# Patient Record
Sex: FEMALE | Race: WHITE | NOT HISPANIC OR LATINO | Employment: OTHER | URBAN - METROPOLITAN AREA
[De-identification: names, ages, dates, MRNs, and addresses within clinical notes are randomized per-mention and may not be internally consistent; named-entity substitution may affect disease eponyms.]

---

## 2017-12-19 ENCOUNTER — OFFICE VISIT (OUTPATIENT)
Dept: FAMILY MEDICINE | Facility: CLINIC | Age: 57
End: 2017-12-19
Payer: OTHER GOVERNMENT

## 2017-12-19 VITALS
TEMPERATURE: 98 F | OXYGEN SATURATION: 97 % | SYSTOLIC BLOOD PRESSURE: 140 MMHG | WEIGHT: 154 LBS | DIASTOLIC BLOOD PRESSURE: 72 MMHG | HEART RATE: 80 BPM | BODY MASS INDEX: 24.75 KG/M2 | HEIGHT: 66 IN

## 2017-12-19 DIAGNOSIS — R31.9 URINARY TRACT INFECTION WITH HEMATURIA, SITE UNSPECIFIED: Primary | ICD-10-CM

## 2017-12-19 DIAGNOSIS — R50.9 FEVER, UNSPECIFIED FEVER CAUSE: ICD-10-CM

## 2017-12-19 DIAGNOSIS — E86.0 DEHYDRATION: ICD-10-CM

## 2017-12-19 DIAGNOSIS — R21 RASH AND NONSPECIFIC SKIN ERUPTION: ICD-10-CM

## 2017-12-19 DIAGNOSIS — N39.0 URINARY TRACT INFECTION WITH HEMATURIA, SITE UNSPECIFIED: Primary | ICD-10-CM

## 2017-12-19 PROBLEM — L57.0 ACTINIC KERATOSIS: Status: ACTIVE | Noted: 2017-12-19

## 2017-12-19 PROBLEM — M47.812 CERVICAL ARTHRITIS: Status: ACTIVE | Noted: 2017-12-19

## 2017-12-19 PROBLEM — M15.9 GENERALIZED OSTEOARTHRITIS: Status: ACTIVE | Noted: 2017-12-19

## 2017-12-19 PROBLEM — R03.0 PREHYPERTENSION: Status: ACTIVE | Noted: 2017-12-19

## 2017-12-19 PROBLEM — F41.9 ANXIETY: Status: ACTIVE | Noted: 2017-12-19

## 2017-12-19 PROBLEM — Z78.0 MENOPAUSE PRESENT: Status: ACTIVE | Noted: 2017-12-19

## 2017-12-19 PROBLEM — G47.33 OBSTRUCTIVE SLEEP APNEA SYNDROME: Status: ACTIVE | Noted: 2017-12-19

## 2017-12-19 PROBLEM — M25.50 ARTHRALGIA: Status: ACTIVE | Noted: 2017-12-19

## 2017-12-19 PROBLEM — R76.8 ELEVATED RHEUMATOID FACTOR: Status: ACTIVE | Noted: 2017-12-19

## 2017-12-19 PROCEDURE — 96372 THER/PROPH/DIAG INJ SC/IM: CPT | Mod: ,,, | Performed by: NURSE PRACTITIONER

## 2017-12-19 PROCEDURE — 99214 OFFICE O/P EST MOD 30 MIN: CPT | Mod: 25,,, | Performed by: NURSE PRACTITIONER

## 2017-12-19 RX ORDER — DEXAMETHASONE SODIUM PHOSPHATE 4 MG/ML
8 INJECTION, SOLUTION INTRA-ARTICULAR; INTRALESIONAL; INTRAMUSCULAR; INTRAVENOUS; SOFT TISSUE
Status: COMPLETED | OUTPATIENT
Start: 2017-12-19 | End: 2017-12-19

## 2017-12-19 RX ORDER — BETAMETHASONE DIPROPIONATE 0.5 MG/G
CREAM TOPICAL 2 TIMES DAILY
Qty: 15 G | Refills: 0 | Status: SHIPPED | OUTPATIENT
Start: 2017-12-19 | End: 2019-01-03

## 2017-12-19 RX ORDER — CIPROFLOXACIN 500 MG/1
500 TABLET ORAL 2 TIMES DAILY
Qty: 20 TABLET | Refills: 0 | Status: SHIPPED | OUTPATIENT
Start: 2017-12-19 | End: 2018-06-18 | Stop reason: ALTCHOICE

## 2017-12-19 RX ADMIN — DEXAMETHASONE SODIUM PHOSPHATE 8 MG: 4 INJECTION, SOLUTION INTRA-ARTICULAR; INTRALESIONAL; INTRAMUSCULAR; INTRAVENOUS; SOFT TISSUE at 12:12

## 2017-12-19 NOTE — PROGRESS NOTES
SUBJECTIVE:   HPI:  Flank Pain (fever, night sweats); Rash (under both arms); and Chest Congestion (lung pain)    Patient states she had a stomach virus last week. Vomiting, fever and nausea. The fever and vomiting have gone away. Continues to have nausea, has not gotten her appetite back yet. 2-3 days ago started with blood tinged urine, low back discomfort and burning with urination. Feels worn down.      Rash   This is a new problem. The current episode started in the past 7 days. The problem is unchanged. The affected locations include the back, torso, left axilla and right axilla. The rash is characterized by redness and itchiness. She was exposed to nothing. Pertinent negatives include no eye pain or vomiting. Past treatments include nothing.   Urinary Tract Infection    This is a new problem. The current episode started in the past 7 days. The problem occurs every urination. The problem has been gradually worsening. The quality of the pain is described as burning. The maximum temperature recorded prior to her arrival was 100 - 100.9 F. There is no history of pyelonephritis. Associated symptoms include flank pain, frequency, nausea and rash. Pertinent negatives include no chills or vomiting. She has tried nothing for the symptoms.   Nausea   This is a new problem. The current episode started in the past 7 days. The problem occurs daily. The problem has been gradually improving. Associated symptoms include nausea and a rash. Pertinent negatives include no chills, diaphoresis, joint swelling or vomiting. The symptoms are aggravated by eating. She has tried nothing for the symptoms.       (Not in a hospital admission)  Review of patient's allergies indicates:   Allergen Reactions    Erythromycin Anaphylaxis     Current Outpatient Prescriptions on File Prior to Visit   Medication Sig Dispense Refill    alprazolam (XANAX) 0.5 MG tablet Take 0.5 mg by mouth 2 (two) times daily as needed for Anxiety.       "cholecalciferol, vitamin D3, 400 unit Tab Take 1,200 Units by mouth once daily.      fluoxetine (PROZAC) 20 MG tablet Take 40 mg by mouth once daily.      [DISCONTINUED] fish oil-omega-3 fatty acids 300-1,000 mg capsule Take 2 g by mouth once daily.       No current facility-administered medications on file prior to visit.      Past Medical History:   Diagnosis Date    Arthritis     Cataract of left eye     Sleep apnea     cpap     History reviewed. No pertinent surgical history.  History reviewed. No pertinent family history.  Social History   Substance Use Topics    Smoking status: Former Smoker     Packs/day: 0.50     Years: 12.00    Smokeless tobacco: Not on file    Alcohol use Yes      Comment: occasional      The patient has no Health Maintenance topics    There is no immunization history on file for this patient.    Review of Systems   Constitutional: Negative for appetite change, chills, diaphoresis and unexpected weight change.   HENT: Negative for ear discharge, hearing loss, trouble swallowing and voice change.    Eyes: Negative for photophobia and pain.   Respiratory: Negative for chest tightness and stridor.    Gastrointestinal: Positive for nausea. Negative for blood in stool and vomiting.   Endocrine: Negative for cold intolerance and heat intolerance.   Genitourinary: Positive for dysuria, flank pain and frequency. Negative for difficulty urinating.   Musculoskeletal: Negative for joint swelling and neck stiffness.   Skin: Positive for rash. Negative for pallor.   Neurological: Negative for speech difficulty.   Hematological: Does not bruise/bleed easily.   Psychiatric/Behavioral: Negative for confusion.      OBJECTIVE:      Vitals:    12/19/17 1132   BP: (!) 140/72   Pulse: 80   Temp: 98.2 °F (36.8 °C)   TempSrc: Oral   SpO2: 97%   Weight: 69.9 kg (154 lb)   Height: 5' 6" (1.676 m)     Physical Exam   Constitutional: She is oriented to person, place, and time. She appears well-developed and " well-nourished.   HENT:   Head: Atraumatic.   Nose: Rhinorrhea present. Mucosal edema: turbinates erythematous and swollen.   Mouth/Throat: Uvula is midline. Mucous membranes are dry. No tonsillar exudate.   Eyes: Conjunctivae are normal.   Neck: Neck supple.   Cardiovascular: Normal rate, regular rhythm, normal heart sounds and intact distal pulses.    No murmur heard.  Pulmonary/Chest: Effort normal and breath sounds normal. She has no wheezes. She has no rhonchi. She has no rales.   Abdominal: Soft. Bowel sounds are normal. She exhibits no distension. There is no hepatosplenomegaly. There is tenderness in the suprapubic area. There is no rigidity, no rebound, no guarding and no CVA tenderness.   Musculoskeletal: Normal range of motion.   Lymphadenopathy:     She has no cervical adenopathy.     She has no axillary adenopathy.   Neurological: She is alert and oriented to person, place, and time.   Skin: Skin is warm, dry and intact. Rash noted.   Diffuse erythematous rash to bilat axilla, back and chest   Psychiatric: She has a normal mood and affect.   Nursing note and vitals reviewed.     Assessment:       1. Urinary tract infection with hematuria, site unspecified    2. Fever, unspecified fever cause    3. Rash and nonspecific skin eruption    4. Dehydration        Plan:       Urinary tract infection with hematuria, site unspecified  -     ciprofloxacin HCl (CIPRO) 500 MG tablet; Take 1 tablet (500 mg total) by mouth 2 (two) times daily.  Dispense: 20 tablet; Refill: 0  -     Urinalysis, Complete with Reflex To Culture; Future; Expected date: 12/19/2017    Fever, unspecified fever cause  -     CBC auto differential; Future; Expected date: 12/19/2017  -     Comprehensive metabolic panel; Future; Expected date: 12/19/2017    Rash and nonspecific skin eruption  -     dexamethasone injection 8 mg; Inject 2 mLs (8 mg total) into the muscle one time.  -     betamethasone dipropionate (DIPROLENE) 0.05 % cream; Apply  topically 2 (two) times daily.  Dispense: 15 g; Refill: 0    Dehydration   Discussed with patient rehydration with Gatorade now that she is able to keep down food and fluids. Voiced understanding    Return in about 2 years (around 12/19/2019) for uti.      12/19/2017 YULIANA Farooq, FNP

## 2017-12-19 NOTE — PATIENT INSTRUCTIONS

## 2017-12-20 LAB
ALBUMIN SERPL-MCNC: 3.4 G/DL (ref 3.1–4.7)
ALP SERPL-CCNC: 93 IU/L (ref 40–104)
ALT (SGPT): 37 IU/L (ref 3–33)
AST SERPL-CCNC: 28 IU/L (ref 10–40)
BASOPHILS NFR BLD: 0 K/UL (ref 0–0.2)
BASOPHILS NFR BLD: 0.4 %
BILIRUB SERPL-MCNC: 0.6 MG/DL (ref 0.3–1)
BUN SERPL-MCNC: 14 MG/DL (ref 8–20)
CALCIUM SERPL-MCNC: 9.3 MG/DL (ref 7.7–10.4)
CHLORIDE: 103 MMOL/L (ref 98–110)
CO2 SERPL-SCNC: 28.6 MMOL/L (ref 22.8–31.6)
CREATININE: 0.49 MG/DL (ref 0.6–1.4)
EOSINOPHIL NFR BLD: 0 K/UL (ref 0–0.7)
EOSINOPHIL NFR BLD: 0.2 %
ERYTHROCYTE [DISTWIDTH] IN BLOOD BY AUTOMATED COUNT: 12.5 % (ref 11.7–14.9)
GLUCOSE: 96 MG/DL (ref 70–99)
GRAN #: 8.4 K/UL (ref 1.4–6.5)
GRAN%: 74.4 %
HCT VFR BLD AUTO: 35.8 % (ref 36–48)
HGB BLD-MCNC: 11.9 G/DL (ref 12–15)
IMMATURE GRANS (ABS): 0.5 K/UL (ref 0–1)
IMMATURE GRANULOCYTES: 4.1 %
LYMPH #: 1.4 K/UL (ref 1.2–3.4)
LYMPH%: 12.4 %
MCH RBC QN AUTO: 30.7 PG (ref 25–35)
MCHC RBC AUTO-ENTMCNC: 33.2 G/DL (ref 31–36)
MCV RBC AUTO: 92.3 FL (ref 79–98)
MONO #: 1 K/UL (ref 0.1–0.6)
MONO%: 8.5 %
NUCLEATED RBCS: 0 %
PLATELET # BLD AUTO: 297 K/UL (ref 140–440)
PMV BLD AUTO: 9.8 FL (ref 8.8–12.7)
POTASSIUM SERPL-SCNC: 3.9 MMOL/L (ref 3.5–5)
PROT SERPL-MCNC: 6.8 G/DL (ref 6–8.2)
RBC # BLD AUTO: 3.88 M/UL (ref 3.5–5.5)
SODIUM: 141 MMOL/L (ref 134–144)
WBC # BLD AUTO: 11.3 K/UL (ref 5–10)

## 2017-12-21 ENCOUNTER — TELEPHONE (OUTPATIENT)
Dept: FAMILY MEDICINE | Facility: CLINIC | Age: 57
End: 2017-12-21

## 2017-12-21 DIAGNOSIS — D72.829 LEUKOCYTOSIS, UNSPECIFIED TYPE: Primary | ICD-10-CM

## 2017-12-21 NOTE — TELEPHONE ENCOUNTER
----- Message from Allen Callahan NP sent at 12/21/2017  1:45 PM CST -----  cmp u/a test ok. Repeat cbc in 2 weeks.

## 2018-03-13 ENCOUNTER — OFFICE VISIT (OUTPATIENT)
Dept: FAMILY MEDICINE | Facility: CLINIC | Age: 58
End: 2018-03-13
Payer: OTHER GOVERNMENT

## 2018-03-13 VITALS
DIASTOLIC BLOOD PRESSURE: 72 MMHG | HEIGHT: 66 IN | SYSTOLIC BLOOD PRESSURE: 130 MMHG | WEIGHT: 156 LBS | HEART RATE: 76 BPM | OXYGEN SATURATION: 98 % | BODY MASS INDEX: 25.07 KG/M2

## 2018-03-13 DIAGNOSIS — L98.9 SKIN LESION OF FACE: ICD-10-CM

## 2018-03-13 DIAGNOSIS — Z12.11 ENCOUNTER FOR SCREENING COLONOSCOPY: ICD-10-CM

## 2018-03-13 DIAGNOSIS — N39.498 OTHER URINARY INCONTINENCE: Primary | ICD-10-CM

## 2018-03-13 PROCEDURE — 99213 OFFICE O/P EST LOW 20 MIN: CPT | Mod: ,,, | Performed by: NURSE PRACTITIONER

## 2018-03-13 NOTE — PROGRESS NOTES
SUBJECTIVE:      Patient ID: Luann Borjas is a 57 y.o. female.    Chief Complaint: Mass (left side of face) and Urinary Incontinence (at night only )    Pt states she has a skin lesion on the left side of her face. Has had one on her right cheek previously removed per Dr Weil. Needs referral   C/o urinary incontinence at night for over a year. Would like referral to urology for work up        History reviewed. No pertinent surgical history.  History reviewed. No pertinent family history.   Social History     Social History    Marital status:      Spouse name: N/A    Number of children: N/A    Years of education: N/A     Social History Main Topics    Smoking status: Former Smoker     Packs/day: 0.50     Years: 12.00    Smokeless tobacco: Never Used    Alcohol use Yes      Comment: occasional    Drug use: No    Sexual activity: Not Asked     Other Topics Concern    None     Social History Narrative    None     Current Outpatient Prescriptions   Medication Sig Dispense Refill    alprazolam (XANAX) 0.5 MG tablet Take 0.5 mg by mouth 2 (two) times daily as needed for Anxiety.      fluoxetine (PROZAC) 20 MG tablet Take 40 mg by mouth once daily.      betamethasone dipropionate (DIPROLENE) 0.05 % cream Apply topically 2 (two) times daily. 15 g 0    cholecalciferol, vitamin D3, 400 unit Tab Take 1,200 Units by mouth once daily.      ciprofloxacin HCl (CIPRO) 500 MG tablet Take 1 tablet (500 mg total) by mouth 2 (two) times daily. 20 tablet 0     No current facility-administered medications for this visit.      Review of patient's allergies indicates:   Allergen Reactions    Erythromycin Anaphylaxis      Past Medical History:   Diagnosis Date    Arthritis     Cataract of left eye     Sleep apnea     cpap     History reviewed. No pertinent surgical history.    Review of Systems   Constitutional: Negative for appetite change, chills, diaphoresis and unexpected weight change.   HENT: Negative for  "ear discharge, hearing loss, trouble swallowing and voice change.    Eyes: Negative for photophobia and pain.   Respiratory: Negative for chest tightness and stridor.    Cardiovascular: Negative for chest pain.   Gastrointestinal: Negative for blood in stool and vomiting.   Endocrine: Negative for cold intolerance and heat intolerance.   Genitourinary: Negative for difficulty urinating and flank pain.   Musculoskeletal: Negative for joint swelling and neck stiffness.   Skin: Negative for pallor.   Neurological: Negative for speech difficulty.   Hematological: Does not bruise/bleed easily.   Psychiatric/Behavioral: Negative for confusion.      OBJECTIVE:      Vitals:    03/13/18 1004   BP: 130/72   Pulse: 76   SpO2: 98%   Weight: 70.8 kg (156 lb)   Height: 5' 6" (1.676 m)     Physical Exam   Constitutional: She is oriented to person, place, and time. She appears well-developed and well-nourished.   HENT:   Head: Atraumatic.   Eyes: Conjunctivae are normal.   Neck: Neck supple.   Cardiovascular: Normal rate, regular rhythm, normal heart sounds and intact distal pulses.    Pulmonary/Chest: Effort normal and breath sounds normal.   Abdominal: Soft. Bowel sounds are normal. She exhibits no distension.   Musculoskeletal: Normal range of motion.   Neurological: She is alert and oriented to person, place, and time.   Skin: Skin is warm and dry. Lesion (left cheek with raised scaled skin lesion) noted.   Psychiatric: She has a normal mood and affect.   Nursing note and vitals reviewed.     Assessment:       1. Other urinary incontinence    2. Skin lesion of face    3. Encounter for screening colonoscopy        Plan:       Other urinary incontinence  -     Ambulatory referral to Urology    Skin lesion of face  -     Ambulatory referral to Dermatology    Encounter for screening colonoscopy  -     Ambulatory Referral to Gastroenterology        Follow-up if symptoms worsen or fail to improve.      3/13/2018 Allen Callahan, APRN, " FNP

## 2018-03-13 NOTE — PATIENT INSTRUCTIONS
4 Steps for Eating Healthier  Changing the way you eat can improve your health. It can lower your cholesterol and blood pressure, and help you stay at a healthy weight. Your diet doesnt have to be bland and boring to be healthy. Just watch your calories and follow these steps:    1. Eat fewer unhealthy fats  · Choose more fish and lean meats instead of fatty cuts of meat.  · Skip butter and lard, and use less margarine.  · Pass on foods that have palm, coconut, or hydrogenated oils.  · Eat fewer high-fat dairy foods like cheese, ice cream, and whole milk.  · Get a heart-healthy cookbook and try some low-fat recipes.  2. Go light on salt  · Keep the saltshaker off the table.  · Limit high-salt ingredients, such as soy sauce, bouillon, and garlic salt.  · Instead of adding salt when cooking, season your food with herbs and flavorings. Try lemon, garlic, and onion.  · Limit convenience foods, such as boxed or canned foods and restaurant food.  · Read food labels and choose lower-sodium options.  3. Limit sugar  · Pause before you add sugars to pancakes, cereal, coffee, or tea. This includes white and brown table sugar, syrup, honey, and molasses. Cut your usual amount by half.  · Use non-sugar sweeteners. Stevia, aspartame, and sucralose can satisfy a sweet tooth without adding calories.  · Swap out sugar-filled soda and other drinks. Buy sugar-free or low-calorie beverages. Remember water is always the best choice.  · Read labels and choose foods with less added sugar. Keep in mind that dairy foods and foods with fruit will have some natural sugar.  · Cut the sugar in recipes by 1/3 to 1/2. Boost the flavor with extracts like almond, vanilla, or orange. Or add spices such as cinnamon or nutmeg.  4. Eat more fiber  · Eat fresh fruits and vegetables every day.  · Boost your diet with whole grains. Go for oats, whole-grain rice, and bran.  · Add beans and lentils to your meals.  · Drink more water to match your fiber  increase. This is to help prevent constipation.  Date Last Reviewed: 5/11/2015  © 2173-7981 [employer]. [address] [place] [address], [place] [address]. All rights reserved. This information is not intended as a substitute for professional medical care. Always follow your healthcare professional's instructions.

## 2018-04-19 ENCOUNTER — TELEPHONE (OUTPATIENT)
Dept: FAMILY MEDICINE | Facility: CLINIC | Age: 58
End: 2018-04-19

## 2018-04-19 DIAGNOSIS — F43.0 ACUTE STRESS REACTION: ICD-10-CM

## 2018-04-19 DIAGNOSIS — F41.9 ANXIETY: ICD-10-CM

## 2018-04-19 DIAGNOSIS — F33.2 SEVERE EPISODE OF RECURRENT MAJOR DEPRESSIVE DISORDER, WITHOUT PSYCHOTIC FEATURES: ICD-10-CM

## 2018-04-19 DIAGNOSIS — F33.2 SEVERE RECURRENT MAJOR DEPRESSION WITHOUT PSYCHOTIC FEATURES: Primary | ICD-10-CM

## 2018-06-18 ENCOUNTER — OFFICE VISIT (OUTPATIENT)
Dept: FAMILY MEDICINE | Facility: CLINIC | Age: 58
End: 2018-06-18
Payer: OTHER GOVERNMENT

## 2018-06-18 VITALS
HEIGHT: 66 IN | TEMPERATURE: 99 F | WEIGHT: 155 LBS | HEART RATE: 71 BPM | BODY MASS INDEX: 24.91 KG/M2 | DIASTOLIC BLOOD PRESSURE: 86 MMHG | SYSTOLIC BLOOD PRESSURE: 132 MMHG | OXYGEN SATURATION: 96 %

## 2018-06-18 DIAGNOSIS — M26.609 TMJ (TEMPOROMANDIBULAR JOINT SYNDROME): Primary | ICD-10-CM

## 2018-06-18 PROCEDURE — 99213 OFFICE O/P EST LOW 20 MIN: CPT | Mod: ,,, | Performed by: NURSE PRACTITIONER

## 2018-06-18 RX ORDER — BACLOFEN 10 MG/1
10 TABLET ORAL 3 TIMES DAILY
Qty: 90 TABLET | Refills: 0 | Status: SHIPPED | OUTPATIENT
Start: 2018-06-18 | End: 2019-01-03

## 2018-06-18 RX ORDER — OXYBUTYNIN CHLORIDE 10 MG/1
TABLET, EXTENDED RELEASE ORAL
COMMUNITY
Start: 2018-06-01 | End: 2019-01-03

## 2018-06-18 NOTE — PROGRESS NOTES
SUBJECTIVE:      Patient ID: Luann Borjas is a 58 y.o. female.    Chief Complaint: Otalgia (right)    Complaining of right ear pain for about a week. The pain travels into the right side of her neck. Denies cough, cold or congestion.       Otalgia    There is pain in the right ear. This is a new problem. The current episode started in the past 7 days. The problem has been unchanged. There has been no fever. The pain is mild. Associated symptoms include neck pain. Pertinent negatives include no coughing, ear discharge, hearing loss or vomiting. She has tried nothing for the symptoms.       History reviewed. No pertinent surgical history.  History reviewed. No pertinent family history.   Social History     Social History    Marital status:      Spouse name: N/A    Number of children: N/A    Years of education: N/A     Social History Main Topics    Smoking status: Former Smoker     Packs/day: 0.50     Years: 12.00    Smokeless tobacco: Never Used    Alcohol use Yes      Comment: occasional    Drug use: No    Sexual activity: Not Asked     Other Topics Concern    None     Social History Narrative    None     Current Outpatient Prescriptions   Medication Sig Dispense Refill    alprazolam (XANAX) 0.5 MG tablet Take 0.5 mg by mouth 2 (two) times daily as needed for Anxiety.      betamethasone dipropionate (DIPROLENE) 0.05 % cream Apply topically 2 (two) times daily. 15 g 0    fluoxetine (PROZAC) 20 MG tablet Take 40 mg by mouth once daily.      oxybutynin (DITROPAN-XL) 10 MG 24 hr tablet       baclofen (LIORESAL) 10 MG tablet Take 1 tablet (10 mg total) by mouth 3 (three) times daily. 90 tablet 0     No current facility-administered medications for this visit.      Review of patient's allergies indicates:   Allergen Reactions    Erythromycin Anaphylaxis      Past Medical History:   Diagnosis Date    Arthritis     Cataract of left eye     Sleep apnea     cpap     History reviewed. No  "pertinent surgical history.    Review of Systems   Constitutional: Negative for appetite change, chills, diaphoresis and unexpected weight change.   HENT: Positive for ear pain. Negative for ear discharge, hearing loss, trouble swallowing and voice change.    Eyes: Negative for photophobia and pain.   Respiratory: Negative for cough, chest tightness and stridor.    Cardiovascular: Negative for chest pain.   Gastrointestinal: Negative for blood in stool and vomiting.   Endocrine: Negative for cold intolerance and heat intolerance.   Genitourinary: Negative for difficulty urinating and flank pain.   Musculoskeletal: Positive for neck pain. Negative for joint swelling and neck stiffness.   Skin: Negative for pallor.   Neurological: Negative for speech difficulty.   Hematological: Does not bruise/bleed easily.   Psychiatric/Behavioral: Negative for confusion.      OBJECTIVE:      Vitals:    06/18/18 1124   BP: (!) 140/90   Pulse: 71   Temp: 98.8 °F (37.1 °C)   TempSrc: Oral   SpO2: 96%   Weight: 70.3 kg (155 lb)   Height: 5' 6" (1.676 m)     Physical Exam   Constitutional: She is oriented to person, place, and time. She appears well-developed and well-nourished.   HENT:   Head: Atraumatic.   Right Ear: Tympanic membrane normal.   Left Ear: Tympanic membrane normal.   Nose: Nose normal.   Mouth/Throat: Uvula is midline, oropharynx is clear and moist and mucous membranes are normal. No tonsillar exudate.   Right tmj and temporal tenderness   Eyes: Conjunctivae are normal.   Neck: Neck supple.   Cardiovascular: Normal rate, regular rhythm, normal heart sounds and intact distal pulses.    Pulmonary/Chest: Effort normal and breath sounds normal.   Abdominal: Soft. Bowel sounds are normal. She exhibits no distension.   Musculoskeletal: Normal range of motion.   Neurological: She is alert and oriented to person, place, and time.   Skin: Skin is warm and dry.   Psychiatric: She has a normal mood and affect.   Nursing note and " vitals reviewed.     Assessment:       1. TMJ (temporomandibular joint syndrome)        Plan:       TMJ (temporomandibular joint syndrome)  -   start  baclofen (LIORESAL) 10 MG tablet; Take 1 tablet (10 mg total) by mouth 3 (three) times daily.  Dispense: 90 tablet; Refill: 0        Follow-up if symptoms worsen or fail to improve.      6/18/2018 YULIANA Farooq, FNP

## 2018-06-18 NOTE — PATIENT INSTRUCTIONS
Myofascial Pain Syndrome  Your pain is caused by a state of chronic muscle tension. This condition is called by various names: myofascial pain, fibrositis, and trigger point pain. This can also be due to mechanical stress, such as working at a computer terminal for long periods or work that requires  TMJ Syndrome  The temporomandibular joint (TMJ) is the joint that connects your lower jaw to your head. You can feel it in front of your ears when you open and close your mouth. TMJ disorders involve chronic or recurrent pain in the joint. When treated, symptoms of TMJ disorders usually go away within a few months.  Causes  There is no widely agreed-on cause of TMJ disorders. They have been linked to injury, arthritis, chronic fatigue syndrome, and fibromyalgia. A definite connection has not been shown, though.  Symptoms  · Pain in the face, jaw, or neck  · Pain with jaw movement or chewing  · Locking or catching sensation of the jaw  · Clicking, popping, or grinding sounds with movement of the TMJ  · Headache  · Ear pain  Home care  Modest, nonsurgical treatments are a good first step toward relieving symptoms. Try the approaches described below.  · Rest the jaw by avoiding crunchy or hard-to-chew foods. Do not eat hard or sticky candies. Soft foods and liquids are easier on the jaw.  · Protect your jaw while yawning. If you need to yawn, put your fist under your chin to prevent your mouth from opening up too wide.  · To help relieve pain, try applying hot or cold packs to the painful area. Try both hot and cold to find out which works best for you. If you use hot packs (small towels soaked in hot water), be careful not to burn yourself.  · You may take acetaminophen or ibuprofen for pain, unless you were given a different pain medicine. (Note: If you have chronic liver or kidney disease or have ever had a stomach ulcer or gastrointestinal bleeding, talk with your healthcare provider before using these medicines.  Also talk to your provider if you are taking medicine to prevent blood clots.) Aspirin should never be given to anyone younger than 18 years of age who is ill with a viral infection or fever. It may cause severe liver or brain damage.  Reducing stress  If stress seems to be contributing to your symptoms, try to identify the sources of stress in your life. These arent always obvious. Common stressors include:  · Everyday hassles (which can add up), such as traffic jams, missed appointments, or car trouble  · Major life changes, both good (such as a new baby or job promotion) and bad (loss of job or loss of a loved one)  · Overload: The feeling that you have too many responsibilities and can't take care of everything at once  · Helplessness: Feeling like your problems are more than you can solve  When possible, do something about your sources of stress. See if you can avoid hassles, limit the amount of change in your life at one time, and take breaks when you feel overloaded.  Unfortunately, many stressful situations cannot be avoided. So learning how to manage stress better is very important. Getting regular exercise, eating nutritious, balanced meals, and getting adequate rest all help to make everyday stress more manageable. Certain techniques are also helpful: relaxation and breathing exercises, visualization, biofeedback, meditation, or simply taking some time out to clear your mind. For more information, talk with your healthcare provider.  Follow-up care  Follow up with your healthcare provider, or as advised. Further testing and additional treatment may be required. If changes to your lifestyle do not improve your symptoms, talk with your healthcare provider about other available therapies. These include bite guards for help with teeth grinding, stress management techniques, and more. If stress is an important factor and does not respond to the above simple measures, talk to your doctor about a referral for  stress management.  · If X-rays were done, they will be reviewed by a specialist. You will be notified of the results, especially if they affect treatment.  Call 911  Call emergency services right away if any of these occur:  · Trouble breathing or swallowing, wheezing  · Confusion  · Extreme drowsiness or trouble awakening  · Fainting or loss of consciousness  · Rapid heart rate  When to seek medical advice  Call your healthcare provider right away if any of these occur:  · Your face becomes swollen or red.  · Your pain worsens.  · You have increasing neck, mouth, tooth, or throat pain.  · You develop a fever of 100.4F (38°C) or higher  Date Last Reviewed: 7/30/2015  © 7011-2061 [employer]. [address] [place] [address], [place] [address]. All rights reserved. This information is not intended as a substitute for professional medical care. Always follow your healthcare professional's instructions.      repetitive motions of the arms or hands. It can also be caused by emotional stress, such as problems on the job or in your personal life. Sometimes there is no obvious cause. The pain can happen in the area of the muscle spasm or at a site distant to it. For example, spasm of a neck muscle can cause headache. Spasm of the muscle near the shoulder blade can cause pain shooting down the arm.  Home care  · Try to identify the factors that may be causing your problem and change them:  ¨ If you feel that emotional stress is a cause of your pain, learn methods to better deal with the stress in your life. These may include regular exercise, muscle relaxation techniques, meditation, or simply taking time out for yourself. Talk with your healthcare provider or go to a local bookstore and review the many books and tapes about reducing stress.  ¨ If you feel that physical stress is a cause for your pain, try to change any poor work habits.  · You may use over-the-counter pain medicine to control pain, unless another  medicine was prescribed. If you have chronic liver or kidney disease or ever had a stomach ulcer or GI bleeding, talk with your healthcare provider before using these medicines.  · Apply an ice pack over the injured area for 15 to 20 minutes every 3 to 6 hours. You should do this for the first 24 to 48 hours. You can make an ice pack by filling a plastic bag that seals at the top with ice cubes and then wrapping it with a thin towel. Be careful not to injure your skin with the ice treatments. Ice should never be applied directly to skin. Continue the use of ice packs for relief of pain and swelling as needed. After 48 hours, apply heat (warm shower or warm bath) for 15 to 20 minutes several times a day, or alternate ice and heat.  · Massage the trigger point and stretch out the muscle. Trigger point massage can be done by applying heat to the area to warm and prepare the muscle. Then have someone apply steady thumb pressure directly on the knot in the muscle for 30 seconds. Release the pressure, then massage the surrounding muscle. Repeat the process, applying more pressure to the trigger point each time. Do this up to the limit of pain. With each treatment, the trigger point should become less tender and the pain should decrease. You can apply local pressure to trigger points in the back by lying on the floor with a tennis ball under the trigger point.  Follow-up care  Follow up with your healthcare provider, or as advised. It may be necessary for you to receive physical therapy if you dont respond to home treatment alone.  Call 911  Call 911 if you have:  · A trigger point in the chest muscles, pain that becomes more severe, lasts longer, or spreads into your shoulder, arm, or jaw  · Chest pain or discomfort  · Trouble breathing with or without chest discomfort   · Sweating, lightheadedness, nausea, or vomiting along with chest discomfort  · Sudden weakness in the arm, leg, or face, especially if this happens on  one side of the body  When to seek medical advice  Call your healthcare provider right away if any of these occur:  · A week passes and you have not improved  · If you develop weakness or numbness in an extremity  · If your pain worsens, regardless of its location  Date Last Reviewed: 11/23/2015  © 0773-3157 [employer]. [address] [place] [address], [place] [address]. All rights reserved. This information is not intended as a substitute for professional medical care. Always follow your healthcare professional's instructions.

## 2019-01-03 ENCOUNTER — OFFICE VISIT (OUTPATIENT)
Dept: FAMILY MEDICINE | Facility: CLINIC | Age: 59
End: 2019-01-03
Payer: OTHER GOVERNMENT

## 2019-01-03 VITALS
SYSTOLIC BLOOD PRESSURE: 136 MMHG | DIASTOLIC BLOOD PRESSURE: 80 MMHG | HEIGHT: 66 IN | HEART RATE: 76 BPM | BODY MASS INDEX: 25.23 KG/M2 | WEIGHT: 157 LBS | OXYGEN SATURATION: 96 %

## 2019-01-03 DIAGNOSIS — S52.502S CLOSED FRACTURE OF DISTAL END OF LEFT RADIUS, UNSPECIFIED FRACTURE MORPHOLOGY, SEQUELA: Primary | ICD-10-CM

## 2019-01-03 DIAGNOSIS — Z00.00 PREVENTATIVE HEALTH CARE: ICD-10-CM

## 2019-01-03 DIAGNOSIS — Z78.0 POST-MENOPAUSAL: ICD-10-CM

## 2019-01-03 DIAGNOSIS — K59.00 CONSTIPATION, UNSPECIFIED CONSTIPATION TYPE: ICD-10-CM

## 2019-01-03 PROCEDURE — 99214 PR OFFICE/OUTPT VISIT, EST, LEVL IV, 30-39 MIN: ICD-10-PCS | Mod: ,,, | Performed by: NURSE PRACTITIONER

## 2019-01-03 PROCEDURE — 99214 OFFICE O/P EST MOD 30 MIN: CPT | Mod: ,,, | Performed by: NURSE PRACTITIONER

## 2019-01-03 RX ORDER — ONDANSETRON 4 MG/1
1 TABLET, ORALLY DISINTEGRATING ORAL EVERY 6 HOURS PRN
Refills: 0 | COMMUNITY
Start: 2018-12-29 | End: 2019-08-02

## 2019-01-03 RX ORDER — HYDROCODONE BITARTRATE AND ACETAMINOPHEN 5; 325 MG/1; MG/1
1 TABLET ORAL EVERY 6 HOURS PRN
Refills: 0 | COMMUNITY
Start: 2018-12-29 | End: 2019-08-02

## 2019-01-03 RX ORDER — DOCUSATE SODIUM 100 MG/1
100 CAPSULE, LIQUID FILLED ORAL 2 TIMES DAILY
Refills: 0 | COMMUNITY
Start: 2019-01-03 | End: 2019-08-02

## 2019-01-03 NOTE — PATIENT INSTRUCTIONS
4 Steps for Eating Healthier  Changing the way you eat can improve your health. It can lower your cholesterol and blood pressure, and help you stay at a healthy weight. Your diet doesnt have to be bland and boring to be healthy. Just watch your calories and follow these steps:    1. Eat fewer unhealthy fats  · Choose more fish and lean meats instead of fatty cuts of meat.  · Skip butter and lard, and use less margarine.  · Pass on foods that have palm, coconut, or hydrogenated oils.  · Eat fewer high-fat dairy foods like cheese, ice cream, and whole milk.  · Get a heart-healthy cookbook and try some low-fat recipes.  2. Go light on salt  · Keep the saltshaker off the table.  · Limit high-salt ingredients, such as soy sauce, bouillon, and garlic salt.  · Instead of adding salt when cooking, season your food with herbs and flavorings. Try lemon, garlic, and onion.  · Limit convenience foods, such as boxed or canned foods and restaurant food.  · Read food labels and choose lower-sodium options.  3. Limit sugar  · Pause before you add sugars to pancakes, cereal, coffee, or tea. This includes white and brown table sugar, syrup, honey, and molasses. Cut your usual amount by half.  · Use non-sugar sweeteners. Stevia, aspartame, and sucralose can satisfy a sweet tooth without adding calories.  · Swap out sugar-filled soda and other drinks. Buy sugar-free or low-calorie beverages. Remember water is always the best choice.  · Read labels and choose foods with less added sugar. Keep in mind that dairy foods and foods with fruit will have some natural sugar.  · Cut the sugar in recipes by 1/3 to 1/2. Boost the flavor with extracts like almond, vanilla, or orange. Or add spices such as cinnamon or nutmeg.  4. Eat more fiber  · Eat fresh fruits and vegetables every day.  · Boost your diet with whole grains. Go for oats, whole-grain rice, and bran.  · Add beans and lentils to your meals.  · Drink more water to match your fiber  increase. This is to help prevent constipation.  Date Last Reviewed: 5/11/2015  © 8051-2502 [employer], [facility]. [address] [place] [address], [place] [address]. All rights reserved. This information is not intended as a substitute for professional medical care. Always follow your healthcare professional's instructions.

## 2019-01-03 NOTE — PROGRESS NOTES
SUBJECTIVE:      Patient ID: Luann Borjas is a 58 y.o. female.    Chief Complaint: Hospital Follow Up ([facility]- wrist fracture)    [facility] ER f/u for left distal radius and ulna fracture. She is following with Dr Ramos, has an appt tomorrow morning. Her blood pressure was elevated in the ER and was told to f/u with PCP for recheck. She is having constipation since starting the pain medication.     Arm Injury    The incident occurred 3 to 5 days ago. The incident occurred at home. The injury mechanism was a fall. The pain is present in the left wrist. The quality of the pain is described as aching. The pain is mild. The pain has been improving since the incident. Pertinent negatives include no chest pain or muscle weakness. She has tried elevation (pain medication) for the symptoms. The treatment provided mild relief.   Constipation   This is a new problem. The current episode started in the past 7 days. The stool is described as firm. The patient is not on a high fiber diet. She does not exercise regularly. There has been adequate water intake. Associated symptoms include flatus. Pertinent negatives include no abdominal pain, back pain, difficulty urinating, fever, nausea or vomiting. Risk factors include change in medication usage/dosage. She has tried nothing for the symptoms.       Past Surgical History:   Procedure Laterality Date    EXTRACTION-CATARACT-IOL Left 10/28/2015    Performed by Carmelo Perez II, MD at [facility] OR     History reviewed. No pertinent family history.   Social History     Socioeconomic History    Marital status:      Spouse name: None    Number of children: None    Years of education: None    Highest education level: None   Social Needs    Financial resource strain: None    Food insecurity - worry: None    Food insecurity - inability: None    Transportation needs - medical: None    Transportation needs - non-medical: None   Occupational History    None   Tobacco Use     Smoking status: Current Every Day Smoker     Packs/day: 0.50     Years: 12.00     Pack years: 6.00     Last attempt to quit: 1/3/2019    Smokeless tobacco: Never Used   Substance and Sexual Activity    Alcohol use: Yes     Comment: occasional    Drug use: No    Sexual activity: None   Other Topics Concern    None   Social History Narrative    None     Current Outpatient Medications   Medication Sig Dispense Refill    alprazolam (XANAX) 0.5 MG tablet Take 0.5 mg by mouth 2 (two) times daily as needed for Anxiety.      fluoxetine (PROZAC) 20 MG tablet Take 40 mg by mouth once daily.      HYDROcodone-acetaminophen (NORCO) 5-325 mg per tablet Take 1 tablet by mouth every 6 (six) hours as needed.  0    ondansetron (ZOFRAN-ODT) 4 MG TbDL Take 1 tablet by mouth every 6 (six) hours as needed.  0     No current facility-administered medications for this visit.      Review of patient's allergies indicates:   Allergen Reactions    Erythromycin Anaphylaxis      Past Medical History:   Diagnosis Date    Arthritis     Cataract of left eye     Sleep apnea     cpap     Past Surgical History:   Procedure Laterality Date    EXTRACTION-CATARACT-IOL Left 10/28/2015    Performed by Carmelo Perez II, MD at [facility] OR       Review of Systems   Constitutional: Negative for appetite change, chills, diaphoresis, fever and unexpected weight change.   HENT: Negative for ear discharge, hearing loss, trouble swallowing and voice change.    Eyes: Negative for photophobia and pain.   Respiratory: Negative for chest tightness, shortness of breath, wheezing and stridor.    Cardiovascular: Negative for chest pain and palpitations.   Gastrointestinal: Positive for constipation and flatus. Negative for abdominal pain, blood in stool, nausea and vomiting.   Endocrine: Negative for cold intolerance and heat intolerance.   Genitourinary: Negative for difficulty urinating and flank pain.   Musculoskeletal: Positive for arthralgias (left wrist  "pain). Negative for back pain, joint swelling and neck stiffness.   Skin: Negative for pallor.   Neurological: Negative for dizziness, speech difficulty, light-headedness and headaches.   Hematological: Does not bruise/bleed easily.   Psychiatric/Behavioral: Negative for confusion.      OBJECTIVE:      Vitals:    01/03/19 0836   BP: 136/80   Pulse: 76   SpO2: 96%   Weight: 71.2 kg (157 lb)   Height: 5' 6" (1.676 m)     Physical Exam   Constitutional: She is oriented to person, place, and time. She appears well-developed and well-nourished.   HENT:   Head: Atraumatic.   Eyes: Conjunctivae are normal.   Neck: Neck supple.   Cardiovascular: Normal rate, regular rhythm, normal heart sounds and intact distal pulses.   Pulmonary/Chest: Effort normal and breath sounds normal.   Abdominal: Soft. Bowel sounds are normal. She exhibits no distension. There is no tenderness.   Musculoskeletal: Normal range of motion.   Left wrist splint noted. Neurovascular intact. Cap refill <3 sec, she can wiggle all fingers, minimal swelling noted.    Neurological: She is alert and oriented to person, place, and time.   Skin: Skin is warm and dry.   Psychiatric: She has a normal mood and affect.   Nursing note and vitals reviewed.   ER records reviewed  Assessment:       1. Closed fracture of distal end of left radius, unspecified fracture morphology, sequela    2. Post-menopausal    3. Preventative health care    4. Constipation, unspecified constipation type        Plan:       Closed fracture of distal end of left radius, unspecified fracture morphology, sequela  F/u with Dr Ramos tomorrow as scheduled    Post-menopausal  -     DXA Bone Density Spine And Hip; Future; Expected date: 01/03/2019    Preventative health care  -     CBC auto differential; Future; Expected date: 01/03/2019  -     Comprehensive metabolic panel; Future; Expected date: 01/03/2019  -     Lipid panel; Future; Expected date: 01/03/2019  -     TSH; Future; Expected " date: 01/03/2019  -     Urinalysis; Future; Expected date: 01/03/2019    Constipation  Colace: Take 1 capsule (100 mg total) by mouth 2 (two) times daily., Starting Thu 1/3/2019, OTC    Follow-up in about 3 months (around 4/3/2019) for wellness .      1/3/2019 YULIANA Farooq, FNP

## 2019-01-15 ENCOUNTER — TELEPHONE (OUTPATIENT)
Dept: FAMILY MEDICINE | Facility: CLINIC | Age: 59
End: 2019-01-15

## 2019-01-15 DIAGNOSIS — S52.502A CLOSED FRACTURE OF DISTAL END OF LEFT RADIUS, UNSPECIFIED FRACTURE MORPHOLOGY, INITIAL ENCOUNTER: Primary | ICD-10-CM

## 2019-01-22 ENCOUNTER — TELEPHONE (OUTPATIENT)
Dept: FAMILY MEDICINE | Facility: CLINIC | Age: 59
End: 2019-01-22

## 2019-04-26 ENCOUNTER — TELEPHONE (OUTPATIENT)
Dept: FAMILY MEDICINE | Facility: CLINIC | Age: 59
End: 2019-04-26

## 2019-04-26 DIAGNOSIS — L98.9 SKIN LESION OF FACE: Primary | ICD-10-CM

## 2019-08-02 ENCOUNTER — OFFICE VISIT (OUTPATIENT)
Dept: FAMILY MEDICINE | Facility: CLINIC | Age: 59
End: 2019-08-02
Payer: OTHER GOVERNMENT

## 2019-08-02 VITALS
OXYGEN SATURATION: 98 % | HEIGHT: 66 IN | WEIGHT: 154.38 LBS | BODY MASS INDEX: 24.81 KG/M2 | HEART RATE: 76 BPM | DIASTOLIC BLOOD PRESSURE: 92 MMHG | SYSTOLIC BLOOD PRESSURE: 150 MMHG

## 2019-08-02 DIAGNOSIS — I10 ESSENTIAL HYPERTENSION: ICD-10-CM

## 2019-08-02 DIAGNOSIS — Z78.0 POST-MENOPAUSAL: ICD-10-CM

## 2019-08-02 DIAGNOSIS — Z12.31 ENCOUNTER FOR SCREENING MAMMOGRAM FOR BREAST CANCER: ICD-10-CM

## 2019-08-02 DIAGNOSIS — L98.9 SKIN LESION OF FACE: Primary | ICD-10-CM

## 2019-08-02 DIAGNOSIS — Z00.00 PREVENTATIVE HEALTH CARE: ICD-10-CM

## 2019-08-02 PROCEDURE — 99214 OFFICE O/P EST MOD 30 MIN: CPT | Mod: S$GLB,,, | Performed by: NURSE PRACTITIONER

## 2019-08-02 PROCEDURE — 99214 PR OFFICE/OUTPT VISIT, EST, LEVL IV, 30-39 MIN: ICD-10-PCS | Mod: S$GLB,,, | Performed by: NURSE PRACTITIONER

## 2019-08-02 RX ORDER — LISINOPRIL 10 MG/1
20 TABLET ORAL DAILY
Qty: 60 TABLET | Refills: 1 | Status: SHIPPED | OUTPATIENT
Start: 2019-08-02 | End: 2019-08-29 | Stop reason: CLARIF

## 2019-08-02 NOTE — PROGRESS NOTES
SUBJECTIVE:      Patient ID: Luann Borjas is a 59 y.o. female.    Chief Complaint: Mole (on face referral to dermatologist [facility] )    Patient is here today for a referral to a dermatologist for a skin lesion on her left cheek. She currently follows with Dr Weil and is asking for a new referral. Her blood pressure is elevated today and has been elevated at other doctors visits. Will discuss medication management.   Patient states she has not seen a GYN in over 20 years, had a hysterectomy but still has an ovary. Would like a referral for a general check up    Hypertension   This is a new problem. The current episode started more than 1 month ago. The problem has been gradually worsening since onset. Pertinent negatives include no headaches, palpitations, peripheral edema or shortness of breath. Risk factors for coronary artery disease include smoking/tobacco exposure. Past treatments include nothing.   Rash   This is a new (skin lesion) problem. The current episode started more than 1 month ago. The problem is unchanged. The affected locations include the face. The rash is characterized by scaling and redness. She was exposed to nothing. Pertinent negatives include no eye pain or shortness of breath.       Past Surgical History:   Procedure Laterality Date    EXTRACTION-CATARACT-IOL Left 10/28/2015    [facility]     History reviewed. No pertinent family history.   Social History     Socioeconomic History    Marital status:      Spouse name: Not on file    Number of children: Not on file    Years of education: Not on file    Highest education level: Not on file   Occupational History    Not on file   Social Needs    Financial resource strain: Not on file    Food insecurity:     Worry: Not on file     Inability: Not on file    Transportation needs:     Medical: Not on file     Non-medical: Not on file   Tobacco Use    Smoking status: Current Every  Day Smoker     Packs/day: 0.50     Years: 12.00     Pack years: 6.00     Last attempt to quit: 1/3/2019     Years since quittin.5    Smokeless tobacco: Never Used   Substance and Sexual Activity    Alcohol use: Yes     Comment: occasional    Drug use: No    Sexual activity: Not on file   Lifestyle    Physical activity:     Days per week: Not on file     Minutes per session: Not on file    Stress: Not on file   Relationships    Social connections:     Talks on phone: Not on file     Gets together: Not on file     Attends Buddhist service: Not on file     Active member of club or organization: Not on file     Attends meetings of clubs or organizations: Not on file     Relationship status: Not on file   Other Topics Concern    Not on file   Social History Narrative    Not on file     Current Outpatient Medications   Medication Sig Dispense Refill    alprazolam (XANAX) 0.5 MG tablet Take 0.5 mg by mouth 2 (two) times daily as needed for Anxiety.      fluoxetine (PROZAC) 20 MG tablet Take 40 mg by mouth once daily.      lisinopril 10 MG tablet Take 2 tablets (20 mg total) by mouth once daily. Take 10mg for 7d then 20mg 60 tablet 1     No current facility-administered medications for this visit.      Review of patient's allergies indicates:   Allergen Reactions    Erythromycin Anaphylaxis      Past Medical History:   Diagnosis Date    Arthritis     Cataract of left eye     Sleep apnea     cpap     Past Surgical History:   Procedure Laterality Date    EXTRACTION-CATARACT-IOL Left 10/28/2015    Performed by Carmelo Perez II, MD at [facility] OR       Review of Systems   Constitutional: Negative for appetite change and unexpected weight change.   HENT: Negative for ear discharge, hearing loss, trouble swallowing and voice change.    Eyes: Negative for photophobia and pain.   Respiratory: Negative for chest tightness, shortness of breath, wheezing and stridor.    Cardiovascular: Negative for palpitations.  "  Gastrointestinal: Negative for abdominal pain and blood in stool.   Endocrine: Negative for cold intolerance and heat intolerance.   Genitourinary: Negative for difficulty urinating and flank pain.   Musculoskeletal: Negative for neck stiffness.   Skin: Negative for pallor and rash.   Neurological: Negative for dizziness, speech difficulty, weakness, light-headedness and headaches.   Hematological: Does not bruise/bleed easily.   Psychiatric/Behavioral: Negative for confusion and dysphoric mood. The patient is not nervous/anxious.       OBJECTIVE:      Vitals:    08/02/19 1110 08/02/19 1138   BP: (!) 160/108 (!) 150/92   BP Location: Right arm    Patient Position: Sitting    BP Method: Large (Manual)    Pulse: 76    SpO2: 98%    Weight: 70 kg (154 lb 6.4 oz)    Height: 5' 6" (1.676 m)      Physical Exam   Constitutional: She is oriented to person, place, and time. She appears well-developed and well-nourished.   HENT:   Head: Atraumatic.   Eyes: Conjunctivae are normal.   Neck: Neck supple. No JVD present. No thyromegaly present.   Cardiovascular: Normal rate, regular rhythm, normal heart sounds and intact distal pulses.   Pulmonary/Chest: Effort normal and breath sounds normal.   Abdominal: Soft. Bowel sounds are normal. She exhibits no distension. There is no tenderness.   Musculoskeletal: Normal range of motion. She exhibits no edema.   Neurological: She is alert and oriented to person, place, and time.   Skin: Skin is warm and dry. Lesion noted.   Left lower cheek with scabbed lesion noted   Psychiatric: She has a normal mood and affect. Her speech is normal.   Nursing note and vitals reviewed.     Assessment:       1. Skin lesion of face    2. Essential hypertension    3. Preventative health care    4. Encounter for screening mammogram for breast cancer    5. Post-menopausal        Plan:       Skin lesion of face  -     Ambulatory referral to Dermatology    Essential hypertension  - start  lisinopril 10 MG " tablet; Take 2 tablets (20 mg total) by mouth once daily. Take 10mg for 7d then 20mg  Dispense: 60 tablet; Refill: 1  -     CBC auto differential; Future; Expected date: 08/02/2019  -     Comprehensive metabolic panel; Future; Expected date: 08/02/2019  -     Lipid panel; Future; Expected date: 08/02/2019  -     TSH; Future; Expected date: 08/02/2019  -     Urinalysis; Future; Expected date: 08/02/2019  She will start 10 mg daily, will monitor blood pressure at home. Will increase to 20 mg if blood pressure >139/89    [facility] care  -     CBC auto differential; Future; Expected date: 08/02/2019  -     Comprehensive metabolic panel; Future; Expected date: 08/02/2019  -     Lipid panel; Future; Expected date: 08/02/2019  -     TSH; Future; Expected date: 08/02/2019  -     Urinalysis; Future; Expected date: 08/02/2019    Encounter for screening mammogram for breast cancer  -     Mammo Digital Screening Bilat with CAD; Future; Expected date: 08/02/2019    Post-menopausal  -     Ambulatory referral to Obstetrics / Gynecology        Follow up in about 4 weeks (around 8/30/2019) for htn .      8/2/2019 YULIANA Farooq, FNP

## 2019-08-02 NOTE — PATIENT INSTRUCTIONS

## 2019-08-14 ENCOUNTER — TELEPHONE (OUTPATIENT)
Dept: FAMILY MEDICINE | Facility: CLINIC | Age: 59
End: 2019-08-14

## 2019-08-14 ENCOUNTER — LAB VISIT (OUTPATIENT)
Dept: LAB | Facility: HOSPITAL | Age: 59
End: 2019-08-14
Attending: NURSE PRACTITIONER
Payer: OTHER GOVERNMENT

## 2019-08-14 ENCOUNTER — HOSPITAL ENCOUNTER (OUTPATIENT)
Dept: RADIOLOGY | Facility: HOSPITAL | Age: 59
Discharge: HOME | End: 2019-08-14
Attending: NURSE PRACTITIONER
Payer: OTHER GOVERNMENT

## 2019-08-14 VITALS — WEIGHT: 154.38 LBS | BODY MASS INDEX: 24.81 KG/M2 | HEIGHT: 66 IN

## 2019-08-14 DIAGNOSIS — Z00.00 PREVENTATIVE HEALTH CARE: ICD-10-CM

## 2019-08-14 DIAGNOSIS — Z12.31 ENCOUNTER FOR SCREENING MAMMOGRAM FOR BREAST CANCER: ICD-10-CM

## 2019-08-14 DIAGNOSIS — I10 ESSENTIAL HYPERTENSION: ICD-10-CM

## 2019-08-14 LAB
ALBUMIN SERPL BCP-MCNC: 4.4 G/DL (ref 3.5–5.2)
ALP SERPL-CCNC: 60 U/L (ref 55–135)
ALT SERPL W/O P-5'-P-CCNC: 18 U/L (ref 10–44)
ANION GAP SERPL CALC-SCNC: 9 MMOL/L (ref 8–16)
AST SERPL-CCNC: 20 U/L (ref 10–40)
BASOPHILS # BLD AUTO: 0.07 K/UL (ref 0–0.2)
BASOPHILS NFR BLD: 1.2 % (ref 0–1.9)
BILIRUB SERPL-MCNC: 0.8 MG/DL (ref 0.1–1)
BILIRUB UR QL STRIP: NEGATIVE
BUN SERPL-MCNC: 14 MG/DL (ref 6–20)
CALCIUM SERPL-MCNC: 9.3 MG/DL (ref 8.7–10.5)
CHLORIDE SERPL-SCNC: 102 MMOL/L (ref 95–110)
CHOLEST SERPL-MCNC: 218 MG/DL (ref 120–199)
CHOLEST/HDLC SERPL: 3.5 {RATIO} (ref 2–5)
CLARITY UR: CLEAR
CO2 SERPL-SCNC: 31 MMOL/L (ref 23–29)
COLOR UR: YELLOW
CREAT SERPL-MCNC: 0.8 MG/DL (ref 0.5–1.4)
DIFFERENTIAL METHOD: ABNORMAL
EOSINOPHIL # BLD AUTO: 0.3 K/UL (ref 0–0.5)
EOSINOPHIL NFR BLD: 4.9 % (ref 0–8)
ERYTHROCYTE [DISTWIDTH] IN BLOOD BY AUTOMATED COUNT: 12.6 % (ref 11.5–14.5)
EST. GFR  (AFRICAN AMERICAN): >60 ML/MIN/1.73 M^2
EST. GFR  (NON AFRICAN AMERICAN): >60 ML/MIN/1.73 M^2
GLUCOSE SERPL-MCNC: 94 MG/DL (ref 70–110)
GLUCOSE UR QL STRIP: NEGATIVE
HCT VFR BLD AUTO: 43.2 % (ref 37–48.5)
HDLC SERPL-MCNC: 62 MG/DL (ref 40–75)
HDLC SERPL: 28.4 % (ref 20–50)
HGB BLD-MCNC: 14.3 G/DL (ref 12–16)
HGB UR QL STRIP: NEGATIVE
IMM GRANULOCYTES # BLD AUTO: 0.02 K/UL (ref 0–0.04)
IMM GRANULOCYTES NFR BLD AUTO: 0.4 % (ref 0–0.5)
KETONES UR QL STRIP: NEGATIVE
LDLC SERPL CALC-MCNC: 123 MG/DL (ref 63–159)
LEUKOCYTE ESTERASE UR QL STRIP: NEGATIVE
LYMPHOCYTES # BLD AUTO: 1.2 K/UL (ref 1–4.8)
LYMPHOCYTES NFR BLD: 21.6 % (ref 18–48)
MCH RBC QN AUTO: 31.9 PG (ref 27–31)
MCHC RBC AUTO-ENTMCNC: 33.1 G/DL (ref 32–36)
MCV RBC AUTO: 96 FL (ref 82–98)
MONOCYTES # BLD AUTO: 0.4 K/UL (ref 0.3–1)
MONOCYTES NFR BLD: 6.7 % (ref 4–15)
NEUTROPHILS # BLD AUTO: 3.7 K/UL (ref 1.8–7.7)
NEUTROPHILS NFR BLD: 65.2 % (ref 38–73)
NITRITE UR QL STRIP: NEGATIVE
NONHDLC SERPL-MCNC: 156 MG/DL
NRBC BLD-RTO: 0 /100 WBC
PH UR STRIP: 7 [PH] (ref 5–8)
PLATELET # BLD AUTO: 262 K/UL (ref 150–350)
PMV BLD AUTO: 10.1 FL (ref 9.2–12.9)
POTASSIUM SERPL-SCNC: 4.4 MMOL/L (ref 3.5–5.1)
PROT SERPL-MCNC: 7 G/DL (ref 6–8.4)
PROT UR QL STRIP: ABNORMAL
RBC # BLD AUTO: 4.48 M/UL (ref 4–5.4)
SODIUM SERPL-SCNC: 142 MMOL/L (ref 136–145)
SP GR UR STRIP: 1.01 (ref 1–1.03)
TRIGL SERPL-MCNC: 165 MG/DL (ref 30–150)
TSH SERPL DL<=0.005 MIU/L-ACNC: 1.91 UIU/ML (ref 0.34–5.6)
URN SPEC COLLECT METH UR: ABNORMAL
UROBILINOGEN UR STRIP-ACNC: NEGATIVE EU/DL
WBC # BLD AUTO: 5.66 K/UL (ref 3.9–12.7)

## 2019-08-14 PROCEDURE — 84443 ASSAY THYROID STIM HORMONE: CPT

## 2019-08-14 PROCEDURE — 80053 COMPREHEN METABOLIC PANEL: CPT

## 2019-08-14 PROCEDURE — 85025 COMPLETE CBC W/AUTO DIFF WBC: CPT

## 2019-08-14 PROCEDURE — 81003 URINALYSIS AUTO W/O SCOPE: CPT

## 2019-08-14 PROCEDURE — 77067 SCR MAMMO BI INCL CAD: CPT | Mod: TC,PO

## 2019-08-14 PROCEDURE — 80061 LIPID PANEL: CPT

## 2019-08-14 PROCEDURE — 36415 COLL VENOUS BLD VENIPUNCTURE: CPT

## 2019-08-14 NOTE — TELEPHONE ENCOUNTER
----- Message from Allen Callahan NP sent at 8/14/2019 12:38 PM CDT -----  Trigs elevated, other labs are stable. Will review results at upcoming OV

## 2019-08-23 ENCOUNTER — TELEPHONE (OUTPATIENT)
Dept: FAMILY MEDICINE | Facility: CLINIC | Age: 59
End: 2019-08-23

## 2019-08-23 DIAGNOSIS — I10 ESSENTIAL HYPERTENSION: Primary | ICD-10-CM

## 2019-08-23 DIAGNOSIS — H54.7 VISION PROBLEM: ICD-10-CM

## 2019-08-23 RX ORDER — AMLODIPINE BESYLATE 5 MG/1
5 TABLET ORAL DAILY
Qty: 30 TABLET | Refills: 1 | Status: SHIPPED | OUTPATIENT
Start: 2019-08-23 | End: 2019-08-23 | Stop reason: SDUPTHER

## 2019-08-23 RX ORDER — AMLODIPINE BESYLATE 5 MG/1
5 TABLET ORAL DAILY
Qty: 30 TABLET | Refills: 1 | Status: SHIPPED | OUTPATIENT
Start: 2019-08-23 | End: 2019-08-29 | Stop reason: ALTCHOICE

## 2019-08-23 NOTE — TELEPHONE ENCOUNTER
Pt calling to report a significant decrease in vision since starting lisinopril. Says she has been monitoring her pressure and it is still running high. Pt advised to stop lisinopril, danie with an ophthalmologist, and keep appt with Allen.

## 2019-08-23 NOTE — TELEPHONE ENCOUNTER
Rasheeda, I'm calling in norvasc 5mg 1/2 tab for a few days then 1 tab. The vision change could be from her elevated blood pressure.

## 2019-08-29 ENCOUNTER — OFFICE VISIT (OUTPATIENT)
Dept: FAMILY MEDICINE | Facility: CLINIC | Age: 59
End: 2019-08-29
Payer: OTHER GOVERNMENT

## 2019-08-29 VITALS
SYSTOLIC BLOOD PRESSURE: 138 MMHG | HEIGHT: 66 IN | WEIGHT: 157 LBS | OXYGEN SATURATION: 95 % | BODY MASS INDEX: 25.23 KG/M2 | HEART RATE: 85 BPM | DIASTOLIC BLOOD PRESSURE: 82 MMHG

## 2019-08-29 DIAGNOSIS — I10 ESSENTIAL HYPERTENSION: ICD-10-CM

## 2019-08-29 DIAGNOSIS — Z00.00 PREVENTATIVE HEALTH CARE: Primary | ICD-10-CM

## 2019-08-29 PROCEDURE — 99396 PR PREVENTIVE VISIT,EST,40-64: ICD-10-PCS | Mod: S$GLB,,, | Performed by: NURSE PRACTITIONER

## 2019-08-29 PROCEDURE — 99396 PREV VISIT EST AGE 40-64: CPT | Mod: S$GLB,,, | Performed by: NURSE PRACTITIONER

## 2019-08-29 RX ORDER — AMLODIPINE AND BENAZEPRIL HYDROCHLORIDE 5; 10 MG/1; MG/1
1 CAPSULE ORAL DAILY
Qty: 30 CAPSULE | Refills: 1 | Status: SHIPPED | OUTPATIENT
Start: 2019-08-29 | End: 2019-10-07 | Stop reason: SDUPTHER

## 2019-08-29 NOTE — PATIENT INSTRUCTIONS
Low-Cholesterol Diet  Your body needs cholesterol to build new cells and create certain hormones. There are 2 kinds of cholesterol in your blood:     · HDL (good) cholesterol. This prevents fat deposits (plaque) from building up in your arteries. In this way it protects against heart disease and stroke.  · LDL (bad) cholesterol. This stays in your body and sticks to artery walls. Over time it may block blood flow to the heart and brain. This can cause a heart attack or stroke.  The cholesterol in your blood comes from 2 sources: cholesterol in food that you eat and cholesterol that your liver makes. You should limit the amount of cholesterol in your diet. But the cholesterol that your body makes has the greatest disease risk. And your body makes more cholesterol when your diet is high in bad fats (saturated and trans fats). There are 2 kinds of fats you can eat:  · Good fats, or unsaturated fats (mono-unsaturated and poly-unsaturated). They raise the level of good cholesterol and lower the level of bad cholesterol. Good fats are found in vegetable oils such as olive, sunflower, corn, and soybean oils, and in nuts and seeds.  · Bad fats, or saturated fats (including foods high in cholesterol) and trans fats. These raise your risk of disease. They lower the good cholesterol and raise the level of bad cholesterol. Bad fats are found in animal products, including meat, whole-milk dairy products, and butter. Some plants are also high in bad fats (coconut and palm plants). Trans fats are found in hard (stick) margarines. They are also in many fast foods and commercially baked goods. Soft margarine sold in tubs has fewer trans fats and is safer to use.  High blood cholesterol is usually due to a diet high in saturated fat, along with not being physically active. In some cases, genetics plays a role in causing high cholesterol. The tips below will help you create healthy eating habits that will help lower your blood  cholesterol level.  Create a diet high in good fats, low in bad fats (and low in cholesterol)  The following steps will help you create a diet high in good fats and low in bad fats:  · Talk with your doctor before starting a low cholesterol diet or weight loss program.  · Learn to read nutrition labels and select appropriate portion sizes.  · When cooking, use plant-based unsaturated vegetable oils (sunflower, corn, soybean, canola, peanut, and olive oils).  · Avoid saturated fats found in animal products such as meat, dairy (whole-milk, cheese and ice cream), poultry skin, and egg yolks. Plants high in saturated oils include coconut oil, palm oil, and palm kernel oil.  · If you eat meat, choose smaller portions and lean cuts, such as round, priya, sirloin, or loin. Eat more meatless meals.  · Replace meat with fish at least 2 times a week. Fish is an important source of the unsaturated fat called omega-3 fatty acids. This fat has potential to lower the risk of heart disease.  · Replace whole-milk dairy products with low-fat or nonfat products. Try soy products. Soy helps to reduce total cholesterol.  · Supplement your diet with protective fibers. Eat nuts, seeds, and whole grains rather than white rice and bread. These foods lower both cholesterol and triglyceride levels. (Triglycerides are another fat found in the blood.) Walnuts are one of the best sources of omega-3 fatty acids.  · Eat plenty of fresh fruits and vegetables daily.  · Avoid fast foods and commercial baked goods. Assume they contain saturated fat unless labeled otherwise.  Date Last Reviewed: 8/1/2016  © 8239-8562 [employer]. [address] [place] [address], [place] [address]. All rights reserved. This information is not intended as a substitute for professional medical care. Always follow your healthcare professional's instructions.

## 2019-08-29 NOTE — PROGRESS NOTES
SUBJECTIVE:      Patient ID: Luann Borjas is a 59 y.o. female.    Chief Complaint: Hypertension    Mrs Borjas is here today for her annual exam, review labs and f/u on HTN. She has been taking her medication as prescribed. Blood pressure is improved at the office today. Home readings have been 150's/90's. She noticed blurry vision when she started the lisinopril, we stopped the medication and changed to norvasc. She continues to have hazy vision. Follows with Dr Perez for opthalmology and has a visit scheduled.     Hypertension   This is a new problem. The current episode started more than 1 month ago. The problem has been gradually worsening since onset. Pertinent negatives include no chest pain, headaches, palpitations, peripheral edema or shortness of breath. Risk factors for coronary artery disease include smoking/tobacco exposure. Past treatments include calcium channel blockers. The current treatment provides moderate improvement.       Past Surgical History:   Procedure Laterality Date    EXTRACTION-CATARACT-IOL Left 10/28/2015    [facility]     History reviewed. No pertinent family history.   Social History     Socioeconomic History    Marital status:      Spouse name: Not on file    Number of children: Not on file    Years of education: Not on file    Highest education level: Not on file   Occupational History    Not on file   Social Needs    Financial resource strain: Not on file    Food insecurity:     Worry: Not on file     Inability: Not on file    Transportation needs:     Medical: Not on file     Non-medical: Not on file   Tobacco Use    Smoking status: Current Every Day Smoker     Packs/day: 0.50     Years: 12.00     Pack years: 6.00     Last attempt to quit: 1/3/2019     Years since quittin.6    Smokeless tobacco: Never Used   Substance and Sexual Activity    Alcohol use: Yes     Comment: occasional    Drug use: No    Sexual  activity: Not on file   Lifestyle    Physical activity:     Days per week: Not on file     Minutes per session: Not on file    Stress: Not on file   Relationships    Social connections:     Talks on phone: Not on file     Gets together: Not on file     Attends Buddhist service: Not on file     Active member of club or organization: Not on file     Attends meetings of clubs or organizations: Not on file     Relationship status: Not on file   Other Topics Concern    Not on file   Social History Narrative    Not on file     Current Outpatient Medications   Medication Sig Dispense Refill    alprazolam (XANAX) 0.5 MG tablet Take 0.5 mg by mouth 2 (two) times daily as needed for Anxiety.      fluoxetine (PROZAC) 20 MG tablet Take 20 mg by mouth once daily.       amlodipine-benazepril 5-10 mg (LOTREL) 5-10 mg per capsule Take 1 capsule by mouth once daily. 30 capsule 1     No current facility-administered medications for this visit.      Review of patient's allergies indicates:   Allergen Reactions    Erythromycin Anaphylaxis      Past Medical History:   Diagnosis Date    Anxiety     Arthritis     Cataract of left eye     Hyperlipidemia     Sleep apnea     cpap     Past Surgical History:   Procedure Laterality Date    EXTRACTION-CATARACT-IOL Left 10/28/2015    Performed by Carmelo Perez II, MD at [facility] OR       Review of Systems   Constitutional: Negative for appetite change, chills, diaphoresis and unexpected weight change.   HENT: Negative for ear discharge, hearing loss, trouble swallowing and voice change.    Eyes: Negative for photophobia.   Respiratory: Negative for chest tightness, shortness of breath and stridor.    Cardiovascular: Negative for chest pain and palpitations.   Gastrointestinal: Negative for abdominal pain and blood in stool.   Endocrine: Negative for cold intolerance and heat intolerance.   Genitourinary: Negative for difficulty urinating and flank pain.   Musculoskeletal: Negative  "for back pain, joint swelling and neck stiffness.   Skin: Negative for pallor.   Neurological: Negative for dizziness, speech difficulty, weakness and headaches.   Hematological: Does not bruise/bleed easily.   Psychiatric/Behavioral: Negative for confusion and dysphoric mood.      OBJECTIVE:      Vitals:    08/29/19 1351 08/29/19 1419   BP: (!) 140/80 138/82   Pulse: 85    SpO2: 95%    Weight: 71.2 kg (157 lb)    Height: 5' 6" (1.676 m)      Physical Exam   Constitutional: She is oriented to person, place, and time. She appears well-developed and well-nourished.   HENT:   Head: Atraumatic.   Eyes: Conjunctivae are normal.   Neck: Neck supple. No JVD present. No thyromegaly present.   Cardiovascular: Normal rate, regular rhythm, normal heart sounds and intact distal pulses.   Pulmonary/Chest: Effort normal and breath sounds normal.   Abdominal: Soft. Bowel sounds are normal. She exhibits no distension. There is no tenderness.   Musculoskeletal: Normal range of motion. She exhibits no edema.   Neurological: She is alert and oriented to person, place, and time.   Skin: Skin is warm and dry.   Psychiatric: She has a normal mood and affect.   Nursing note and vitals reviewed.      Lab Visit on 08/14/2019   Component Date Value Ref Range Status    Specimen UA 08/14/2019 Urine, Clean Catch   Final    Color, UA 08/14/2019 Yellow  Yellow, Straw, Margot Final    Appearance, UA 08/14/2019 Clear  Clear Final    pH, UA 08/14/2019 7.0  5.0 - 8.0 Final    Specific Gravity, UA 08/14/2019 1.010  1.005 - 1.030 Final    Protein, UA 08/14/2019 Trace* Negative Final    Comment: Recommend a 24 hour urine protein or a urine   protein/creatinine ratio if globulin induced proteinuria is  clinically suspected.      Glucose, UA 08/14/2019 Negative  Negative Final    Ketones, UA 08/14/2019 Negative  Negative Final    Bilirubin (UA) 08/14/2019 Negative  Negative Final    Occult Blood UA 08/14/2019 Negative  Negative Final    Nitrite, " UA 08/14/2019 Negative  Negative Final    Urobilinogen, UA 08/14/2019 Negative  Negative EU/dL Final    Leukocytes, UA 08/14/2019 Negative  Negative Final   Lab Visit on 08/14/2019   Component Date Value Ref Range Status    WBC 08/14/2019 5.66  3.90 - 12.70 K/uL Final    RBC 08/14/2019 4.48  4.00 - 5.40 M/uL Final    Hemoglobin 08/14/2019 14.3  12.0 - 16.0 g/dL Final    Hematocrit 08/14/2019 43.2  37.0 - 48.5 % Final    Mean Corpuscular Volume 08/14/2019 96  82 - 98 fL Final    Mean Corpuscular Hemoglobin 08/14/2019 31.9* 27.0 - 31.0 pg Final    Mean Corpuscular Hemoglobin Conc 08/14/2019 33.1  32.0 - 36.0 g/dL Final    RDW 08/14/2019 12.6  11.5 - 14.5 % Final    Platelets 08/14/2019 262  150 - 350 K/uL Final    MPV 08/14/2019 10.1  9.2 - 12.9 fL Final    Immature Granulocytes 08/14/2019 0.4  0.0 - 0.5 % Final    Gran # (ANC) 08/14/2019 3.7  1.8 - 7.7 K/uL Final    Immature Grans (Abs) 08/14/2019 0.02  0.00 - 0.04 K/uL Final    Comment: Mild elevation in immature granulocytes is non specific and   can be seen in a variety of conditions including stress response,   acute inflammation, trauma and pregnancy. Correlation with other   laboratory and clinical findings is essential.      Lymph # 08/14/2019 1.2  1.0 - 4.8 K/uL Final    Mono # 08/14/2019 0.4  0.3 - 1.0 K/uL Final    Eos # 08/14/2019 0.3  0.0 - 0.5 K/uL Final    Baso # 08/14/2019 0.07  0.00 - 0.20 K/uL Final    nRBC 08/14/2019 0  0 /100 WBC Final    Gran% 08/14/2019 65.2  38.0 - 73.0 % Final    Lymph% 08/14/2019 21.6  18.0 - 48.0 % Final    Mono% 08/14/2019 6.7  4.0 - 15.0 % Final    Eosinophil% 08/14/2019 4.9  0.0 - 8.0 % Final    Basophil% 08/14/2019 1.2  0.0 - 1.9 % Final    Differential Method 08/14/2019 Automated   Final    Sodium 08/14/2019 142  136 - 145 mmol/L Final    Potassium 08/14/2019 4.4  3.5 - 5.1 mmol/L Final    Chloride 08/14/2019 102  95 - 110 mmol/L Final    CO2 08/14/2019 31* 23 - 29 mmol/L Final    Glucose  08/14/2019 94  70 - 110 mg/dL Final    BUN, Bld 08/14/2019 14  6 - 20 mg/dL Final    Creatinine 08/14/2019 0.8  0.5 - 1.4 mg/dL Final    Calcium 08/14/2019 9.3  8.7 - 10.5 mg/dL Final    Total Protein 08/14/2019 7.0  6.0 - 8.4 g/dL Final    Albumin 08/14/2019 4.4  3.5 - 5.2 g/dL Final    Total Bilirubin 08/14/2019 0.8  0.1 - 1.0 mg/dL Final    Comment: For infants and newborns, interpretation of results should be based  on gestational age, weight and in agreement with clinical  observations.  Premature Infant recommended reference ranges:  Up to 24 hours.............<8.0 mg/dL  Up to 48 hours............<12.0 mg/dL  3-5 days..................<15.0 mg/dL  6-29 days.................<15.0 mg/dL      Alkaline Phosphatase 08/14/2019 60  55 - 135 U/L Final    AST 08/14/2019 20  10 - 40 U/L Final    ALT 08/14/2019 18  10 - 44 U/L Final    Anion Gap 08/14/2019 9  8 - 16 mmol/L Final    eGFR if African American 08/14/2019 >60.0  >60 mL/min/1.73 m^2 Final    eGFR if non African American 08/14/2019 >60.0  >60 mL/min/1.73 m^2 Final    Comment: Calculation used to obtain the estimated glomerular filtration  rate (eGFR) is the CKD-EPI equation.       Cholesterol 08/14/2019 218* 120 - 199 mg/dL Final    Comment: The National Cholesterol Education Program (NCEP) has set the  following guidelines (reference ranges) for Cholesterol:  Optimal.....................<200 mg/dL  Borderline High.............200-239 mg/dL  High........................> or = 240 mg/dL      Triglycerides 08/14/2019 165* 30 - 150 mg/dL Final    Comment: The National Cholesterol Education Program (NCEP) has set the  following guidelines (reference values) for triglycerides:  Normal......................<150 mg/dL  Borderline High.............150-199 mg/dL  High........................200-499 mg/dL      HDL 08/14/2019 62  40 - 75 mg/dL Final    Comment: The National Cholesterol Education Program (NCEP) has set the  following guidelines (reference  values) for HDL Cholesterol:  Low...............<40 mg/dL  Optimal...........>60 mg/dL      LDL Cholesterol 08/14/2019 123.0  63.0 - 159.0 mg/dL Final    Comment: The National Cholesterol Education Program (NCEP) has set the  following guidelines (reference values) for LDL Cholesterol:  Optimal.......................<130 mg/dL  Borderline High...............130-159 mg/dL  High..........................160-189 mg/dL  Very High.....................>190 mg/dL      Hdl/Cholesterol Ratio 08/14/2019 28.4  20.0 - 50.0 % Final    Total Cholesterol/HDL Ratio 08/14/2019 3.5  2.0 - 5.0 Final    Non-HDL Cholesterol 08/14/2019 156  mg/dL Final    Comment: Risk category and Non-HDL cholesterol goals:  Coronary heart disease (CHD)or equivalent (10-year risk of CHD >20%):  Non-HDL cholesterol goal     <130 mg/dL  Two or more CHD risk factors and 10-year risk of CHD <= 20%:  Non-HDL cholesterol goal     <160 mg/dL  0 to 1 CHD risk factor:  Non-HDL cholesterol goal     <190 mg/dL      TSH 08/14/2019 1.910  0.340 - 5.600 uIU/mL Final   ]  Assessment:       1. Preventative health care    2. Essential hypertension        Plan:       Preventative health care  UTD with health maintenance  Labs reviewed with patient    Essential hypertension  -    star amlodipine-benazepril 5-10 mg (LOTREL) 5-10 mg per capsule; Take 1 capsule by mouth once daily.  Dispense: 30 capsule; Refill: 1  Stop amlodipine      Follow up in about 4 weeks (around 9/26/2019) for htn .      8/29/2019 Allen Callahan, YULIANA, FNP

## 2019-10-07 ENCOUNTER — OFFICE VISIT (OUTPATIENT)
Dept: FAMILY MEDICINE | Facility: CLINIC | Age: 59
End: 2019-10-07
Payer: OTHER GOVERNMENT

## 2019-10-07 VITALS
WEIGHT: 152 LBS | HEIGHT: 66 IN | BODY MASS INDEX: 24.43 KG/M2 | OXYGEN SATURATION: 95 % | DIASTOLIC BLOOD PRESSURE: 80 MMHG | HEART RATE: 88 BPM | SYSTOLIC BLOOD PRESSURE: 130 MMHG

## 2019-10-07 DIAGNOSIS — H65.01 RIGHT ACUTE SEROUS OTITIS MEDIA, RECURRENCE NOT SPECIFIED: ICD-10-CM

## 2019-10-07 DIAGNOSIS — Z23 NEED FOR INFLUENZA VACCINATION: ICD-10-CM

## 2019-10-07 DIAGNOSIS — I10 ESSENTIAL HYPERTENSION: Primary | ICD-10-CM

## 2019-10-07 PROCEDURE — 99213 PR OFFICE/OUTPT VISIT, EST, LEVL III, 20-29 MIN: ICD-10-PCS | Mod: 25,S$GLB,, | Performed by: NURSE PRACTITIONER

## 2019-10-07 PROCEDURE — 90686 IIV4 VACC NO PRSV 0.5 ML IM: CPT | Mod: S$GLB,,, | Performed by: NURSE PRACTITIONER

## 2019-10-07 PROCEDURE — 99213 OFFICE O/P EST LOW 20 MIN: CPT | Mod: 25,S$GLB,, | Performed by: NURSE PRACTITIONER

## 2019-10-07 PROCEDURE — 90471 FLU VACCINE (QUAD) GREATER THAN OR EQUAL TO 3YO PRESERVATIVE FREE IM: ICD-10-PCS | Mod: S$GLB,,, | Performed by: NURSE PRACTITIONER

## 2019-10-07 PROCEDURE — 90471 IMMUNIZATION ADMIN: CPT | Mod: S$GLB,,, | Performed by: NURSE PRACTITIONER

## 2019-10-07 PROCEDURE — 90686 FLU VACCINE (QUAD) GREATER THAN OR EQUAL TO 3YO PRESERVATIVE FREE IM: ICD-10-PCS | Mod: S$GLB,,, | Performed by: NURSE PRACTITIONER

## 2019-10-07 RX ORDER — AMLODIPINE AND BENAZEPRIL HYDROCHLORIDE 5; 10 MG/1; MG/1
1 CAPSULE ORAL DAILY
Qty: 90 CAPSULE | Refills: 1 | Status: SHIPPED | OUTPATIENT
Start: 2019-10-07 | End: 2020-04-20 | Stop reason: SDUPTHER

## 2019-10-07 RX ORDER — GUAIFENESIN 600 MG/1
600 TABLET, EXTENDED RELEASE ORAL 2 TIMES DAILY
Qty: 20 TABLET | Refills: 0 | Status: SHIPPED | OUTPATIENT
Start: 2019-10-07 | End: 2019-10-17

## 2019-10-07 RX ORDER — FLUTICASONE PROPIONATE 50 MCG
2 SPRAY, SUSPENSION (ML) NASAL 2 TIMES DAILY
Qty: 1 BOTTLE | Refills: 0 | Status: SHIPPED | OUTPATIENT
Start: 2019-10-07 | End: 2020-04-20

## 2019-10-07 NOTE — PROGRESS NOTES
SUBJECTIVE:      Patient ID: Luann Borjas is a 59 y.o. female.    Chief Complaint: Hypertension and Otalgia (rt ear)    Mrs Borjas is here today to f/u on HTN. She is doing well on the current meds. Has an occasional cough but says it is not bothersome. She plans on quitting smoking in the next 6 months. Has congestion in her right ear today, feels a fullness. She has seen Dr Otero to address her blurred vision, has scheduled cataract surgery.     Hypertension   The current episode started more than 1 month ago. The problem has been gradually improving since onset. The problem is controlled. Pertinent negatives include no chest pain, headaches, palpitations, peripheral edema or shortness of breath. Risk factors for coronary artery disease include smoking/tobacco exposure. Past treatments include calcium channel blockers and ACE inhibitors. The current treatment provides significant improvement.   Otalgia    There is pain in the right ear. This is a new problem. The current episode started in the past 7 days. The problem has been unchanged. There has been no fever. The pain is mild. Pertinent negatives include no abdominal pain, coughing, ear discharge, headaches, hearing loss or vomiting. She has tried nothing for the symptoms.       Past Surgical History:   Procedure Laterality Date    BASAL CELL CARCINOMA EXCISION  09/2019    lip     History reviewed. No pertinent family history.   Social History     Socioeconomic History    Marital status:      Spouse name: Not on file    Number of children: Not on file    Years of education: Not on file    Highest education level: Not on file   Occupational History    Not on file   Social Needs    Financial resource strain: Not on file    Food insecurity:     Worry: Not on file     Inability: Not on file    Transportation needs:     Medical: Not on file     Non-medical: Not on file   Tobacco Use    Smoking status: Current Every Day Smoker     Packs/day:  0.50     Years: 12.00     Pack years: 6.00     Last attempt to quit: 1/3/2019     Years since quittin.7    Smokeless tobacco: Never Used   Substance and Sexual Activity    Alcohol use: Yes     Comment: occasional    Drug use: No    Sexual activity: Not on file   Lifestyle    Physical activity:     Days per week: Not on file     Minutes per session: Not on file    Stress: Not on file   Relationships    Social connections:     Talks on phone: Not on file     Gets together: Not on file     Attends [employer] service: Not on file     Active member of club or organization: Not on file     Attends meetings of clubs or organizations: Not on file     Relationship status: Not on file   Other Topics Concern    Not on file   Social History Narrative    Not on file     Current Outpatient Medications   Medication Sig Dispense Refill    alprazolam (XANAX) 0.5 MG tablet Take 0.5 mg by mouth 2 (two) times daily as needed for Anxiety.      amlodipine-benazepril 5-10 mg (LOTREL) 5-10 mg per capsule Take 1 capsule by mouth once daily. 90 capsule 1    fluoxetine (PROZAC) 20 MG tablet Take 20 mg by mouth once daily.       fluticasone propionate (FLONASE) 50 mcg/actuation nasal spray 2 sprays (100 mcg total) by Each Nostril route 2 (two) times daily. 1 Bottle 0    guaiFENesin (MUCINEX) 600 mg 12 hr tablet Take 1 tablet (600 mg total) by mouth 2 (two) times daily. for 10 days 20 tablet 0     No current facility-administered medications for this visit.      Review of patient's allergies indicates:   Allergen Reactions    Erythromycin Anaphylaxis      Past Medical History:   Diagnosis Date    Anxiety     Arthritis     Cataract of left eye     Hyperlipidemia     Sleep apnea     cpap     Past Surgical History:   Procedure Laterality Date    BASAL CELL CARCINOMA EXCISION  2019    lip       Review of Systems   Constitutional: Negative for appetite change, chills, diaphoresis and unexpected weight change.   HENT:  "Positive for ear pain and postnasal drip. Negative for ear discharge, hearing loss, trouble swallowing and voice change.    Eyes: Negative for photophobia and pain.   Respiratory: Negative for cough, chest tightness, shortness of breath and stridor.    Cardiovascular: Negative for chest pain and palpitations.   Gastrointestinal: Negative for abdominal pain, blood in stool and vomiting.   Endocrine: Negative for cold intolerance and heat intolerance.   Genitourinary: Negative for difficulty urinating and flank pain.   Musculoskeletal: Negative for joint swelling and neck stiffness.   Skin: Negative for pallor.   Neurological: Negative for dizziness, speech difficulty, weakness and headaches.   Hematological: Does not bruise/bleed easily.   Psychiatric/Behavioral: Negative for confusion and dysphoric mood. The patient is not nervous/anxious.       OBJECTIVE:      Vitals:    10/07/19 0825   BP: 130/80   Pulse: 88   SpO2: 95%   Weight: 68.9 kg (152 lb)   Height: 5' 6" (1.676 m)     Physical Exam   Constitutional: She is oriented to person, place, and time. She appears well-developed and well-nourished.   HENT:   Head: Atraumatic.   Right Ear: A middle ear effusion is present.   Left Ear: Tympanic membrane normal.   Nose: Nose normal.   Mouth/Throat: Oropharynx is clear and moist and mucous membranes are normal.   Eyes: Conjunctivae are normal.   Neck: Neck supple.   Cardiovascular: Normal rate, regular rhythm, normal heart sounds and intact distal pulses.   Pulmonary/Chest: Effort normal and breath sounds normal.   Abdominal: Soft. Bowel sounds are normal. She exhibits no distension. There is no tenderness.   Musculoskeletal: Normal range of motion.   Lymphadenopathy:     She has no cervical adenopathy.   Neurological: She is alert and oriented to person, place, and time.   Skin: Skin is warm and dry.   Psychiatric: She has a normal mood and affect.   Nursing note and vitals reviewed.     Assessment:       1. Essential " hypertension    2. Need for influenza vaccination    3. Right acute serous otitis media, recurrence not specified        Plan:       Essential hypertension  -   refill  amlodipine-benazepril 5-10 mg (LOTREL) 5-10 mg per capsule; Take 1 capsule by mouth once daily.  Dispense: 90 capsule; Refill: 1    Need for influenza vaccination  -     Influenza - Quadrivalent (3 years & older) (PF)    Right acute serous otitis media, recurrence not specified  -    start fluticasone propionate (FLONASE) 50 mcg/actuation nasal spray; 2 sprays (100 mcg total) by Each Nostril route 2 (two) times daily.  Dispense: 1 Bottle; Refill: 0  -  start   guaiFENesin (MUCINEX) 600 mg 12 hr tablet; Take 1 tablet (600 mg total) by mouth 2 (two) times daily. for 10 days  Dispense: 20 tablet; Refill: 0        Follow up in about 6 months (around 4/7/2020) for htn .      10/7/2019 YULIANA Farooq, FNP

## 2019-10-07 NOTE — PATIENT INSTRUCTIONS
4 Steps for Eating Healthier  Changing the way you eat can improve your health. It can lower your cholesterol and blood pressure, and help you stay at a healthy weight. Your diet doesnt have to be bland and boring to be healthy. Just watch your calories and follow these steps:    1. Eat fewer unhealthy fats  · Choose more fish and lean meats instead of fatty cuts of meat.  · Skip butter and lard, and use less margarine.  · Pass on foods that have palm, coconut, or hydrogenated oils.  · Eat fewer high-fat dairy foods like cheese, ice cream, and whole milk.  · Get a heart-healthy cookbook and try some low-fat recipes.  2. Go light on salt  · Keep the saltshaker off the table.  · Limit high-salt ingredients, such as soy sauce, bouillon, and garlic salt.  · Instead of adding salt when cooking, season your food with herbs and flavorings. Try lemon, garlic, and onion.  · Limit convenience foods, such as boxed or canned foods and restaurant food.  · Read food labels and choose lower-sodium options.  3. Limit sugar  · Pause before you add sugars to pancakes, cereal, coffee, or tea. This includes white and brown table sugar, syrup, honey, and molasses. Cut your usual amount by half.  · Use non-sugar sweeteners. Stevia, aspartame, and sucralose can satisfy a sweet tooth without adding calories.  · Swap out sugar-filled soda and other drinks. Buy sugar-free or low-calorie beverages. Remember water is always the best choice.  · Read labels and choose foods with less added sugar. Keep in mind that dairy foods and foods with fruit will have some natural sugar.  · Cut the sugar in recipes by 1/3 to 1/2. Boost the flavor with extracts like almond, vanilla, or orange. Or add spices such as cinnamon or nutmeg.  4. Eat more fiber  · Eat fresh fruits and vegetables every day.  · Boost your diet with whole grains. Go for oats, whole-grain rice, and bran.  · Add beans and lentils to your meals.  · Drink more water to match your fiber  increase. This is to help prevent constipation.  Date Last Reviewed: 5/11/2015  © 1275-2902 [employer] [address] [place] [address], [place] [address]. All rights reserved. This information is not intended as a substitute for professional medical care. Always follow your healthcare professional's instructions.

## 2019-10-24 ENCOUNTER — TELEPHONE (OUTPATIENT)
Dept: FAMILY MEDICINE | Facility: CLINIC | Age: 59
End: 2019-10-24

## 2019-10-24 NOTE — TELEPHONE ENCOUNTER
Patient has scheduled cataract surgery with Dr Perez 11/13/19.  At her Office visit on 10/7/19 she was doing well, blood pressure controlled. CBC, CMP 8/2019 was wnl. She is physically active by walking/climbing stairs and reports no chest pain, no sob. No change in exercise tolerance. She is cleared for cataract surgery.

## 2019-11-12 ENCOUNTER — ANESTHESIA EVENT (OUTPATIENT)
Dept: SURGERY | Facility: AMBULARY SURGERY CENTER | Age: 59
End: 2019-11-12
Payer: OTHER GOVERNMENT

## 2019-11-12 ENCOUNTER — OFFICE VISIT (OUTPATIENT)
Dept: FAMILY MEDICINE | Facility: CLINIC | Age: 59
End: 2019-11-12
Payer: OTHER GOVERNMENT

## 2019-11-12 VITALS
WEIGHT: 154 LBS | BODY MASS INDEX: 24.75 KG/M2 | DIASTOLIC BLOOD PRESSURE: 80 MMHG | HEART RATE: 76 BPM | SYSTOLIC BLOOD PRESSURE: 130 MMHG | HEIGHT: 66 IN | OXYGEN SATURATION: 95 %

## 2019-11-12 DIAGNOSIS — H26.9 CATARACT, UNSPECIFIED CATARACT TYPE, UNSPECIFIED LATERALITY: ICD-10-CM

## 2019-11-12 DIAGNOSIS — I10 ESSENTIAL HYPERTENSION: ICD-10-CM

## 2019-11-12 DIAGNOSIS — Z01.818 PREOP EXAMINATION: Primary | ICD-10-CM

## 2019-11-12 PROCEDURE — 99213 PR OFFICE/OUTPT VISIT, EST, LEVL III, 20-29 MIN: ICD-10-PCS | Mod: S$GLB,,, | Performed by: NURSE PRACTITIONER

## 2019-11-12 PROCEDURE — 99213 OFFICE O/P EST LOW 20 MIN: CPT | Mod: S$GLB,,, | Performed by: NURSE PRACTITIONER

## 2019-11-12 RX ORDER — POLYMYXIN B SULFATE AND TRIMETHOPRIM 1; 10000 MG/ML; [USP'U]/ML
SOLUTION OPHTHALMIC
Refills: 1 | COMMUNITY
Start: 2019-10-24 | End: 2020-04-20

## 2019-11-12 RX ORDER — PREDNISOLONE ACETATE 10 MG/ML
SUSPENSION/ DROPS OPHTHALMIC
Refills: 1 | COMMUNITY
Start: 2019-10-24 | End: 2020-04-20

## 2019-11-12 NOTE — PROGRESS NOTES
SUBJECTIVE:      Patient ID: Luann Borjas is a 59 y.o. female.    Chief Complaint: Pre-op Exam (cataract surgery with Dr. Perez)    Mrs Borjas is here today to for a pre op clearance for cataract surgery on her right eye. She is an active 58yo who completes house work and walks daily without chest pain or sob. Currently on medication for htn and is well controlled. She is a current smoker but is trying to quit      Past Surgical History:   Procedure Laterality Date    BASAL CELL CARCINOMA EXCISION  2019    lip    EYE SURGERY Right     cataracts     History reviewed. No pertinent family history.   Social History     Socioeconomic History    Marital status:      Spouse name: Not on file    Number of children: Not on file    Years of education: Not on file    Highest education level: Not on file   Occupational History    Not on file   Social Needs    Financial resource strain: Not on file    Food insecurity:     Worry: Not on file     Inability: Not on file    Transportation needs:     Medical: Not on file     Non-medical: Not on file   Tobacco Use    Smoking status: Current Every Day Smoker     Packs/day: 0.50     Years: 12.00     Pack years: 6.00     Last attempt to quit: 1/3/2019     Years since quittin.8    Smokeless tobacco: Never Used   Substance and Sexual Activity    Alcohol use: Yes     Comment: occasional    Drug use: No    Sexual activity: Not on file   Lifestyle    Physical activity:     Days per week: Not on file     Minutes per session: Not on file    Stress: Not on file   Relationships    Social connections:     Talks on phone: Not on file     Gets together: Not on file     Attends Anabaptism service: Not on file     Active member of club or organization: Not on file     Attends meetings of clubs or organizations: Not on file     Relationship status: Not on file   Other Topics Concern    Not on file   Social History Narrative    Not on file     Current Outpatient  Medications   Medication Sig Dispense Refill    alprazolam (XANAX) 0.5 MG tablet Take 0.5 mg by mouth 2 (two) times daily as needed for Anxiety.      amlodipine-benazepril 5-10 mg (LOTREL) 5-10 mg per capsule Take 1 capsule by mouth once daily. 90 capsule 1    fluoxetine (PROZAC) 20 MG tablet Take 20 mg by mouth once daily.       fluticasone propionate (FLONASE) 50 mcg/actuation nasal spray 2 sprays (100 mcg total) by Each Nostril route 2 (two) times daily. (Patient not taking: Reported on 11/12/2019) 1 Bottle 0    polymyxin B sulf-trimethoprim (POLYTRIM) 10,000 unit- 1 mg/mL Drop INSTILL 1 DROP INTO EACH EYE 4 TIMES DAILY AS DIRECTED  1    prednisoLONE acetate (PRED FORTE) 1 % DrpS INSTILL 1 DROP INTO EACH EYE 4 TIMES DAILY AS DIRECTED  1     No current facility-administered medications for this visit.      Review of patient's allergies indicates:   Allergen Reactions    Erythromycin Anaphylaxis      Past Medical History:   Diagnosis Date    Anxiety     Arthritis     Cataract of left eye     Hyperlipidemia     Sleep apnea     cpap     Past Surgical History:   Procedure Laterality Date    BASAL CELL CARCINOMA EXCISION  09/2019    lip    EYE SURGERY Right     cataracts       Review of Systems   Constitutional: Negative for appetite change, chills, diaphoresis and unexpected weight change.   HENT: Negative for trouble swallowing and voice change.    Eyes: Negative for photophobia and pain.   Respiratory: Negative for chest tightness, shortness of breath and stridor.    Cardiovascular: Negative for chest pain and palpitations.   Gastrointestinal: Negative for abdominal pain and blood in stool.   Endocrine: Negative for cold intolerance and heat intolerance.   Genitourinary: Negative for difficulty urinating and flank pain.   Musculoskeletal: Negative for joint swelling and neck stiffness.   Skin: Negative for pallor.   Neurological: Negative for dizziness, speech difficulty, weakness and light-headedness.  "  Hematological: Does not bruise/bleed easily.   Psychiatric/Behavioral: Negative for confusion.      OBJECTIVE:      Vitals:    11/12/19 1036   BP: 130/80   Pulse: 76   SpO2: 95%   Weight: 69.9 kg (154 lb)   Height: 5' 6" (1.676 m)     Physical Exam   Constitutional: She is oriented to person, place, and time. She appears well-developed and well-nourished.   HENT:   Head: Atraumatic.   Eyes: Conjunctivae are normal.   Neck: Neck supple.   Cardiovascular: Normal rate, regular rhythm, normal heart sounds and intact distal pulses.   Pulmonary/Chest: Effort normal and breath sounds normal.   Abdominal: Soft. Bowel sounds are normal. She exhibits no distension.   Musculoskeletal: Normal range of motion.   Neurological: She is alert and oriented to person, place, and time.   Skin: Skin is warm and dry.   Psychiatric: She has a normal mood and affect.   Nursing note and vitals reviewed.      Lab Visit on 08/14/2019   Component Date Value Ref Range Status    Specimen UA 08/14/2019 Urine, Clean Catch   Final    Color, UA 08/14/2019 Yellow  Yellow, Straw, Margot Final    Appearance, UA 08/14/2019 Clear  Clear Final    pH, UA 08/14/2019 7.0  5.0 - 8.0 Final    Specific Gravity, UA 08/14/2019 1.010  1.005 - 1.030 Final    Protein, UA 08/14/2019 Trace* Negative Final    Comment: Recommend a 24 hour urine protein or a urine   protein/creatinine ratio if globulin induced proteinuria is  clinically suspected.      Glucose, UA 08/14/2019 Negative  Negative Final    Ketones, UA 08/14/2019 Negative  Negative Final    Bilirubin (UA) 08/14/2019 Negative  Negative Final    Occult Blood UA 08/14/2019 Negative  Negative Final    Nitrite, UA 08/14/2019 Negative  Negative Final    Urobilinogen, UA 08/14/2019 Negative  Negative EU/dL Final    Leukocytes, UA 08/14/2019 Negative  Negative Final   Lab Visit on 08/14/2019   Component Date Value Ref Range Status    WBC 08/14/2019 5.66  3.90 - 12.70 K/uL Final    RBC 08/14/2019 4.48  " 4.00 - 5.40 M/uL Final    Hemoglobin 08/14/2019 14.3  12.0 - 16.0 g/dL Final    Hematocrit 08/14/2019 43.2  37.0 - 48.5 % Final    Mean Corpuscular Volume 08/14/2019 96  82 - 98 fL Final    Mean Corpuscular Hemoglobin 08/14/2019 31.9* 27.0 - 31.0 pg Final    Mean Corpuscular Hemoglobin Conc 08/14/2019 33.1  32.0 - 36.0 g/dL Final    RDW 08/14/2019 12.6  11.5 - 14.5 % Final    Platelets 08/14/2019 262  150 - 350 K/uL Final    MPV 08/14/2019 10.1  9.2 - 12.9 fL Final    Immature Granulocytes 08/14/2019 0.4  0.0 - 0.5 % Final    Gran # (ANC) 08/14/2019 3.7  1.8 - 7.7 K/uL Final    Immature Grans (Abs) 08/14/2019 0.02  0.00 - 0.04 K/uL Final    Comment: Mild elevation in immature granulocytes is non specific and   can be seen in a variety of conditions including stress response,   acute inflammation, trauma and pregnancy. Correlation with other   laboratory and clinical findings is essential.      Lymph # 08/14/2019 1.2  1.0 - 4.8 K/uL Final    Mono # 08/14/2019 0.4  0.3 - 1.0 K/uL Final    Eos # 08/14/2019 0.3  0.0 - 0.5 K/uL Final    Baso # 08/14/2019 0.07  0.00 - 0.20 K/uL Final    nRBC 08/14/2019 0  0 /100 WBC Final    Gran% 08/14/2019 65.2  38.0 - 73.0 % Final    Lymph% 08/14/2019 21.6  18.0 - 48.0 % Final    Mono% 08/14/2019 6.7  4.0 - 15.0 % Final    Eosinophil% 08/14/2019 4.9  0.0 - 8.0 % Final    Basophil% 08/14/2019 1.2  0.0 - 1.9 % Final    Differential Method 08/14/2019 Automated   Final    Sodium 08/14/2019 142  136 - 145 mmol/L Final    Potassium 08/14/2019 4.4  3.5 - 5.1 mmol/L Final    Chloride 08/14/2019 102  95 - 110 mmol/L Final    CO2 08/14/2019 31* 23 - 29 mmol/L Final    Glucose 08/14/2019 94  70 - 110 mg/dL Final    BUN, Bld 08/14/2019 14  6 - 20 mg/dL Final    Creatinine 08/14/2019 0.8  0.5 - 1.4 mg/dL Final    Calcium 08/14/2019 9.3  8.7 - 10.5 mg/dL Final    Total Protein 08/14/2019 7.0  6.0 - 8.4 g/dL Final    Albumin 08/14/2019 4.4  3.5 - 5.2 g/dL Final     Total Bilirubin 08/14/2019 0.8  0.1 - 1.0 mg/dL Final    Comment: For infants and newborns, interpretation of results should be based  on gestational age, weight and in agreement with clinical  observations.  Premature Infant recommended reference ranges:  Up to 24 hours.............<8.0 mg/dL  Up to 48 hours............<12.0 mg/dL  3-5 days..................<15.0 mg/dL  6-29 days.................<15.0 mg/dL      Alkaline Phosphatase 08/14/2019 60  55 - 135 U/L Final    AST 08/14/2019 20  10 - 40 U/L Final    ALT 08/14/2019 18  10 - 44 U/L Final    Anion Gap 08/14/2019 9  8 - 16 mmol/L Final    eGFR if African American 08/14/2019 >60.0  >60 mL/min/1.73 m^2 Final    eGFR if non African American 08/14/2019 >60.0  >60 mL/min/1.73 m^2 Final    Comment: Calculation used to obtain the estimated glomerular filtration  rate (eGFR) is the CKD-EPI equation.       Cholesterol 08/14/2019 218* 120 - 199 mg/dL Final    Comment: The National Cholesterol Education Program (NCEP) has set the  following guidelines (reference ranges) for Cholesterol:  Optimal.....................<200 mg/dL  Borderline High.............200-239 mg/dL  High........................> or = 240 mg/dL      Triglycerides 08/14/2019 165* 30 - 150 mg/dL Final    Comment: The National Cholesterol Education Program (NCEP) has set the  following guidelines (reference values) for triglycerides:  Normal......................<150 mg/dL  Borderline High.............150-199 mg/dL  High........................200-499 mg/dL      HDL 08/14/2019 62  40 - 75 mg/dL Final    Comment: The National Cholesterol Education Program (NCEP) has set the  following guidelines (reference values) for HDL Cholesterol:  Low...............<40 mg/dL  Optimal...........>60 mg/dL      LDL Cholesterol 08/14/2019 123.0  63.0 - 159.0 mg/dL Final    Comment: The National Cholesterol Education Program (NCEP) has set the  following guidelines (reference values) for LDL  Cholesterol:  Optimal.......................<130 mg/dL  Borderline High...............130-159 mg/dL  High..........................160-189 mg/dL  Very High.....................>190 mg/dL      Hdl/Cholesterol Ratio 08/14/2019 28.4  20.0 - 50.0 % Final    Total Cholesterol/HDL Ratio 08/14/2019 3.5  2.0 - 5.0 Final    Non-HDL Cholesterol 08/14/2019 156  mg/dL Final    Comment: Risk category and Non-HDL cholesterol goals:  Coronary heart disease (CHD)or equivalent (10-year risk of CHD >20%):  Non-HDL cholesterol goal     <130 mg/dL  Two or more CHD risk factors and 10-year risk of CHD <= 20%:  Non-HDL cholesterol goal     <160 mg/dL  0 to 1 CHD risk factor:  Non-HDL cholesterol goal     <190 mg/dL      TSH 08/14/2019 1.910  0.340 - 5.600 uIU/mL Final   ]  Assessment:       1. Preop examination    2. Essential hypertension    3. Cataract, unspecified cataract type, unspecified laterality        Plan:       Preop examination  >4 METS  Patient cleared for cataract surgery    Essential hypertension  Stable on current meds    Cataract, unspecified cataract type, unspecified laterality  F/u with Dr Perez as scheduled      Follow up for has f/u.      11/12/2019 YULIANA Farooq, FNP

## 2019-11-12 NOTE — PATIENT INSTRUCTIONS
4 Steps for Eating Healthier  Changing the way you eat can improve your health. It can lower your cholesterol and blood pressure, and help you stay at a healthy weight. Your diet doesnt have to be bland and boring to be healthy. Just watch your calories and follow these steps:    1. Eat fewer unhealthy fats  · Choose more fish and lean meats instead of fatty cuts of meat.  · Skip butter and lard, and use less margarine.  · Pass on foods that have palm, coconut, or hydrogenated oils.  · Eat fewer high-fat dairy foods like cheese, ice cream, and whole milk.  · Get a heart-healthy cookbook and try some low-fat recipes.  2. Go light on salt  · Keep the saltshaker off the table.  · Limit high-salt ingredients, such as soy sauce, bouillon, and garlic salt.  · Instead of adding salt when cooking, season your food with herbs and flavorings. Try lemon, garlic, and onion.  · Limit convenience foods, such as boxed or canned foods and restaurant food.  · Read food labels and choose lower-sodium options.  3. Limit sugar  · Pause before you add sugars to pancakes, cereal, coffee, or tea. This includes white and brown table sugar, syrup, honey, and molasses. Cut your usual amount by half.  · Use non-sugar sweeteners. Stevia, aspartame, and sucralose can satisfy a sweet tooth without adding calories.  · Swap out sugar-filled soda and other drinks. Buy sugar-free or low-calorie beverages. Remember water is always the best choice.  · Read labels and choose foods with less added sugar. Keep in mind that dairy foods and foods with fruit will have some natural sugar.  · Cut the sugar in recipes by 1/3 to 1/2. Boost the flavor with extracts like almond, vanilla, or orange. Or add spices such as cinnamon or nutmeg.  4. Eat more fiber  · Eat fresh fruits and vegetables every day.  · Boost your diet with whole grains. Go for oats, whole-grain rice, and bran.  · Add beans and lentils to your meals.  · Drink more water to match your fiber  increase. This is to help prevent constipation.  Date Last Reviewed: 5/11/2015  © 5775-4034 [employer]. [address] [place] [address], [place] [address]. All rights reserved. This information is not intended as a substitute for professional medical care. Always follow your healthcare professional's instructions.

## 2019-11-13 ENCOUNTER — ANESTHESIA (OUTPATIENT)
Dept: SURGERY | Facility: AMBULARY SURGERY CENTER | Age: 59
End: 2019-11-13
Payer: OTHER GOVERNMENT

## 2019-11-13 ENCOUNTER — HOSPITAL ENCOUNTER (OUTPATIENT)
Facility: AMBULARY SURGERY CENTER | Age: 59
Discharge: HOME | End: 2019-11-13
Attending: OPHTHALMOLOGY | Admitting: OPHTHALMOLOGY
Payer: OTHER GOVERNMENT

## 2019-11-13 DIAGNOSIS — H26.9 CATARACT OF RIGHT EYE: ICD-10-CM

## 2019-11-13 PROCEDURE — D9220A PRA ANESTHESIA: ICD-10-PCS | Mod: CRNA,,, | Performed by: NURSE ANESTHETIST, CERTIFIED REGISTERED

## 2019-11-13 PROCEDURE — D9220A PRA ANESTHESIA: ICD-10-PCS | Mod: ANES,,, | Performed by: ANESTHESIOLOGY

## 2019-11-13 PROCEDURE — D9220A PRA ANESTHESIA: Mod: CRNA,,, | Performed by: NURSE ANESTHETIST, CERTIFIED REGISTERED

## 2019-11-13 PROCEDURE — 66984 XCAPSL CTRC RMVL W/O ECP: CPT | Performed by: OPHTHALMOLOGY

## 2019-11-13 PROCEDURE — D9220A PRA ANESTHESIA: Mod: ANES,,, | Performed by: ANESTHESIOLOGY

## 2019-11-13 DEVICE — LENS 17.5: Type: IMPLANTABLE DEVICE | Site: EYE | Status: FUNCTIONAL

## 2019-11-13 RX ORDER — CYCLOPENTOLATE HYDROCHLORIDE 10 MG/ML
1 SOLUTION/ DROPS OPHTHALMIC
Status: DISCONTINUED | OUTPATIENT
Start: 2019-11-13 | End: 2019-11-13 | Stop reason: HOSPADM

## 2019-11-13 RX ORDER — PHENYLEPHRINE HYDROCHLORIDE 100 MG/ML
1 SOLUTION/ DROPS OPHTHALMIC
Status: DISCONTINUED | OUTPATIENT
Start: 2019-11-13 | End: 2019-11-13 | Stop reason: HOSPADM

## 2019-11-13 RX ORDER — SODIUM CHLORIDE 0.9 % (FLUSH) 0.9 %
3 SYRINGE (ML) INJECTION EVERY 8 HOURS
Status: DISCONTINUED | OUTPATIENT
Start: 2019-11-13 | End: 2019-11-13 | Stop reason: HOSPADM

## 2019-11-13 RX ORDER — FENTANYL CITRATE 50 UG/ML
INJECTION, SOLUTION INTRAMUSCULAR; INTRAVENOUS
Status: DISCONTINUED | OUTPATIENT
Start: 2019-11-13 | End: 2019-11-13

## 2019-11-13 RX ORDER — TROPICAMIDE 10 MG/ML
1 SOLUTION/ DROPS OPHTHALMIC
Status: COMPLETED | OUTPATIENT
Start: 2019-11-13 | End: 2019-11-13

## 2019-11-13 RX ORDER — SODIUM CHLORIDE, SODIUM LACTATE, POTASSIUM CHLORIDE, CALCIUM CHLORIDE 600; 310; 30; 20 MG/100ML; MG/100ML; MG/100ML; MG/100ML
500 INJECTION, SOLUTION INTRAVENOUS ONCE
Status: DISCONTINUED | OUTPATIENT
Start: 2019-11-13 | End: 2019-11-13 | Stop reason: HOSPADM

## 2019-11-13 RX ORDER — PROPARACAINE HYDROCHLORIDE 5 MG/ML
SOLUTION/ DROPS OPHTHALMIC
Status: DISCONTINUED | OUTPATIENT
Start: 2019-11-13 | End: 2019-11-13 | Stop reason: HOSPADM

## 2019-11-13 RX ORDER — MIDAZOLAM HYDROCHLORIDE 1 MG/ML
INJECTION, SOLUTION INTRAMUSCULAR; INTRAVENOUS
Status: DISCONTINUED | OUTPATIENT
Start: 2019-11-13 | End: 2019-11-13

## 2019-11-13 RX ORDER — ONDANSETRON 2 MG/ML
INJECTION INTRAMUSCULAR; INTRAVENOUS
Status: DISCONTINUED | OUTPATIENT
Start: 2019-11-13 | End: 2019-11-13

## 2019-11-13 RX ORDER — POLYMYXIN B SULFATE AND TRIMETHOPRIM 1; 10000 MG/ML; [USP'U]/ML
SOLUTION OPHTHALMIC
Status: DISCONTINUED | OUTPATIENT
Start: 2019-11-13 | End: 2019-11-13 | Stop reason: HOSPADM

## 2019-11-13 RX ORDER — PHENYLEPHRINE HYDROCHLORIDE 25 MG/ML
1 SOLUTION/ DROPS OPHTHALMIC
Status: COMPLETED | OUTPATIENT
Start: 2019-11-13 | End: 2019-11-13

## 2019-11-13 RX ORDER — OXYCODONE HYDROCHLORIDE 5 MG/1
5 TABLET ORAL
Status: DISCONTINUED | OUTPATIENT
Start: 2019-11-13 | End: 2019-11-13 | Stop reason: HOSPADM

## 2019-11-13 RX ORDER — FENTANYL CITRATE 50 UG/ML
25 INJECTION, SOLUTION INTRAMUSCULAR; INTRAVENOUS EVERY 5 MIN PRN
Status: DISCONTINUED | OUTPATIENT
Start: 2019-11-13 | End: 2019-11-13 | Stop reason: HOSPADM

## 2019-11-13 RX ORDER — CYCLOPENTOLATE HYDROCHLORIDE 10 MG/ML
1 SOLUTION/ DROPS OPHTHALMIC
Status: COMPLETED | OUTPATIENT
Start: 2019-11-13 | End: 2019-11-13

## 2019-11-13 RX ORDER — SODIUM CHLORIDE, SODIUM LACTATE, POTASSIUM CHLORIDE, CALCIUM CHLORIDE 600; 310; 30; 20 MG/100ML; MG/100ML; MG/100ML; MG/100ML
10 INJECTION, SOLUTION INTRAVENOUS CONTINUOUS
Status: DISCONTINUED | OUTPATIENT
Start: 2019-11-13 | End: 2019-11-13 | Stop reason: HOSPADM

## 2019-11-13 RX ORDER — PROPARACAINE HYDROCHLORIDE 5 MG/ML
1 SOLUTION/ DROPS OPHTHALMIC
Status: DISCONTINUED | OUTPATIENT
Start: 2019-11-13 | End: 2019-11-13 | Stop reason: HOSPADM

## 2019-11-13 RX ORDER — TROPICAMIDE 10 MG/ML
1 SOLUTION/ DROPS OPHTHALMIC
Status: DISCONTINUED | OUTPATIENT
Start: 2019-11-13 | End: 2019-11-13 | Stop reason: HOSPADM

## 2019-11-13 RX ORDER — LIDOCAINE HYDROCHLORIDE 10 MG/ML
1 INJECTION, SOLUTION EPIDURAL; INFILTRATION; INTRACAUDAL; PERINEURAL ONCE
Status: DISCONTINUED | OUTPATIENT
Start: 2019-11-13 | End: 2019-11-13 | Stop reason: HOSPADM

## 2019-11-13 RX ORDER — LIDOCAINE HYDROCHLORIDE 40 MG/ML
0.1 INJECTION, SOLUTION RETROBULBAR
Status: ACTIVE | OUTPATIENT
Start: 2019-11-13 | End: 2019-11-13

## 2019-11-13 RX ADMIN — LIDOCAINE HYDROCHLORIDE 0.1 ML: 40 INJECTION, SOLUTION RETROBULBAR at 10:11

## 2019-11-13 RX ADMIN — TROPICAMIDE 1 DROP: 10 SOLUTION/ DROPS OPHTHALMIC at 08:11

## 2019-11-13 RX ADMIN — PROPARACAINE HYDROCHLORIDE 1 DROP: 5 SOLUTION/ DROPS OPHTHALMIC at 08:11

## 2019-11-13 RX ADMIN — SODIUM CHLORIDE, SODIUM LACTATE, POTASSIUM CHLORIDE, CALCIUM CHLORIDE 10 ML/HR: 600; 310; 30; 20 INJECTION, SOLUTION INTRAVENOUS at 09:11

## 2019-11-13 RX ADMIN — ONDANSETRON 4 MG: 2 INJECTION INTRAMUSCULAR; INTRAVENOUS at 10:11

## 2019-11-13 RX ADMIN — FENTANYL CITRATE 50 MCG: 50 INJECTION, SOLUTION INTRAMUSCULAR; INTRAVENOUS at 10:11

## 2019-11-13 RX ADMIN — MIDAZOLAM HYDROCHLORIDE 2 MG: 1 INJECTION, SOLUTION INTRAMUSCULAR; INTRAVENOUS at 10:11

## 2019-11-13 RX ADMIN — PHENYLEPHRINE HYDROCHLORIDE 1 DROP: 25 SOLUTION/ DROPS OPHTHALMIC at 08:11

## 2019-11-13 RX ADMIN — OXYCODONE HYDROCHLORIDE 5 MG: 5 TABLET ORAL at 11:11

## 2019-11-13 RX ADMIN — CYCLOPENTOLATE HYDROCHLORIDE 1 DROP: 10 SOLUTION/ DROPS OPHTHALMIC at 08:11

## 2019-11-13 NOTE — OP NOTE
[facility]  Opthalmology  Operative Note    SUMMARY     Date of Procedure: 11/13/2019     Procedure: Procedure(s) (LRB):  EXTRACTION, CATARACT, WITH IOL INSERTION (Right)       Surgeon(s) and Role:     * Carmelo Perez II, MD - Primary    Surgeon :  Carmelo Perez    Specimens:  None    Complications:  None      Pre-Operative Diagnosis: Cataract of right eye.    Post-Operative Diagnosis: Cataract of right eye.    Anesthesia: Topical    Procedure in Detail: The patient was brought from the Day Surgery Unit to the holding area where intravenous infusion was started and multiple doses of 4% lidocaine instilled into the right eye to achieve topical anesthesia.  The patient was then brought to the operating room where the operative field was prepped with Betadine and draped with sterile drapes.  The patient was then positioned under the operating microscope where a self retaining wire lid speculum was placed into the operative eye.  A partial thickness temporal clear corneal groove incision was then made near the limbus approximately 3mm in length using a 15 degree blade.  The same 15 degree blade was then used to create a limbal side port incision.  Additional intracameral anesthesia was obtained by the injection of 1% preservative free lidocaine through the side port incision.  The anterior chamber was then re-deepened by the injection of Viscoat through the side port incision.  The previously made temporal groove incision was then converted to a clear corneal valve incision using a 2.5 mm keratome.  A cystotome was then passed through this temporal clear corneal incision and used to raise an anterior lenticular capsular flap which was then grasped with Utrata forceps and a circular tear capsulotomy performed.  Hydrodissection of the lens nucleus from its surrounding cortical and capsular attachments was accomplished by the injection of Balanced Salt Solution through a 27 gauge flat hydrodissection  cannula.   The phacoemulsification tip was then passed through the temporal incision and used to sculpt the central nucleus and to sculpt a peripheral nuclear groove nasally.  A Knolle spatula was then passed through the side port incision and used to rotate the nucleus 90 degrees clockwise where a second peripheral nuclear groove was sculpted.  In like fashion a third and fourth groove were sculpted at 90 degrees intervals until the depths of the groove were sufficiently thin to allow fracture the nucleus into 4 fragments which were then individually emulsified in the pupillary plane.  The phacoemulsification tip was then withdrawn and the irrigation/aspiration cannula used to aspirate residual cortical material from the capsular bag.  The capsular bag was then reinflated and the anterior chamber redeepened by the injection of Provisc.  A posterior chamber intraocular lens was then placed into its insertion device and injected through the temporal clear corneal incision such that the lens was placed entirely inside the capsular bag.  The irrigation/aspiration cannula ws then used once again to aspirate residual Viscoelastic from the anterior chamber.  At the end of this procedure the anterior chamber was redeepened by the injection of additional Balanced Salt Solution through the side port incision.  The side port incision and temporal clear corneal incision were then tested with Weck-norma sponges and found to be watertight.  The eye was then irrigated with antibiotic solution after which the lid speculum and drapes were removed and an eye shield applied.  The patient tolerated the procedure well and was taken from the operating room in satisfactory condition.                  Estimated Blood Loss (EBL): * No values recorded between 11/13/2019 10:26 AM and 11/13/2019 10:41 AM *           Implants:   Implant Name Type Inv. Item Serial No.  Lot No. LRB No. Used   LENS 17.5 - [id number]  LENS 17.5  [id number] [employer]  Right 1           Disposition: Home/Self Care  Pt instructed to follow up in one day, return to usual diet, limit activities - no heavy lifting.

## 2019-11-13 NOTE — TRANSFER OF CARE
"Anesthesia Transfer of Care Note    Patient: Luann Borjas    Procedure(s) Performed: Procedure(s) (LRB):  EXTRACTION, CATARACT, WITH IOL INSERTION (Right)    Patient location: PACU    Anesthesia Type: MAC    Transport from OR: Transported from OR on room air with adequate spontaneous ventilation    Post pain: adequate analgesia    Post assessment: no apparent anesthetic complications and tolerated procedure well    Post vital signs: stable    Level of consciousness: awake, alert and oriented    Nausea/Vomiting: no nausea/vomiting    Complications: none    Transfer of care protocol was followed      Last vitals:   Visit Vitals  BP (!) 182/85   Pulse (!) 56   Temp 36.8 °C (98.2 °F) (Skin)   Resp 18   Ht 5' 6" (1.676 m)   Wt 68.9 kg (152 lb)   SpO2 95%   Breastfeeding? No   BMI 24.53 kg/m²     "

## 2019-11-13 NOTE — ANESTHESIA POSTPROCEDURE EVALUATION
Anesthesia Post Evaluation    Patient: Luann Borjas    Procedure(s) Performed: Procedure(s) (LRB):  EXTRACTION, CATARACT, WITH IOL INSERTION (Right)    Final Anesthesia Type: MAC  Patient location during evaluation: PACU  Patient participation: Yes- Able to Participate  Level of consciousness: awake and alert  Post-procedure vital signs: reviewed and stable  Pain management: adequate  Airway patency: patent  PONV status at discharge: No PONV  Anesthetic complications: no      Cardiovascular status: blood pressure returned to baseline  Respiratory status: unassisted  Hydration status: euvolemic  Follow-up not needed.          Vitals Value Taken Time   /75 11/13/2019 10:47 AM   Temp 36.4 °C (97.5 °F) 11/13/2019 10:40 AM   Pulse 65 11/13/2019 10:48 AM   Resp 13 11/13/2019 10:48 AM   SpO2 91 % 11/13/2019 10:48 AM   Vitals shown include unvalidated device data.      No case tracking events are documented in the log.      Pain/Ana M Score: No data recorded

## 2019-11-13 NOTE — ANESTHESIA PREPROCEDURE EVALUATION
11/13/2019  Luann Borjas is a 59 y.o., female.    Pre-op Assessment    I have reviewed the Patient Summary Reports.     I have reviewed the Nursing Notes.      Review of Systems  Anesthesia Hx:  No problems with previous Anesthesia Denies Hx of Anesthetic complications    Social:  Non-Smoker    Cardiovascular:   Denies Hypertension.  Denies MI.  Denies CAD.    Denies CABG/stent.   Denies Angina.    Pulmonary:   Denies COPD.  Denies Asthma.  Denies Recent URI. Sleep Apnea    Renal/:   Denies Chronic Renal Disease.     Hepatic/GI:   Denies GERD. Denies Liver Disease.    Musculoskeletal:   Arthritis     Neurological:   Denies TIA. Denies CVA. Denies Seizures.    Endocrine:   Denies Diabetes. Denies Hypothyroidism.    Psych:   Denies Psychiatric History.          Physical Exam  General:  Well nourished    Airway/Jaw/Neck:  Airway Findings: Mouth Opening: Normal Tongue: Normal  General Airway Assessment: Adult, Good  Mallampati: II  Improves to II with phonation.  TM Distance: 4-6 cm      Dental:  Dental Findings: In tact   Chest/Lungs:  Chest/Lungs Findings: Clear to auscultation, Normal Respiratory Rate     Heart/Vascular:  Heart Findings: Rate: Normal  Rhythm: Regular Rhythm  Sounds: Normal  Heart murmur: negative       Mental Status:  Mental Status Findings:  Cooperative, Alert and Oriented         Anesthesia Plan  Type of Anesthesia, risks & benefits discussed:  Anesthesia Type:  MAC  Patient's Preference:   Intra-op Monitoring Plan:   Intra-op Monitoring Plan Comments:   Post Op Pain Control Plan:   Post Op Pain Control Plan Comments:   Induction:   IV  Beta Blocker:  Patient is not currently on a Beta-Blocker (No further documentation required).       Informed Consent: Patient understands risks and agrees with Anesthesia plan.  Questions answered. Anesthesia consent signed with patient.  ASA Score: 2      Day of Surgery Review of History & Physical:    H&P update referred to the surgeon.         Ready For Surgery From Anesthesia Perspective.

## 2019-11-13 NOTE — DISCHARGE INSTRUCTIONS
After Cataract Surgery    You may remove your shield on the day of surgery. You may see double and your vision may be blurry when the patch is first removed. If you are uncomfortable with these symptoms, you may leave  the shield on    Tape the eye sheild over your eye at bedtime and naptime for 7 days. Tape is provided in your kit.    Do not vigorously press or rub your eye after surgery for 6 weeks.    Clean around your eyelids regularly being careful not to press on the upper lid.    You may shower, bathe and shampoo but avoid getting water, soap or shampoo in your eye for 2 weeks.  No swimming for 2 weeks.    Resume normal activities tomorrow, but no lifting or bending for 1 week.  Rest today. Reading, writing or watching TV are fine.     No eye makeup for 1 week, no eye creams for 24 hrs. Caution using perfumes or colognes.    You may be sensitive to light and  may wear sunglasses provided when outdoors     Your glasses may not be the right strength for your eye after surgery.  You may wear your old glasses or go without.  You may find ModiFace reading glasses helpful.    Symptoms you may experience the first day:  Blurry vision, Double Vision, Redish color to objects, Sensitivity to light, flickering light, Burning or scratchy feeling in eye.    Serious Problems that could arise that need immediate reporting to the Dr:     A sudden Decrease or loss of vision in either eye  Your vision is becoming worse instead of better each day  You develop a shadow in your vision  You see flashing lights  You have a continuous ache or increased pain not relieved by over the counter pain medication.    If you have any questions, please re read this carefully. If your  Question is still not answered call the office. [phone] . We are here to help you!    Emergency after office hours number is     Using eye drops:  Wash hands, shake drops well, tilt head back, open eye and look up, gently pull lower eyelid  down to form a pocket, place drop into the pocket formed, close eye    Be careful not to touch your eyelashes or finger to the tip of the bottle. Allow 5 m between each drop.    Prednisolone 1 drop 4x daily- every 2 hrs til bedtime today  Polymyxin 1 drop 4x daily  Ketorolac 1 drop 4 x daily    Before leaving, please make sure you have all your personal belongings such as glasses, purses, wallets, keys, cell phones, jewelry, jackets etc

## 2019-11-13 NOTE — PLAN OF CARE
Patient is being driven home by her . Her clear eye shield is in place. She has her follow up appointment with Dr Perez scheduled for 4 pm tomorrow. She has her eye drops. She was given additional tape for her eye shield and sunglasses. She was able to walk to the bathroom and the car. She ate 2 crackers before taking a pain pill.

## 2019-11-14 VITALS
HEIGHT: 66 IN | OXYGEN SATURATION: 93 % | TEMPERATURE: 98 F | DIASTOLIC BLOOD PRESSURE: 70 MMHG | HEART RATE: 68 BPM | WEIGHT: 152 LBS | RESPIRATION RATE: 18 BRPM | BODY MASS INDEX: 24.43 KG/M2 | SYSTOLIC BLOOD PRESSURE: 147 MMHG

## 2020-04-20 ENCOUNTER — OFFICE VISIT (OUTPATIENT)
Dept: FAMILY MEDICINE | Facility: CLINIC | Age: 60
End: 2020-04-20
Payer: OTHER GOVERNMENT

## 2020-04-20 VITALS
HEART RATE: 74 BPM | DIASTOLIC BLOOD PRESSURE: 76 MMHG | HEIGHT: 66 IN | WEIGHT: 154 LBS | BODY MASS INDEX: 24.75 KG/M2 | TEMPERATURE: 99 F | SYSTOLIC BLOOD PRESSURE: 130 MMHG | OXYGEN SATURATION: 96 %

## 2020-04-20 DIAGNOSIS — Z00.00 PREVENTATIVE HEALTH CARE: ICD-10-CM

## 2020-04-20 DIAGNOSIS — I10 ESSENTIAL HYPERTENSION: Primary | ICD-10-CM

## 2020-04-20 PROCEDURE — 99213 OFFICE O/P EST LOW 20 MIN: CPT | Mod: S$GLB,,, | Performed by: NURSE PRACTITIONER

## 2020-04-20 PROCEDURE — 99213 PR OFFICE/OUTPT VISIT, EST, LEVL III, 20-29 MIN: ICD-10-PCS | Mod: S$GLB,,, | Performed by: NURSE PRACTITIONER

## 2020-04-20 RX ORDER — AMLODIPINE AND BENAZEPRIL HYDROCHLORIDE 5; 10 MG/1; MG/1
1 CAPSULE ORAL DAILY
Qty: 90 CAPSULE | Refills: 1 | Status: SHIPPED | OUTPATIENT
Start: 2020-04-20 | End: 2020-11-03 | Stop reason: SDUPTHER

## 2020-04-20 NOTE — PROGRESS NOTES
SUBJECTIVE:      Patient ID: Luann Borjas is a 60 y.o. female.    Chief Complaint: Hypertension    Mrs Borjas is here today to f/u on HTN. She is doing well on the current meds, needs refills. She says she is still smoking and with the current COVID pandemic has not been able to cut back. She is following with a counselor and has a virtual visit next week.     Hypertension   This is a chronic problem. The current episode started more than 1 month ago. The problem is unchanged. The problem is controlled. Pertinent negatives include no chest pain, headaches, palpitations, peripheral edema or shortness of breath. Risk factors for coronary artery disease include smoking/tobacco exposure. Past treatments include calcium channel blockers and ACE inhibitors. The current treatment provides significant improvement.       Past Surgical History:   Procedure Laterality Date    BASAL CELL CARCINOMA EXCISION  09/2019    lip    CATARACT EXTRACTION W/  INTRAOCULAR LENS IMPLANT Right 11/13/2019    Procedure: EXTRACTION, CATARACT, WITH IOL INSERTION;  Surgeon: Carmelo Perez II, MD;  Location: [facility] OR;  Service: Ophthalmology;  Laterality: Right;    EYE SURGERY Right     cataracts     History reviewed. No pertinent family history.   Social History     Socioeconomic History    Marital status:      Spouse name: Not on file    Number of children: Not on file    Years of education: Not on file    Highest education level: Not on file   Occupational History    Not on file   Social Needs    Financial resource strain: Not on file    Food insecurity:     Worry: Not on file     Inability: Not on file    Transportation needs:     Medical: Not on file     Non-medical: Not on file   Tobacco Use    Smoking status: Current Every Day Smoker     Packs/day: 0.50     Years: 12.00     Pack years: 6.00     Types: Cigarettes    Smokeless tobacco: Never Used   Substance and Sexual Activity    Alcohol use: Yes     Comment:  occasional    Drug use: No    Sexual activity: Not on file   Lifestyle    Physical activity:     Days per week: Not on file     Minutes per session: Not on file    Stress: Not on file   Relationships    Social connections:     Talks on phone: Not on file     Gets together: Not on file     Attends Mosque service: Not on file     Active member of club or organization: Not on file     Attends meetings of clubs or organizations: Not on file     Relationship status: Not on file   Other Topics Concern    Not on file   Social History Narrative    Not on file     Current Outpatient Medications   Medication Sig Dispense Refill    alprazolam (XANAX) 0.5 MG tablet Take 0.5 mg by mouth 2 (two) times daily as needed for Anxiety.      amlodipine-benazepril 5-10 mg (LOTREL) 5-10 mg per capsule Take 1 capsule by mouth once daily. 90 capsule 1    fluoxetine (PROZAC) 20 MG tablet Take 20 mg by mouth once daily.        No current facility-administered medications for this visit.      Review of patient's allergies indicates:   Allergen Reactions    Erythromycin Anaphylaxis      Past Medical History:   Diagnosis Date    Anxiety     Arthritis     Cataract of left eye     Hyperlipidemia     Sleep apnea     cpap     Past Surgical History:   Procedure Laterality Date    BASAL CELL CARCINOMA EXCISION  09/2019    lip    CATARACT EXTRACTION W/  INTRAOCULAR LENS IMPLANT Right 11/13/2019    Procedure: EXTRACTION, CATARACT, WITH IOL INSERTION;  Surgeon: Carmelo Perez II, MD;  Location: [facility] OR;  Service: Ophthalmology;  Laterality: Right;    EYE SURGERY Right     cataracts       Review of Systems   Constitutional: Negative for appetite change, chills, diaphoresis and unexpected weight change.   HENT: Negative for trouble swallowing and voice change.    Eyes: Negative for photophobia and pain.   Respiratory: Negative for chest tightness, shortness of breath and stridor.    Cardiovascular: Negative for chest pain and  "palpitations.   Gastrointestinal: Negative for abdominal pain and blood in stool.   Endocrine: Negative for cold intolerance and heat intolerance.   Genitourinary: Negative for difficulty urinating and flank pain.   Musculoskeletal: Negative for joint swelling and neck stiffness.   Skin: Negative for pallor.   Neurological: Negative for dizziness, speech difficulty, weakness, light-headedness and headaches.   Hematological: Does not bruise/bleed easily.   Psychiatric/Behavioral: Negative for confusion and dysphoric mood. The patient is not nervous/anxious.       OBJECTIVE:      Vitals:    04/20/20 0802   BP: 130/76   Pulse: 74   Temp: 98.7 °F (37.1 °C)   TempSrc: Oral   SpO2: 96%   Weight: 69.9 kg (154 lb)   Height: 5' 6" (1.676 m)     Physical Exam   Constitutional: She is oriented to person, place, and time. She appears well-developed and well-nourished.   HENT:   Head: Atraumatic.   Eyes: Conjunctivae are normal.   Neck: Neck supple. No thyromegaly present.   Cardiovascular: Normal rate, regular rhythm, normal heart sounds and intact distal pulses.   Pulmonary/Chest: Effort normal and breath sounds normal.   Abdominal: Soft. Bowel sounds are normal. She exhibits no distension. There is no tenderness.   Musculoskeletal: Normal range of motion. She exhibits no edema.   Neurological: She is alert and oriented to person, place, and time.   Skin: Skin is warm and dry.   Psychiatric: She has a normal mood and affect. Her speech is normal and behavior is normal. Thought content normal.   Nursing note and vitals reviewed.     Assessment:       1. Essential hypertension    2. Preventative health care        Plan:       Essential hypertension  -     amlodipine-benazepril 5-10 mg (LOTREL) 5-10 mg per capsule; Take 1 capsule by mouth once daily.  Dispense: 90 capsule; Refill: 1    Preventative health care  -     CBC auto differential; Future; Expected date: 04/20/2020  -     Comprehensive metabolic panel; Future; Expected " date: 04/20/2020  -     Lipid panel; Future; Expected date: 04/20/2020  -     TSH; Future; Expected date: 04/20/2020  -     Urinalysis; Future; Expected date: 04/20/2020        Follow up in about 4 months (around 8/20/2020) for wellness.      4/20/2020 YULIANA Farooq, FNP

## 2020-04-20 NOTE — PATIENT INSTRUCTIONS

## 2020-08-20 ENCOUNTER — OFFICE VISIT (OUTPATIENT)
Dept: FAMILY MEDICINE | Facility: CLINIC | Age: 60
End: 2020-08-20
Payer: OTHER GOVERNMENT

## 2020-08-20 VITALS
WEIGHT: 156 LBS | HEART RATE: 83 BPM | OXYGEN SATURATION: 95 % | DIASTOLIC BLOOD PRESSURE: 72 MMHG | TEMPERATURE: 97 F | BODY MASS INDEX: 25.07 KG/M2 | HEIGHT: 66 IN | SYSTOLIC BLOOD PRESSURE: 130 MMHG

## 2020-08-20 DIAGNOSIS — Z12.31 SCREENING MAMMOGRAM, ENCOUNTER FOR: ICD-10-CM

## 2020-08-20 DIAGNOSIS — I10 ESSENTIAL HYPERTENSION: ICD-10-CM

## 2020-08-20 DIAGNOSIS — Z00.00 PREVENTATIVE HEALTH CARE: Primary | ICD-10-CM

## 2020-08-20 DIAGNOSIS — R05.9 COUGH: ICD-10-CM

## 2020-08-20 PROCEDURE — 99396 PREV VISIT EST AGE 40-64: CPT | Mod: S$GLB,,, | Performed by: NURSE PRACTITIONER

## 2020-08-20 PROCEDURE — 99396 PR PREVENTIVE VISIT,EST,40-64: ICD-10-PCS | Mod: S$GLB,,, | Performed by: NURSE PRACTITIONER

## 2020-08-20 NOTE — PATIENT INSTRUCTIONS
Tips to Control Acid Reflux    To control acid reflux, youll need to make some basic diet and lifestyle changes. The simple steps outlined below may be all youll need to ease discomfort.  Watch what you eat  · Avoid fatty foods and spicy foods.  · Eat fewer acidic foods, such as citrus and tomato-based foods. These can increase symptoms.  · Limit drinking alcohol, caffeine, and fizzy beverages. All increase acid reflux.  · Try limiting chocolate, peppermint, and spearmint. These can worsen acid reflux in some people.  Watch when you eat  · Avoid lying down for 3 hours after eating.  · Do not snack before going to bed.  Raise your head  Raising your head and upper body by 4 to 6 inches helps limit reflux when youre lying down. Put blocks under the head of your bed frame to raise it.  Other changes  · Lose weight, if you need to  · Dont exercise near bedtime  · Avoid tight-fitting clothes  · Limit aspirin and ibuprofen  · Stop smoking   Date Last Reviewed: 7/1/2016  © 2974-0288 [employer]. [address] [place] [address], [place] [address]. All rights reserved. This information is not intended as a substitute for professional medical care. Always follow your healthcare professional's instructions.

## 2020-08-20 NOTE — PROGRESS NOTES
SUBJECTIVE:   HPI: Luann Borjas  is a 60 y.o. female who presents for annual physical .   Annual Exam    Mrs Borjas is here today for her annual exam. She is doing well on the current meds. Following with a counselor for depression/anxiety. Complaining of a dry cough since October. The cough is worse at night. She is utd with her colonoscopy, due for her mammogram    Hypertension  This is a chronic problem. The current episode started more than 1 month ago. The problem is unchanged. The problem is controlled. Pertinent negatives include no chest pain, headaches, palpitations, peripheral edema or shortness of breath. Risk factors for coronary artery disease include smoking/tobacco exposure. Past treatments include calcium channel blockers and ACE inhibitors. The current treatment provides significant improvement.     (Not in a hospital admission)    Review of patient's allergies indicates:   Allergen Reactions    Erythromycin Anaphylaxis     Current Outpatient Medications on File Prior to Visit   Medication Sig Dispense Refill    alprazolam (XANAX) 0.5 MG tablet Take 0.5 mg by mouth 2 (two) times daily as needed for Anxiety.      amlodipine-benazepril 5-10 mg (LOTREL) 5-10 mg per capsule Take 1 capsule by mouth once daily. 90 capsule 1    fluoxetine (PROZAC) 20 MG tablet Take 20 mg by mouth once daily.        No current facility-administered medications on file prior to visit.      Past Medical History:   Diagnosis Date    Anxiety     Arthritis     Cataract of left eye     Hyperlipidemia     Sleep apnea     cpap     Past Surgical History:   Procedure Laterality Date    BASAL CELL CARCINOMA EXCISION  09/2019    lip    CATARACT EXTRACTION W/  INTRAOCULAR LENS IMPLANT Right 11/13/2019    Procedure: EXTRACTION, CATARACT, WITH IOL INSERTION;  Surgeon: Carmelo Perez II, MD;  Location: [facility];  Service: Ophthalmology;  Laterality: Right;    EYE SURGERY Right     cataracts     History reviewed. No  "pertinent family history.  Social History     Tobacco Use    Smoking status: Current Every Day Smoker     Packs/day: 0.50     Years: 12.00     Pack years: 6.00     Types: Cigarettes    Smokeless tobacco: Never Used   Substance Use Topics    Alcohol use: Yes     Comment: occasional    Drug use: No      Health Maintenance Topics with due status: Not Due       Topic Last Completion Date    TETANUS VACCINE 04/02/2018    Colorectal Cancer Screening 04/02/2018    Mammogram 08/14/2019    Lipid Panel 08/14/2019    Influenza Vaccine 10/07/2019     Immunization History   Administered Date(s) Administered    Influenza - Quadrivalent - PF (6 months and older) 10/07/2019       Review of Systems   Constitutional: Negative for appetite change, chills, diaphoresis and unexpected weight change.   HENT: Negative for ear discharge, hearing loss, trouble swallowing and voice change.    Eyes: Negative for photophobia and pain.   Respiratory: Negative for chest tightness, shortness of breath and stridor.    Cardiovascular: Negative for chest pain and palpitations.   Gastrointestinal: Negative for abdominal pain, blood in stool and vomiting.   Endocrine: Negative for cold intolerance and heat intolerance.   Genitourinary: Negative for difficulty urinating and flank pain.   Musculoskeletal: Negative for back pain, joint swelling and neck stiffness.   Skin: Negative for pallor.   Neurological: Negative for dizziness, speech difficulty, weakness, light-headedness and headaches.   Hematological: Does not bruise/bleed easily.   Psychiatric/Behavioral: Negative for confusion and dysphoric mood. The patient is not nervous/anxious.       OBJECTIVE:      Vitals:    08/20/20 0754   BP: 130/72   Pulse: 83   Temp: 97.3 °F (36.3 °C)   TempSrc: Skin   SpO2: 95%   Weight: 70.8 kg (156 lb)   Height: 5' 6" (1.676 m)     Physical Exam  Vitals signs and nursing note reviewed.   Constitutional:       General: She is not in acute distress.     " Appearance: She is well-developed.   HENT:      Head: Normocephalic and atraumatic.      Right Ear: Tympanic membrane normal.      Left Ear: Tympanic membrane normal.      Nose: Nose normal.      Mouth/Throat:      Pharynx: Uvula midline.   Eyes:      General: Lids are normal.      Conjunctiva/sclera: Conjunctivae normal.      Pupils: Pupils are equal, round, and reactive to light.      Right eye: Pupil is round and reactive.      Left eye: Pupil is round and reactive.   Neck:      Musculoskeletal: Normal range of motion and neck supple.      Thyroid: No thyromegaly.      Vascular: No JVD.      Trachea: Trachea normal.   Cardiovascular:      Rate and Rhythm: Normal rate and regular rhythm.      Pulses: Normal pulses.      Heart sounds: Normal heart sounds.   Pulmonary:      Effort: Pulmonary effort is normal. No tachypnea or respiratory distress.      Breath sounds: Normal breath sounds. No wheezing, rhonchi or rales.   Abdominal:      General: Bowel sounds are normal.      Palpations: Abdomen is soft.      Tenderness: There is no abdominal tenderness.   Musculoskeletal: Normal range of motion.   Lymphadenopathy:      Cervical: No cervical adenopathy.   Skin:     General: Skin is warm and dry.      Findings: No rash.   Neurological:      Mental Status: She is alert and oriented to person, place, and time.   Psychiatric:         Attention and Perception: Attention normal.         Mood and Affect: Mood normal.         Speech: Speech normal.         Behavior: Behavior normal. Behavior is cooperative.         Thought Content: Thought content normal.        Assessment:       1. Preventative health care    2. Essential hypertension    3. Screening mammogram, encounter for    4. Cough        Plan:       Preventative health care  -     CBC auto differential; Future; Expected date: 08/20/2020  -     Comprehensive metabolic panel; Future; Expected date: 08/20/2020  -     Lipid Panel; Future; Expected date: 08/20/2020  -      TSH; Future; Expected date: 08/20/2020  -     Urinalysis; Future; Expected date: 08/20/2020  Counseled on age and gender appropriate medical preventative services, including cancer screenings, immunizations, overall nutritional health, need for a consistent exercise regimen and an overall push towards maintaining a vigorous and active lifestyle.      Essential hypertension  Stable on current meds  Discussed cough could be a side effect from the medication, she does not want to change medication at this time    Screening mammogram, encounter for  -     Mammo Digital Screening Bilat; Future; Expected date: 08/20/2020    Cough  -     X-Ray Chest PA And Lateral; Future; Expected date: 08/20/2020  Discussed reflux as a possible cause of the cough. Conservative measures discussed with patient. She will have the cxr and f/u if cough not improved          Follow up in about 6 months (around 2/20/2021) for htn .

## 2020-09-16 ENCOUNTER — LAB VISIT (OUTPATIENT)
Dept: LAB | Facility: HOSPITAL | Age: 60
End: 2020-09-16
Attending: NURSE PRACTITIONER
Payer: OTHER GOVERNMENT

## 2020-09-16 ENCOUNTER — TELEPHONE (OUTPATIENT)
Dept: FAMILY MEDICINE | Facility: CLINIC | Age: 60
End: 2020-09-16

## 2020-09-16 ENCOUNTER — HOSPITAL ENCOUNTER (OUTPATIENT)
Dept: RADIOLOGY | Facility: HOSPITAL | Age: 60
Discharge: HOME | End: 2020-09-16
Attending: NURSE PRACTITIONER
Payer: OTHER GOVERNMENT

## 2020-09-16 DIAGNOSIS — Z00.00 PREVENTATIVE HEALTH CARE: Primary | ICD-10-CM

## 2020-09-16 DIAGNOSIS — R31.9 HEMATURIA, UNSPECIFIED TYPE: ICD-10-CM

## 2020-09-16 DIAGNOSIS — R05.9 COUGH: ICD-10-CM

## 2020-09-16 DIAGNOSIS — Z00.00 PREVENTATIVE HEALTH CARE: ICD-10-CM

## 2020-09-16 DIAGNOSIS — Z12.31 SCREENING MAMMOGRAM, ENCOUNTER FOR: ICD-10-CM

## 2020-09-16 LAB
ALBUMIN SERPL BCP-MCNC: 4.6 G/DL (ref 3.5–5.2)
ALP SERPL-CCNC: 60 U/L (ref 55–135)
ALT SERPL W/O P-5'-P-CCNC: 21 U/L (ref 10–44)
ANION GAP SERPL CALC-SCNC: 8 MMOL/L (ref 8–16)
AST SERPL-CCNC: 20 U/L (ref 10–40)
BASOPHILS # BLD AUTO: 0.07 K/UL (ref 0–0.2)
BASOPHILS NFR BLD: 1.4 % (ref 0–1.9)
BILIRUB SERPL-MCNC: 1 MG/DL (ref 0.1–1)
BILIRUB UR QL STRIP: NEGATIVE
BUN SERPL-MCNC: 13 MG/DL (ref 6–20)
CALCIUM SERPL-MCNC: 9.2 MG/DL (ref 8.7–10.5)
CHLORIDE SERPL-SCNC: 106 MMOL/L (ref 95–110)
CHOLEST SERPL-MCNC: 238 MG/DL (ref 120–199)
CHOLEST/HDLC SERPL: 3.7 {RATIO} (ref 2–5)
CLARITY UR: ABNORMAL
CO2 SERPL-SCNC: 26 MMOL/L (ref 23–29)
COLOR UR: YELLOW
CREAT SERPL-MCNC: 0.7 MG/DL (ref 0.5–1.4)
DIFFERENTIAL METHOD: ABNORMAL
EOSINOPHIL # BLD AUTO: 0.3 K/UL (ref 0–0.5)
EOSINOPHIL NFR BLD: 5 % (ref 0–8)
ERYTHROCYTE [DISTWIDTH] IN BLOOD BY AUTOMATED COUNT: 12.9 % (ref 11.5–14.5)
EST. GFR  (AFRICAN AMERICAN): >60 ML/MIN/1.73 M^2
EST. GFR  (NON AFRICAN AMERICAN): >60 ML/MIN/1.73 M^2
GLUCOSE SERPL-MCNC: 96 MG/DL (ref 70–110)
GLUCOSE UR QL STRIP: NEGATIVE
HCT VFR BLD AUTO: 41.6 % (ref 37–48.5)
HDLC SERPL-MCNC: 65 MG/DL (ref 40–75)
HDLC SERPL: 27.3 % (ref 20–50)
HGB BLD-MCNC: 13.7 G/DL (ref 12–16)
HGB UR QL STRIP: ABNORMAL
IMM GRANULOCYTES # BLD AUTO: 0.01 K/UL (ref 0–0.04)
IMM GRANULOCYTES NFR BLD AUTO: 0.2 % (ref 0–0.5)
KETONES UR QL STRIP: NEGATIVE
LDLC SERPL CALC-MCNC: 157.6 MG/DL (ref 63–159)
LEUKOCYTE ESTERASE UR QL STRIP: NEGATIVE
LYMPHOCYTES # BLD AUTO: 1.2 K/UL (ref 1–4.8)
LYMPHOCYTES NFR BLD: 23.3 % (ref 18–48)
MCH RBC QN AUTO: 31.6 PG (ref 27–31)
MCHC RBC AUTO-ENTMCNC: 32.9 G/DL (ref 32–36)
MCV RBC AUTO: 96 FL (ref 82–98)
MONOCYTES # BLD AUTO: 0.4 K/UL (ref 0.3–1)
MONOCYTES NFR BLD: 7.2 % (ref 4–15)
NEUTROPHILS # BLD AUTO: 3.3 K/UL (ref 1.8–7.7)
NEUTROPHILS NFR BLD: 62.9 % (ref 38–73)
NITRITE UR QL STRIP: NEGATIVE
NONHDLC SERPL-MCNC: 173 MG/DL
NRBC BLD-RTO: 0 /100 WBC
PH UR STRIP: 6 [PH] (ref 5–8)
PLATELET # BLD AUTO: 268 K/UL (ref 150–350)
PMV BLD AUTO: 10.4 FL (ref 9.2–12.9)
POTASSIUM SERPL-SCNC: 3.7 MMOL/L (ref 3.5–5.1)
PROT SERPL-MCNC: 7 G/DL (ref 6–8.4)
PROT UR QL STRIP: NEGATIVE
RBC # BLD AUTO: 4.34 M/UL (ref 4–5.4)
SODIUM SERPL-SCNC: 140 MMOL/L (ref 136–145)
SP GR UR STRIP: >=1.03 (ref 1–1.03)
TRIGL SERPL-MCNC: 77 MG/DL (ref 30–150)
TSH SERPL DL<=0.005 MIU/L-ACNC: 1.79 UIU/ML (ref 0.34–5.6)
URN SPEC COLLECT METH UR: ABNORMAL
UROBILINOGEN UR STRIP-ACNC: NEGATIVE EU/DL
WBC # BLD AUTO: 5.16 K/UL (ref 3.9–12.7)

## 2020-09-16 PROCEDURE — 80061 LIPID PANEL: CPT

## 2020-09-16 PROCEDURE — 71046 X-RAY EXAM CHEST 2 VIEWS: CPT | Mod: TC,PO

## 2020-09-16 PROCEDURE — 36415 COLL VENOUS BLD VENIPUNCTURE: CPT

## 2020-09-16 PROCEDURE — 85025 COMPLETE CBC W/AUTO DIFF WBC: CPT

## 2020-09-16 PROCEDURE — 80053 COMPREHEN METABOLIC PANEL: CPT

## 2020-09-16 PROCEDURE — 77067 SCR MAMMO BI INCL CAD: CPT | Mod: TC,PO

## 2020-09-16 PROCEDURE — 84443 ASSAY THYROID STIM HORMONE: CPT

## 2020-09-16 PROCEDURE — 81003 URINALYSIS AUTO W/O SCOPE: CPT

## 2020-09-16 NOTE — TELEPHONE ENCOUNTER
Spoke to [facility] states if the urinalysis machine flags it needing microscopic it will automatically reflex but this sample did not flag and there is not enough urine to do a microscopic test.

## 2020-09-16 NOTE — TELEPHONE ENCOUNTER
----- Message from Allen Callahan NP sent at 9/16/2020  1:01 PM CDT -----  Can we get a microscopic u/a?

## 2020-09-16 NOTE — TELEPHONE ENCOUNTER
----- Message from Allen Callahan NP sent at 9/16/2020  8:56 AM CDT -----  Chest xray with no acute findings. Calcified granulomas noted from prior infection/inflammation

## 2020-09-16 NOTE — TELEPHONE ENCOUNTER
----- Message from Allen Callahan NP sent at 9/16/2020  8:56 AM CDT -----  mammo neg, repeat annually

## 2020-09-17 NOTE — TELEPHONE ENCOUNTER
Pt will call to schedule virtual appointment when she stops driving.     Please order U/A with microscopic, Im unable to order.

## 2020-09-18 ENCOUNTER — LAB VISIT (OUTPATIENT)
Dept: LAB | Facility: HOSPITAL | Age: 60
End: 2020-09-18
Attending: NURSE PRACTITIONER
Payer: OTHER GOVERNMENT

## 2020-09-18 DIAGNOSIS — R31.9 HEMATURIA, UNSPECIFIED TYPE: ICD-10-CM

## 2020-09-18 LAB
BACTERIA #/AREA URNS HPF: NEGATIVE /HPF
HYALINE CASTS #/AREA URNS LPF: 6 /LPF
MICROSCOPIC COMMENT: ABNORMAL
RBC #/AREA URNS HPF: 1 /HPF (ref 0–4)
SQUAMOUS #/AREA URNS HPF: 1 /HPF
WBC #/AREA URNS HPF: 0 /HPF (ref 0–5)

## 2020-09-18 PROCEDURE — 81001 URINALYSIS AUTO W/SCOPE: CPT

## 2020-09-21 ENCOUNTER — TELEPHONE (OUTPATIENT)
Dept: FAMILY MEDICINE | Facility: CLINIC | Age: 60
End: 2020-09-21

## 2020-09-24 ENCOUNTER — OFFICE VISIT (OUTPATIENT)
Dept: FAMILY MEDICINE | Facility: CLINIC | Age: 60
End: 2020-09-24
Payer: OTHER GOVERNMENT

## 2020-09-24 DIAGNOSIS — F17.210 CIGARETTE NICOTINE DEPENDENCE WITHOUT COMPLICATION: ICD-10-CM

## 2020-09-24 DIAGNOSIS — E78.00 PURE HYPERCHOLESTEROLEMIA: Primary | ICD-10-CM

## 2020-09-24 DIAGNOSIS — Z12.2 ENCOUNTER FOR SCREENING FOR MALIGNANT NEOPLASM OF RESPIRATORY ORGANS: ICD-10-CM

## 2020-09-24 DIAGNOSIS — I10 ESSENTIAL HYPERTENSION: ICD-10-CM

## 2020-09-24 PROCEDURE — 99214 OFFICE O/P EST MOD 30 MIN: CPT | Mod: 95,,, | Performed by: NURSE PRACTITIONER

## 2020-09-24 PROCEDURE — 99214 PR OFFICE/OUTPT VISIT, EST, LEVL IV, 30-39 MIN: ICD-10-PCS | Mod: 95,,, | Performed by: NURSE PRACTITIONER

## 2020-09-24 RX ORDER — FLUOXETINE HYDROCHLORIDE 40 MG/1
40 CAPSULE ORAL DAILY
COMMUNITY
Start: 2020-08-21 | End: 2021-09-28

## 2020-09-24 RX ORDER — ATORVASTATIN CALCIUM 10 MG/1
10 TABLET, FILM COATED ORAL DAILY
Qty: 90 TABLET | Refills: 0 | Status: SHIPPED | OUTPATIENT
Start: 2020-09-24 | End: 2020-12-16 | Stop reason: SDUPTHER

## 2020-09-24 NOTE — PROGRESS NOTES
Subjective:        The chief complaint leading to consultation is: elevated LDL  The patient location is:  [facility]  Visit type: Virtual visit with synchronous audio/video or audio only  This was a video visit in lieu of in-person visit due to the coronavirus emergency. Patient acknowledged and consented to the video visit encounter.     Mrs Borjas is following up on elevated LDL. She is following with psyc for depression/anxiety, her prozac was recently increased to 40mg. She feels she is doing well on that dose. Her blood pressure readings have been well controlled. She does cont to have an occasional dry cough. CXR showed no acute findings, previous granulomas noted. She is a smoker 1ppd for 30 years. Was quit for 10 years and started back. She would like to quit smoking but says the stress lately has not helped.     Hypertension  This is a chronic problem. The problem is unchanged. The problem is controlled. Pertinent negatives include no chest pain, headaches, neck pain, palpitations, peripheral edema or shortness of breath. Risk factors for coronary artery disease include smoking/tobacco exposure. Past treatments include calcium channel blockers and ACE inhibitors. The current treatment provides significant improvement.   Nicotine Dependence  Presents for initial visit. Symptoms include cravings. Preferred tobacco types include cigarettes. Her urge triggers include company of smokers and stress. Her first smoke is from 6 to 8 AM. She smokes 1 pack of cigarettes per day. Luann is thinking about quitting. Luann has tried to quit 2 times.       Past Surgical History:   Procedure Laterality Date    BASAL CELL CARCINOMA EXCISION  09/2019    lip    CATARACT EXTRACTION W/  INTRAOCULAR LENS IMPLANT Right 11/13/2019    Procedure: EXTRACTION, CATARACT, WITH IOL INSERTION;  Surgeon: Carmelo Perez II, MD;  Location: [facility];  Service: Ophthalmology;  Laterality: Right;    EYE SURGERY Right     cataracts      Past Medical History:   Diagnosis Date    Anxiety     Arthritis     Cataract of left eye     Hyperlipidemia     Sleep apnea     cpap     History reviewed. No pertinent family history.     Social History:   Marital Status:   Alcohol History:  reports current alcohol use.  Tobacco History:  reports that she has been smoking cigarettes. She has a 6.00 pack-year smoking history. She has never used smokeless tobacco.  Drug History:  reports no history of drug use.    Review of patient's allergies indicates:   Allergen Reactions    Erythromycin Anaphylaxis       Current Outpatient Medications   Medication Sig Dispense Refill    FLUoxetine (PROZAC) 40 MG capsule       alprazolam (XANAX) 0.5 MG tablet Take 0.5 mg by mouth 2 (two) times daily as needed for Anxiety.      amlodipine-benazepril 5-10 mg (LOTREL) 5-10 mg per capsule Take 1 capsule by mouth once daily. 90 capsule 1    atorvastatin (LIPITOR) 10 MG tablet Take 1 tablet (10 mg total) by mouth once daily. 90 tablet 0     No current facility-administered medications for this visit.        Review of Systems   Constitutional: Negative for activity change, appetite change, chills, diaphoresis and unexpected weight change.   HENT: Negative for ear discharge, hearing loss, rhinorrhea, trouble swallowing and voice change.    Eyes: Negative for photophobia, pain, discharge and visual disturbance.   Respiratory: Negative for chest tightness, shortness of breath, wheezing and stridor.    Cardiovascular: Negative for chest pain and palpitations.   Gastrointestinal: Negative for abdominal pain, blood in stool, constipation, diarrhea and vomiting.   Endocrine: Negative for cold intolerance, heat intolerance, polydipsia and polyuria.   Genitourinary: Negative for difficulty urinating, dysuria, flank pain, hematuria and menstrual problem.   Musculoskeletal: Negative for arthralgias, joint swelling, neck pain and neck stiffness.   Skin: Negative for pallor.    Neurological: Negative for speech difficulty, weakness and headaches.   Hematological: Does not bruise/bleed easily.   Psychiatric/Behavioral: Negative for confusion and dysphoric mood. The patient is not nervous/anxious.          Objective:        Physical Exam:   Physical Exam  Pulmonary:      Effort: No respiratory distress.   Neurological:      Mental Status: She is oriented to person, place, and time.   Psychiatric:         Mood and Affect: Mood normal.         Behavior: Behavior normal.         Thought Content: Thought content normal.     CXR  IMPRESSION:     Normal size heart. No pulmonary infiltrate. Prior granulomatous  infection.       Lab Visit on 09/18/2020   Component Date Value Ref Range Status    RBC, UA 09/18/2020 1  0 - 4 /hpf Final    WBC, UA 09/18/2020 0  0 - 5 /hpf Final    Bacteria 09/18/2020 Negative  None-Occ /hpf Final    Squam Epithel, UA 09/18/2020 1  /hpf Final    Hyaline Casts, UA 09/18/2020 6* 0-1/lpf /lpf Final    Microscopic Comment 09/18/2020 SEE COMMENT   Final    Comment: Other formed elements not mentioned in the report are not   present in the microscopic examination.      Lab Visit on 09/16/2020   Component Date Value Ref Range Status    WBC 09/16/2020 5.16  3.90 - 12.70 K/uL Final    RBC 09/16/2020 4.34  4.00 - 5.40 M/uL Final    Hemoglobin 09/16/2020 13.7  12.0 - 16.0 g/dL Final    Hematocrit 09/16/2020 41.6  37.0 - 48.5 % Final    Mean Corpuscular Volume 09/16/2020 96  82 - 98 fL Final    Mean Corpuscular Hemoglobin 09/16/2020 31.6* 27.0 - 31.0 pg Final    Mean Corpuscular Hemoglobin Conc 09/16/2020 32.9  32.0 - 36.0 g/dL Final    RDW 09/16/2020 12.9  11.5 - 14.5 % Final    Platelets 09/16/2020 268  150 - 350 K/uL Final    MPV 09/16/2020 10.4  9.2 - 12.9 fL Final    Immature Granulocytes 09/16/2020 0.2  0.0 - 0.5 % Final    Gran # (ANC) 09/16/2020 3.3  1.8 - 7.7 K/uL Final    Immature Grans (Abs) 09/16/2020 0.01  0.00 - 0.04 K/uL Final    Comment: Mild  elevation in immature granulocytes is non specific and   can be seen in a variety of conditions including stress response,   acute inflammation, trauma and pregnancy. Correlation with other   laboratory and clinical findings is essential.      Lymph # 09/16/2020 1.2  1.0 - 4.8 K/uL Final    Mono # 09/16/2020 0.4  0.3 - 1.0 K/uL Final    Eos # 09/16/2020 0.3  0.0 - 0.5 K/uL Final    Baso # 09/16/2020 0.07  0.00 - 0.20 K/uL Final    nRBC 09/16/2020 0  0 /100 WBC Final    Gran% 09/16/2020 62.9  38.0 - 73.0 % Final    Lymph% 09/16/2020 23.3  18.0 - 48.0 % Final    Mono% 09/16/2020 7.2  4.0 - 15.0 % Final    Eosinophil% 09/16/2020 5.0  0.0 - 8.0 % Final    Basophil% 09/16/2020 1.4  0.0 - 1.9 % Final    Differential Method 09/16/2020 Automated   Final    Sodium 09/16/2020 140  136 - 145 mmol/L Final    Potassium 09/16/2020 3.7  3.5 - 5.1 mmol/L Final    Chloride 09/16/2020 106  95 - 110 mmol/L Final    CO2 09/16/2020 26  23 - 29 mmol/L Final    Glucose 09/16/2020 96  70 - 110 mg/dL Final    BUN, Bld 09/16/2020 13  6 - 20 mg/dL Final    Creatinine 09/16/2020 0.7  0.5 - 1.4 mg/dL Final    Calcium 09/16/2020 9.2  8.7 - 10.5 mg/dL Final    Total Protein 09/16/2020 7.0  6.0 - 8.4 g/dL Final    Albumin 09/16/2020 4.6  3.5 - 5.2 g/dL Final    Total Bilirubin 09/16/2020 1.0  0.1 - 1.0 mg/dL Final    Comment: For infants and newborns, interpretation of results should be based  on gestational age, weight and in agreement with clinical  observations.  Premature Infant recommended reference ranges:  Up to 24 hours.............<8.0 mg/dL  Up to 48 hours............<12.0 mg/dL  3-5 days..................<15.0 mg/dL  6-29 days.................<15.0 mg/dL      Alkaline Phosphatase 09/16/2020 60  55 - 135 U/L Final    AST 09/16/2020 20  10 - 40 U/L Final    ALT 09/16/2020 21  10 - 44 U/L Final    Anion Gap 09/16/2020 8  8 - 16 mmol/L Final    eGFR if African American 09/16/2020 >60.0  >60 mL/min/1.73 m^2 Final     eGFR if non African American 09/16/2020 >60.0  >60 mL/min/1.73 m^2 Final    Comment: Calculation used to obtain the estimated glomerular filtration  rate (eGFR) is the CKD-EPI equation.       Cholesterol 09/16/2020 238* 120 - 199 mg/dL Final    Comment: The National Cholesterol Education Program (NCEP) has set the  following guidelines (reference ranges) for Cholesterol:  Optimal.....................<200 mg/dL  Borderline High.............200-239 mg/dL  High........................> or = 240 mg/dL      Triglycerides 09/16/2020 77  30 - 150 mg/dL Final    Comment: The National Cholesterol Education Program (NCEP) has set the  following guidelines (reference values) for triglycerides:  Normal......................<150 mg/dL  Borderline High.............150-199 mg/dL  High........................200-499 mg/dL      HDL 09/16/2020 65  40 - 75 mg/dL Final    Comment: The National Cholesterol Education Program (NCEP) has set the  following guidelines (reference values) for HDL Cholesterol:  Low...............<40 mg/dL  Optimal...........>60 mg/dL      LDL Cholesterol 09/16/2020 157.6  63.0 - 159.0 mg/dL Final    Comment: The National Cholesterol Education Program (NCEP) has set the  following guidelines (reference values) for LDL Cholesterol:  Optimal.......................<130 mg/dL  Borderline High...............130-159 mg/dL  High..........................160-189 mg/dL  Very High.....................>190 mg/dL      Hdl/Cholesterol Ratio 09/16/2020 27.3  20.0 - 50.0 % Final    Total Cholesterol/HDL Ratio 09/16/2020 3.7  2.0 - 5.0 Final    Non-HDL Cholesterol 09/16/2020 173  mg/dL Final    Comment: Risk category and Non-HDL cholesterol goals:  Coronary heart disease (CHD)or equivalent (10-year risk of CHD >20%):  Non-HDL cholesterol goal     <130 mg/dL  Two or more CHD risk factors and 10-year risk of CHD <= 20%:  Non-HDL cholesterol goal     <160 mg/dL  0 to 1 CHD risk factor:  Non-HDL cholesterol goal      <190 mg/dL      TSH 09/16/2020 1.790  0.340 - 5.600 uIU/mL Final    Specimen UA 09/16/2020 Urine, Clean Catch   Final    Color, UA 09/16/2020 Yellow  Yellow, Straw, Margot Final    Appearance, UA 09/16/2020 Hazy* Clear Final    pH, UA 09/16/2020 6.0  5.0 - 8.0 Final    Specific Gravity, UA 09/16/2020 >=1.030* 1.005 - 1.030 Final    Protein, UA 09/16/2020 Negative  Negative Final    Comment: Recommend a 24 hour urine protein or a urine   protein/creatinine ratio if globulin induced proteinuria is  clinically suspected.      Glucose, UA 09/16/2020 Negative  Negative Final    Ketones, UA 09/16/2020 Negative  Negative Final    Bilirubin (UA) 09/16/2020 Negative  Negative Final    Occult Blood UA 09/16/2020 Trace* Negative Final    Nitrite, UA 09/16/2020 Negative  Negative Final    Urobilinogen, UA 09/16/2020 Negative  Negative EU/dL Final    Leukocytes, UA 09/16/2020 Negative  Negative Final   ]  Assessment:       1. Pure hypercholesterolemia    2. Cigarette nicotine dependence without complication    3. Encounter for screening for malignant neoplasm of respiratory organs    4. Essential hypertension      Plan:   Pure hypercholesterolemia  -   start  atorvastatin (LIPITOR) 10 MG tablet; Take 1 tablet (10 mg total) by mouth once daily.  Dispense: 90 tablet; Refill: 0  -     Comprehensive metabolic panel; Future; Expected date: 09/24/2020  -     Lipid Panel; Future; Expected date: 09/24/2020  LDL is elevated; recommend high fiber, low fat diet, daily metamucil supplement and daily aerobic exercise. Will repeat labs in 3mo    Cigarette nicotine dependence without complication  -     CT Chest Lung Screening Low Dose; Future; Expected date: 09/24/2020  -     Ambulatory referral/consult to Smoking Cessation Program; Future; Expected date: 10/01/2020    Encounter for screening for malignant neoplasm of respiratory organs  -     CT Chest Lung Screening Low Dose; Future; Expected date: 09/24/2020  Discussed risk  benefit of lung screening. Patient would like to have the screening and will check with her insurance to make sure it is covered    Essential hypertension  Stable on current meds    Follow up in about 3 months (around 12/24/2020) for hyperlipidemia .    Total time spent with patient: 20 minutes    Each patient to whom he or she provides medical services by telemedicine is:  (1) informed of the relationship between the physician and patient and the respective role of any other health care provider with respect to management of the patient; and (2) notified that he or she may decline to receive medical services by telemedicine and may withdraw from such care at any time.

## 2020-09-24 NOTE — PATIENT INSTRUCTIONS
Low-Cholesterol Diet  Your body needs cholesterol to build new cells and create certain hormones. There are 2 kinds of cholesterol in your blood:     · HDL (good) cholesterol. This prevents fat deposits (plaque) from building up in your arteries. In this way it protects against heart disease and stroke.  · LDL (bad) cholesterol. This stays in your body and sticks to artery walls. Over time it may block blood flow to the heart and brain. This can cause a heart attack or stroke.  The cholesterol in your blood comes from 2 sources: cholesterol in food that you eat and cholesterol that your liver makes. You should limit the amount of cholesterol in your diet. But the cholesterol that your body makes has the greatest disease risk. And your body makes more cholesterol when your diet is high in bad fats (saturated and trans fats). There are 2 kinds of fats you can eat:  · Good fats, or unsaturated fats (mono-unsaturated and poly-unsaturated). They raise the level of good cholesterol and lower the level of bad cholesterol. Good fats are found in vegetable oils such as olive, sunflower, corn, and soybean oils, and in nuts and seeds.  · Bad fats, or saturated fats (including foods high in cholesterol) and trans fats. These raise your risk of disease. They lower the good cholesterol and raise the level of bad cholesterol. Bad fats are found in animal products, including meat, whole-milk dairy products, and butter. Some plants are also high in bad fats (coconut and palm plants). Trans fats are found in hard (stick) margarines. They are also in many fast foods and commercially baked goods. Soft margarine sold in tubs has fewer trans fats and is safer to use.  High blood cholesterol is usually due to a diet high in saturated fat, along with not being physically active. In some cases, genetics plays a role in causing high cholesterol. The tips below will help you create healthy eating habits that will help lower your blood  cholesterol level.  Create a diet high in good fats, low in bad fats (and low in cholesterol)  The following steps will help you create a diet high in good fats and low in bad fats:  · Talk with your doctor before starting a low cholesterol diet or weight loss program.  · Learn to read nutrition labels and select appropriate portion sizes.  · When cooking, use plant-based unsaturated vegetable oils (sunflower, corn, soybean, canola, peanut, and olive oils).  · Avoid saturated fats found in animal products such as meat, dairy (whole-milk, cheese and ice cream), poultry skin, and egg yolks. Plants high in saturated oils include coconut oil, palm oil, and palm kernel oil.  · If you eat meat, choose smaller portions and lean cuts, such as round, priya, sirloin, or loin. Eat more meatless meals.  · Replace meat with fish at least 2 times a week. Fish is an important source of the unsaturated fat called omega-3 fatty acids. This fat has potential to lower the risk of heart disease.  · Replace whole-milk dairy products with low-fat or nonfat products. Try soy products. Soy helps to reduce total cholesterol.  · Supplement your diet with protective fibers. Eat nuts, seeds, and whole grains rather than white rice and bread. These foods lower both cholesterol and triglyceride levels. (Triglycerides are another fat found in the blood.) Walnuts are one of the best sources of omega-3 fatty acids.  · Eat plenty of fresh fruits and vegetables daily.  · Avoid fast foods and commercial baked goods. Assume they contain saturated fat unless labeled otherwise.  Date Last Reviewed: 8/1/2016  © 0428-3934 [employer]. [address] [place] [address], [place] [address]. All rights reserved. This information is not intended as a substitute for professional medical care. Always follow your healthcare professional's instructions.

## 2020-10-06 ENCOUNTER — HOSPITAL ENCOUNTER (OUTPATIENT)
Dept: RADIOLOGY | Facility: HOSPITAL | Age: 60
Discharge: HOME | End: 2020-10-06
Attending: NURSE PRACTITIONER
Payer: OTHER GOVERNMENT

## 2020-10-06 DIAGNOSIS — Z12.2 ENCOUNTER FOR SCREENING FOR MALIGNANT NEOPLASM OF RESPIRATORY ORGANS: ICD-10-CM

## 2020-10-06 DIAGNOSIS — F17.210 CIGARETTE NICOTINE DEPENDENCE WITHOUT COMPLICATION: ICD-10-CM

## 2020-10-06 PROCEDURE — G0297 LDCT FOR LUNG CA SCREEN: HCPCS | Mod: TC,PO

## 2020-10-07 ENCOUNTER — TELEPHONE (OUTPATIENT)
Dept: FAMILY MEDICINE | Facility: CLINIC | Age: 60
End: 2020-10-07

## 2020-10-07 DIAGNOSIS — R05.3 CHRONIC COUGH: ICD-10-CM

## 2020-10-07 DIAGNOSIS — R91.1 PULMONARY NODULE: Primary | ICD-10-CM

## 2020-10-07 NOTE — TELEPHONE ENCOUNTER
Pt notified to call  to change PCP from Dr. March to Allen Callahan NP. Verbalized understanding.

## 2020-10-07 NOTE — TELEPHONE ENCOUNTER
----- Message from Allen Callahan NP sent at 10/7/2020  7:45 AM CDT -----  I spoke with the patient to review results. She needs a referral to Dr Ravi for a pulmonary nodule and chronic cough

## 2020-10-16 ENCOUNTER — TELEPHONE (OUTPATIENT)
Dept: FAMILY MEDICINE | Facility: CLINIC | Age: 60
End: 2020-10-16

## 2020-10-16 NOTE — TELEPHONE ENCOUNTER
A referral was placed for Dr. Ravi pulmonolgy 10/7/20. The referral cannot be processed until the pt calls [facility] to change her PCP to you. I notified the pt 10/7/20 to call [facility] to change PCP. I attempted to call the pt this am but I was unable to leave a voicemail due to she has no voicemail set up.

## 2020-10-16 NOTE — TELEPHONE ENCOUNTER
Spoke to Mary at Dr Ravi's office informed of inability to get in touch with pt to change PCP from Dr. March to Allen Callahan NP. Mary states they will try to reach pt as well.

## 2020-10-25 ENCOUNTER — HOSPITAL ENCOUNTER (OUTPATIENT)
Facility: HOSPITAL | Age: 60
Discharge: HOME | End: 2020-10-28
Attending: EMERGENCY MEDICINE | Admitting: HOSPITALIST
Payer: OTHER GOVERNMENT

## 2020-10-25 DIAGNOSIS — R52 PAIN: ICD-10-CM

## 2020-10-25 DIAGNOSIS — R09.02 HYPOXEMIA: ICD-10-CM

## 2020-10-25 DIAGNOSIS — S32.020A COMPRESSION FRACTURE OF L2 VERTEBRA, INITIAL ENCOUNTER: Primary | ICD-10-CM

## 2020-10-25 DIAGNOSIS — Z79.899 HIGH RISK MEDICATION USE: ICD-10-CM

## 2020-10-25 DIAGNOSIS — R07.9 ACUTE CHEST PAIN: ICD-10-CM
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
## 2020-10-25 PROBLEM — I10 HYPERTENSION: Status: ACTIVE | Noted: 2020-10-25

## 2020-10-25 LAB
ALBUMIN SERPL BCP-MCNC: 4.4 G/DL (ref 3.5–5.2)
ALP SERPL-CCNC: 60 U/L (ref 55–135)
ALT SERPL W/O P-5'-P-CCNC: 26 U/L (ref 10–44)
ANION GAP SERPL CALC-SCNC: 9 MMOL/L (ref 8–16)
AST SERPL-CCNC: 28 U/L (ref 10–40)
BASOPHILS # BLD AUTO: 0.07 K/UL (ref 0–0.2)
BASOPHILS NFR BLD: 0.5 % (ref 0–1.9)
BILIRUB SERPL-MCNC: 0.8 MG/DL (ref 0.1–1)
BUN SERPL-MCNC: 15 MG/DL (ref 6–20)
CALCIUM SERPL-MCNC: 9.3 MG/DL (ref 8.7–10.5)
CHLORIDE SERPL-SCNC: 105 MMOL/L (ref 95–110)
CO2 SERPL-SCNC: 27 MMOL/L (ref 23–29)
CREAT SERPL-MCNC: 0.6 MG/DL (ref 0.5–1.4)
CRP SERPL-MCNC: 0.12 MG/DL
DIFFERENTIAL METHOD: ABNORMAL
EOSINOPHIL # BLD AUTO: 0 K/UL (ref 0–0.5)
EOSINOPHIL NFR BLD: 0.2 % (ref 0–8)
ERYTHROCYTE [DISTWIDTH] IN BLOOD BY AUTOMATED COUNT: 12.4 % (ref 11.5–14.5)
ERYTHROCYTE [SEDIMENTATION RATE] IN BLOOD BY WESTERGREN METHOD: 10 MM/HR (ref 0–20)
EST. GFR  (AFRICAN AMERICAN): >60 ML/MIN/1.73 M^2
EST. GFR  (NON AFRICAN AMERICAN): >60 ML/MIN/1.73 M^2
GLUCOSE SERPL-MCNC: 122 MG/DL (ref 70–110)
HCT VFR BLD AUTO: 38.1 % (ref 37–48.5)
HGB BLD-MCNC: 12.8 G/DL (ref 12–16)
IMM GRANULOCYTES # BLD AUTO: 0.06 K/UL (ref 0–0.04)
IMM GRANULOCYTES NFR BLD AUTO: 0.5 % (ref 0–0.5)
LYMPHOCYTES # BLD AUTO: 1.1 K/UL (ref 1–4.8)
LYMPHOCYTES NFR BLD: 8.4 % (ref 18–48)
MCH RBC QN AUTO: 31.5 PG (ref 27–31)
MCHC RBC AUTO-ENTMCNC: 33.6 G/DL (ref 32–36)
MCV RBC AUTO: 94 FL (ref 82–98)
MONOCYTES # BLD AUTO: 0.6 K/UL (ref 0.3–1)
MONOCYTES NFR BLD: 4.8 % (ref 4–15)
NEUTROPHILS # BLD AUTO: 11 K/UL (ref 1.8–7.7)
NEUTROPHILS NFR BLD: 85.6 % (ref 38–73)
NRBC BLD-RTO: 0 /100 WBC
PLATELET # BLD AUTO: 236 K/UL (ref 150–350)
PMV BLD AUTO: 9.7 FL (ref 9.2–12.9)
POTASSIUM SERPL-SCNC: 4 MMOL/L (ref 3.5–5.1)
PROT SERPL-MCNC: 6.7 G/DL (ref 6–8.4)
RBC # BLD AUTO: 4.06 M/UL (ref 4–5.4)
SARS-COV-2 RDRP RESP QL NAA+PROBE: NEGATIVE
SODIUM SERPL-SCNC: 141 MMOL/L (ref 136–145)
WBC # BLD AUTO: 12.79 K/UL (ref 3.9–12.7)

## 2020-10-25 PROCEDURE — 25000003 PHARM REV CODE 250: Performed by: NURSE PRACTITIONER

## 2020-10-25 PROCEDURE — 96375 TX/PRO/DX INJ NEW DRUG ADDON: CPT

## 2020-10-25 PROCEDURE — 85651 RBC SED RATE NONAUTOMATED: CPT

## 2020-10-25 PROCEDURE — 96372 THER/PROPH/DIAG INJ SC/IM: CPT | Mod: 59

## 2020-10-25 PROCEDURE — 25000003 PHARM REV CODE 250: Performed by: EMERGENCY MEDICINE

## 2020-10-25 PROCEDURE — U0002 COVID-19 LAB TEST NON-CDC: HCPCS

## 2020-10-25 PROCEDURE — 96375 TX/PRO/DX INJ NEW DRUG ADDON: CPT | Mod: 59

## 2020-10-25 PROCEDURE — 86140 C-REACTIVE PROTEIN: CPT

## 2020-10-25 PROCEDURE — 63600175 PHARM REV CODE 636 W HCPCS: Performed by: NURSE PRACTITIONER

## 2020-10-25 PROCEDURE — G0378 HOSPITAL OBSERVATION PER HR: HCPCS

## 2020-10-25 PROCEDURE — 80053 COMPREHEN METABOLIC PANEL: CPT

## 2020-10-25 PROCEDURE — 99285 EMERGENCY DEPT VISIT HI MDM: CPT | Mod: 25

## 2020-10-25 PROCEDURE — 85025 COMPLETE CBC W/AUTO DIFF WBC: CPT

## 2020-10-25 PROCEDURE — 96374 THER/PROPH/DIAG INJ IV PUSH: CPT

## 2020-10-25 RX ORDER — SODIUM,POTASSIUM PHOSPHATES 280-250MG
2 POWDER IN PACKET (EA) ORAL
Status: DISCONTINUED | OUTPATIENT
Start: 2020-10-25 | End: 2020-10-28 | Stop reason: HOSPADM

## 2020-10-25 RX ORDER — TALC
6 POWDER (GRAM) TOPICAL NIGHTLY PRN
Status: DISCONTINUED | OUTPATIENT
Start: 2020-10-25 | End: 2020-10-28 | Stop reason: HOSPADM

## 2020-10-25 RX ORDER — LANOLIN ALCOHOL/MO/W.PET/CERES
800 CREAM (GRAM) TOPICAL
Status: DISCONTINUED | OUTPATIENT
Start: 2020-10-25 | End: 2020-10-28 | Stop reason: HOSPADM

## 2020-10-25 RX ORDER — ATORVASTATIN CALCIUM 10 MG/1
10 TABLET, FILM COATED ORAL DAILY
Status: DISCONTINUED | OUTPATIENT
Start: 2020-10-25 | End: 2020-10-28 | Stop reason: HOSPADM

## 2020-10-25 RX ORDER — ALPRAZOLAM 0.5 MG/1
0.5 TABLET ORAL 2 TIMES DAILY PRN
Status: DISCONTINUED | OUTPATIENT
Start: 2020-10-25 | End: 2020-10-28 | Stop reason: HOSPADM

## 2020-10-25 RX ORDER — OXYCODONE AND ACETAMINOPHEN 5; 325 MG/1; MG/1
1 TABLET ORAL EVERY 4 HOURS PRN
Status: DISCONTINUED | OUTPATIENT
Start: 2020-10-25 | End: 2020-10-26

## 2020-10-25 RX ORDER — AMLODIPINE BESYLATE 5 MG/1
5 TABLET ORAL DAILY
Status: DISCONTINUED | OUTPATIENT
Start: 2020-10-26 | End: 2020-10-26

## 2020-10-25 RX ORDER — BENAZEPRIL HYDROCHLORIDE 10 MG/1
10 TABLET ORAL DAILY
Status: DISCONTINUED | OUTPATIENT
Start: 2020-10-26 | End: 2020-10-26

## 2020-10-25 RX ORDER — POTASSIUM CHLORIDE 1.5 G/1.58G
40 POWDER, FOR SOLUTION ORAL
Status: DISCONTINUED | OUTPATIENT
Start: 2020-10-25 | End: 2020-10-28 | Stop reason: HOSPADM

## 2020-10-25 RX ORDER — ONDANSETRON 4 MG/1
4 TABLET, ORALLY DISINTEGRATING ORAL
Status: COMPLETED | OUTPATIENT
Start: 2020-10-25 | End: 2020-10-25

## 2020-10-25 RX ORDER — SODIUM CHLORIDE 9 MG/ML
1000 INJECTION, SOLUTION INTRAVENOUS
Status: COMPLETED | OUTPATIENT
Start: 2020-10-25 | End: 2020-10-25

## 2020-10-25 RX ORDER — AMLODIPINE AND BENAZEPRIL HYDROCHLORIDE 5; 10 MG/1; MG/1
1 CAPSULE ORAL DAILY
Status: DISCONTINUED | OUTPATIENT
Start: 2020-10-26 | End: 2020-10-25 | Stop reason: SDUPTHER

## 2020-10-25 RX ORDER — DIAZEPAM 10 MG/2ML
10 INJECTION INTRAMUSCULAR
Status: COMPLETED | OUTPATIENT
Start: 2020-10-25 | End: 2020-10-25

## 2020-10-25 RX ORDER — MORPHINE SULFATE 4 MG/ML
8 INJECTION, SOLUTION INTRAMUSCULAR; INTRAVENOUS
Status: COMPLETED | OUTPATIENT
Start: 2020-10-25 | End: 2020-10-25

## 2020-10-25 RX ORDER — ONDANSETRON 2 MG/ML
4 INJECTION INTRAMUSCULAR; INTRAVENOUS EVERY 8 HOURS PRN
Status: DISCONTINUED | OUTPATIENT
Start: 2020-10-25 | End: 2020-10-26

## 2020-10-25 RX ORDER — MORPHINE SULFATE 4 MG/ML
4 INJECTION, SOLUTION INTRAMUSCULAR; INTRAVENOUS EVERY 4 HOURS PRN
Status: DISCONTINUED | OUTPATIENT
Start: 2020-10-25 | End: 2020-10-26

## 2020-10-25 RX ORDER — SODIUM CHLORIDE 0.9 % (FLUSH) 0.9 %
10 SYRINGE (ML) INJECTION
Status: DISCONTINUED | OUTPATIENT
Start: 2020-10-25 | End: 2020-10-28 | Stop reason: HOSPADM

## 2020-10-25 RX ORDER — HYDROMORPHONE HYDROCHLORIDE 1 MG/ML
1 INJECTION, SOLUTION INTRAMUSCULAR; INTRAVENOUS; SUBCUTANEOUS
Status: COMPLETED | OUTPATIENT
Start: 2020-10-25 | End: 2020-10-25

## 2020-10-25 RX ORDER — FLUOXETINE HYDROCHLORIDE 20 MG/1
40 CAPSULE ORAL DAILY
Status: DISCONTINUED | OUTPATIENT
Start: 2020-10-26 | End: 2020-10-28 | Stop reason: HOSPADM

## 2020-10-25 RX ORDER — OXYCODONE AND ACETAMINOPHEN 5; 325 MG/1; MG/1
1 TABLET ORAL
Status: COMPLETED | OUTPATIENT
Start: 2020-10-25 | End: 2020-10-25

## 2020-10-25 RX ORDER — ACETAMINOPHEN 325 MG/1
650 TABLET ORAL EVERY 8 HOURS PRN
Status: DISCONTINUED | OUTPATIENT
Start: 2020-10-25 | End: 2020-10-28 | Stop reason: HOSPADM

## 2020-10-25 RX ADMIN — HYDROMORPHONE HYDROCHLORIDE 1 MG: 1 INJECTION, SOLUTION INTRAMUSCULAR; INTRAVENOUS; SUBCUTANEOUS at 05:10

## 2020-10-25 RX ADMIN — OXYCODONE AND ACETAMINOPHEN 1 TABLET: 5; 325 TABLET ORAL at 10:10

## 2020-10-25 RX ADMIN — ATORVASTATIN CALCIUM 10 MG: 10 TABLET, FILM COATED ORAL at 10:10

## 2020-10-25 RX ADMIN — ONDANSETRON 4 MG: 2 INJECTION INTRAMUSCULAR; INTRAVENOUS at 10:10

## 2020-10-25 RX ADMIN — ONDANSETRON 4 MG: 4 TABLET, ORALLY DISINTEGRATING ORAL at 03:10

## 2020-10-25 RX ADMIN — SODIUM CHLORIDE 1000 ML: 0.9 INJECTION, SOLUTION INTRAVENOUS at 08:10

## 2020-10-25 RX ADMIN — MORPHINE SULFATE 8 MG: 4 INJECTION INTRAVENOUS at 08:10

## 2020-10-25 RX ADMIN — DIAZEPAM 10 MG: 5 INJECTION, SOLUTION INTRAMUSCULAR; INTRAVENOUS at 04:10

## 2020-10-25 RX ADMIN — OXYCODONE AND ACETAMINOPHEN 1 TABLET: 5; 325 TABLET ORAL at 03:10

## 2020-10-25 NOTE — ED PROVIDER NOTES
Encounter Date: 10/25/2020       History     Chief Complaint   Patient presents with    Back Pain     fall    Buttocks pain     Patient is a 60-year-old female with medical history of anxiety, arthritis presenting to the ED after a slip and fall.  Patient states she was dancing at a wedding when she tripped and fell onto were buttocks.  Patient states low back pain and sacrum pain since that time.  Patient states she is having spasms in her lower back since that time.  Patient denies any previous back issues.    The history is provided by the patient.     Review of patient's allergies indicates:   Allergen Reactions    Erythromycin Anaphylaxis     Past Medical History:   Diagnosis Date    Anxiety     Arthritis     Cataract of left eye     Hyperlipidemia     Sleep apnea     cpap     Past Surgical History:   Procedure Laterality Date    BASAL CELL CARCINOMA EXCISION  09/2019    lip    CATARACT EXTRACTION W/  INTRAOCULAR LENS IMPLANT Right 11/13/2019    Procedure: EXTRACTION, CATARACT, WITH IOL INSERTION;  Surgeon: Carmelo Perez II, MD;  Location: [facility] OR;  Service: Ophthalmology;  Laterality: Right;    EYE SURGERY Right     cataracts     History reviewed. No pertinent family history.  Social History     Tobacco Use    Smoking status: Current Every Day Smoker     Packs/day: 0.50     Years: 12.00     Pack years: 6.00     Types: Cigarettes    Smokeless tobacco: Never Used   Substance Use Topics    Alcohol use: Yes     Comment: occasional    Drug use: No     Review of Systems   Constitutional: Positive for activity change ( due to pain). Negative for chills, fatigue and fever.   HENT: Negative for congestion and sore throat.    Respiratory: Negative for cough, chest tightness, shortness of breath and wheezing.    Cardiovascular: Negative for chest pain and palpitations.   Gastrointestinal: Negative for abdominal pain and nausea.   Genitourinary: Negative for decreased urine volume, difficulty urinating,  dysuria and urgency.   Musculoskeletal: Positive for arthralgias, back pain, gait problem ( due to pain) and myalgias ( low back spasms).   Skin: Negative for color change, rash and wound.   Allergic/Immunologic: Negative for immunocompromised state.   Neurological: Negative for weakness and numbness.   Hematological: Does not bruise/bleed easily.   All other systems reviewed and are negative.      Physical Exam     Initial Vitals [10/25/20 1542]   BP Pulse Resp Temp SpO2   (!) 115/55 (!) 58 18 97.7 °F (36.5 °C) 98 %      MAP       --         Physical Exam    Nursing note and vitals reviewed.  Constitutional: Vital signs are normal. She appears well-developed and well-nourished. She is cooperative. She appears distressed ( due to pain).   HENT:   Head: Normocephalic and atraumatic.   Mouth/Throat: Uvula is midline, oropharynx is clear and moist and mucous membranes are normal.   Eyes: Conjunctivae, EOM and lids are normal. Pupils are equal, round, and reactive to light.   Neck: Trachea normal, normal range of motion and phonation normal. Neck supple.   Cardiovascular: Normal rate, regular rhythm and intact distal pulses.   Pulses:       Radial pulses are 2+ on the right side and 2+ on the left side.   Pulmonary/Chest: Effort normal and breath sounds normal.   Abdominal: Normal appearance.   Musculoskeletal:      Cervical back: Normal.      Thoracic back: Normal.      Lumbar back: She exhibits decreased range of motion, tenderness, bony tenderness, pain and spasm. She exhibits no swelling, no edema, no deformity, no laceration and normal pulse.        Back:    Neurological: She is alert and oriented to person, place, and time. She has normal strength. No sensory deficit. GCS eye subscore is 4. GCS verbal subscore is 5. GCS motor subscore is 6.   Skin: Skin is warm, dry and intact. Capillary refill takes 2 to 3 seconds. No pallor.         ED Course   Procedures  Labs Reviewed   CBC W/ AUTO DIFFERENTIAL - Abnormal;  Notable for the following components:       Result Value    WBC 12.79 (*)     Mean Corpuscular Hemoglobin 31.5 (*)     Gran # (ANC) 11.0 (*)     Immature Grans (Abs) 0.06 (*)     Gran% 85.6 (*)     Lymph% 8.4 (*)     All other components within normal limits   COMPREHENSIVE METABOLIC PANEL - Abnormal; Notable for the following components:    Glucose 122 (*)     All other components within normal limits   SARS-COV-2 RNA AMPLIFICATION, QUAL   C-REACTIVE PROTEIN   URINALYSIS, REFLEX TO URINE CULTURE   SEDIMENTATION RATE          Imaging Results          CT Pelvis Without Contrast (Final result)  Result time 10/25/20 18:24:58    Final result by Jayesh Monet Jr., MD (10/25/20 18:24:58)                 Narrative:    CT PELVIS WITHOUT INTRAVENOUS CONTRAST    HISTORY: Trauma. Pain.    CMS MANDATED QUALITY DATA - CT RADIATION  436    All CT scans at this facility utilize dose modulation, iterative  reconstruction, and/or weight based dosing when appropriate to reduce  radiation dose to as low as reasonably achievable.      Technical factors:   Spiral imaging of the abdomen and pelvis was  performed at 3.75 mm increments in the unenhanced format. Coronal  reconstruction images were utilized. Enteric contrast was employed.    FINDINGS: The osseous structures demonstrate evidence of generalized  skeletal osteopenia without localizing findings, acute traumatic  injury, or bony destructive changes. No significant malalignment or  diastases involving the sacroiliac joint margins. Lower lumbar spine  demonstrates chronic degenerative changes involving the lower  articular facets at the level of L4-L5 and more so at the L5-S1 level.  The sacral bodies are anatomically aligned, although there is evidence  of abrupt angulation along the sacrococcygeal interface in an anterior  direction with minimal posterior space involving the first coccygeal  segment the order 3 mm. Abrupt angulation identified in the anterior  margin of the  first occipital segment. No associated presacral  hematoma. Findings likely on a chronic basis. The intrapelvic contents  are well-maintained. The femoral head is demonstrated adequate  articulation within the acetabular compartment without evidence of  significant osseous displacement.    IMPRESSION:  1. Abrupt angulation identified of the sacrococcygeal interface with  evidence of minimal posterior distraction of the distal segment  findings are suggestive of a subacute on chronic injury changes. There  is evidence of an abrupt anterior compression of the first coccygeal  segment suggestive of chronic injury.  2. No evidence of active bleeding hematoma formation with the  presacral space suggestive of acute injury pattern or acute osseous  injury.        Electronically Signed by Jayesh DIGGS on 10/25/2020 7:25 PM                             CT Lumbar Spine Without Contrast (Final result)  Result time 10/25/20 18:22:58    Final result by Jayesh Monet Jr., MD (10/25/20 18:22:58)                 Narrative:    Examination: CT scan of the lumbar spine without contrast.    CLINICAL HISTORY: Back pain    COMPARISON: Correlation is made with the patient's previous  conventional radius the lumbar spine of 10/25/2020.        Technical factors: Cross-sectional evaluation the lumbar spine was  gathered in standard helical fashion with subsequent sagittal and  coronal reconstruction images also included along with standard data  set provided.    FINDINGS: The rendered sagittal insertion images demonstrate evidence  of maintenance of the lordotic convexity of the lumbar spine with  generalized diminished bone mineral density pattern secondary  generalized skeletal osteopenia. Focus inspection of the L2 vertebral  body demonstrates evidence of acute or acute on chronic superimposed  compression fracture at least 25% loss involving the upper cortical  margin and minimal anterior traction of the anterior endplate  margin.  No significant burst component or significant retropulsion of the  vertebral body elements. The remaining vertebral bodies maintain  appropriate stature and overall contour. Chronic anterior subluxation  of L4 upon L5 is noted on the order 3 mm without any additional areas  of malalignment changes identified elsewhere throughout the lumbar  elements. The L5/S1 intervertebral disc chronic or disc disease with  vacuum gas phenomenon central component and dorsal osteophytic  spurring encroaching nominally upon the left-sided neural foraminal  space with evidence of nerve root contact. His findings may lead to  some element of left-sided radicular pain. Fairly profound bilateral  facet arthrosis at both the L4-5 and L5-S1 intervertebral disc level  with there is changes resulting in acquired spinal canal stenosis at  the L4-5 intervertebral disc level with contributory findings from  bulky ligamentum flavum thickening. The patient and the spinal canal  this level measures roughly 6 mm in AP dimension.    IMPRESSION:  1. Acute or acute on chronic spinal compression fracture of L2 at  least 25% loss involving the upper endplate with marginal depression  of the superior endplate and herniation of cartilaginous material  identified into the herniated segment of the depressed endplate. The  anterosuperior endplate appears displaced slightly anteriorly on the  order of 3 mm.  2. Chronic degenerative anterior subluxation of L4 upon L5 vertebral  disc with bilateral facet arthrosis and ligament thickening  constitutes moderate spinal canal stenosis on the order 6 mm.    Electronically Signed by Jayesh DIGGS on 10/25/2020 7:32 PM                             X-Ray Sacrum And Coccyx (Final result)  Result time 10/25/20 16:32:54    Final result by Jayesh Monet Jr., MD (10/25/20 16:32:54)                 Narrative:    Examination: 3 views the sacrum and coccyx.    CLINICAL HISTORY: Pain.    FINDINGS: The sacral  segments demonstrate appropriate alignment,  although there is evidence of mild anterior angulation identified  across the sacrococcygeal interface and anterior direction. The  presacral space is not significantly widened. There is evidence  degenerative narrowing across the L4/5 and L5/S1 interspace with  moderate facet arthrosis in the posterior elements as noted in the  uppermost field-of-view. The SI joints are well-maintained. The bony  pelvic cavity appears normal.    IMPRESSION: Angulated morphology identified of the distal coccygeal  segments may be secondary to chronic trauma.    Electronically Signed by Jayesh DIGGS on 10/25/2020 5:05 PM                             X-Ray Pelvis Routine AP (Final result)  Result time 10/25/20 16:31:54    Final result by Jayesh Monet Jr., MD (10/25/20 16:31:54)                 Narrative:    Examination: 2 views the pelvis    HISTORY: Status post fall.    COMPARISON: None.    TECHNIQUE: AP and pelvic inlet views.    FINDINGS: The lower most slices evidence of chronic degenerative  spondylosis changes generated at both the L4-5 and L5-S1  intervertebral disc levels. There is evidence of maintenance involving  the bony pelvic ring without evidence of diastasis involving the  sacroiliac joints or significant bony destructive changes. The  sacroiliac and sacrotuberous lines are well-maintained. Normal  articulation of femoral head is within the acetabular compartment. The  bony pelvic cavity appears normal.    IMPRESSION:  1. No evidence of acute traumatic injury.  2. Chronic degenerative changes of the lower lumbar spine.    Electronically Signed by Jayesh DIGGS on 10/25/2020 5:25 PM                             X-Ray Lumbar Spine Complete 5 View (Final result)  Result time 10/25/20 16:30:54   Procedure changed from X-Ray Lumbar Spine Ap And Lateral     Final result by Jayesh Monet Jr., MD (10/25/20 16:30:54)                 Narrative:    Examination:  Lumbar spine series 5 views.    CLINICAL HISTORY: Status post fall.    COMPARISON: None.    FINDINGS: There are 5 lumbar segments demonstrated minimal levoconvex  alignment. Acute on chronic compression deformity upper endplate  margin of L2. Chronic anterior subluxation of L4 upon L5 on  degenerative basis. Degenerative narrowing at the L5-S1 intervertebral  disc level. Relatively mild physiologic wedging of the T12 vertebral  body level noted. The SI joints are well-maintained.    IMPRESSION:  1. Acute on chronic compression fracture upper endplate margin of L2.  2. Anterior subluxation of L4 upon L5 on a chronic basis.    Electronically Signed by Jayesh DIGGS on 10/25/2020 5:30 PM                            X-Rays:   Independently Interpreted Readings:   Other Readings:  Reviewed by me, read by Radiology    Medical Decision Making:   Initial Assessment:   Emergent evaluation of a 60-year-old female presenting to the ED after a slip and fall while dancing at a wedding reception.  The patient states she tripped over her own 2 ft when she fell landing on her sacrum.  Patient states increased pain and spasming since that time.  On exam patient is awake and alert.  Patient is distressed due to pain.  Breath sounds clear bilaterally.  Abdomen soft and nontender.  Tenderness to palpation to lower back.  Patient does endorse midline spinal tenderness to palpation.  No crepitus, edema or ecchymosis noted.  Good sensation to bilateral lower extremities.  Patient denies any numbness or groin tingling.  Strength 5/5 in bilateral lower extremities.  Differential Diagnosis:   Differential diagnoses include but are not limited to sprain, fracture, dislocation, muscle strain.      Independently Interpreted Test(s):   I have ordered and independently interpreted X-rays - see prior notes.  Clinical Tests:   Radiological Study: Ordered and Reviewed  ED Management:  I will get imaging, medicate and reassess.  I discussed  this case with my supervising physician.    Patient reassessed multiple times.  Patient denying any relief of pain.  Will give IV Dilaudid and obtain CT imaging to rule out any acute fractures.  Patient and  updated on plan of care.  All questions and concerns addressed.                   ED Course as of Oct 25 2128   Sun Oct 25, 2020   1930 Patient reassessed.  Patient continues to endorse pain.  CT showing acute on chronic L2 compression fracture.  Will discuss case with neuro surgery.  Awaiting recommendations.    [RZ]   1950 Discussed case with Dr. Hobbs, neurosurgery.  She recommends TSLO brace and pain control.  Will discuss admission with hospital medicine due to a intractable pain.  Pt and  updated on plan of care.  All questions and concerns addressed.  Awaiting labs for admission.     [RZ]   2119 Patient's CBC unremarkable.  No leukocytosis noted.  H&H stable.  CMP unremarkable.  CRP within normal limits.  COVID negative.  Will discussed case with hospital medicine for admission for TSLO bracing and pain control.    [RZ]      ED Course User Index  [RZ] Marj Cintron NP            Clinical Impression:       ICD-10-CM ICD-9-CM   1. Compression fracture of L2 vertebra, initial encounter  S32.020A 805.4   2. Pain  R52 780.96   3. Fall, initial encounter  W19.XXXA E888.9                          ED Disposition Condition    Admit                             Marj Cintron NP  10/25/20 2128

## 2020-10-25 NOTE — FIRST PROVIDER EVALUATION
"Medical screening exam completed.  I have conducted a focused provider triage encounter, findings are as follows:    Brief history of present illness:  Patient was dancing at a wedding reception.  She slipped and fell.  She reports low back and sacral pain.  She denies any head trauma.    Vitals:    10/25/20 1542   BP: (!) 115/55   Pulse: (!) 58   Resp: 18   Temp: 97.7 °F (36.5 °C)   TempSrc: Oral   SpO2: 98%   Weight: 70.8 kg (156 lb)   Height: 5' 6" (1.676 m)       Pertinent physical exam:  Tenderness over lower back and sacral area.    Brief workup plan:  Pain control, x-ray evaluation.    Preliminary workup initiated; this workup will be continued and followed by the physician or advanced practice provider that is assigned to the patient when roomed.  "

## 2020-10-26 LAB
ANION GAP SERPL CALC-SCNC: 9 MMOL/L (ref 8–16)
BASOPHILS # BLD AUTO: 0.05 K/UL (ref 0–0.2)
BASOPHILS NFR BLD: 0.6 % (ref 0–1.9)
BILIRUB UR QL STRIP: NEGATIVE
BUN SERPL-MCNC: 14 MG/DL (ref 6–20)
CALCIUM SERPL-MCNC: 8.9 MG/DL (ref 8.7–10.5)
CHLORIDE SERPL-SCNC: 102 MMOL/L (ref 95–110)
CLARITY UR: CLEAR
CO2 SERPL-SCNC: 28 MMOL/L (ref 23–29)
COLOR UR: YELLOW
CREAT SERPL-MCNC: 0.7 MG/DL (ref 0.5–1.4)
DIFFERENTIAL METHOD: ABNORMAL
EOSINOPHIL # BLD AUTO: 0.1 K/UL (ref 0–0.5)
EOSINOPHIL NFR BLD: 1.5 % (ref 0–8)
ERYTHROCYTE [DISTWIDTH] IN BLOOD BY AUTOMATED COUNT: 12.5 % (ref 11.5–14.5)
EST. GFR  (AFRICAN AMERICAN): >60 ML/MIN/1.73 M^2
EST. GFR  (NON AFRICAN AMERICAN): >60 ML/MIN/1.73 M^2
GLUCOSE SERPL-MCNC: 111 MG/DL (ref 70–110)
GLUCOSE UR QL STRIP: NEGATIVE
HCT VFR BLD AUTO: 38.2 % (ref 37–48.5)
HGB BLD-MCNC: 12.6 G/DL (ref 12–16)
HGB UR QL STRIP: NEGATIVE
IMM GRANULOCYTES # BLD AUTO: 0.02 K/UL (ref 0–0.04)
IMM GRANULOCYTES NFR BLD AUTO: 0.3 % (ref 0–0.5)
KETONES UR QL STRIP: ABNORMAL
LEUKOCYTE ESTERASE UR QL STRIP: NEGATIVE
LYMPHOCYTES # BLD AUTO: 1.5 K/UL (ref 1–4.8)
LYMPHOCYTES NFR BLD: 19 % (ref 18–48)
MAGNESIUM SERPL-MCNC: 2.3 MG/DL (ref 1.6–2.6)
MCH RBC QN AUTO: 31.2 PG (ref 27–31)
MCHC RBC AUTO-ENTMCNC: 33 G/DL (ref 32–36)
MCV RBC AUTO: 95 FL (ref 82–98)
MONOCYTES # BLD AUTO: 0.6 K/UL (ref 0.3–1)
MONOCYTES NFR BLD: 8.1 % (ref 4–15)
NEUTROPHILS # BLD AUTO: 5.5 K/UL (ref 1.8–7.7)
NEUTROPHILS NFR BLD: 70.5 % (ref 38–73)
NITRITE UR QL STRIP: NEGATIVE
NRBC BLD-RTO: 0 /100 WBC
PH UR STRIP: 6 [PH] (ref 5–8)
PLATELET # BLD AUTO: 215 K/UL (ref 150–350)
PMV BLD AUTO: 9.7 FL (ref 9.2–12.9)
POTASSIUM SERPL-SCNC: 3.9 MMOL/L (ref 3.5–5.1)
PROT UR QL STRIP: ABNORMAL
RBC # BLD AUTO: 4.04 M/UL (ref 4–5.4)
SODIUM SERPL-SCNC: 139 MMOL/L (ref 136–145)
SP GR UR STRIP: 1.03 (ref 1–1.03)
URN SPEC COLLECT METH UR: ABNORMAL
UROBILINOGEN UR STRIP-ACNC: NEGATIVE EU/DL
WBC # BLD AUTO: 7.75 K/UL (ref 3.9–12.7)

## 2020-10-26 PROCEDURE — 93005 ELECTROCARDIOGRAM TRACING: CPT | Performed by: INTERNAL MEDICINE

## 2020-10-26 PROCEDURE — 97161 PT EVAL LOW COMPLEX 20 MIN: CPT

## 2020-10-26 PROCEDURE — 97535 SELF CARE MNGMENT TRAINING: CPT

## 2020-10-26 PROCEDURE — 85025 COMPLETE CBC W/AUTO DIFF WBC: CPT

## 2020-10-26 PROCEDURE — 83735 ASSAY OF MAGNESIUM: CPT

## 2020-10-26 PROCEDURE — 25000003 PHARM REV CODE 250: Performed by: FAMILY MEDICINE

## 2020-10-26 PROCEDURE — 63600175 PHARM REV CODE 636 W HCPCS: Performed by: FAMILY MEDICINE

## 2020-10-26 PROCEDURE — 97165 OT EVAL LOW COMPLEX 30 MIN: CPT

## 2020-10-26 PROCEDURE — 25000003 PHARM REV CODE 250: Performed by: NURSE PRACTITIONER

## 2020-10-26 PROCEDURE — 97116 GAIT TRAINING THERAPY: CPT

## 2020-10-26 PROCEDURE — 93010 EKG 12-LEAD: ICD-10-PCS | Mod: ,,, | Performed by: INTERNAL MEDICINE

## 2020-10-26 PROCEDURE — G0378 HOSPITAL OBSERVATION PER HR: HCPCS

## 2020-10-26 PROCEDURE — 80048 BASIC METABOLIC PNL TOTAL CA: CPT

## 2020-10-26 PROCEDURE — 36415 COLL VENOUS BLD VENIPUNCTURE: CPT

## 2020-10-26 PROCEDURE — 96376 TX/PRO/DX INJ SAME DRUG ADON: CPT

## 2020-10-26 PROCEDURE — 63600175 PHARM REV CODE 636 W HCPCS: Performed by: NURSE PRACTITIONER

## 2020-10-26 PROCEDURE — 93010 ELECTROCARDIOGRAM REPORT: CPT | Mod: ,,, | Performed by: INTERNAL MEDICINE

## 2020-10-26 PROCEDURE — 81003 URINALYSIS AUTO W/O SCOPE: CPT

## 2020-10-26 RX ORDER — HYDROMORPHONE HYDROCHLORIDE 1 MG/ML
2 INJECTION, SOLUTION INTRAMUSCULAR; INTRAVENOUS; SUBCUTANEOUS ONCE
Status: COMPLETED | OUTPATIENT
Start: 2020-10-26 | End: 2020-10-26

## 2020-10-26 RX ORDER — OXYCODONE AND ACETAMINOPHEN 10; 325 MG/1; MG/1
1 TABLET ORAL EVERY 4 HOURS
Status: DISPENSED | OUTPATIENT
Start: 2020-10-26 | End: 2020-10-27

## 2020-10-26 RX ORDER — HYDROMORPHONE HYDROCHLORIDE 1 MG/ML
2 INJECTION, SOLUTION INTRAMUSCULAR; INTRAVENOUS; SUBCUTANEOUS EVERY 4 HOURS PRN
Status: DISCONTINUED | OUTPATIENT
Start: 2020-10-26 | End: 2020-10-28 | Stop reason: HOSPADM

## 2020-10-26 RX ORDER — AMLODIPINE BESYLATE 5 MG/1
5 TABLET ORAL DAILY PRN
Status: DISCONTINUED | OUTPATIENT
Start: 2020-10-26 | End: 2020-10-28 | Stop reason: HOSPADM

## 2020-10-26 RX ORDER — OXYCODONE AND ACETAMINOPHEN 5; 325 MG/1; MG/1
1 TABLET ORAL EVERY 4 HOURS
Status: DISCONTINUED | OUTPATIENT
Start: 2020-10-26 | End: 2020-10-26

## 2020-10-26 RX ORDER — ONDANSETRON 2 MG/ML
8 INJECTION INTRAMUSCULAR; INTRAVENOUS EVERY 6 HOURS
Status: COMPLETED | OUTPATIENT
Start: 2020-10-26 | End: 2020-10-27

## 2020-10-26 RX ADMIN — ONDANSETRON 8 MG: 2 INJECTION INTRAMUSCULAR; INTRAVENOUS at 05:10

## 2020-10-26 RX ADMIN — OXYCODONE HYDROCHLORIDE AND ACETAMINOPHEN 1 TABLET: 10; 325 TABLET ORAL at 09:10

## 2020-10-26 RX ADMIN — FLUOXETINE 40 MG: 20 CAPSULE ORAL at 08:10

## 2020-10-26 RX ADMIN — HYDROMORPHONE HYDROCHLORIDE 2 MG: 1 INJECTION, SOLUTION INTRAMUSCULAR; INTRAVENOUS; SUBCUTANEOUS at 05:10

## 2020-10-26 RX ADMIN — OXYCODONE HYDROCHLORIDE AND ACETAMINOPHEN 1 TABLET: 10; 325 TABLET ORAL at 02:10

## 2020-10-26 RX ADMIN — OXYCODONE HYDROCHLORIDE AND ACETAMINOPHEN 1 TABLET: 10; 325 TABLET ORAL at 06:10

## 2020-10-26 RX ADMIN — MORPHINE SULFATE 4 MG: 4 INJECTION INTRAVENOUS at 12:10

## 2020-10-26 RX ADMIN — ATORVASTATIN CALCIUM 10 MG: 10 TABLET, FILM COATED ORAL at 10:10

## 2020-10-26 RX ADMIN — MORPHINE SULFATE 4 MG: 4 INJECTION INTRAVENOUS at 04:10

## 2020-10-26 RX ADMIN — OXYCODONE HYDROCHLORIDE AND ACETAMINOPHEN 1 TABLET: 10; 325 TABLET ORAL at 10:10

## 2020-10-26 RX ADMIN — ONDANSETRON 8 MG: 2 INJECTION INTRAMUSCULAR; INTRAVENOUS at 09:10

## 2020-10-26 RX ADMIN — HYDROMORPHONE HYDROCHLORIDE 2 MG: 1 INJECTION, SOLUTION INTRAMUSCULAR; INTRAVENOUS; SUBCUTANEOUS at 08:10

## 2020-10-26 NOTE — SUBJECTIVE & OBJECTIVE
Past Medical History:   Diagnosis Date    Anxiety     Arthritis     Cataract of left eye     Hyperlipidemia     Sleep apnea     cpap       Past Surgical History:   Procedure Laterality Date    BASAL CELL CARCINOMA EXCISION  09/2019    lip    CATARACT EXTRACTION W/  INTRAOCULAR LENS IMPLANT Right 11/13/2019    Procedure: EXTRACTION, CATARACT, WITH IOL INSERTION;  Surgeon: Carmelo Perez II, MD;  Location: [facility];  Service: Ophthalmology;  Laterality: Right;    EYE SURGERY Right     cataracts       Review of patient's allergies indicates:   Allergen Reactions    Erythromycin Anaphylaxis       No current facility-administered medications on file prior to encounter.      Current Outpatient Medications on File Prior to Encounter   Medication Sig    alprazolam (XANAX) 0.5 MG tablet Take 0.5 mg by mouth 2 (two) times daily as needed for Anxiety.    amlodipine-benazepril 5-10 mg (LOTREL) 5-10 mg per capsule Take 1 capsule by mouth once daily.    atorvastatin (LIPITOR) 10 MG tablet Take 1 tablet (10 mg total) by mouth once daily.    FLUoxetine (PROZAC) 40 MG capsule Take 40 mg by mouth once daily.      Family History     None        Tobacco Use    Smoking status: Current Every Day Smoker     Packs/day: 0.50     Years: 12.00     Pack years: 6.00     Types: Cigarettes    Smokeless tobacco: Never Used   Substance and Sexual Activity    Alcohol use: Yes     Comment: occasional    Drug use: No    Sexual activity: Not on file     Review of Systems   All other systems reviewed and are negative.    Objective:     Vital Signs (Most Recent):  Temp: 98.4 °F (36.9 °C) (10/25/20 2137)  Pulse: 60 (10/25/20 2130)  Resp: 18 (10/25/20 2137)  BP: 128/60 (10/25/20 2130)  SpO2: 96 % (10/25/20 2137) Vital Signs (24h Range):  Temp:  [97.7 °F (36.5 °C)-98.4 °F (36.9 °C)] 98.4 °F (36.9 °C)  Pulse:  [58-73] 60  Resp:  [17-20] 18  SpO2:  [94 %-98 %] 96 %  BP: (115-157)/(55-96) 128/60     Weight: 70.8 kg (156 lb)  Body mass index  is 25.18 kg/m².    Physical Exam  Vitals signs reviewed.   Constitutional:       Appearance: She is not ill-appearing or diaphoretic.      Comments: Appears uncomfortable   HENT:      Head: Normocephalic and atraumatic.      Right Ear: External ear normal.      Left Ear: External ear normal.      Nose: Nose normal.      Mouth/Throat:      Mouth: Mucous membranes are moist.   Eyes:      General: No scleral icterus.        Right eye: No discharge.         Left eye: No discharge.   Neck:      Musculoskeletal: Normal range of motion and neck supple.   Cardiovascular:      Rate and Rhythm: Normal rate and regular rhythm.      Pulses: Normal pulses.      Heart sounds: Normal heart sounds.   Pulmonary:      Effort: Pulmonary effort is normal. No respiratory distress.      Breath sounds: Normal breath sounds. No stridor.   Abdominal:      General: Bowel sounds are normal.      Palpations: Abdomen is soft.   Genitourinary:     General: Normal vulva.   Musculoskeletal:      Right lower leg: No edema.      Left lower leg: No edema.   Skin:     General: Skin is warm and dry.      Capillary Refill: Capillary refill takes less than 2 seconds.   Neurological:      General: No focal deficit present.      Mental Status: She is alert and oriented to person, place, and time.      Motor: No weakness.   Psychiatric:         Mood and Affect: Mood normal.         Behavior: Behavior normal.             Significant Labs:   CBC:   Recent Labs   Lab 10/25/20  2011   WBC 12.79*   HGB 12.8   HCT 38.1        CMP:   Recent Labs   Lab 10/25/20  2011      K 4.0      CO2 27   *   BUN 15   CREATININE 0.6   CALCIUM 9.3   PROT 6.7   ALBUMIN 4.4   BILITOT 0.8   ALKPHOS 60   AST 28   ALT 26   ANIONGAP 9   EGFRNONAA >60.0       Significant Imaging: I have reviewed all pertinent imaging results/findings within the past 24 hours.   X-ray Lumbar Spine Complete 5 View    Result Date: 10/25/2020  Examination: Lumbar spine series 5  views. CLINICAL HISTORY: Status post fall. COMPARISON: None. FINDINGS: There are 5 lumbar segments demonstrated minimal levoconvex alignment. Acute on chronic compression deformity upper endplate margin of L2. Chronic anterior subluxation of L4 upon L5 on degenerative basis. Degenerative narrowing at the L5-S1 intervertebral disc level. Relatively mild physiologic wedging of the T12 vertebral body level noted. The SI joints are well-maintained. IMPRESSION: 1. Acute on chronic compression fracture upper endplate margin of L2. 2. Anterior subluxation of L4 upon L5 on a chronic basis. Electronically Signed by Jayesh DIGGS on 10/25/2020 5:30 PM    X-ray Sacrum And Coccyx    Result Date: 10/25/2020  Examination: 3 views the sacrum and coccyx. CLINICAL HISTORY: Pain. FINDINGS: The sacral segments demonstrate appropriate alignment, although there is evidence of mild anterior angulation identified across the sacrococcygeal interface and anterior direction. The presacral space is not significantly widened. There is evidence degenerative narrowing across the L4/5 and L5/S1 interspace with moderate facet arthrosis in the posterior elements as noted in the uppermost field-of-view. The SI joints are well-maintained. The bony pelvic cavity appears normal. IMPRESSION: Angulated morphology identified of the distal coccygeal segments may be secondary to chronic trauma. Electronically Signed by Jayesh DIGGS on 10/25/2020 5:05 PM    Ct Lumbar Spine Without Contrast    Result Date: 10/25/2020  Examination: CT scan of the lumbar spine without contrast. CLINICAL HISTORY: Back pain COMPARISON: Correlation is made with the patient's previous conventional radius the lumbar spine of 10/25/2020. Technical factors: Cross-sectional evaluation the lumbar spine was gathered in standard helical fashion with subsequent sagittal and coronal reconstruction images also included along with standard data set provided. FINDINGS: The  rendered sagittal insertion images demonstrate evidence of maintenance of the lordotic convexity of the lumbar spine with generalized diminished bone mineral density pattern secondary generalized skeletal osteopenia. Focus inspection of the L2 vertebral body demonstrates evidence of acute or acute on chronic superimposed compression fracture at least 25% loss involving the upper cortical margin and minimal anterior traction of the anterior endplate margin. No significant burst component or significant retropulsion of the vertebral body elements. The remaining vertebral bodies maintain appropriate stature and overall contour. Chronic anterior subluxation of L4 upon L5 is noted on the order 3 mm without any additional areas of malalignment changes identified elsewhere throughout the lumbar elements. The L5/S1 intervertebral disc chronic or disc disease with vacuum gas phenomenon central component and dorsal osteophytic spurring encroaching nominally upon the left-sided neural foraminal space with evidence of nerve root contact. His findings may lead to some element of left-sided radicular pain. Fairly profound bilateral facet arthrosis at both the L4-5 and L5-S1 intervertebral disc level with there is changes resulting in acquired spinal canal stenosis at the L4-5 intervertebral disc level with contributory findings from bulky ligamentum flavum thickening. The patient and the spinal canal this level measures roughly 6 mm in AP dimension. IMPRESSION: 1. Acute or acute on chronic spinal compression fracture of L2 at least 25% loss involving the upper endplate with marginal depression of the superior endplate and herniation of cartilaginous material identified into the herniated segment of the depressed endplate. The anterosuperior endplate appears displaced slightly anteriorly on the order of 3 mm. 2. Chronic degenerative anterior subluxation of L4 upon L5 vertebral disc with bilateral facet arthrosis and ligament  thickening constitutes moderate spinal canal stenosis on the order 6 mm. Electronically Signed by Jayesh DIGGS on 10/25/2020 7:32 PM    Ct Pelvis Without Contrast    Result Date: 10/25/2020  CT PELVIS WITHOUT INTRAVENOUS CONTRAST HISTORY: Trauma. Pain. CMS MANDATED QUALITY DATA - CT RADIATION  436 All CT scans at this facility utilize dose modulation, iterative reconstruction, and/or weight based dosing when appropriate to reduce radiation dose to as low as reasonably achievable. Technical factors:   Spiral imaging of the abdomen and pelvis was performed at 3.75 mm increments in the unenhanced format. Coronal reconstruction images were utilized. Enteric contrast was employed. FINDINGS: The osseous structures demonstrate evidence of generalized skeletal osteopenia without localizing findings, acute traumatic injury, or bony destructive changes. No significant malalignment or diastases involving the sacroiliac joint margins. Lower lumbar spine demonstrates chronic degenerative changes involving the lower articular facets at the level of L4-L5 and more so at the L5-S1 level. The sacral bodies are anatomically aligned, although there is evidence of abrupt angulation along the sacrococcygeal interface in an anterior direction with minimal posterior space involving the first coccygeal segment the order 3 mm. Abrupt angulation identified in the anterior margin of the first occipital segment. No associated presacral hematoma. Findings likely on a chronic basis. The intrapelvic contents are well-maintained. The femoral head is demonstrated adequate articulation within the acetabular compartment without evidence of significant osseous displacement. IMPRESSION: 1. Abrupt angulation identified of the sacrococcygeal interface with evidence of minimal posterior distraction of the distal segment findings are suggestive of a subacute on chronic injury changes. There is evidence of an abrupt anterior compression of the first  coccygeal segment suggestive of chronic injury. 2. No evidence of active bleeding hematoma formation with the presacral space suggestive of acute injury pattern or acute osseous injury. Electronically Signed by Jayesh DIGGS on 10/25/2020 7:25 PM    X-ray Pelvis Routine Ap    Result Date: 10/25/2020  Examination: 2 views the pelvis HISTORY: Status post fall. COMPARISON: None. TECHNIQUE: AP and pelvic inlet views. FINDINGS: The lower most slices evidence of chronic degenerative spondylosis changes generated at both the L4-5 and L5-S1 intervertebral disc levels. There is evidence of maintenance involving the bony pelvic ring without evidence of diastasis involving the sacroiliac joints or significant bony destructive changes. The sacroiliac and sacrotuberous lines are well-maintained. Normal articulation of femoral head is within the acetabular compartment. The bony pelvic cavity appears normal. IMPRESSION: 1. No evidence of acute traumatic injury. 2. Chronic degenerative changes of the lower lumbar spine. Electronically Signed by Jayesh DIGGS on 10/25/2020 5:25 PM

## 2020-10-26 NOTE — PT/OT/SLP EVAL
Occupational Therapy   Evaluation    Name: Luann Borjas  MRN: [id number]  Admitting Diagnosis:  Compression fracture of L2 lumbar vertebra      Recommendations:     Discharge Recommendations: home  Discharge Equipment Recommendations:  walker, rolling, shower chair  Barriers to discharge:  Other (Comment)(bed/bath upstairs)    Assessment:     Luann Borjas is a 60 y.o. female with a medical diagnosis of Compression fracture of L2 lumbar vertebra. Performance deficits affecting function: impaired self care skills, impaired functional mobilty, gait instability, impaired balance, pain, impaired cardiopulmonary response to activity, orthopedic precautions.      Pt presented today with good motivation to participate in OT evaluation.  C/o 5/10 back pain.  Pt agreeable to sitting up in bedside chair for lunch.      Rehab Prognosis: Good; patient would benefit from acute skilled OT services to address these deficits and reach maximum level of function.       Plan:     Patient to be seen 3 x/week to address the above listed problems via self-care/home management, therapeutic activities, therapeutic exercises  · Plan of Care Expires: 11/25/20  · Plan of Care Reviewed with: patient    Subjective     Chief Complaint: none stated  Patient/Family Comments/goals: return to home/work     Occupational Profile:  Living Environment: Pt lives in a 2SH with a threshold MAGDY; her bed/bath are on the second floor; she has a walk-in shower and a garden tub; standard commode   Previous level of function: Independent  Roles and Routines: Works as a   Equipment Used at Home:  none  Assistance upon Discharge: spouse     Pain/Comfort:  · Pain Rating 1: 5/10  · Location - Orientation 1: generalized  · Location 1: back  · Pain Addressed 1: Pre-medicate for activity, Reposition, Distraction  · Pain Rating Post-Intervention 1: 5/10    Objective:     Communicated with: RN prior to session.  Patient found supine with oxygen,  [place], telemetry upon OT entry to room.    General Precautions: Standard, fall   Orthopedic Precautions:spinal precautions   Braces: TLSO(ordered; not yet present in room; for pain control only per neurosurgery)     Occupational Performance:    Bed Mobility:    · Patient completed Supine to Sit with stand by assistance    Functional Mobility/Transfers:  · Patient completed Sit <> Stand Transfer with contact guard assistance  with  hand-held assist   · Patient completed Toilet Transfer Stand Pivot technique with contact guard assistance with  hand-held assist and bedside commode    Activities of Daily Living:  · Feeding:  independence    · Toileting: contact guard assistance on bedside commode    Cognitive/Visual Perceptual:  Cognitive/Psychosocial Skills:     -       Oriented to: Person, Place, Time and Situation   -       Follows Commands/attention:Follows multistep  commands  -       Communication: clear/fluent  -       Memory: No Deficits noted  -       Safety awareness/insight to disability: intact   -       Mood/Affect/Coping skills/emotional control: Appropriate to situation    Physical Exam:  Upper Extremity Range of Motion:     -       Right Upper Extremity: WFL  -       Left Upper Extremity: WFL  Upper Extremity Strength:    -       Right Upper Extremity: WFL  -       Left Upper Extremity: WFL   Strength:    -       Right Upper Extremity: WFL  -       Left Upper Extremity: WFL  Fine Motor Coordination:    -       Intact  Gross motor coordination:   WFL    AMPAC 6 Click ADL:  AMPAC Total Score: 19    Treatment & Education:  OT role/POC  Review of spinal precautions and application to ADLs/functional mobility; pt demonstrated understanding with cues today; would benefit from review in future sessions   Education:    Patient left up in chair with all lines intact and call button in reach    GOALS:   Multidisciplinary Problems     Occupational Therapy Goals        Problem: Occupational Therapy Goal     Goal Priority Disciplines Outcome Interventions   Occupational Therapy Goal     OT, PT/OT Ongoing, Progressing    Description: Goals to be met by: d/c     Patient will increase functional independence with ADLs by performing:    UE Dressing with Supervision.  LE Dressing with Supervision and Assistive Devices as needed.  Grooming while standing at sink with Supervision.  Toileting from toilet with Supervision for hygiene and clothing management.   Toilet transfer to toilet with Supervision.                     History:     Past Medical History:   Diagnosis Date    Anxiety     Arthritis     Cataract of left eye     Hyperlipidemia     Sleep apnea     cpap       Past Surgical History:   Procedure Laterality Date    BASAL CELL CARCINOMA EXCISION  09/2019    lip    CATARACT EXTRACTION W/  INTRAOCULAR LENS IMPLANT Right 11/13/2019    Procedure: EXTRACTION, CATARACT, WITH IOL INSERTION;  Surgeon: Carmelo Perez II, MD;  Location: [facility];  Service: Ophthalmology;  Laterality: Right;    ECTOPIC PREGNANCY SURGERY      EYE SURGERY Right     cataracts    HYSTERECTOMY         Time Tracking:     OT Date of Treatment: 10/26/20  OT Start Time: 1158  OT Stop Time: 1220  OT Total Time (min): 22 min    Billable Minutes:Evaluation 12  Self Care/Home Management 10    London Lakhani, OT  10/26/2020

## 2020-10-26 NOTE — H&P
[facility] access hospital Medicine  History & Physical    Patient Name: Luann Borjas  MRN: [id number]  Admission Date: 10/25/2020  Attending Physician: Sakshi Leong MD   Primary Care Provider: Allen Callahan NP         Patient information was obtained from patient and ER records.     Subjective:     Principal Problem:Compression fracture of L2 lumbar vertebra    Chief Complaint:   Chief Complaint   Patient presents with    Back Pain     fall    Buttocks pain        HPI: 60-year-old female with history of hypertension, hyperlipidemia, anxiety, sleep apnea presented to the ED after a fall, while dancing at a wedding, landing on her buttock earlier today  Reports severe persistent low back pain with difficulty ambulating since the fall  States that she has been in her usual state state health prior to the incident  Denies fever, chills, shortness of breath, abdominal pain, urinary symptoms  Smoker with chronic cough  Imaging showed acute on chronic L2 compression fracture  Still have significant pain despite Percocet, morphine IV, Dilaudid IV, Valium p.o. in the ED    Past Medical History:   Diagnosis Date    Anxiety     Arthritis     Cataract of left eye     Hyperlipidemia     Sleep apnea     cpap       Past Surgical History:   Procedure Laterality Date    BASAL CELL CARCINOMA EXCISION  09/2019    lip    CATARACT EXTRACTION W/  INTRAOCULAR LENS IMPLANT Right 11/13/2019    Procedure: EXTRACTION, CATARACT, WITH IOL INSERTION;  Surgeon: Carmelo Perez II, MD;  Location: [facility] OR;  Service: Ophthalmology;  Laterality: Right;    EYE SURGERY Right     cataracts       Review of patient's allergies indicates:   Allergen Reactions    Erythromycin Anaphylaxis       No current facility-administered medications on file prior to encounter.      Current Outpatient Medications on File Prior to Encounter   Medication Sig    alprazolam (XANAX) 0.5 MG tablet Take 0.5 mg by mouth 2 (two) times daily as needed for  Anxiety.    amlodipine-benazepril 5-10 mg (LOTREL) 5-10 mg per capsule Take 1 capsule by mouth once daily.    atorvastatin (LIPITOR) 10 MG tablet Take 1 tablet (10 mg total) by mouth once daily.    FLUoxetine (PROZAC) 40 MG capsule Take 40 mg by mouth once daily.      Family History     None        Tobacco Use    Smoking status: Current Every Day Smoker     Packs/day: 0.50     Years: 12.00     Pack years: 6.00     Types: Cigarettes    Smokeless tobacco: Never Used   Substance and Sexual Activity    Alcohol use: Yes     Comment: occasional    Drug use: No    Sexual activity: Not on file     Review of Systems   All other systems reviewed and are negative.    Objective:     Vital Signs (Most Recent):  Temp: 98.4 °F (36.9 °C) (10/25/20 2137)  Pulse: 60 (10/25/20 2130)  Resp: 18 (10/25/20 2137)  BP: 128/60 (10/25/20 2130)  SpO2: 96 % (10/25/20 2137) Vital Signs (24h Range):  Temp:  [97.7 °F (36.5 °C)-98.4 °F (36.9 °C)] 98.4 °F (36.9 °C)  Pulse:  [58-73] 60  Resp:  [17-20] 18  SpO2:  [94 %-98 %] 96 %  BP: (115-157)/(55-96) 128/60     Weight: 70.8 kg (156 lb)  Body mass index is 25.18 kg/m².    Physical Exam  Vitals signs reviewed.   Constitutional:       Appearance: She is not ill-appearing or diaphoretic.      Comments: Appears uncomfortable   HENT:      Head: Normocephalic and atraumatic.      Right Ear: External ear normal.      Left Ear: External ear normal.      Nose: Nose normal.      Mouth/Throat:      Mouth: Mucous membranes are moist.   Eyes:      General: No scleral icterus.        Right eye: No discharge.         Left eye: No discharge.   Neck:      Musculoskeletal: Normal range of motion and neck supple.   Cardiovascular:      Rate and Rhythm: Normal rate and regular rhythm.      Pulses: Normal pulses.      Heart sounds: Normal heart sounds.   Pulmonary:      Effort: Pulmonary effort is normal. No respiratory distress.      Breath sounds: Normal breath sounds. No stridor.   Abdominal:      General:  Bowel sounds are normal.      Palpations: Abdomen is soft.   Genitourinary:     General: Normal vulva.   Musculoskeletal:      Right lower leg: No edema.      Left lower leg: No edema.   Skin:     General: Skin is warm and dry.      Capillary Refill: Capillary refill takes less than 2 seconds.   Neurological:      General: No focal deficit present.      Mental Status: She is alert and oriented to person, place, and time.      Motor: No weakness.   Psychiatric:         Mood and Affect: Mood normal.         Behavior: Behavior normal.             Significant Labs:   CBC:   Recent Labs   Lab 10/25/20  2011   WBC 12.79*   HGB 12.8   HCT 38.1        CMP:   Recent Labs   Lab 10/25/20  2011      K 4.0      CO2 27   *   BUN 15   CREATININE 0.6   CALCIUM 9.3   PROT 6.7   ALBUMIN 4.4   BILITOT 0.8   ALKPHOS 60   AST 28   ALT 26   ANIONGAP 9   EGFRNONAA >60.0       Significant Imaging: I have reviewed all pertinent imaging results/findings within the past 24 hours.   X-ray Lumbar Spine Complete 5 View    Result Date: 10/25/2020  Examination: Lumbar spine series 5 views. CLINICAL HISTORY: Status post fall. COMPARISON: None. FINDINGS: There are 5 lumbar segments demonstrated minimal levoconvex alignment. Acute on chronic compression deformity upper endplate margin of L2. Chronic anterior subluxation of L4 upon L5 on degenerative basis. Degenerative narrowing at the L5-S1 intervertebral disc level. Relatively mild physiologic wedging of the T12 vertebral body level noted. The SI joints are well-maintained. IMPRESSION: 1. Acute on chronic compression fracture upper endplate margin of L2. 2. Anterior subluxation of L4 upon L5 on a chronic basis. Electronically Signed by Jayesh DIGGS on 10/25/2020 5:30 PM    X-ray Sacrum And Coccyx    Result Date: 10/25/2020  Examination: 3 views the sacrum and coccyx. CLINICAL HISTORY: Pain. FINDINGS: The sacral segments demonstrate appropriate alignment, although  there is evidence of mild anterior angulation identified across the sacrococcygeal interface and anterior direction. The presacral space is not significantly widened. There is evidence degenerative narrowing across the L4/5 and L5/S1 interspace with moderate facet arthrosis in the posterior elements as noted in the uppermost field-of-view. The SI joints are well-maintained. The bony pelvic cavity appears normal. IMPRESSION: Angulated morphology identified of the distal coccygeal segments may be secondary to chronic trauma. Electronically Signed by Jayesh DIGGS on 10/25/2020 5:05 PM    Ct Lumbar Spine Without Contrast    Result Date: 10/25/2020  Examination: CT scan of the lumbar spine without contrast. CLINICAL HISTORY: Back pain COMPARISON: Correlation is made with the patient's previous conventional radius the lumbar spine of 10/25/2020. Technical factors: Cross-sectional evaluation the lumbar spine was gathered in standard helical fashion with subsequent sagittal and coronal reconstruction images also included along with standard data set provided. FINDINGS: The rendered sagittal insertion images demonstrate evidence of maintenance of the lordotic convexity of the lumbar spine with generalized diminished bone mineral density pattern secondary generalized skeletal osteopenia. Focus inspection of the L2 vertebral body demonstrates evidence of acute or acute on chronic superimposed compression fracture at least 25% loss involving the upper cortical margin and minimal anterior traction of the anterior endplate margin. No significant burst component or significant retropulsion of the vertebral body elements. The remaining vertebral bodies maintain appropriate stature and overall contour. Chronic anterior subluxation of L4 upon L5 is noted on the order 3 mm without any additional areas of malalignment changes identified elsewhere throughout the lumbar elements. The L5/S1 intervertebral disc chronic or disc  disease with vacuum gas phenomenon central component and dorsal osteophytic spurring encroaching nominally upon the left-sided neural foraminal space with evidence of nerve root contact. His findings may lead to some element of left-sided radicular pain. Fairly profound bilateral facet arthrosis at both the L4-5 and L5-S1 intervertebral disc level with there is changes resulting in acquired spinal canal stenosis at the L4-5 intervertebral disc level with contributory findings from bulky ligamentum flavum thickening. The patient and the spinal canal this level measures roughly 6 mm in AP dimension. IMPRESSION: 1. Acute or acute on chronic spinal compression fracture of L2 at least 25% loss involving the upper endplate with marginal depression of the superior endplate and herniation of cartilaginous material identified into the herniated segment of the depressed endplate. The anterosuperior endplate appears displaced slightly anteriorly on the order of 3 mm. 2. Chronic degenerative anterior subluxation of L4 upon L5 vertebral disc with bilateral facet arthrosis and ligament thickening constitutes moderate spinal canal stenosis on the order 6 mm. Electronically Signed by Jayesh DIGGS on 10/25/2020 7:32 PM    Ct Pelvis Without Contrast    Result Date: 10/25/2020  CT PELVIS WITHOUT INTRAVENOUS CONTRAST HISTORY: Trauma. Pain. CMS MANDATED QUALITY DATA - CT RADIATION  436 All CT scans at this facility utilize dose modulation, iterative reconstruction, and/or weight based dosing when appropriate to reduce radiation dose to as low as reasonably achievable. Technical factors:   Spiral imaging of the abdomen and pelvis was performed at 3.75 mm increments in the unenhanced format. Coronal reconstruction images were utilized. Enteric contrast was employed. FINDINGS: The osseous structures demonstrate evidence of generalized skeletal osteopenia without localizing findings, acute traumatic injury, or bony destructive  changes. No significant malalignment or diastases involving the sacroiliac joint margins. Lower lumbar spine demonstrates chronic degenerative changes involving the lower articular facets at the level of L4-L5 and more so at the L5-S1 level. The sacral bodies are anatomically aligned, although there is evidence of abrupt angulation along the sacrococcygeal interface in an anterior direction with minimal posterior space involving the first coccygeal segment the order 3 mm. Abrupt angulation identified in the anterior margin of the first occipital segment. No associated presacral hematoma. Findings likely on a chronic basis. The intrapelvic contents are well-maintained. The femoral head is demonstrated adequate articulation within the acetabular compartment without evidence of significant osseous displacement. IMPRESSION: 1. Abrupt angulation identified of the sacrococcygeal interface with evidence of minimal posterior distraction of the distal segment findings are suggestive of a subacute on chronic injury changes. There is evidence of an abrupt anterior compression of the first coccygeal segment suggestive of chronic injury. 2. No evidence of active bleeding hematoma formation with the presacral space suggestive of acute injury pattern or acute osseous injury. Electronically Signed by Jayesh DIGGS on 10/25/2020 7:25 PM    X-ray Pelvis Routine Ap    Result Date: 10/25/2020  Examination: 2 views the pelvis HISTORY: Status post fall. COMPARISON: None. TECHNIQUE: AP and pelvic inlet views. FINDINGS: The lower most slices evidence of chronic degenerative spondylosis changes generated at both the L4-5 and L5-S1 intervertebral disc levels. There is evidence of maintenance involving the bony pelvic ring without evidence of diastasis involving the sacroiliac joints or significant bony destructive changes. The sacroiliac and sacrotuberous lines are well-maintained. Normal articulation of femoral head is within the  acetabular compartment. The bony pelvic cavity appears normal. IMPRESSION: 1. No evidence of acute traumatic injury. 2. Chronic degenerative changes of the lower lumbar spine. Electronically Signed by Jayesh DIGGS on 10/25/2020 5:25 PM      Assessment/Plan:     * Compression fracture of L2 lumbar vertebra, acute on chronic  Consult neurosurgery  ED nurse practitioner had discussed case with Neurosurgery on call.  Recommended TLSO brace and pain controlled  Consult PT/OT  Consult social service for brace  Will need standing L-spine xrays while wearing the TLSO brace.       Hypertension  Chronic medical condition  Continue home medication  Monitor    Anxiety  Chronic medical condition  Continue home medication      VTE Risk Mitigation (From admission, onward)         Ordered     IP VTE LOW RISK PATIENT  Once      10/25/20 2143     Place sequential compression device  Until discontinued      10/25/20 2143                   YULIANA Davidson  Department of Hospital Medicine   [facility]

## 2020-10-26 NOTE — ASSESSMENT & PLAN NOTE
Consult neurosurgery  ED nurse practitioner had discussed case with Neurosurgery on-call.  Recommended TLSO brace and pain controlled  Consult PT/OT  Consult social service for brace

## 2020-10-26 NOTE — PLAN OF CARE
Problem: Occupational Therapy Goal  Goal: Occupational Therapy Goal  Description: Goals to be met by: d/c     Patient will increase functional independence with ADLs by performing:    UE Dressing with Supervision.  LE Dressing with Supervision and Assistive Devices as needed.  Grooming while standing at sink with Supervision.  Toileting from toilet with Supervision for hygiene and clothing management.   Toilet transfer to toilet with Supervision.    Outcome: Ongoing, Progressing

## 2020-10-26 NOTE — PT/OT/SLP EVAL
Physical Therapy Evaluation    Patient Name:  Luann Borjas   MRN:  [id number]    Recommendations:     Discharge Recommendations:  home   Discharge Equipment Recommendations: walker, rolling   Barriers to discharge: None    Assessment:     Luann Borjas is a 60 y.o. female admitted with a medical diagnosis of Compression fracture of L2 lumbar vertebra.  She presents with the following Minimal  impairments/functional limitations:  impaired self care skills, impaired functional mobilty, gait instability, pain .  Pt had no increase in pain with t/f OOB and gait in the room.    Rehab Prognosis: Good; patient would benefit from acute skilled PT services to address these deficits and reach maximum level of function.    Recent Surgery: * No surgery found *      Plan:     During this hospitalization, patient to be seen 5 x/week to address the identified rehab impairments via gait training, therapeutic activities, therapeutic exercises and progress toward the following goals:    · Plan of Care Expires:       Subjective     Chief Complaint: back pain 6/10  Patient/Family Comments/goals: home with   Pain/Comfort:  · Pain Rating 1: 6/10  · Location - Side 1: Bilateral  · Location - Orientation 1: lower  · Location 1: back  · Pain Addressed 1: Pre-medicate for activity    Patients cultural, spiritual, Evangelical conflicts given the current situation:      Living Environment:  Pt lives  With her  in a  2 story house with her bedroom upstairs.  Prior to admission, patients level of function was independent.  Equipment used at home: none.  DME owned (not currently used): rolling walker.  Upon discharge, patient will have assistance from her .    Objective:     Communicated with nurse prior to session.  Patient found supine with bed alarm  upon PT entry to room.    General Precautions: Standard, fall   Orthopedic Precautions:    Braces: (TLSO has been ordered)     Exams:  · Cognitive Exam:  Patient is oriented  to Person, Place, Time and Situation  · RUE Strength: WNL  · LUE Strength: WNL  · RLE ROM: WNL  · RLE Strength: WNL  · LLE ROM: WNL  · LLE Strength: WNL    Functional Mobility:  · Bed Mobility:     · Supine to Sit: contact guard assistance  · Sit to Supine: contact guard assistance  · Transfers:     · Sit to Stand:  contact guard assistance with no AD  · Gait: 20 ft RW and CGA    Therapeutic Activities and Exercises:  Pt was eager to t/f OOB.   She was instructed on log rolling technique.  Pt t/'ed supine to sit and sit to stand with CGA. She ambulated in the room 20 ft RW and CGA. Pt reported no increase in pain with t/f OOB.    AM-PAC 6 CLICK MOBILITY  Total Score:18     Patient left supine with call button in reach, bed alarm off and her   present.    GOALS:   Multidisciplinary Problems     Physical Therapy Goals        Problem: Physical Therapy Goal    Goal Priority Disciplines Outcome Goal Variances Interventions   Physical Therapy Goal     PT, PT/OT      Description: Goals to be met by: D/C    Patient will increase functional independence with mobility by performin. Supine to sit with Stand-by Assistance  2. Sit to stand transfer with Stand-by Assistance  3. Gait  x 150  feet with Stand-by Assistance using Rolling Walker.                      History:     Past Medical History:   Diagnosis Date    Anxiety     Arthritis     Cataract of left eye     Hyperlipidemia     Sleep apnea     cpap       Past Surgical History:   Procedure Laterality Date    BASAL CELL CARCINOMA EXCISION  2019    lip    CATARACT EXTRACTION W/  INTRAOCULAR LENS IMPLANT Right 2019    Procedure: EXTRACTION, CATARACT, WITH IOL INSERTION;  Surgeon: Carmelo Perez II, MD;  Location: [facility] OR;  Service: Ophthalmology;  Laterality: Right;    ECTOPIC PREGNANCY SURGERY      EYE SURGERY Right     cataracts    HYSTERECTOMY         Time Tracking:     PT Received On: 10/26/20  PT Start Time: 1015     PT Stop Time:  1033  PT Total Time (min): 18 min     Billable Minutes: Evaluation 10 minutes and Gait Training 8  minutes      Mila Jaquez, PT  10/26/2020

## 2020-10-26 NOTE — HPI
60-year-old female with history of hypertension, hyperlipidemia, anxiety, sleep apnea presented to the ED after a fall, while dancing at a wedding, landing on her buttock earlier today  Reports severe persistent low back pain with difficulty ambulating since the fall  States that she has been in her usual state state health prior to the incident  Denies fever, chills, shortness of breath, abdominal pain, urinary symptoms  Smoker with chronic cough  Imaging showed acute on chronic L2 compression fracture  Still have significant pain despite Percocet, morphine IV, Dilaudid IV, Valium p.o. in the ED

## 2020-10-26 NOTE — PROGRESS NOTES
"[facility] Medicine  Progress Note    Patient Name: Luann Borjas  MRN: [id number]  Patient Class: OP- Observation   Admission Date: 10/25/2020  3:44 PM  Attending Physician: Natalya Watlon MD  Primary Care Provider: Allen Callahan NP  Face-to-Face encounter date: 10/26/2020    Assessment & Plan:   Luann Borjas is a 60 y.o. female with:    Intractable Pain  Due to Acute on Chronic L2 Compression Fxr  Due to Fall while dancing (mechanical fall)  - pain control  - neurosurgery evaluated pt and has signed off  - no surgical intervention needed per their recs  - HOWEVER, they request standing L-spine XR while wearing TLSO brace  - please notify them when scans received   - CM consulted for TLSO brace   - PT/OT    Other chronic conditions:  Home meds as appropriate  Electrolyte derangement:  Trending BMP, Mg; Replacement prn  DVT ppx:  lovenox held for now due to recent fall; SCDs  FULL CODE    Discharge Planning:     PT/OT to evaluate.  Outpt follow-up with neurosurgery in 4 - 6 weeks if pain does not improve.    Subjective:      10/26:  Pt's back pain is not controlled.  Adjusting pain regimen.  Pt also reports nausea; she states this always happens when she takes pain meds.  Scheduling zofran.  No CP, no SOB.    Review of Systems   All systems reviewed and are negative except as noted per above.  Objective:     Physical Exam  /71   Pulse 65   Temp 98.3 °F (36.8 °C) (Oral)   Resp 18   Ht 5' 6" (1.676 m)   Wt 69 kg (152 lb 1.9 oz)   SpO2 96%   BMI 24.55 kg/m²     Gen: alert, responsive   HEENT:  Eyes - no pallor  External ears with no lesions  Nares patent  Mouth - lips chapped  CV: RRR  Lungs: CTA B/L  Abd: +BS, soft, NT, ND  Back:  LUMBAR SPINE TTP, SACRAL SPINE TTP  Ext: no atrophy or edema  Skin: warm, dry  Neuro: NO FOCAL DEFICITS   Psych: pleasant    Recent Labs   Lab 10/26/20  0530   WBC 7.75   HGB 12.6   HCT 38.2        Recent Labs   Lab 10/25/20  2011 10/26/20  0530 "   CALCIUM 9.3 8.9   ALBUMIN 4.4  --    PROT 6.7  --     139   K 4.0 3.9   CO2 27 28    102   BUN 15 14   CREATININE 0.6 0.7   ALKPHOS 60  --    ALT 26  --    AST 28  --    BILITOT 0.8  --      No results found for: POCTGLUCOSE   Microbiology Results (last 7 days)     ** No results found for the last 168 hours. **        CT Pelvis Without Contrast   Final Result      CT Lumbar Spine Without Contrast   Final Result      X-Ray Sacrum And Coccyx   Final Result      X-Ray Pelvis Routine AP   Final Result      X-Ray Lumbar Spine Complete 5 View   Final Result          All labs, images, and other studies - including those not included in this note -- were reviewed personally by me.    Inpatient medications  Scheduled Meds:   amLODIPine  5 mg Oral Daily    And    benazepriL  10 mg Oral Daily    atorvastatin  10 mg Oral Daily    FLUoxetine  40 mg Oral Daily     Continuous Infusions:  PRN Meds:.acetaminophen, ALPRAZolam, magnesium oxide, magnesium oxide, melatonin, morphine, ondansetron, oxyCODONE-acetaminophen, potassium chloride, potassium chloride, potassium chloride, potassium, sodium phosphates, potassium, sodium phosphates, potassium, sodium phosphates, promethazine (PHENERGAN) IVPB, sodium chloride 0.9%    Above encounter included review of the medical records, interviewing and examining the patient face-to-face, discussion with family and other health care providers, ordering and interpreting lab/test results and formulating a plan of care.     Natalya Walton MD  [facility] Hospitalist

## 2020-10-26 NOTE — CONSULTS
[facility]  Neurosurgery  Consult Note    Consults  Subjective:     Chief Complaint/Reason for Admission: low back pain.    History of Present Illness: Ms Borjas is a 59 yo F with past medical history significant for anxiety, arthritis, hyperlipidemia, and sleep apnea. She denies any prior back history or pain. As per the ED, she was dancing at a wedding when she slipped and fell on her buttocks. She immediately experienced severe low back pain. SHe denies weakness or bowel/bladder incontinence.    (Not in a hospital admission)      Review of patient's allergies indicates:   Allergen Reactions    Erythromycin Anaphylaxis       Past Medical History:   Diagnosis Date    Anxiety     Arthritis     Cataract of left eye     Hyperlipidemia     Sleep apnea     cpap     Past Surgical History:   Procedure Laterality Date    BASAL CELL CARCINOMA EXCISION  09/2019    lip    CATARACT EXTRACTION W/  INTRAOCULAR LENS IMPLANT Right 11/13/2019    Procedure: EXTRACTION, CATARACT, WITH IOL INSERTION;  Surgeon: Carmelo Perez II, MD;  Location: [facility] OR;  Service: Ophthalmology;  Laterality: Right;    EYE SURGERY Right     cataracts     Family History     None        Tobacco Use    Smoking status: Current Every Day Smoker     Packs/day: 0.50     Years: 12.00     Pack years: 6.00     Types: Cigarettes    Smokeless tobacco: Never Used   Substance and Sexual Activity    Alcohol use: Yes     Comment: occasional    Drug use: No    Sexual activity: Not on file     Review of Systems  Objective:     Weight: 70.8 kg (156 lb)  Body mass index is 25.18 kg/m².  Vital Signs (Most Recent):  Temp: 98.4 °F (36.9 °C) (10/25/20 2137)  Pulse: 60 (10/25/20 2130)  Resp: 18 (10/25/20 2137)  BP: 128/60 (10/25/20 2130)  SpO2: 96 % (10/25/20 2137) Vital Signs (24h Range):  Temp:  [97.7 °F (36.5 °C)-98.4 °F (36.9 °C)] 98.4 °F (36.9 °C)  Pulse:  [58-73] 60  Resp:  [17-20] 18  SpO2:  [94 %-98 %] 96 %  BP: (115-157)/(55-96) 128/60                           Neurosurgery Physical Exam   No focal motor deficit b/l lower extremities    Significant Labs:  Recent Labs   Lab 10/25/20  2011   *      K 4.0      CO2 27   BUN 15   CREATININE 0.6   CALCIUM 9.3     Recent Labs   Lab 10/25/20  2011   WBC 12.79*   HGB 12.8   HCT 38.1        No results for input(s): LABPT, INR, APTT in the last 48 hours.  Microbiology Results (last 7 days)     ** No results found for the last 168 hours. **        CMP:   Recent Labs   Lab 10/25/20  2011   *   CALCIUM 9.3   ALBUMIN 4.4   PROT 6.7      K 4.0   CO2 27      BUN 15   CREATININE 0.6   ALKPHOS 60   ALT 26   AST 28   BILITOT 0.8     CRP:   Recent Labs   Lab 10/25/20  2011   CRP 0.12     ESR: No results for input(s): POCESR, ERYTHROCYTES in the last 48 hours.     All pertinent labs from the last 24 hours have been reviewed.    Significant Diagnostics:  She has a CT lumbar spine which I personally reviewed. This shows an L2 compression fracture with approximately 25% loss of height but without retropulsion or significant spinal canal compromise.    I have reviewed and interpreted all pertinent imaging results/findings within the past 24 hours.    Assessment/Plan:     Active Diagnoses:    Diagnosis Date Noted POA    Compression fracture of L2 lumbar vertebra [S32.020A] 10/25/2020 Yes      Problems Resolved During this Admission:     Ms Borjas is a 61 yo F status post mechanical fall with an L2 compression fracture without significant loss of height or canal compromise. Still complains of severe low back pain despite narcotics. This is a non-operative fracture as well as a stable fracture that does not require any acute neurosurgical intervention. I would recommend admitting the patient for pain control. I would recommend a TLSO brace for pain control/managment (not necessary from a stability standpoint). Please get standing L-spine xrays while wearing the TLSO brace. If pain  does not improve within 4-6 weeks, can consider kyphoplasty as an outpatient for pain control. We will follow as an outpatient.    Thank you for your consult. I will sign off. Please contact us if you have any additional questions.    Cristina Hobbs MD  Neurosurgery  [facility]  [phone]

## 2020-10-27 LAB
25(OH)D3+25(OH)D2 SERPL-MCNC: 23 NG/ML (ref 30–96)
ANION GAP SERPL CALC-SCNC: 9 MMOL/L (ref 8–16)
BASOPHILS # BLD AUTO: 0.04 K/UL (ref 0–0.2)
BASOPHILS NFR BLD: 0.4 % (ref 0–1.9)
BUN SERPL-MCNC: 9 MG/DL (ref 6–20)
CALCIUM SERPL-MCNC: 9.2 MG/DL (ref 8.7–10.5)
CHLORIDE SERPL-SCNC: 98 MMOL/L (ref 95–110)
CO2 SERPL-SCNC: 29 MMOL/L (ref 23–29)
CREAT SERPL-MCNC: 0.6 MG/DL (ref 0.5–1.4)
DIFFERENTIAL METHOD: ABNORMAL
EOSINOPHIL # BLD AUTO: 0.1 K/UL (ref 0–0.5)
EOSINOPHIL NFR BLD: 0.8 % (ref 0–8)
ERYTHROCYTE [DISTWIDTH] IN BLOOD BY AUTOMATED COUNT: 12.2 % (ref 11.5–14.5)
EST. GFR  (AFRICAN AMERICAN): >60 ML/MIN/1.73 M^2
EST. GFR  (NON AFRICAN AMERICAN): >60 ML/MIN/1.73 M^2
GLUCOSE SERPL-MCNC: 112 MG/DL (ref 70–110)
HCT VFR BLD AUTO: 38.6 % (ref 37–48.5)
HGB BLD-MCNC: 12.6 G/DL (ref 12–16)
IMM GRANULOCYTES # BLD AUTO: 0.03 K/UL (ref 0–0.04)
IMM GRANULOCYTES NFR BLD AUTO: 0.3 % (ref 0–0.5)
LYMPHOCYTES # BLD AUTO: 1.1 K/UL (ref 1–4.8)
LYMPHOCYTES NFR BLD: 11.5 % (ref 18–48)
MAGNESIUM SERPL-MCNC: 2.1 MG/DL (ref 1.6–2.6)
MCH RBC QN AUTO: 31.3 PG (ref 27–31)
MCHC RBC AUTO-ENTMCNC: 32.6 G/DL (ref 32–36)
MCV RBC AUTO: 96 FL (ref 82–98)
MONOCYTES # BLD AUTO: 0.6 K/UL (ref 0.3–1)
MONOCYTES NFR BLD: 6.2 % (ref 4–15)
NEUTROPHILS # BLD AUTO: 7.9 K/UL (ref 1.8–7.7)
NEUTROPHILS NFR BLD: 80.8 % (ref 38–73)
NRBC BLD-RTO: 0 /100 WBC
PLATELET # BLD AUTO: 205 K/UL (ref 150–350)
PMV BLD AUTO: 9.6 FL (ref 9.2–12.9)
POTASSIUM SERPL-SCNC: 3.9 MMOL/L (ref 3.5–5.1)
RBC # BLD AUTO: 4.03 M/UL (ref 4–5.4)
SODIUM SERPL-SCNC: 136 MMOL/L (ref 136–145)
WBC # BLD AUTO: 9.75 K/UL (ref 3.9–12.7)

## 2020-10-27 PROCEDURE — 82306 VITAMIN D 25 HYDROXY: CPT

## 2020-10-27 PROCEDURE — 94799 UNLISTED PULMONARY SVC/PX: CPT

## 2020-10-27 PROCEDURE — 96376 TX/PRO/DX INJ SAME DRUG ADON: CPT | Mod: 59

## 2020-10-27 PROCEDURE — 97530 THERAPEUTIC ACTIVITIES: CPT

## 2020-10-27 PROCEDURE — 25000003 PHARM REV CODE 250: Performed by: FAMILY MEDICINE

## 2020-10-27 PROCEDURE — 36600 WITHDRAWAL OF ARTERIAL BLOOD: CPT

## 2020-10-27 PROCEDURE — 27000221 HC OXYGEN, UP TO 24 HOURS

## 2020-10-27 PROCEDURE — 63600175 PHARM REV CODE 636 W HCPCS: Performed by: FAMILY MEDICINE

## 2020-10-27 PROCEDURE — 85025 COMPLETE CBC W/AUTO DIFF WBC: CPT

## 2020-10-27 PROCEDURE — S4991 NICOTINE PATCH NONLEGEND: HCPCS | Performed by: INTERNAL MEDICINE

## 2020-10-27 PROCEDURE — 96375 TX/PRO/DX INJ NEW DRUG ADDON: CPT

## 2020-10-27 PROCEDURE — 25000003 PHARM REV CODE 250: Performed by: INTERNAL MEDICINE

## 2020-10-27 PROCEDURE — 83735 ASSAY OF MAGNESIUM: CPT

## 2020-10-27 PROCEDURE — 97116 GAIT TRAINING THERAPY: CPT | Mod: CQ

## 2020-10-27 PROCEDURE — 94761 N-INVAS EAR/PLS OXIMETRY MLT: CPT

## 2020-10-27 PROCEDURE — 99244 OFF/OP CNSLTJ NEW/EST MOD 40: CPT | Mod: ,,, | Performed by: INTERNAL MEDICINE

## 2020-10-27 PROCEDURE — 25500020 PHARM REV CODE 255: Performed by: INTERNAL MEDICINE

## 2020-10-27 PROCEDURE — 99900035 HC TECH TIME PER 15 MIN (STAT)

## 2020-10-27 PROCEDURE — 80048 BASIC METABOLIC PNL TOTAL CA: CPT

## 2020-10-27 PROCEDURE — 36415 COLL VENOUS BLD VENIPUNCTURE: CPT

## 2020-10-27 PROCEDURE — 25000003 PHARM REV CODE 250: Performed by: NURSE PRACTITIONER

## 2020-10-27 PROCEDURE — G0378 HOSPITAL OBSERVATION PER HR: HCPCS

## 2020-10-27 PROCEDURE — 99244 PR OFFICE CONSULTATION,LEVEL IV: ICD-10-PCS | Mod: ,,, | Performed by: INTERNAL MEDICINE

## 2020-10-27 RX ORDER — CHOLECALCIFEROL (VITAMIN D3) 50 MCG
2000 TABLET ORAL DAILY
Status: DISCONTINUED | OUTPATIENT
Start: 2020-10-28 | End: 2020-10-28 | Stop reason: HOSPADM

## 2020-10-27 RX ORDER — IBUPROFEN 200 MG
1 TABLET ORAL DAILY
Status: DISCONTINUED | OUTPATIENT
Start: 2020-10-27 | End: 2020-10-28 | Stop reason: HOSPADM

## 2020-10-27 RX ORDER — OXYCODONE AND ACETAMINOPHEN 10; 325 MG/1; MG/1
1 TABLET ORAL EVERY 4 HOURS
Status: DISCONTINUED | OUTPATIENT
Start: 2020-10-27 | End: 2020-10-27

## 2020-10-27 RX ORDER — IBUPROFEN 200 MG
1 TABLET ORAL DAILY
Status: DISCONTINUED | OUTPATIENT
Start: 2020-10-28 | End: 2020-10-27

## 2020-10-27 RX ORDER — DOCUSATE SODIUM 100 MG/1
100 CAPSULE, LIQUID FILLED ORAL DAILY
Status: DISCONTINUED | OUTPATIENT
Start: 2020-10-27 | End: 2020-10-28 | Stop reason: HOSPADM

## 2020-10-27 RX ORDER — ONDANSETRON 4 MG/1
4 TABLET, ORALLY DISINTEGRATING ORAL EVERY 8 HOURS PRN
Status: DISCONTINUED | OUTPATIENT
Start: 2020-10-27 | End: 2020-10-28 | Stop reason: HOSPADM

## 2020-10-27 RX ORDER — OXYCODONE AND ACETAMINOPHEN 5; 325 MG/1; MG/1
1 TABLET ORAL EVERY 4 HOURS PRN
Status: DISCONTINUED | OUTPATIENT
Start: 2020-10-27 | End: 2020-10-28 | Stop reason: HOSPADM

## 2020-10-27 RX ORDER — LACTULOSE 10 G/15ML
15 SOLUTION ORAL ONCE
Status: COMPLETED | OUTPATIENT
Start: 2020-10-27 | End: 2020-10-27

## 2020-10-27 RX ADMIN — DOCUSATE SODIUM 100 MG: 100 CAPSULE, LIQUID FILLED ORAL at 09:10

## 2020-10-27 RX ADMIN — ONDANSETRON 8 MG: 2 INJECTION INTRAMUSCULAR; INTRAVENOUS at 12:10

## 2020-10-27 RX ADMIN — ONDANSETRON 4 MG: 4 TABLET, ORALLY DISINTEGRATING ORAL at 03:10

## 2020-10-27 RX ADMIN — PROMETHAZINE HYDROCHLORIDE 12.5 MG: 25 INJECTION INTRAMUSCULAR; INTRAVENOUS at 10:10

## 2020-10-27 RX ADMIN — LACTULOSE 15 G: 20 SOLUTION ORAL at 03:10

## 2020-10-27 RX ADMIN — IOHEXOL 100 ML: 350 INJECTION, SOLUTION INTRAVENOUS at 06:10

## 2020-10-27 RX ADMIN — OXYCODONE AND ACETAMINOPHEN 1 TABLET: 5; 325 TABLET ORAL at 09:10

## 2020-10-27 RX ADMIN — OXYCODONE HYDROCHLORIDE AND ACETAMINOPHEN 1 TABLET: 10; 325 TABLET ORAL at 05:10

## 2020-10-27 RX ADMIN — NICOTINE 1 PATCH: 14 PATCH, EXTENDED RELEASE TRANSDERMAL at 05:10

## 2020-10-27 RX ADMIN — OXYCODONE HYDROCHLORIDE AND ACETAMINOPHEN 1 TABLET: 10; 325 TABLET ORAL at 01:10

## 2020-10-27 RX ADMIN — ONDANSETRON 8 MG: 2 INJECTION INTRAMUSCULAR; INTRAVENOUS at 05:10

## 2020-10-27 RX ADMIN — FLUOXETINE 40 MG: 20 CAPSULE ORAL at 09:10

## 2020-10-27 RX ADMIN — ALPRAZOLAM 0.5 MG: 0.5 TABLET ORAL at 10:10

## 2020-10-27 RX ADMIN — ATORVASTATIN CALCIUM 10 MG: 10 TABLET, FILM COATED ORAL at 09:10

## 2020-10-27 NOTE — PT/OT/SLP PROGRESS
Occupational Therapy   Treatment    Name: Luann Borjas  MRN: [id number]  Admitting Diagnosis:  Compression fracture of L2 lumbar vertebra       Recommendations:     Discharge Recommendations: home  Discharge Equipment Recommendations:  bedside commode, walker, rolling  Barriers to discharge:  None    Assessment:     Luann Borjas is a 60 y.o. female with a medical diagnosis of Compression fracture of L2 lumbar vertebra.  Performance deficits affecting function are orthopedic precautions, weakness, impaired endurance, impaired self care skills, impaired functional mobilty, gait instability, impaired balance, pain.     Pt presented with less c/o pain today (3/10) with pt reporting that TLSO helps manage pain.  Pt/spouse able to don TLSO correctly (pt requiring minimal assistance from spouse).  Pt was motivated to work on functional mobility and walked in room/calix ~200ft CGA with RW; she required 2L 02 with sats maintaining between 92-93%.  Review of education with pt/spouse regarding spinal precautions and application to ADLs/functional mobility.      Rehab Prognosis:  Good; patient would benefit from acute skilled OT services to address these deficits and reach maximum level of function.       Plan:     Patient to be seen 3 x/week to address the above listed problems via self-care/home management, therapeutic activities, therapeutic exercises  · Plan of Care Expires: 11/25/20  · Plan of Care Reviewed with: patient, spouse    Subjective     Pain/Comfort:  · Pain Rating 1: 3/10  · Location 1: back  · Pain Addressed 1: (TLSO brace)  · Pain Rating Post-Intervention 1: 3/10    Objective:     Communicated with: RN prior to session.  Patient found supine with telemetry upon OT entry to room.    General Precautions: Standard, fall   Orthopedic Precautions:spinal precautions   Braces: TLSO     Occupational Performance:     Bed Mobility:    · Patient completed Supine to Sit with supervision     Functional  Mobility/Transfers:  · Patient completed Sit <> Stand Transfer with stand by assistance  with  rolling walker   · Functional Mobility: CGA ~200ft CGA with RW on 2L 02 with sats 92-93%    Activities of Daily Living:  · Upper Body Dressing: minimum assistance to don TLSO  · Lower Body Dressing: stand by assistance to don slippers seated EOB    Treatment & Education:  Review of education with pt/spouse regarding spinal precautions and application to ADLs/functional mobility.      Patient left up in chair with all lines intact and call button in reachEducation:      GOALS:   Multidisciplinary Problems     Occupational Therapy Goals        Problem: Occupational Therapy Goal    Goal Priority Disciplines Outcome Interventions   Occupational Therapy Goal     OT, PT/OT Ongoing, Progressing    Description: Goals to be met by: d/c     Patient will increase functional independence with ADLs by performing:    UE Dressing with Supervision.  LE Dressing with Supervision and Assistive Devices as needed.  Grooming while standing at sink with Supervision.  Toileting from toilet with Supervision for hygiene and clothing management.   Toilet transfer to toilet with Supervision.                     Time Tracking:     OT Date of Treatment: 10/27/20  OT Start Time: 1156  OT Stop Time: 1215  OT Total Time (min): 19 min    Billable Minutes:Self Care/Home Management 08  Therapeutic Activity 11    London Lakhani, OT  10/27/2020

## 2020-10-27 NOTE — PLAN OF CARE
Problem: Fall Injury Risk  Goal: Absence of Fall and Fall-Related Injury  Outcome: Ongoing, Progressing     Problem: Adult Inpatient Plan of Care  Goal: Plan of Care Review  Outcome: Ongoing, Progressing  Flowsheets (Taken 10/27/2020 0629)  Plan of Care Reviewed With: patient  Goal: Optimal Comfort and Wellbeing  Outcome: Ongoing, Progressing

## 2020-10-27 NOTE — PT/OT/SLP PROGRESS
Physical Therapy Treatment    Patient Name:  Luann Borjas   MRN:  [id number]    Recommendations:     Discharge Recommendations:  home   Discharge Equipment Recommendations: walker, rolling, shower chair   Barriers to discharge: None    Assessment:     Luann Borjas is a 60 y.o. female admitted with a medical diagnosis of Compression fracture of L2 lumbar vertebra.  She presents with the following impairments/functional limitations:  impaired self care skills, impaired functional mobilty, gait instability, impaired balance, pain, impaired cardiopulmonary response to activity, orthopedic precautions.    Patient presents resting in bed with HOB elevated; agreeable to PT treatment. Patient endorses back pain. PTA assisted patient with donning of TLSO brace while sitting EOB. Patient instructed in log rolling for supine <> sit to adhere to spinal precautions. Patient ambulated initially on RA and O2 sats noted to be 83%. Sats remained 94-95% with gait training on 3L. No c/o SOB. Continue with PT and POC.    Rehab Prognosis: Good; patient would benefit from acute skilled PT services to address these deficits and reach maximum level of function.    Recent Surgery: * No surgery found *      Plan:     During this hospitalization, patient to be seen 5 x/week to address the identified rehab impairments via gait training, therapeutic activities, therapeutic exercises and progress toward the following goals:    · Plan of Care Expires:       Subjective     Chief Complaint: Back pain  Patient/Family Comments/goals:   Pain/Comfort:  · Pain Rating 1: (Pt did not quantify)  · Location - Side 1: Bilateral  · Location 1: back  · Pain Addressed 1: Reposition, Distraction, Cessation of Activity  · Pain Rating Post-Intervention 1: 6/10      Objective:     Communicated with RN prior to session.  Patient found HOB elevated with telemetry upon PT entry to room.     General Precautions: Standard, fall   Orthopedic Precautions:spinal  precautions   Braces: TLSO     Functional Mobility:  · Bed Mobility:     · Supine to Sit: minimum assistance (log rolling)  · Sit to Supine: minimum assistance (log rolling)  · Transfers:     · Sit to Stand:  contact guard assistance with rolling walker  · Gait: 100ft with RW and CGA      AM-PAC 6 CLICK MOBILITY          Therapeutic Activities and Exercises:   bed mobility; sitting EOB for trunk control and midline orientation; sit <> stands; transfer training; spinal precaution education    Patient left HOB elevated with all lines intact, call button in reach and bed alarm on..    GOALS:   Multidisciplinary Problems     Physical Therapy Goals        Problem: Physical Therapy Goal    Goal Priority Disciplines Outcome Goal Variances Interventions   Physical Therapy Goal     PT, PT/OT Ongoing, Progressing     Description: Goals to be met by: D/C    Patient will increase functional independence with mobility by performin. Supine to sit with Stand-by Assistance  2. Sit to stand transfer with Stand-by Assistance  3. Gait  x 150  feet with Stand-by Assistance using Rolling Walker.                      Time Tracking:     PT Received On: 10/27/20  PT Start Time: 1413     PT Stop Time: 1427  PT Total Time (min): 14 min     Billable Minutes: Gait Training 14    Treatment Type: Treatment  PT/PTA: PTA     PTA Visit Number: 1     Irina Nettles PTA  10/27/2020

## 2020-10-27 NOTE — CONSULTS
Pulmonary/Critical Care Consult      PATIENT NAME: Luann Borjas  MRN: [id number]  TODAY'S DATE: 10/27/2020  6:25 PM  ADMIT DATE: 10/25/2020  AGE: 60 y.o. : 1960    CONSULT REQUESTED BY: Mary Casey MD    REASON FOR CONSULT:   Hypoxemia    HPI:  The patient is a 60-year-old female who was admitted with a L2 compression fracture.  Upon obtaining vital signs she is been persistently hypoxemic.  The patient refused an ABG.  Her CO2 is rising with narcotic consumption.  She is also constipated.  The patient is an ongoing smoker.  She was advised to follow-up with me because of a 5 mm pulmonary nodule.  It is likely that she has significant COPD.  She does have a smoker's cough.  She does not perceive herself to be dyspneic.    REVIEW OF SYSTEMS  GENERAL: Feeling badly  EYES: Vision is good.  ENT: No sinusitis or pharyngitis.   HEART: No chest pain or palpitations.  LUNGS:  She coughs daily and produces mucus sometimes  GI:  She is constipated and nauseated  : No dysuria, hesitancy, or nocturia.  SKIN: No lesions or rashes.  MUSCULOSKELETAL:  Her back hurts.  NEURO: No headaches or neuropathy.  LYMPH: No edema or adenopathy.  PSYCH: No anxiety or depression.  ENDO: No weight change.    ALLERGIES  Review of patient's allergies indicates:   Allergen Reactions    Erythromycin Anaphylaxis       INPATIENT SCHEDULED MEDICATIONS   atorvastatin  10 mg Oral Daily    docusate sodium  100 mg Oral Daily    FLUoxetine  40 mg Oral Daily    nicotine  1 patch Transdermal Daily         MEDICAL AND SURGICAL HISTORY  Past Medical History:   Diagnosis Date    Anxiety     Arthritis     Cataract of left eye     Hyperlipidemia     Sleep apnea     cpap     Past Surgical History:   Procedure Laterality Date    BASAL CELL CARCINOMA EXCISION  2019    lip    CATARACT EXTRACTION W/  INTRAOCULAR LENS IMPLANT Right 2019    Procedure: EXTRACTION, CATARACT, WITH IOL INSERTION;  Surgeon: Carmelo Perez II, MD;   [facility]: [facility];  Service: Ophthalmology;  Laterality: Right;    ECTOPIC PREGNANCY SURGERY      EYE SURGERY Right     cataracts    HYSTERECTOMY         ALCOHOL, TOBACCO AND DRUG USE  Social History     Tobacco Use   Smoking Status Current Every Day Smoker    Packs/day: 0.50    Years: 12.00    Pack years: 6.00    Types: Cigarettes   Smokeless Tobacco Never Used     Social History     Substance and Sexual Activity   Alcohol Use Yes    Alcohol/week: 2.0 standard drinks    Types: 2 Glasses of wine per week     Social History     Substance and Sexual Activity   Drug Use No       FAMILY HISTORY  History reviewed. No pertinent family history.    VITAL SIGNS (MOST RECENT)  Temp: 98.9 °F (37.2 °C) (10/27/20 1615)  Pulse: 84 (10/27/20 1615)  Resp: 18 (10/27/20 1615)  BP: (!) 167/80 (10/27/20 1615)  SpO2: (!) 94 % (10/27/20 1615)    INTAKE AND OUTPUT (LAST 24 HOURS):    Intake/Output Summary (Last 24 hours) at 10/27/2020 1825  Last data filed at 10/27/2020 1400  Gross per 24 hour   Intake --   Output 200 ml   Net -200 ml       WEIGHT  Wt Readings from Last 1 Encounters:   10/25/20 69 kg (152 lb 1.9 oz)       PHYSICAL EXAM  GENERAL: Older appearing patient looking unhappy  HEENT: Pupils equal and reactive. Extraocular movements intact. Nose intact. Pharynx moist.  NECK: Supple.   HEART: Regular rate and rhythm. No murmur or gallop auscultated.  LUNGS: Clear to auscultation and percussion. Lung excursion symmetrical. No change in fremitus. No adventitial noises.  ABDOMEN: Bowel sounds present. Non-tender, no masses palpated.  : Normal anatomy.  EXTREMITIES: Normal muscle tone and joint movement, no cyanosis or clubbing.   LYMPHATICS: No adenopathy palpated, no edema.  SKIN: Dry, intact, no lesions.   NEURO: Cranial nerves II-XII intact. Motor strength 5/5 bilaterally, upper and lower extremities.  PSYCH: Appropriate affect    CBC LAST (LAST 24 HOURS)  Recent Labs   Lab 10/27/20  0540   WBC 9.75   RBC 4.03   HGB 12.6    HCT 38.6   MCV 96   MCH 31.3*   MCHC 32.6   RDW 12.2      MPV 9.6   GRAN 80.8*  7.9*   LYMPH 11.5*  1.1   MONO 6.2  0.6   BASO 0.04   NRBC 0       CHEMISTRY LAST (LAST 24 HOURS)  Recent Labs   Lab 10/27/20  0540      K 3.9   CL 98   CO2 29   ANIONGAP 9   BUN 9   CREATININE 0.6   *   CALCIUM 9.2   MG 2.1       COAGULATION LAST (LAST 24 HOURS)  No results for input(s): LABPT, INR, APTT in the last 24 hours.    CARDIAC PROFILE (LAST 24 HOURS)  No results for input(s): BNP, CPK, CPKMB, LDH, TROPONINI in the last 168 hours.    LAST 7 DAYS MICROBIOLOGY   Microbiology Results (last 7 days)     ** No results found for the last 168 hours. **          MOST RECENT IMAGING    Heart size is normal. The mediastinum is unremarkable. There is mild  right-sided perihilar atelectasis or infiltrate. Left lung is clear.  There are no pleural effusions.    CTA shows no pulmonary emboli, no infiltrates    CURRENT VISIT EKG  Results for orders placed or performed during the hospital encounter of 10/25/20   EKG 12-lead    Narrative    Test Reason : Z79.899,    Vent. Rate : 057 BPM     Atrial Rate : 057 BPM     P-R Int : 174 ms          QRS Dur : 060 ms      QT Int : 406 ms       P-R-T Axes : 035 012 050 degrees     QTc Int : 395 ms    Sinus bradycardia  Nonspecific ST abnormality  Abnormal ECG  No previous ECGs available    Referred By: [email or web address]   SELF           Confirmed By:        IMPRESSION AND PLAN    Hypoxemia, most likely a chronic condition for this patient who is asymptomatic from this  Tobacco abuse  5 mm pulmonary nodule    No objection to patient discharging home on oxygen  Would like to see the patient in the office so that we can evaluate the of degree of her COPD and make sure she completes her tobacco cessation

## 2020-10-27 NOTE — PLAN OF CARE
Initial discharge planning assessment completed at bedside with patient and spouse. No prior HHC. PCP: Allen Callahan NP. Pharmacy: [facility]. Case management following.      10/27/20 1112   Discharge Assessment   Assessment Type Discharge Planning Assessment   Confirmed/corrected address and phone number on facesheet? Yes   Assessment information obtained from? Patient   Expected Length of Stay (days) 2   Communicated expected length of stay with patient/caregiver yes   Prior to hospitilization cognitive status: Alert/Oriented   Prior to hospitalization functional status: Independent   Current cognitive status: Alert/Oriented   Current Functional Status: Assistive Equipment   Lives With spouse   Able to Return to Prior Arrangements yes   Is patient able to care for self after discharge? Yes   Readmission Within the Last 30 Days no previous admission in last 30 days   Patient currently being followed by outpatient case management? No   Patient currently receives any other outside agency services? No   Equipment Currently Used at Home none   Do you have any problems affording any of your prescribed medications? No   Is the patient taking medications as prescribed? yes   Does the patient have transportation home? Yes   Transportation Anticipated family or friend will provide  (Spouse, Frankie Borjas)   Discharge Plan A Home   Discharge Plan B Home with family   DME Needed Upon Discharge  3-in-1 commode;walker, rolling   Patient/Family in Agreement with Plan yes

## 2020-10-27 NOTE — PLAN OF CARE
Referral for home oxygen sent to [facility] and was notified that insurance will not cover with patient in observation level of care. Call placed to [employer]; informed send referral and will submit to [facility] for approval. Sent via [employer]. Case management following.      10/27/20 1553   Discharge Reassessment   Assessment Type Discharge Planning Reassessment   DME Needed Upon Discharge  oxygen   Post-Acute Status   Post-Acute Authorization HME   HME Status Referrals Sent

## 2020-10-27 NOTE — NURSING
"Pt up to bedside commode with assist.  Pt states, "This is the longest I've slept since I've been here."  "

## 2020-10-27 NOTE — PLAN OF CARE
Problem: Physical Therapy Goal  Goal: Physical Therapy Goal  Description: Goals to be met by: D/C    Patient will increase functional independence with mobility by performin. Supine to sit with Stand-by Assistance  2. Sit to stand transfer with Stand-by Assistance  3. Gait  x 150  feet with Stand-by Assistance using Rolling Walker.     Outcome: Ongoing, Progressing   Pt continues to progress towards goals.

## 2020-10-27 NOTE — NURSING
Notified Dr. Lai that pt 02 sats are 80-83 % on room air.   Pt has IS, encouraged to use.   Pt is not short of breath or in any distress.

## 2020-10-27 NOTE — PLAN OF CARE
Discussed DME need - RW and 3 in 1 Commode at bedside with patient and spouse. Preference [facility]. Referral sent and approved to provide both. Forms completed and placed in designated area.      10/27/20 1121   Discharge Reassessment   Assessment Type Discharge Planning Reassessment   Post-Acute Status   Post-Acute Authorization E   [facility] Status Set-up Complete

## 2020-10-27 NOTE — CARE UPDATE
10/27/20 1422   Home Oxygen Qualification   Room Air SpO2 on Exertion (!) 83 %   SpO2 During Exertion on O2 96 %   Heart Rate on O2 82 bpm   Exertion O2 LPM 3 LPM   SpO2 on Recovery 96 %   Recovery Heart Rate 75 bpm   Recovery O2 LPM 3 LPM   Home O2 Eval Comments pt requires 3lpm home oxygen

## 2020-10-27 NOTE — NURSING
Pt up to bedside commode with assist.  Pt sat on side on bed a while til she felt strong enough to get up. Pt tolerated well.

## 2020-10-27 NOTE — PLAN OF CARE
10/27/20 1414   Patient Assessment/Suction   Level of Consciousness (AVPU) alert   Respiratory Effort Normal;Unlabored   All Lung Fields Breath Sounds clear   PRE-TX-O2   O2 Device (Oxygen Therapy) nasal cannula   $ Is the patient on Low Flow Oxygen? Yes   Flow (L/min) 3   SpO2 96 %   Pulse Oximetry Type Intermittent   $ Pulse Oximetry - Multiple Charge Pulse Oximetry - Multiple   Pulse 74   Resp 18   Incentive Spirometer   $ Incentive Spirometer Charges done with encouragement   Administration (IS) instruction provided, initial;mouthpiece   Number of Repetitions (IS) 10   Level Incentive Spirometer (mL) 500   Patient Tolerance (IS) fair   Respiratory Evaluation   $ Care Plan Tech Time 15 min

## 2020-10-28 VITALS
WEIGHT: 152.13 LBS | RESPIRATION RATE: 18 BRPM | OXYGEN SATURATION: 96 % | HEIGHT: 66 IN | BODY MASS INDEX: 24.45 KG/M2 | SYSTOLIC BLOOD PRESSURE: 139 MMHG | TEMPERATURE: 98 F | HEART RATE: 70 BPM | DIASTOLIC BLOOD PRESSURE: 76 MMHG

## 2020-10-28 LAB
ANION GAP SERPL CALC-SCNC: 10 MMOL/L (ref 8–16)
BASOPHILS # BLD AUTO: 0.04 K/UL (ref 0–0.2)
BASOPHILS NFR BLD: 0.5 % (ref 0–1.9)
BUN SERPL-MCNC: 7 MG/DL (ref 6–20)
CALCIUM SERPL-MCNC: 9 MG/DL (ref 8.7–10.5)
CHLORIDE SERPL-SCNC: 94 MMOL/L (ref 95–110)
CO2 SERPL-SCNC: 32 MMOL/L (ref 23–29)
CREAT SERPL-MCNC: 0.5 MG/DL (ref 0.5–1.4)
DIFFERENTIAL METHOD: ABNORMAL
EOSINOPHIL # BLD AUTO: 0.1 K/UL (ref 0–0.5)
EOSINOPHIL NFR BLD: 1 % (ref 0–8)
ERYTHROCYTE [DISTWIDTH] IN BLOOD BY AUTOMATED COUNT: 11.8 % (ref 11.5–14.5)
EST. GFR  (AFRICAN AMERICAN): >60 ML/MIN/1.73 M^2
EST. GFR  (NON AFRICAN AMERICAN): >60 ML/MIN/1.73 M^2
GLUCOSE SERPL-MCNC: 93 MG/DL (ref 70–110)
HCT VFR BLD AUTO: 34.5 % (ref 37–48.5)
HGB BLD-MCNC: 11.5 G/DL (ref 12–16)
IMM GRANULOCYTES # BLD AUTO: 0.02 K/UL (ref 0–0.04)
IMM GRANULOCYTES NFR BLD AUTO: 0.3 % (ref 0–0.5)
LYMPHOCYTES # BLD AUTO: 1.1 K/UL (ref 1–4.8)
LYMPHOCYTES NFR BLD: 14.5 % (ref 18–48)
MAGNESIUM SERPL-MCNC: 2.1 MG/DL (ref 1.6–2.6)
MCH RBC QN AUTO: 31.1 PG (ref 27–31)
MCHC RBC AUTO-ENTMCNC: 33.3 G/DL (ref 32–36)
MCV RBC AUTO: 93 FL (ref 82–98)
MONOCYTES # BLD AUTO: 0.6 K/UL (ref 0.3–1)
MONOCYTES NFR BLD: 7.9 % (ref 4–15)
NEUTROPHILS # BLD AUTO: 5.9 K/UL (ref 1.8–7.7)
NEUTROPHILS NFR BLD: 75.8 % (ref 38–73)
NRBC BLD-RTO: 0 /100 WBC
PLATELET # BLD AUTO: 197 K/UL (ref 150–350)
PMV BLD AUTO: 10.1 FL (ref 9.2–12.9)
POTASSIUM SERPL-SCNC: 3.2 MMOL/L (ref 3.5–5.1)
RBC # BLD AUTO: 3.7 M/UL (ref 4–5.4)
SODIUM SERPL-SCNC: 136 MMOL/L (ref 136–145)
WBC # BLD AUTO: 7.82 K/UL (ref 3.9–12.7)

## 2020-10-28 PROCEDURE — 36415 COLL VENOUS BLD VENIPUNCTURE: CPT

## 2020-10-28 PROCEDURE — 80048 BASIC METABOLIC PNL TOTAL CA: CPT

## 2020-10-28 PROCEDURE — 85025 COMPLETE CBC W/AUTO DIFF WBC: CPT

## 2020-10-28 PROCEDURE — 97530 THERAPEUTIC ACTIVITIES: CPT | Mod: CQ

## 2020-10-28 PROCEDURE — G0378 HOSPITAL OBSERVATION PER HR: HCPCS

## 2020-10-28 PROCEDURE — 83735 ASSAY OF MAGNESIUM: CPT

## 2020-10-28 PROCEDURE — S4991 NICOTINE PATCH NONLEGEND: HCPCS | Performed by: INTERNAL MEDICINE

## 2020-10-28 PROCEDURE — 97116 GAIT TRAINING THERAPY: CPT | Mod: CQ,59

## 2020-10-28 PROCEDURE — 94761 N-INVAS EAR/PLS OXIMETRY MLT: CPT

## 2020-10-28 PROCEDURE — 25000003 PHARM REV CODE 250: Performed by: NURSE PRACTITIONER

## 2020-10-28 PROCEDURE — 25000003 PHARM REV CODE 250: Performed by: INTERNAL MEDICINE

## 2020-10-28 PROCEDURE — 99900035 HC TECH TIME PER 15 MIN (STAT)

## 2020-10-28 PROCEDURE — 27000221 HC OXYGEN, UP TO 24 HOURS

## 2020-10-28 RX ORDER — DOCUSATE SODIUM 100 MG/1
100 CAPSULE, LIQUID FILLED ORAL 2 TIMES DAILY
Qty: 60 CAPSULE | Refills: 0 | Status: SHIPPED | OUTPATIENT
Start: 2020-10-28 | End: 2020-12-16

## 2020-10-28 RX ORDER — OXYCODONE AND ACETAMINOPHEN 5; 325 MG/1; MG/1
1 TABLET ORAL EVERY 6 HOURS PRN
Qty: 15 TABLET | Refills: 0 | Status: SHIPPED | OUTPATIENT
Start: 2020-10-28 | End: 2020-11-04

## 2020-10-28 RX ORDER — CHOLECALCIFEROL (VITAMIN D3) 50 MCG
2000 TABLET ORAL DAILY
Qty: 30 TABLET | Refills: 0 | Status: SHIPPED | OUTPATIENT
Start: 2020-10-29

## 2020-10-28 RX ADMIN — NICOTINE 1 PATCH: 14 PATCH, EXTENDED RELEASE TRANSDERMAL at 09:10

## 2020-10-28 RX ADMIN — OXYCODONE AND ACETAMINOPHEN 1 TABLET: 5; 325 TABLET ORAL at 02:10

## 2020-10-28 RX ADMIN — FLUOXETINE 40 MG: 20 CAPSULE ORAL at 09:10

## 2020-10-28 RX ADMIN — DOCUSATE SODIUM 100 MG: 100 CAPSULE, LIQUID FILLED ORAL at 09:10

## 2020-10-28 NOTE — PT/OT/SLP PROGRESS
Physical Therapy Treatment    Patient Name:  Luann Borjas   MRN:  [id number]    Recommendations:     Discharge Recommendations:  home   Discharge Equipment Recommendations: walker, rolling, shower chair   Barriers to discharge: None    Assessment:     Luann Borjas is a 60 y.o. female admitted with a medical diagnosis of Compression fracture of L2 lumbar vertebra.  She presents with the following impairments/functional limitations:  impaired self care skills, impaired functional mobilty, gait instability, impaired balance, pain, impaired cardiopulmonary response to activity, orthopedic precautions.    Patient presents resting in bed with HOB elevated; agreeable to PT treatment. Reports back pain 3/10 at rest. Patient reports that back pain does not increase with ambulation. TLSO donned while patient sitting EOB prior to OOB mobility. No LOB or SOB noted. Continue with PT and POC.    Rehab Prognosis: Good; patient would benefit from acute skilled PT services to address these deficits and reach maximum level of function.    Recent Surgery: * No surgery found *      Plan:     During this hospitalization, patient to be seen 5 x/week to address the identified rehab impairments via gait training, therapeutic activities, therapeutic exercises and progress toward the following goals:    · Plan of Care Expires:       Subjective     Chief Complaint: back pain  Patient/Family Comments/goals:   Pain/Comfort:  · Pain Rating 1: 3/10  · Location - Side 1: Bilateral  · Location 1: back  · Pain Addressed 1: Reposition, Distraction, Cessation of Activity  · Pain Rating Post-Intervention 1: 3/10      Objective:     Communicated with RN prior to session.  Patient found HOB elevated with telemetry upon PT entry to room.     General Precautions: Standard, fall   Orthopedic Precautions:spinal precautions   Braces: TLSO     Functional Mobility:  · Bed Mobility:     · Supine to Sit: supervision  · Transfers:     · Sit to Stand:  contact  guard assistance with rolling walker  · Bed to Chair: contact guard assistance with  rolling walker  using  Step Transfer  · Toilet Transfer: stand by assistance with  rolling walker  using  Step Transfer  · Gait: 100ft with RW and CGA      AM-PAC 6 CLICK MOBILITY          Therapeutic Activities and Exercises:   bed mobility; sitting EOB for trunk control and midline orientation; sit <> stands; transfer training    Patient left up in chair with all lines intact and call button in reach..    GOALS:   Multidisciplinary Problems     Physical Therapy Goals        Problem: Physical Therapy Goal    Goal Priority Disciplines Outcome Goal Variances Interventions   Physical Therapy Goal     PT, PT/OT Ongoing, Progressing     Description: Goals to be met by: D/C    Patient will increase functional independence with mobility by performin. Supine to sit with Stand-by Assistance  2. Sit to stand transfer with Stand-by Assistance  3. Gait  x 150  feet with Stand-by Assistance using Rolling Walker.                      Time Tracking:     PT Received On: 10/28/20  PT Start Time: 956     PT Stop Time: 1019  PT Total Time (min): 23 min     Billable Minutes: Gait Training 13 and Therapeutic Activity 10    Treatment Type: Treatment  PT/PTA: PTA     PTA Visit Number: 2     Irina Nettles PTA  10/28/2020

## 2020-10-28 NOTE — NURSING
Pt discharged home per md order in NAD, VSS. Discharge instructoins reviewed with pt, verbalized understanding, no concerns noted. Paper script given to pt x1, noted where to  e script. Pt off unit via wheelchair x1 extender, tolerated well.     Julia Marsh  10/28/2020  12:12 PM

## 2020-10-28 NOTE — PROGRESS NOTES
"[facility] Medicine  Progress Note    Patient Name: Luann Borjas  MRN: [id number]  Patient Class: OP- Observation   Admission Date: 10/25/2020  3:44 PM  Attending Physician: Mary Casey MD  Primary Care Provider: Allen Callahan NP  Face-to-Face encounter date: 10/27/2020    Assessment & Plan:   Luann Borjas is a 60 y.o. female with:    Intractable Pain  Due to Acute on Chronic L2 Compression Fxr  Due to Fall while dancing (mechanical fall)  - pain control  - neurosurgery evaluated pt and has signed off  - no surgical intervention needed per their recs  - HOWEVER, they request standing L-spine XR while wearing TLSO brace  - please notify them when scans received   - CM consulted for TLSO brace   - PT/OT    Other chronic conditions:  Home meds as appropriate  Electrolyte derangement:  Trending BMP, Mg; Replacement prn  DVT ppx:  lovenox held for now due to recent fall; SCDs  FULL CODE    Discharge Planning:     PT/OT to evaluate.  Outpt follow-up with neurosurgery in 4 - 6 weeks if pain does not improve.    Subjective:    10/27: pt reports interval relief in pain with TLSO support percocet dose decreased  Noticed to have marked asymptomatic hypoxia , sats low 80s in room air,PE ruled out , pulmonology eval appreciated, home oxy ordered  Mild vit D deficiency noted  D/c planning tomorrow    10/26:  Pt's back pain is not controlled.  Adjusting pain regimen.  Pt also reports nausea; she states this always happens when she takes pain meds.  Scheduling zofran.  No CP, no SOB.    Review of Systems   All systems reviewed and are negative except as noted per above.  Objective:     Physical Exam  BP (!) 167/80   Pulse 84   Temp 98.9 °F (37.2 °C) (Axillary)   Resp 18   Ht 5' 6" (1.676 m)   Wt 69 kg (152 lb 1.9 oz)   SpO2 (!) 94%   BMI 24.55 kg/m²     Gen: alert, responsive   HEENT:  Eyes - no pallor  External ears with no lesions  Nares patent  Mouth - lips chapped  CV: RRR  Lungs: CTA " B/L  Abd: +BS, soft, NT, ND  Back:  LUMBAR SPINE TTP, SACRAL SPINE TTP  Ext: no atrophy or edema  Skin: warm, dry  Neuro: NO FOCAL DEFICITS   Psych: pleasant    Recent Labs   Lab 10/27/20  0540   WBC 9.75   HGB 12.6   HCT 38.6        Recent Labs   Lab 10/27/20  0540   CALCIUM 9.2      K 3.9   CO2 29   CL 98   BUN 9   CREATININE 0.6     No results found for: POCTGLUCOSE   Microbiology Results (last 7 days)     ** No results found for the last 168 hours. **        CTA Chest Non Coronary   Final Result      X-Ray Chest 1 View   Final Result      X-Ray Lumbar Spine 2 Or 3 Views   Final Result      Unchanged lumbosacral alignment and L2 compression fracture.         Electronically signed by: Garrett Sharp MD   Date:    10/26/2020   Time:    17:36      CT Pelvis Without Contrast   Final Result      CT Lumbar Spine Without Contrast   Final Result      X-Ray Sacrum And Coccyx   Final Result      X-Ray Pelvis Routine AP   Final Result      X-Ray Lumbar Spine Complete 5 View   Final Result          All labs, images, and other studies - including those not included in this note -- were reviewed personally by me.    Inpatient medications  Scheduled Meds:   atorvastatin  10 mg Oral Daily    docusate sodium  100 mg Oral Daily    FLUoxetine  40 mg Oral Daily    nicotine  1 patch Transdermal Daily     Continuous Infusions:  PRN Meds:.acetaminophen, ALPRAZolam, amLODIPine, HYDROmorphone, magnesium oxide, magnesium oxide, melatonin, ondansetron, oxyCODONE-acetaminophen, potassium chloride, potassium chloride, potassium chloride, potassium, sodium phosphates, potassium, sodium phosphates, potassium, sodium phosphates, promethazine (PHENERGAN) IVPB, sodium chloride 0.9%    Above encounter included review of the medical records, interviewing and examining the patient face-to-face, discussion with family and other health care providers, ordering and interpreting lab/test results and formulating a plan of care.      Mary Casey MD  [facility] Hospitalist

## 2020-10-28 NOTE — NURSING
Pt medicated for anxiety.  Pt states she never needed oxygen before and plans to quit smoking.  She states she is wearing a nicotine patch.

## 2020-10-28 NOTE — PLAN OF CARE
10/28/20 0840   Patient Assessment/Suction   Level of Consciousness (AVPU) alert   Respiratory Effort Unlabored   PRE-TX-O2   O2 Device (Oxygen Therapy) nasal cannula   $ Is the patient on Low Flow Oxygen? Yes   Flow (L/min) 3  (increased o2 from 3 to 2LPM)   SpO2 96 %  (on 3LPM)   Pulse Oximetry Type Intermittent   $ Pulse Oximetry - Multiple Charge Pulse Oximetry - Multiple   Pulse 70   Resp 18   Respiratory Evaluation   $ Care Plan Tech Time 15 min

## 2020-10-28 NOTE — NURSING
"Pt aware to call for assist to get up. Pt states she feels weak and a little dizzy.  Pt instructed to use bedside commode and move slowly when she is ready to prevent falls.  Pt states, "why did they tell me to drink lots of water after my xray today?"  Pt instructed on importance of water and safety.  Pt voiced understanding.   "

## 2020-10-28 NOTE — PLAN OF CARE
Placed call to Lisa [phone]; trying to reach patient; number given to spouse who called to finalize set up. Patient has been approved for home oxygen. Portable tank will be delivered here to the pt's room and Lisa coordinating concentrator delivery to home with spouse. Update given to staff.      10/28/20 1036   Discharge Reassessment   Assessment Type Discharge Planning Reassessment   Anticipated Discharge Disposition Home   DME Needed Upon Discharge  oxygen   Post-Acute Status   HME Status Set-up Complete

## 2020-10-29 NOTE — PT/OT/SLP DISCHARGE
Occupational Therapy Discharge Summary    Luann Borjas  MRN: [id number]   Principal Problem: Compression fracture of L2 lumbar vertebra      Patient Discharged from acute Occupational Therapy on 10/28/2020.  Please refer to prior OT note dated 10/26/2020 for functional status.    Assessment:      Patient has not met goals.    Objective:     GOALS:   Multidisciplinary Problems     Occupational Therapy Goals     Not on file          Multidisciplinary Problems (Resolved)        Problem: Occupational Therapy Goal    Goal Priority Disciplines Outcome Interventions   Occupational Therapy Goal   (Resolved)     OT, PT/OT Met    Description: Goals to be met by: d/c     Patient will increase functional independence with ADLs by performing:    UE Dressing with Supervision.  LE Dressing with Supervision and Assistive Devices as needed.  Grooming while standing at sink with Supervision.  Toileting from toilet with Supervision for hygiene and clothing management.   Toilet transfer to toilet with Supervision.                     Reasons for Discontinuation of Therapy Services  Patient d/c home.      Plan:     Patient Discharged to: Home no OT services needed    Parish Aly, OT  10/29/2020

## 2020-10-30 ENCOUNTER — TELEPHONE (OUTPATIENT)
Dept: FAMILY MEDICINE | Facility: CLINIC | Age: 60
End: 2020-10-30

## 2020-10-30 DIAGNOSIS — R91.1 PULMONARY NODULE: Primary | ICD-10-CM

## 2020-11-03 ENCOUNTER — OFFICE VISIT (OUTPATIENT)
Dept: FAMILY MEDICINE | Facility: CLINIC | Age: 60
End: 2020-11-03
Payer: OTHER GOVERNMENT

## 2020-11-03 VITALS
OXYGEN SATURATION: 97 % | HEART RATE: 81 BPM | WEIGHT: 150 LBS | BODY MASS INDEX: 24.11 KG/M2 | TEMPERATURE: 97 F | SYSTOLIC BLOOD PRESSURE: 142 MMHG | HEIGHT: 66 IN | DIASTOLIC BLOOD PRESSURE: 90 MMHG

## 2020-11-03 DIAGNOSIS — F17.210 CIGARETTE NICOTINE DEPENDENCE WITHOUT COMPLICATION: Primary | ICD-10-CM

## 2020-11-03 DIAGNOSIS — Z23 NEED FOR INFLUENZA VACCINATION: ICD-10-CM

## 2020-11-03 DIAGNOSIS — S32.020D COMPRESSION FRACTURE OF L2 VERTEBRA WITH ROUTINE HEALING, SUBSEQUENT ENCOUNTER: ICD-10-CM

## 2020-11-03 DIAGNOSIS — E55.9 VITAMIN D DEFICIENCY: ICD-10-CM

## 2020-11-03 DIAGNOSIS — I10 ESSENTIAL HYPERTENSION: ICD-10-CM

## 2020-11-03 DIAGNOSIS — R55 SYNCOPE, UNSPECIFIED SYNCOPE TYPE: ICD-10-CM

## 2020-11-03 DIAGNOSIS — Z09 HOSPITAL DISCHARGE FOLLOW-UP: ICD-10-CM

## 2020-11-03 PROCEDURE — 90686 IIV4 VACC NO PRSV 0.5 ML IM: CPT | Mod: S$GLB,,, | Performed by: NURSE PRACTITIONER

## 2020-11-03 PROCEDURE — 90471 FLU VACCINE (QUAD) GREATER THAN OR EQUAL TO 3YO PRESERVATIVE FREE IM: ICD-10-PCS | Mod: S$GLB,,, | Performed by: NURSE PRACTITIONER

## 2020-11-03 PROCEDURE — 90471 IMMUNIZATION ADMIN: CPT | Mod: S$GLB,,, | Performed by: NURSE PRACTITIONER

## 2020-11-03 PROCEDURE — 99214 PR OFFICE/OUTPT VISIT, EST, LEVL IV, 30-39 MIN: ICD-10-PCS | Mod: 25,S$GLB,, | Performed by: NURSE PRACTITIONER

## 2020-11-03 PROCEDURE — 99214 OFFICE O/P EST MOD 30 MIN: CPT | Mod: 25,S$GLB,, | Performed by: NURSE PRACTITIONER

## 2020-11-03 PROCEDURE — 90686 FLU VACCINE (QUAD) GREATER THAN OR EQUAL TO 3YO PRESERVATIVE FREE IM: ICD-10-PCS | Mod: S$GLB,,, | Performed by: NURSE PRACTITIONER

## 2020-11-03 RX ORDER — AMLODIPINE AND BENAZEPRIL HYDROCHLORIDE 5; 10 MG/1; MG/1
1 CAPSULE ORAL DAILY
Qty: 90 CAPSULE | Refills: 0 | Status: SHIPPED | OUTPATIENT
Start: 2020-11-03 | End: 2020-12-16 | Stop reason: SDUPTHER

## 2020-11-03 NOTE — PROGRESS NOTES
SUBJECTIVE:      Patient ID: Luann Borjas is a 60 y.o. female.    Chief Complaint: Hospital Follow Up (spinal injury)    Mrs Borjas is here today as a hospital f/u for a fall. She was dancing at a wedding and blacked out landing on her buttocks/back. She does not remember the incident. She was drinking alcohol and had taken a xanax before the wedding.  Imaging showed acute on chronic L2 compression fracture. DEXA in 2019 showed osteopenia and she has not been consistent with her calcium/vit D supplements. She was treated with pain medication and was told to f/u with neurosurgery. She is asking for a referral to a local neurosurgeon. Currently not taking medication for back pain, she is wearing the brace she was discharged home with. She was evaluated by Dr Ravi while in the hospital due to hypoxemic episodes. She has not felt sob since her discharge. She is using the oxygen routinely at home. Not using oxygen at her ov today. Her O2 sat in the office today is 97 % at rest and 96% with ambulation. She was told to hold her bp medication at discharge. Her bp is elevated today and she is complaining of a dull headache. She has not had a cigarette since her hospitalization      Past Surgical History:   Procedure Laterality Date    BASAL CELL CARCINOMA EXCISION  09/2019    lip    CATARACT EXTRACTION W/  INTRAOCULAR LENS IMPLANT Right 11/13/2019    Procedure: EXTRACTION, CATARACT, WITH IOL INSERTION;  Surgeon: Carmelo Perez II, MD;  Location: [facility] OR;  Service: Ophthalmology;  Laterality: Right;    ECTOPIC PREGNANCY SURGERY      EYE SURGERY Right     cataracts    HYSTERECTOMY       History reviewed. No pertinent family history.   Social History     Socioeconomic History    Marital status:      Spouse name: Not on file    Number of children: Not on file    Years of education: Not on file    Highest education level: Not on file   Occupational History    Not on file   Social Needs    Financial  resource strain: Not on file    Food insecurity     Worry: Not on file     Inability: Not on file    Transportation needs     Medical: Not on file     Non-medical: Not on file   Tobacco Use    Smoking status: Current Every Day Smoker     Packs/day: 0.50     Years: 12.00     Pack years: 6.00     Types: Cigarettes    Smokeless tobacco: Never Used   Substance and Sexual Activity    Alcohol use: Yes     Alcohol/week: 2.0 standard drinks     Types: 2 Glasses of wine per week    Drug use: No    Sexual activity: Not on file   Lifestyle    Physical activity     Days per week: Not on file     Minutes per session: Not on file    Stress: Not on file   Relationships    Social connections     Talks on phone: Not on file     Gets together: Not on file     Attends Shinto service: Not on file     Active member of club or organization: Not on file     Attends meetings of clubs or organizations: Not on file     Relationship status: Not on file   Other Topics Concern    Not on file   Social History Narrative    Not on file     Current Outpatient Medications   Medication Sig Dispense Refill    alprazolam (XANAX) 0.5 MG tablet Take 0.5 mg by mouth 2 (two) times daily as needed for Anxiety.      amlodipine-benazepril 5-10 mg (LOTREL) 5-10 mg per capsule Take 1 capsule by mouth once daily. 90 capsule 0    atorvastatin (LIPITOR) 10 MG tablet Take 1 tablet (10 mg total) by mouth once daily. 90 tablet 0    cholecalciferol, vitamin D3, (VITAMIN D3) 50 mcg (2,000 unit) Tab Take 1 tablet (2,000 Units total) by mouth once daily. 30 tablet 0    docusate sodium (COLACE) 100 MG capsule Take 1 capsule (100 mg total) by mouth 2 (two) times daily. 60 capsule 0    FLUoxetine (PROZAC) 40 MG capsule Take 40 mg by mouth once daily.       oxyCODONE-acetaminophen (PERCOCET) 5-325 mg per tablet Take 1 tablet by mouth every 6 (six) hours as needed. 15 tablet 0     No current facility-administered medications for this visit.      Review  "of patient's allergies indicates:   Allergen Reactions    Erythromycin Anaphylaxis      Past Medical History:   Diagnosis Date    Anxiety     Arthritis     Cataract of left eye     Hyperlipidemia     Sleep apnea     cpap     Past Surgical History:   Procedure Laterality Date    BASAL CELL CARCINOMA EXCISION  09/2019    lip    CATARACT EXTRACTION W/  INTRAOCULAR LENS IMPLANT Right 11/13/2019    Procedure: EXTRACTION, CATARACT, WITH IOL INSERTION;  Surgeon: Carmelo Perez II, MD;  Location: [facility];  Service: Ophthalmology;  Laterality: Right;    ECTOPIC PREGNANCY SURGERY      EYE SURGERY Right     cataracts    HYSTERECTOMY         Review of Systems   Constitutional: Negative for appetite change, chills, diaphoresis and unexpected weight change.   HENT: Negative for ear discharge, hearing loss, trouble swallowing and voice change.    Eyes: Negative for photophobia and pain.   Respiratory: Negative for chest tightness, shortness of breath and stridor.    Cardiovascular: Negative for chest pain and palpitations.   Gastrointestinal: Negative for abdominal pain, blood in stool and vomiting.   Endocrine: Negative for cold intolerance and heat intolerance.   Genitourinary: Negative for difficulty urinating and flank pain.   Musculoskeletal: Negative for joint swelling and neck stiffness.   Skin: Negative for pallor.   Neurological: Positive for headaches. Negative for dizziness, speech difficulty, weakness, light-headedness and numbness.   Hematological: Does not bruise/bleed easily.   Psychiatric/Behavioral: Negative for confusion and dysphoric mood. The patient is not nervous/anxious.       OBJECTIVE:      Vitals:    11/03/20 1336 11/03/20 1427   BP: (!) 140/80 (!) 142/90   Pulse: 81    Temp: 97.3 °F (36.3 °C)    TempSrc: Skin    SpO2: 97%    Weight: 68 kg (150 lb)    Height: 5' 6" (1.676 m)      Physical Exam  Vitals signs and nursing note reviewed.   Constitutional:       General: She is not in acute " distress.     Appearance: She is well-developed.   HENT:      Head: Normocephalic and atraumatic.      Right Ear: Tympanic membrane normal.      Left Ear: Tympanic membrane normal.      Nose: Nose normal.      Mouth/Throat:      Pharynx: Uvula midline.   Eyes:      General: Lids are normal.      Conjunctiva/sclera: Conjunctivae normal.      Pupils: Pupils are equal, round, and reactive to light.      Right eye: Pupil is round and reactive.      Left eye: Pupil is round and reactive.   Neck:      Musculoskeletal: Normal range of motion and neck supple.      Thyroid: No thyromegaly.      Vascular: No JVD.      Trachea: Trachea normal.   Cardiovascular:      Rate and Rhythm: Normal rate and regular rhythm.      Pulses: Normal pulses.      Heart sounds: Normal heart sounds.   Pulmonary:      Effort: Pulmonary effort is normal. No tachypnea or respiratory distress.      Breath sounds: Normal breath sounds.   Abdominal:      General: Bowel sounds are normal.      Palpations: Abdomen is soft.      Tenderness: There is no abdominal tenderness.   Musculoskeletal: Normal range of motion.      Comments: Back brace in use. No bruising to lumbar or sacral spine   Lymphadenopathy:      Cervical: No cervical adenopathy.   Skin:     General: Skin is warm and dry.      Findings: No rash.   Neurological:      Mental Status: She is alert and oriented to person, place, and time.   Psychiatric:         Attention and Perception: Attention normal.         Mood and Affect: Mood normal.         Speech: Speech normal.         Behavior: Behavior normal. Behavior is cooperative.         Thought Content: Thought content normal.        Assessment:       1. Cigarette nicotine dependence without complication    2. Essential hypertension    3. Compression fracture of L2 vertebra with routine healing, subsequent encounter    4. Syncope, unspecified syncope type    5. Hospital discharge follow-up    6. Need for influenza vaccination    7. Vitamin D  deficiency        Plan:       Cigarette nicotine dependence without complication  -     Ambulatory referral/consult to Smoking Cessation Program; Future; Expected date: 11/10/2020    Essential hypertension  -  restart  amlodipine-benazepril 5-10 mg (LOTREL) 5-10 mg per capsule; Take 1 capsule by mouth once daily.  Dispense: 90 capsule; Refill: 0    Compression fracture of L2 vertebra with routine healing, subsequent encounter  -     Ambulatory referral/consult to Neurosurgery; Future; Expected date: 11/10/2020    Syncope, unspecified syncope type  -     Ambulatory referral/consult to Cardiology; Future; Expected date: 11/10/2020    Hospital discharge follow-up  Hospital records reviewed  Medications reconciled    Need for influenza vaccination  -     Influenza - Quadrivalent (PF)    Vitamin D deficiency  Patient will restart her vitamin D3 supplement 5000 units daily x4 weeks then 2000 units daily      Follow up in about 3 weeks (around 11/24/2020) for htn.      11/3/2020 YULIANA Farooq, FNP

## 2020-11-03 NOTE — PATIENT INSTRUCTIONS
Vertebral Compression Fracture    You have a compression fracture or break in one of the bones in your spine. This kind of break usually happens in older people with thinning of the bones called osteoporosis. It may happen after a ground level fall or even with a very minor force. This can include bending forward, getting up from a seated position, coughing, or sneezing.  It may also occur in young healthy people after a severe trauma, such as a car accident or fall from a height. This is generally a stable break and usually does not cause any injury to the spinal cord or nerves. This injury usually takes 1 to 3 months to heal. It can be treated at home with bed rest and pain medicine.  Prescription or over-the-counter pain medicine can be used to control the pain. Long-term use of pain medicine can increase the risk of side effects. This includes: liver or kidney damage, GI bleeding, constipation, or narcotic dependence. If you have chronic liver or kidney disease, or ever had a stomach ulcer or GI bleeding, talk with your healthcare provider before using these medicines. If pain medicine is needed for more than 1 to 2 weeks, talk with your healthcare provider about other treatment options.  A back brace or abdominal binder may be prescribed to reduce pain by limiting motion at the site of the break. If you have osteoporosis, talk with your healthcare provider about using calcium and vitamin D supplements. You may need prescription medicines to prevent further bone loss. An exercise program to strengthen spine strength is a very important part of the treatment plan. It should begin once the pain is under control.  If you have severe or persistent pain, your healthcare provider may recommend a procedure called a vertebral augmentation. In this procedure, a needle is used to inject a bone cement into the broken vertebra. This will expand it  to its original shape.  Home care  · You may need to stay in  bed for the first few days. But, begin sitting or walking as soon as possible. This will help you avoid problems with prolonged bed rest such as: muscle weakness, worsening back stiffness and pain, and blood clots in the legs.  · When in bed, try to find a comfortable position. A firm mattress is best. Try lying flat on your back with pillows under your knees. You can also try lying on your side with your knees bent up towards your chest and a pillow between your knees.  · Avoid sitting for long periods of time. This puts more stress on the lower back than standing or walking.  · Apply an ice pack over the injured area for 15 to 20 minutes every 3 to 6 hours. You should do this for the first 24 to 48 hours. You can make an ice pack by filling a plastic bag that seals at the top with ice cubes and then wrapping it with a thin towel. You can start with ice, then switch to heat after two days. Apply heat (warm shower or warm bath) for 15 to 20 minutes several times a day for muscle spasms. Some patients feel best alternating ice and heat treatments. Use the one method that feels the best to you. Be careful not to injure your skin with the ice or heat treatments. Ice should never be applied directly to skin. Warm rather than hot heat should be used to protect skin areas that have decreased sensation.  · Take pain medicine as directed. Call your healthcare provider if your pain is not well-controlled. A dose change, stronger medicine, or other treatment options may be needed.  · Be aware of safe lifting methods and do not lift anything over 10 pounds until all the pain is gone.  Follow-up care  Follow up with your healthcare provider, or as advised. If X-rays were taken, you will be told of any new findings that may affect your care.  Call 911  Call 911 if you have:  · Weakness or numbness in one or both legs  · Loss of control over bowels or bladder  · Numbness in the groin area  When to seek medical advice  Call your  healthcare provider right away if the pain gets worse or spreads to your arms or legs.  Date Last Reviewed: 11/23/2015  © 2654-7770 [employer]. [address] [place] [address], [place] [address]. All rights reserved. This information is not intended as a substitute for professional medical care. Always follow your healthcare professional's instructions.

## 2020-11-05 ENCOUNTER — CLINICAL SUPPORT (OUTPATIENT)
Dept: SMOKING CESSATION | Facility: CLINIC | Age: 60
End: 2020-11-05
Payer: COMMERCIAL

## 2020-11-05 ENCOUNTER — OFFICE VISIT (OUTPATIENT)
Dept: PULMONOLOGY | Facility: CLINIC | Age: 60
End: 2020-11-05
Payer: OTHER GOVERNMENT

## 2020-11-05 VITALS
WEIGHT: 149 LBS | SYSTOLIC BLOOD PRESSURE: 113 MMHG | HEIGHT: 66 IN | DIASTOLIC BLOOD PRESSURE: 66 MMHG | HEART RATE: 85 BPM | TEMPERATURE: 98 F | BODY MASS INDEX: 23.95 KG/M2 | OXYGEN SATURATION: 98 %

## 2020-11-05 DIAGNOSIS — G47.33 OSA (OBSTRUCTIVE SLEEP APNEA): ICD-10-CM

## 2020-11-05 DIAGNOSIS — R09.02 HYPOXEMIA: ICD-10-CM

## 2020-11-05 DIAGNOSIS — F17.200 NICOTINE DEPENDENCE: Primary | ICD-10-CM

## 2020-11-05 DIAGNOSIS — J43.2 CENTRILOBULAR EMPHYSEMA: Primary | ICD-10-CM

## 2020-11-05 PROCEDURE — 99404 PREV MED CNSL INDIV APPRX 60: CPT | Mod: S$GLB,,,

## 2020-11-05 PROCEDURE — 99404 PR PREVENT COUNSEL,INDIV,60 MIN: ICD-10-PCS | Mod: S$GLB,,,

## 2020-11-05 PROCEDURE — 99214 PR OFFICE/OUTPT VISIT, EST, LEVL IV, 30-39 MIN: ICD-10-PCS | Mod: S$GLB,,, | Performed by: INTERNAL MEDICINE

## 2020-11-05 PROCEDURE — 99214 OFFICE O/P EST MOD 30 MIN: CPT | Mod: S$GLB,,, | Performed by: INTERNAL MEDICINE

## 2020-11-05 RX ORDER — IBUPROFEN 200 MG
1 TABLET ORAL
COMMUNITY
End: 2020-11-23 | Stop reason: DRUGHIGH

## 2020-11-05 NOTE — DISCHARGE SUMMARY
[facility] Medicine  Discharge Summary      Patient Name: Luann Borjas  MRN: [id number]  Admission Date: 10/25/2020  Hospital Length of Stay: 0 days  Discharge Date and Time:  11/05/2020 11:31 AM  Attending Physician: Tasia att. providers found   Discharging Provider: Mary Casey MD  Primary Care Provider: Allen Callahan NP      HPI:   60-year-old female with history of hypertension, hyperlipidemia, anxiety, sleep apnea presented to the ED after a fall, while dancing at a wedding, landing on her buttock earlier today  Reports severe persistent low back pain with difficulty ambulating since the fall  States that she has been in her usual state state health prior to the incident  Denies fever, chills, shortness of breath, abdominal pain, urinary symptoms  Smoker with chronic cough  Imaging showed acute on chronic L2 compression fracture  Still have significant pain despite Percocet, morphine IV, Dilaudid IV, Valium p.o. in the ED    * No surgery found *      Hospital Course:   Luann Borjas is a 60 y.o. female  admitted to [facility] for  Intractable Pain  Due to Acute on Chronic L2 Compression Fxr  Due to Fall while dancing (mechanical fall)  - neurosurgery evaluated pt and has signed off with outpatient follow-up  - no surgical intervention needed per their recs  - HOWEVER, they request standing L-spine XR while wearing TLSO brace, which was obtained and no abnormal alignment noted  - CM consulted for TLSO brace , provided on discharge with pain medication  -pain control improved with the brace  - PT/OT eval appreciated, DME supplies provided, rolling walker and shower chair  -low vit D level noted started on so p.o. supplement d3  -noted to be hypoxic on room air in low 80s, PE ruled out with CT angio, pulmonology consult appreciated  Home oxygen provided on discharge, advised to quit smoking and outpatient follow-up for COPD and lung nodule with pulmonologist Dr. Trav Nolen  provided to follow up with primary care physician as outpatient. Patient verbalized understanding and is aware to contact primary care physician or return to ED if new or worsening symptoms.    Physical exam on the day of discharge:  General: Patient resting comfortably in no acute distress.  Lungs: CTA. Good air entry.  Cor: Regular rate and rhythm. No murmurs. No pedal edema.  Abd: Soft. Nontender. Non-distended.  Neuro: A&O x3. Moving all 4 extremities equally  Ext: No clubbing. No cyanosis.  walking on the hallway with lumbar brace comfortably with the support of Rollator          Consults:   Consults (From admission, onward)        Status Ordering Provider     Case Management/  Once     Provider:  (Not yet assigned)    Completed IVANNA MISHRA     Inpatient consult to Neurosurgery  Once     Provider:  Cristina Hobbs MD    Completed CRISELDA OLSEN     Inpatient consult to Pulmonology  Once     Provider:  Rekha Ravi MD    Completed LELIA THAKUR new Assessment & Plan notes have been filed under this hospital service since the last note was generated.  Service: Hospital Medicine    Final Active Diagnoses:    Diagnosis Date Noted POA    PRINCIPAL PROBLEM:  Compression fracture of L2 lumbar vertebra, acute on chronic [S32.020A] 10/25/2020 Yes    Hypoxemia [R09.02]  Unknown    Hypertension [I10] 10/25/2020 Yes    Anxiety [F41.9] 12/19/2017 Yes      Problems Resolved During this Admission:       Discharged Condition: stable    Disposition: Home or Self Care    Follow Up:   Contact information for follow-up providers     Rekha Ravi MD In 1 week.    Specialties: Pulmonary Disease, Sleep Medicine  Contact information:  [address] [facility]  [address]  [facility] [address] [phone]             Cristina Hobbs MD In 1 week.    Specialty: Neurosurgery  Contact information:  [address] [facility] [id number] [phone]                   Contact information for  "after-discharge care     Durable Medical Equipment     Personal .    Service: Durable Medical Equipment  Contact information:  [facility] [id number] [phone]           [facility]   .    Service: Durable Medical Equipment  Contact information:  [place]  [phone]                           Patient Instructions:      WALKER FOR HOME USE     Order Specific Question Answer Comments   Type of Walker: Adult (5'4"-6'6")    With wheels? Yes 2   Height: 5' 6" (1.676 m)    Weight: 69 kg (152 lb 1.9 oz)    Length of need (1-99 months): 99    Does patient have medical equipment at home? none    Please check all that apply: Patient's condition impairs ambulation.    Please check all that apply: Patient is unable to safely ambulate without equipment.    Please check all that apply: Patient needs help to get in and out of chair.      COMMODE FOR HOME USE     Order Specific Question Answer Comments   Type: Standard 3 in 1   Height: 5' 6" (1.676 m)    Weight: 69 kg (152 lb 1.9 oz)    Does patient have medical equipment at home? none    Length of need (1-99 months): 99      OXYGEN FOR HOME USE     Order Specific Question Answer Comments   Liter Flow 3    Duration Continuous    Qualifying SpO2: 80    Testing done at: Rest    Route nasal cannula    Device home concentrator with portable unit    Length of need (in months): 99 mos    Patient condition with qualifying saturation other chronic pulmonary condition    Height: 5' 6" (1.676 m)    Weight: 69 kg (152 lb 1.9 oz)    Does patient have medical equipment at home? none    Alternative treatment measures have been tried or considered and deemed clinically ineffective. Yes      Diet Cardiac     Notify your health care provider if you experience any of the following:  temperature >100.4     Notify your health care provider if you experience any of the following:  persistent nausea and vomiting or diarrhea     Notify " your health care provider if you experience any of the following:  persistent dizziness, light-headedness, or visual disturbances     Activity as tolerated       Significant Diagnostic Studies: Labs: BMP: No results for input(s): GLU, NA, K, CL, CO2, BUN, CREATININE, CALCIUM, MG in the last 48 hours. and CBC No results for input(s): WBC, HGB, HCT, PLT in the last 48 hours.    Pending Diagnostic Studies:     None         Medications:  Reconciled Home Medications:      Medication List      START taking these medications    cholecalciferol (vitamin D3) 50 mcg (2,000 unit) Tab  Commonly known as: VITAMIN D3  Take 1 tablet (2,000 Units total) by mouth once daily.     docusate sodium 100 MG capsule  Commonly known as: COLACE  Take 1 capsule (100 mg total) by mouth 2 (two) times daily.        CONTINUE taking these medications    ALPRAZolam 0.5 MG tablet  Commonly known as: XANAX  Take 0.5 mg by mouth 2 (two) times daily as needed for Anxiety.     atorvastatin 10 MG tablet  Commonly known as: LIPITOR  Take 1 tablet (10 mg total) by mouth once daily.     PROzac 40 MG capsule  Generic drug: FLUoxetine  Take 40 mg by mouth once daily.        ASK your doctor about these medications    oxyCODONE-acetaminophen 5-325 mg per tablet  Commonly known as: PERCOCET  Take 1 tablet by mouth every 6 (six) hours as needed.  Ask about: Should I take this medication?            Indwelling Lines/Drains at time of discharge:   Lines/Drains/Airways     None                 Time spent on the discharge of patient: 33 minutes  Patient was seen and examined on the date of discharge and determined to be suitable for discharge.         Mary Casey MD  Department of Hospital Medicine  [facility]

## 2020-11-05 NOTE — PROGRESS NOTES
SUBJECTIVE:    Patient ID: Luann Borjas is a 60 y.o. female.    Chief Complaint: Hospital Follow Up    HPI The patient is here with sats of 98% on room air.  She has quit smoking.  She has her oxygen with her but is not using it.  She does have a CT in early October that showed a 5 mm nodule in the LLL.  It was not seen in the CTA done on 10/27 secondary to motion artifact. Has CARO but not using CPAP secondary to having a puppy chew up everything.  DME [employer].  Past Medical History:   Diagnosis Date    Anxiety     Arthritis     Cataract of left eye     Hyperlipidemia     Sleep apnea     cpap     Past Surgical History:   Procedure Laterality Date    BASAL CELL CARCINOMA EXCISION  09/2019    lip    CATARACT EXTRACTION W/  INTRAOCULAR LENS IMPLANT Right 11/13/2019    Procedure: EXTRACTION, CATARACT, WITH IOL INSERTION;  Surgeon: Carmelo Perez II, MD;  [facility];  Service: Ophthalmology;  Laterality: Right;    ECTOPIC PREGNANCY SURGERY      EYE SURGERY Right     cataracts    HYSTERECTOMY       No family history on file.     Social History:   Marital Status:   Occupation: Data Unavailable  Alcohol History:  reports current alcohol use of about 2.0 standard drinks of alcohol per week.  Tobacco History:  reports that she quit smoking 11 days ago. Her smoking use included cigarettes. She started smoking about 32 years ago. She smoked 0.50 packs per day. She has never used smokeless tobacco.  Drug History:  reports no history of drug use.    Review of patient's allergies indicates:   Allergen Reactions    Erythromycin Anaphylaxis       Current Outpatient Medications   Medication Sig Dispense Refill    alprazolam (XANAX) 0.5 MG tablet Take 0.5 mg by mouth 2 (two) times daily as needed for Anxiety.      amlodipine-benazepril 5-10 mg (LOTREL) 5-10 mg per capsule Take 1 capsule by mouth once daily. 90 capsule 0    atorvastatin (LIPITOR) 10 MG tablet Take 1 tablet (10 mg total) by mouth once  "daily. 90 tablet 0    cholecalciferol, vitamin D3, (VITAMIN D3) 50 mcg (2,000 unit) Tab Take 1 tablet (2,000 Units total) by mouth once daily. 30 tablet 0    FLUoxetine (PROZAC) 40 MG capsule Take 40 mg by mouth once daily.       docusate sodium (COLACE) 100 MG capsule Take 1 capsule (100 mg total) by mouth 2 (two) times daily. (Patient not taking: Reported on 11/5/2020) 60 capsule 0     No current facility-administered medications for this visit.        Alpha-1 Antitrypsin:  Last PFT:   Last CT:10/27/20 5 mm nodule seen on previous CT earlier not seen on this one, but too much motion    Review of Systems  General: Feeling much better.  Eyes: Vision is good.  ENT:  No sinusitis or pharyngitis.   Heart:: No chest pain or palpitations.  Lungs: No cough, sputum, or wheezing.  GI: No Nausea, vomiting, constipation, diarrhea, or reflux.  : Nocturia.  Musculoskeletal: No joint pain or myalgias. Wearing a brace for her L2 fracture.  Skin: No lesions or rashes.  Neuro: No headaches or neuropathy.  Lymph: No edema or adenopathy.  Psych: No anxiety or depression.  Endo: 5 pounds down    OBJECTIVE:      /66 (BP Location: Left arm, Patient Position: Sitting, BP Method: Medium (Manual))   Pulse 85   Temp 97.5 °F (36.4 °C)   Ht 5' 6" (1.676 m)   Wt 67.6 kg (149 lb)   SpO2 98%   BMI 24.05 kg/m²     Physical Exam  GENERAL: Midaged patient in no distress.  HEENT: Pupils equal and reactive. Extraocular movements intact. Nose intact.      Pharynx moist.  NECK: Supple.   HEART: Regular rate and rhythm. No murmur or gallop auscultated.  LUNGS: Clear to auscultation and percussion. Lung excursion symmetrical. No change in fremitus. No adventitial noises.  ABDOMEN: Bowel sounds present. Non-tender, no masses palpated.  EXTREMITIES: Normal muscle tone and joint movement, no cyanosis or clubbing.   LYMPHATICS: No adenopathy palpated, no edema.  SKIN: Dry, intact, no lesions.   NEURO: Cranial nerves II-XII intact. Motor " strength 5/5 bilaterally, upper and lower extremities.  PSYCH: Appropriate affect.    Assessment:       1. Centrilobular emphysema    2. CARO (obstructive sleep apnea)    3. Hypoxemia          Plan:       Centrilobular emphysema  -     Complete PFT with bronchodilator; Future  -     Stress test, pulmonary; Future; Expected date: 11/05/2020    CARO (obstructive sleep apnea)  -     Home Sleep Studies; Future    Hypoxemia         Follow up in about 6 weeks (around 12/17/2020).  PSG  PFT's  6 minute walk  Ok to return to work  Buy a pulse ox, sats should be 90% or better  Sleep with your oxygen for now

## 2020-11-05 NOTE — Clinical Note
Patient states that she is quitting tobacco use after a recent hospital admission. She was started on a nioctine patch and has already quit tobacco use on 10/25/2020. She will continue to follow up in the clinic for additional support and encouragement. Her PCP has prescribed 14 mg nicotine patch QD, which she has tolerated well thus far without any negative side effects.

## 2020-11-05 NOTE — LETTER
November 5, 2020      Allen Callahan NP  [address]  [place] [address]           [facility] - Pulmonology  [address]  [place] [address]  Phone: [phone]  Fax: [phone]          Patient: Luann Borjas   MR Number: [id number]   YOB: 1960   Date of Visit: 11/5/2020       Dear Allen Callahan:    Thank you for referring Luann Borjas to me for evaluation. Attached you will find relevant portions of my assessment and plan of care.    If you have questions, please do not hesitate to call me. I look forward to following Luann Borjas along with you.    Sincerely,    Rekha Ravi MD    Enclosure  CC:  No Recipients    If you would like to receive this communication electronically, please contact [email or web address] or [phone] to request more information on [facility] access.    For providers and/or their staff who would like to refer a patient to [facility], please contact us through our one-stop-shop provider referral line, [facility], at [phone].    If you feel you have received this communication in error or would no longer like to receive these types of communications, please e-mail [email or web address]

## 2020-11-05 NOTE — HOSPITAL COURSE
Luann Borjas is a 60 y.o. female  admitted to [facility] for  Intractable Pain  Due to Acute on Chronic L2 Compression Fxr  Due to Fall while dancing (mechanical fall)  - neurosurgery evaluated pt and has signed off with outpatient follow-up  - no surgical intervention needed per their recs  - HOWEVER, they request standing L-spine XR while wearing TLSO brace, which was obtained and no abnormal alignment noted  - CM consulted for TLSO brace , provided on discharge with pain medication  -pain control improved with the brace  - PT/OT eval appreciated, DME supplies provided, rolling walker and shower chair  -low vit D level noted started on so p.o. supplement d3  -noted to be hypoxic on room air in low 80s, PE ruled out with CT angio, pulmonology consult appreciated  Home oxygen provided on discharge, advised to quit smoking and outpatient follow-up for COPD and lung nodule with pulmonologist Dr. Ravi    Instructions provided to follow up with primary care physician as outpatient. Patient verbalized understanding and is aware to contact primary care physician or return to ED if new or worsening symptoms.    Physical exam on the day of discharge:  General: Patient resting comfortably in no acute distress.  Lungs: CTA. Good air entry.  Cor: Regular rate and rhythm. No murmurs. No pedal edema.  Abd: Soft. Nontender. Non-distended.  Neuro: A&O x3. Moving all 4 extremities equally  Ext: No clubbing. No cyanosis.  walking on the hallway with lumbar brace comfortably with the support of Rollator

## 2020-11-09 ENCOUNTER — TELEPHONE (OUTPATIENT)
Dept: CARDIOLOGY | Facility: CLINIC | Age: 60
End: 2020-11-09

## 2020-11-10 ENCOUNTER — TELEPHONE (OUTPATIENT)
Dept: CARDIOLOGY | Facility: CLINIC | Age: 60
End: 2020-11-10

## 2020-11-11 ENCOUNTER — PROCEDURE VISIT (OUTPATIENT)
Dept: SLEEP MEDICINE | Facility: HOSPITAL | Age: 60
End: 2020-11-11
Attending: INTERNAL MEDICINE
Payer: OTHER GOVERNMENT

## 2020-11-11 DIAGNOSIS — G47.33 OSA (OBSTRUCTIVE SLEEP APNEA): ICD-10-CM

## 2020-11-11 PROCEDURE — 95806 SLEEP STUDY UNATT&RESP EFFT: CPT

## 2020-11-13 DIAGNOSIS — M54.12 CERVICAL RADICULITIS: ICD-10-CM

## 2020-11-13 DIAGNOSIS — M54.14 RADICULITIS, THORACIC: Primary | ICD-10-CM

## 2020-11-13 DIAGNOSIS — M54.16 LUMBAR RADICULOPATHY: ICD-10-CM

## 2020-11-15 ENCOUNTER — TELEPHONE (OUTPATIENT)
Dept: PULMONOLOGY | Facility: HOSPITAL | Age: 60
End: 2020-11-15

## 2020-11-15 DIAGNOSIS — G47.33 OSA (OBSTRUCTIVE SLEEP APNEA): Primary | ICD-10-CM

## 2020-11-16 ENCOUNTER — HOSPITAL ENCOUNTER (OUTPATIENT)
Dept: PULMONOLOGY | Facility: HOSPITAL | Age: 60
Discharge: HOME | End: 2020-11-16
Attending: INTERNAL MEDICINE
Payer: OTHER GOVERNMENT

## 2020-11-16 VITALS — HEIGHT: 66 IN | BODY MASS INDEX: 23.3 KG/M2 | WEIGHT: 145 LBS

## 2020-11-16 DIAGNOSIS — J43.2 CENTRILOBULAR EMPHYSEMA: ICD-10-CM

## 2020-11-16 PROCEDURE — 94729 DIFFUSING CAPACITY: CPT

## 2020-11-16 PROCEDURE — 94060 EVALUATION OF WHEEZING: CPT

## 2020-11-16 PROCEDURE — 94010 BREATHING CAPACITY TEST: CPT

## 2020-11-16 PROCEDURE — 94727 GAS DIL/WSHOT DETER LNG VOL: CPT

## 2020-11-16 NOTE — CARE UPDATE
"   11/16/20 0742   6MW Test   Ordering Provider Dr. Rekha Ravi   Diagnosis Shortness of Breath   Height 5' 6" (1.676 m)   Weight 65.8 kg (145 lb)   BMI (Calculated) 23.4   Predicted Distance 388.01   Patient Race    6MWT Status completed without stopping   Patient Reported No complaints   Was O2 used? No   6MW Distance walked (feet) 1600 feet   Distance walked (meters) 487.68 meters   Did patient stop? No   Type of assistive device(s) used? no assistive devices   Is extra documentation required for this patient? Yes   Pre-Exercise   Oxygen Saturation 96 %   Supplemental Oxygen Room Air   Heart Rate 73 bpm   Kane Dyspnea Rating  nothing at all   Post Exercise   Oxygen Saturation 95 %   Supplemental Oxygen Room Air   Heart Rate 108 bpm   Kane Dyspnea Rating  light   Recovery   Oxygen Saturation 97 %   Supplemental Oxygen Room Air   Heart Rate 83 bpm   Kane Dyspnea Rating  very, very light (just noticeable)   Interpretation   Is procedure ready for interpretation? Yes   Did the patient stop or pause? No   Total Laps Walked 8   Final Partial Lap Distance (feet) 0 feet   Total Distance Feet (Calculated) 1600 feet   Total Distance Meters (Calculated) 487.68 meters   Predicted Distance Meters (Calculated) 529 meters   Percentage of Predicted (Calculated) 92.19   Peak VO2 (Calculated) 18.61   Mets 5.32   Comments This is a Non-Hypoxemic 6 min. walk. Patient did not qualify for Home Oxygen     "

## 2020-11-17 ENCOUNTER — TELEPHONE (OUTPATIENT)
Dept: PULMONOLOGY | Facility: CLINIC | Age: 60
End: 2020-11-17

## 2020-11-17 ENCOUNTER — TELEPHONE (OUTPATIENT)
Dept: PULMONOLOGY | Facility: HOSPITAL | Age: 60
End: 2020-11-17

## 2020-11-17 DIAGNOSIS — J43.2 CENTRILOBULAR EMPHYSEMA: Primary | ICD-10-CM

## 2020-11-17 RX ORDER — UMECLIDINIUM BROMIDE AND VILANTEROL TRIFENATATE 62.5; 25 UG/1; UG/1
1 POWDER RESPIRATORY (INHALATION) DAILY
Qty: 1 EACH | Refills: 11 | Status: SHIPPED | OUTPATIENT
Start: 2020-11-17 | End: 2021-01-25 | Stop reason: SDUPTHER

## 2020-11-17 NOTE — TELEPHONE ENCOUNTER
PFT shows moderate obstruction with no change to bronchodilator, no restriction, no diffusion defect.  6 minute walk non hypoxemic

## 2020-11-17 NOTE — TELEPHONE ENCOUNTER
The patient's home sleep study shows an RDI that is moderate in intensity.  A CPAP titration study has been ordered.  Her PFT show moderate obstruction with no lung volume issues or diffusion defect.  Her 6 minutes walk was non hypoxemic.  She will continue sleeping on her oxygen until we have her CPAP set up for her.  Explained all of this to her.  Will also call and Anoro for her to get started on and she can come by and have a demonstration on its proper use.

## 2020-11-18 ENCOUNTER — PATIENT MESSAGE (OUTPATIENT)
Dept: FAMILY MEDICINE | Facility: CLINIC | Age: 60
End: 2020-11-18

## 2020-11-19 ENCOUNTER — CLINICAL SUPPORT (OUTPATIENT)
Dept: SMOKING CESSATION | Facility: CLINIC | Age: 60
End: 2020-11-19
Payer: COMMERCIAL

## 2020-11-19 DIAGNOSIS — F17.200 NICOTINE DEPENDENCE: Primary | ICD-10-CM

## 2020-11-19 PROCEDURE — 99402 PREV MED CNSL INDIV APPRX 30: CPT | Mod: S$GLB,,,

## 2020-11-19 PROCEDURE — 99402 PR PREVENT COUNSEL,INDIV,30 MIN: ICD-10-PCS | Mod: S$GLB,,,

## 2020-11-19 NOTE — Clinical Note
Patient remains tobacco free at this time. She states that she is doing well overall. She has noticed she is not as winded doing activities at work like she had been in the past. She has weaned down to a 7 mg nicotine patch QD, tolerating this well without any negative side effects at this time. We discussed and reviewed: management of withdrawal symptoms and urges, triggers, physiologic changes that may occur after quitting, caffeine intake, and stress management with relaxation breathing. I commended patient on her sustainability. The patient will continue to come to the clinic for additional support and encouragement.

## 2020-11-19 NOTE — PROGRESS NOTES
Individual Follow-Up Form    11/19/2020    Quit Date: 10/25/2020    Clinical Status of Patient: Outpatient via telephone     Length of Service: 30 minutes    Continuing Medication: yes  Patches    Other Medications:      Target Symptoms: Withdrawal and medication side effects. The following were  rated moderate (3) to severe (4) on TCRS:  · Moderate (3): None   · Severe (4): None     Comments: Patient remains tobacco free at this time. She states that she is doing well overall. She has noticed she is not as winded doing activities at work like she had been in the past. She has weaned down to a 7 mg nicotine patch QD, tolerating this well without any negative side effects at this time. We discussed and reviewed: management of withdrawal symptoms and urges, triggers, physiologic changes that may occur after quitting, caffeine intake, and stress management with relaxation breathing. I commended patient on her sustainability. The patient will continue to come to the clinic for additional support and encouragement.     Diagnosis: F17.200    Next Visit: 2 weeks

## 2020-11-23 ENCOUNTER — CLINICAL SUPPORT (OUTPATIENT)
Dept: SMOKING CESSATION | Facility: CLINIC | Age: 60
End: 2020-11-23
Payer: COMMERCIAL

## 2020-11-23 DIAGNOSIS — F17.200 NICOTINE DEPENDENCE: Primary | ICD-10-CM

## 2020-11-23 PROCEDURE — 99407 BEHAV CHNG SMOKING > 10 MIN: CPT | Mod: S$GLB,,,

## 2020-11-23 PROCEDURE — 99999 PR PBB SHADOW E&M-EST. PATIENT-LVL I: ICD-10-PCS | Mod: PBBFAC,,,

## 2020-11-23 PROCEDURE — 99999 PR PBB SHADOW E&M-EST. PATIENT-LVL I: CPT | Mod: PBBFAC,,,

## 2020-11-23 PROCEDURE — 99407 PR TOBACCO USE CESSATION INTENSIVE >10 MINUTES: ICD-10-PCS | Mod: S$GLB,,,

## 2020-11-23 RX ORDER — NICOTINE 7MG/24HR
1 PATCH, TRANSDERMAL 24 HOURS TRANSDERMAL DAILY
Qty: 28 PATCH | Refills: 0 | Status: SHIPPED | OUTPATIENT
Start: 2020-11-23 | End: 2021-01-11

## 2020-11-25 ENCOUNTER — HOSPITAL ENCOUNTER (OUTPATIENT)
Dept: RADIOLOGY | Facility: HOSPITAL | Age: 60
Discharge: HOME | End: 2020-11-25
Attending: NEUROLOGICAL SURGERY
Payer: OTHER GOVERNMENT

## 2020-11-25 DIAGNOSIS — M54.16 LUMBAR RADICULOPATHY: ICD-10-CM

## 2020-11-25 DIAGNOSIS — M54.14 RADICULITIS, THORACIC: ICD-10-CM

## 2020-11-25 DIAGNOSIS — M54.12 CERVICAL RADICULITIS: ICD-10-CM

## 2020-11-25 PROCEDURE — 72020 X-RAY EXAM OF SPINE 1 VIEW: CPT | Mod: TC,PO

## 2020-11-25 PROCEDURE — 72146 MRI CHEST SPINE W/O DYE: CPT | Mod: TC,PO

## 2020-11-25 PROCEDURE — 72141 MRI NECK SPINE W/O DYE: CPT | Mod: TC,PO

## 2020-12-03 ENCOUNTER — HOSPITAL ENCOUNTER (OUTPATIENT)
Dept: PREADMISSION TESTING | Facility: HOSPITAL | Age: 60
Discharge: HOME | End: 2020-12-03
Attending: INTERNAL MEDICINE
Payer: OTHER GOVERNMENT

## 2020-12-03 DIAGNOSIS — G47.33 OSA (OBSTRUCTIVE SLEEP APNEA): ICD-10-CM

## 2020-12-03 PROCEDURE — U0003 INFECTIOUS AGENT DETECTION BY NUCLEIC ACID (DNA OR RNA); SEVERE ACUTE RESPIRATORY SYNDROME CORONAVIRUS 2 (SARS-COV-2) (CORONAVIRUS DISEASE [COVID-19]), AMPLIFIED PROBE TECHNIQUE, MAKING USE OF HIGH THROUGHPUT TECHNOLOGIES AS DESCRIBED BY CMS-2020-01-R: HCPCS

## 2020-12-04 LAB — SARS-COV-2 RNA RESP QL NAA+PROBE: NOT DETECTED

## 2020-12-06 ENCOUNTER — PROCEDURE VISIT (OUTPATIENT)
Dept: SLEEP MEDICINE | Facility: HOSPITAL | Age: 60
End: 2020-12-06
Attending: INTERNAL MEDICINE
Payer: OTHER GOVERNMENT

## 2020-12-06 DIAGNOSIS — G47.33 OSA (OBSTRUCTIVE SLEEP APNEA): ICD-10-CM

## 2020-12-06 PROCEDURE — 95811 POLYSOM 6/>YRS CPAP 4/> PARM: CPT

## 2020-12-08 ENCOUNTER — TELEPHONE (OUTPATIENT)
Dept: PULMONOLOGY | Facility: HOSPITAL | Age: 60
End: 2020-12-08

## 2020-12-08 DIAGNOSIS — G47.33 OSA (OBSTRUCTIVE SLEEP APNEA): Primary | ICD-10-CM

## 2020-12-10 ENCOUNTER — CLINICAL SUPPORT (OUTPATIENT)
Dept: SMOKING CESSATION | Facility: CLINIC | Age: 60
End: 2020-12-10
Payer: COMMERCIAL

## 2020-12-10 DIAGNOSIS — F17.200 NICOTINE DEPENDENCE: Primary | ICD-10-CM

## 2020-12-10 PROCEDURE — 99999 PR PBB SHADOW E&M-EST. PATIENT-LVL I: CPT | Mod: PBBFAC,,,

## 2020-12-10 PROCEDURE — 99999 PR PBB SHADOW E&M-EST. PATIENT-LVL I: ICD-10-PCS | Mod: PBBFAC,,,

## 2020-12-10 PROCEDURE — 99406 BEHAV CHNG SMOKING 3-10 MIN: CPT | Mod: S$GLB,,,

## 2020-12-10 PROCEDURE — 99406 PR TOBACCO USE CESSATION INTERMEDIATE 3-10 MINUTES: ICD-10-PCS | Mod: S$GLB,,,

## 2020-12-16 ENCOUNTER — OFFICE VISIT (OUTPATIENT)
Dept: FAMILY MEDICINE | Facility: CLINIC | Age: 60
End: 2020-12-16
Payer: OTHER GOVERNMENT

## 2020-12-16 VITALS
WEIGHT: 156 LBS | DIASTOLIC BLOOD PRESSURE: 82 MMHG | TEMPERATURE: 98 F | OXYGEN SATURATION: 96 % | SYSTOLIC BLOOD PRESSURE: 126 MMHG | HEIGHT: 66 IN | BODY MASS INDEX: 25.07 KG/M2 | HEART RATE: 68 BPM

## 2020-12-16 DIAGNOSIS — E78.00 PURE HYPERCHOLESTEROLEMIA: ICD-10-CM

## 2020-12-16 DIAGNOSIS — I10 ESSENTIAL HYPERTENSION: Primary | ICD-10-CM

## 2020-12-16 PROCEDURE — 99213 PR OFFICE/OUTPT VISIT, EST, LEVL III, 20-29 MIN: ICD-10-PCS | Mod: S$GLB,,, | Performed by: NURSE PRACTITIONER

## 2020-12-16 PROCEDURE — 99213 OFFICE O/P EST LOW 20 MIN: CPT | Mod: S$GLB,,, | Performed by: NURSE PRACTITIONER

## 2020-12-16 RX ORDER — ATORVASTATIN CALCIUM 10 MG/1
10 TABLET, FILM COATED ORAL DAILY
Qty: 90 TABLET | Refills: 1 | Status: SHIPPED | OUTPATIENT
Start: 2020-12-16 | End: 2021-06-01 | Stop reason: SDUPTHER

## 2020-12-16 RX ORDER — AMLODIPINE AND BENAZEPRIL HYDROCHLORIDE 5; 10 MG/1; MG/1
1 CAPSULE ORAL DAILY
Qty: 90 CAPSULE | Refills: 0 | Status: SHIPPED | OUTPATIENT
Start: 2020-12-16 | End: 2021-05-10 | Stop reason: SDUPTHER

## 2020-12-16 NOTE — PATIENT INSTRUCTIONS
4 Steps for Eating Healthier  Changing the way you eat can improve your health. It can lower your cholesterol and blood pressure, and help you stay at a healthy weight. Your diet doesnt have to be bland and boring to be healthy. Just watch your calories and follow these steps:    1. Eat fewer unhealthy fats  · Choose more fish and lean meats instead of fatty cuts of meat.  · Skip butter and lard, and use less margarine.  · Pass on foods that have palm, coconut, or hydrogenated oils.  · Eat fewer high-fat dairy foods like cheese, ice cream, and whole milk.  · Get a heart-healthy cookbook and try some low-fat recipes.  2. Go light on salt  · Keep the saltshaker off the table.  · Limit high-salt ingredients, such as soy sauce, bouillon, and garlic salt.  · Instead of adding salt when cooking, season your food with herbs and flavorings. Try lemon, garlic, and onion.  · Limit convenience foods, such as boxed or canned foods and restaurant food.  · Read food labels and choose lower-sodium options.  3. Limit sugar  · Pause before you add sugars to pancakes, cereal, coffee, or tea. This includes white and brown table sugar, syrup, honey, and molasses. Cut your usual amount by half.  · Use non-sugar sweeteners. Stevia, aspartame, and sucralose can satisfy a sweet tooth without adding calories.  · Swap out sugar-filled soda and other drinks. Buy sugar-free or low-calorie beverages. Remember water is always the best choice.  · Read labels and choose foods with less added sugar. Keep in mind that dairy foods and foods with fruit will have some natural sugar.  · Cut the sugar in recipes by 1/3 to 1/2. Boost the flavor with extracts like almond, vanilla, or orange. Or add spices such as cinnamon or nutmeg.  4. Eat more fiber  · Eat fresh fruits and vegetables every day.  · Boost your diet with whole grains. Go for oats, whole-grain rice, and bran.  · Add beans and lentils to your meals.  · Drink more water to match your fiber  increase. This is to help prevent constipation.  Date Last Reviewed: 5/11/2015  © 3436-5459 [employer]. [address] [place] [address], [place] [address]. All rights reserved. This information is not intended as a substitute for professional medical care. Always follow your healthcare professional's instructions.

## 2020-12-16 NOTE — PROGRESS NOTES
SUBJECTIVE:      Patient ID: Luann Borjas is a 60 y.o. female.    Chief Complaint: Hypertension    Mrs Borjas is following up on htn and hyperlipidemia. She is following with Dr Ravi for sleep apnea. She is following with Dr Fields for compression fracture and will be starting PT in Jan. She is taking her lotrel and lipitor as prescribed. Home bp readings have been 120's/80's. Denies headaches. Has an appt with Dr Tobin at the end of the month. She is still not smoking and following with the smoking cessation program     Hypertension  This is a chronic problem. The problem is unchanged. The problem is controlled. Pertinent negatives include no chest pain, headaches, neck pain, palpitations, peripheral edema or shortness of breath. Risk factors for coronary artery disease include smoking/tobacco exposure. Past treatments include calcium channel blockers and ACE inhibitors. The current treatment provides significant improvement.   Hyperlipidemia  This is a new problem. Pertinent negatives include no chest pain or shortness of breath. Current antihyperlipidemic treatment includes statins.       Past Surgical History:   Procedure Laterality Date    BASAL CELL CARCINOMA EXCISION  09/2019    lip    CATARACT EXTRACTION W/  INTRAOCULAR LENS IMPLANT Right 11/13/2019    Procedure: EXTRACTION, CATARACT, WITH IOL INSERTION;  Surgeon: Carmelo Perez II, MD;  Location: [facility] OR;  Service: Ophthalmology;  Laterality: Right;    ECTOPIC PREGNANCY SURGERY      EYE SURGERY Right     cataracts    HYSTERECTOMY       History reviewed. No pertinent family history.   Social History     Socioeconomic History    Marital status:      Spouse name: Not on file    Number of children: Not on file    Years of education: Not on file    Highest education level: Not on file   Occupational History    Not on file   Social Needs    Financial resource strain: Not on file    Food insecurity     Worry: Not on file     Inability:  Not on file    Transportation needs     Medical: Not on file     Non-medical: Not on file   Tobacco Use    Smoking status: Former Smoker     Packs/day: 0.50     Types: Cigarettes     Start date: 1988     Quit date: 10/25/2020     Years since quittin.1    Smokeless tobacco: Never Used   Substance and Sexual Activity    Alcohol use: Yes     Alcohol/week: 2.0 standard drinks     Types: 2 Glasses of wine per week    Drug use: No    Sexual activity: Not Currently   Lifestyle    Physical activity     Days per week: Not on file     Minutes per session: Not on file    Stress: Not on file   Relationships    Social connections     Talks on phone: Not on file     Gets together: Not on file     Attends [employer] service: Not on file     Active member of club or organization: Not on file     Attends meetings of clubs or organizations: Not on file     Relationship status: Not on file   Other Topics Concern    Not on file   Social History Narrative    Not on file     Current Outpatient Medications   Medication Sig Dispense Refill    alprazolam (XANAX) 0.5 MG tablet Take 0.5 mg by mouth 2 (two) times daily as needed for Anxiety.      amlodipine-benazepril 5-10 mg (LOTREL) 5-10 mg per capsule Take 1 capsule by mouth once daily. 90 capsule 0    atorvastatin (LIPITOR) 10 MG tablet Take 1 tablet (10 mg total) by mouth once daily. 90 tablet 1    cholecalciferol, vitamin D3, (VITAMIN D3) 50 mcg (2,000 unit) Tab Take 1 tablet (2,000 Units total) by mouth once daily. 30 tablet 0    FLUoxetine (PROZAC) 40 MG capsule Take 40 mg by mouth once daily.       nicotine (NICODERM CQ) 7 mg/24 hr Place 1 patch onto the skin once daily. 28 patch 0    umeclidinium-vilanteroL (ANORO ELLIPTA) 62.5-25 mcg/actuation DsDv Inhale 1 puff into the lungs once daily. Controller 1 each 11     No current facility-administered medications for this visit.      Review of patient's allergies indicates:   Allergen Reactions    Erythromycin  "Anaphylaxis      Past Medical History:   Diagnosis Date    Anxiety     Arthritis     Cataract of left eye     Hyperlipidemia     Sleep apnea     cpap     Past Surgical History:   Procedure Laterality Date    BASAL CELL CARCINOMA EXCISION  09/2019    lip    CATARACT EXTRACTION W/  INTRAOCULAR LENS IMPLANT Right 11/13/2019    Procedure: EXTRACTION, CATARACT, WITH IOL INSERTION;  Surgeon: Carmelo Perez II, MD;  Location: [facility];  Service: Ophthalmology;  Laterality: Right;    ECTOPIC PREGNANCY SURGERY      EYE SURGERY Right     cataracts    HYSTERECTOMY         Review of Systems   Constitutional: Negative for appetite change, chills, diaphoresis and unexpected weight change.   HENT: Negative for ear discharge, hearing loss, trouble swallowing and voice change.    Eyes: Negative for photophobia and pain.   Respiratory: Negative for chest tightness, shortness of breath and stridor.    Cardiovascular: Negative for chest pain and palpitations.   Gastrointestinal: Negative for abdominal pain, blood in stool and vomiting.   Endocrine: Negative for cold intolerance and heat intolerance.   Genitourinary: Negative for difficulty urinating and flank pain.   Musculoskeletal: Negative for joint swelling, neck pain and neck stiffness.   Skin: Negative for pallor.   Neurological: Negative for dizziness, speech difficulty, weakness and headaches.   Hematological: Does not bruise/bleed easily.   Psychiatric/Behavioral: Negative for confusion and dysphoric mood. The patient is not nervous/anxious.       OBJECTIVE:      Vitals:    12/16/20 1538 12/16/20 1614   BP: (!) 150/80 126/82   Pulse: 68    Temp: 97.9 °F (36.6 °C)    SpO2: 96%    Weight: 70.8 kg (156 lb)    Height: 5' 6" (1.676 m)      Physical Exam  Vitals signs and nursing note reviewed.   Constitutional:       General: She is not in acute distress.     Appearance: She is well-developed.   HENT:      Head: Normocephalic and atraumatic.      Right Ear: Tympanic " membrane normal.      Left Ear: Tympanic membrane normal.      Nose: Nose normal.      Mouth/Throat:      Pharynx: Uvula midline.   Eyes:      General: Lids are normal.      Conjunctiva/sclera: Conjunctivae normal.      Pupils: Pupils are equal, round, and reactive to light.      Right eye: Pupil is round and reactive.      Left eye: Pupil is round and reactive.   Neck:      Musculoskeletal: Normal range of motion and neck supple.      Thyroid: No thyromegaly.      Vascular: No JVD.      Trachea: Trachea normal.   Cardiovascular:      Rate and Rhythm: Normal rate and regular rhythm.      Pulses: Normal pulses.      Heart sounds: Normal heart sounds.   Pulmonary:      Effort: Pulmonary effort is normal. No tachypnea or respiratory distress.      Breath sounds: Normal breath sounds.   Abdominal:      General: Bowel sounds are normal.      Palpations: Abdomen is soft.      Tenderness: There is no abdominal tenderness.   Musculoskeletal: Normal range of motion.   Lymphadenopathy:      Cervical: No cervical adenopathy.   Skin:     General: Skin is warm and dry.      Findings: No rash.   Neurological:      Mental Status: She is alert and oriented to person, place, and time.   Psychiatric:         Mood and Affect: Mood normal.         Speech: Speech normal.         Behavior: Behavior normal. Behavior is cooperative.         Thought Content: Thought content normal.        Assessment:       1. Essential hypertension    2. Pure hypercholesterolemia        Plan:       Essential hypertension  -     amlodipine-benazepril 5-10 mg (LOTREL) 5-10 mg per capsule; Take 1 capsule by mouth once daily.  Dispense: 90 capsule; Refill: 0  -     CBC Auto Differential; Future; Expected date: 12/16/2020    Pure hypercholesterolemia  -     Lipid Panel; Future; Expected date: 12/30/2020  -     atorvastatin (LIPITOR) 10 MG tablet; Take 1 tablet (10 mg total) by mouth once daily.  Dispense: 90 tablet; Refill: 1        Follow up in about 6 months  (around 6/16/2021) for htn .      12/16/2020 YULIANA Farooq, FNP

## 2020-12-21 ENCOUNTER — TELEPHONE (OUTPATIENT)
Dept: CARDIOLOGY | Facility: CLINIC | Age: 60
End: 2020-12-21

## 2020-12-22 ENCOUNTER — OFFICE VISIT (OUTPATIENT)
Dept: PULMONOLOGY | Facility: CLINIC | Age: 60
End: 2020-12-22
Payer: OTHER GOVERNMENT

## 2020-12-22 VITALS
HEART RATE: 71 BPM | HEIGHT: 66 IN | SYSTOLIC BLOOD PRESSURE: 140 MMHG | OXYGEN SATURATION: 97 % | TEMPERATURE: 97 F | WEIGHT: 157.81 LBS | BODY MASS INDEX: 25.36 KG/M2 | DIASTOLIC BLOOD PRESSURE: 85 MMHG

## 2020-12-22 DIAGNOSIS — G47.33 OSA (OBSTRUCTIVE SLEEP APNEA): ICD-10-CM

## 2020-12-22 DIAGNOSIS — J44.9 CHRONIC OBSTRUCTIVE PULMONARY DISEASE, UNSPECIFIED COPD TYPE: Primary | ICD-10-CM

## 2020-12-22 PROCEDURE — 99214 PR OFFICE/OUTPT VISIT, EST, LEVL IV, 30-39 MIN: ICD-10-PCS | Mod: S$GLB,,, | Performed by: INTERNAL MEDICINE

## 2020-12-22 PROCEDURE — 99214 OFFICE O/P EST MOD 30 MIN: CPT | Mod: S$GLB,,, | Performed by: INTERNAL MEDICINE

## 2020-12-22 NOTE — PROGRESS NOTES
SUBJECTIVE:    Patient ID: Luann Borjas is a 60 y.o. female.    Chief Complaint: Follow-up    HPI The patient is here after her CPAP study shows she needs 8 cm of pressure to sleep on.  She has not received her machine which was ordered on the 9th.  She is using her old machine which has unknown settings.      Her COPD was moderate in intensity,. She is using Anoro daily.. She finds improvement in her breathing with it. She had her 6 minute walk which was fine.  She does not need oxygen with her CPAP.  Will have it picked up.  Past Medical History:   Diagnosis Date    Anxiety     Arthritis     Cataract of left eye     Hyperlipidemia     Sleep apnea     cpap     Past Surgical History:   Procedure Laterality Date    BASAL CELL CARCINOMA EXCISION  09/2019    lip    CATARACT EXTRACTION W/  INTRAOCULAR LENS IMPLANT Right 11/13/2019    Procedure: EXTRACTION, CATARACT, WITH IOL INSERTION;  Surgeon: Carmelo Perez II, MD;  Location: [facility];  Service: Ophthalmology;  Laterality: Right;    ECTOPIC PREGNANCY SURGERY      EYE SURGERY Right     cataracts    HYSTERECTOMY       No family history on file.     Social History:   Marital Status:   Occupation: Data Unavailable  Alcohol History:  reports current alcohol use of about 2.0 standard drinks of alcohol per week.  Tobacco History:  reports that she quit smoking about 8 weeks ago. Her smoking use included cigarettes. She started smoking about 32 years ago. She smoked 0.50 packs per day. She has never used smokeless tobacco.  Drug History:  reports no history of drug use.    Review of patient's allergies indicates:   Allergen Reactions    Erythromycin Anaphylaxis       Current Outpatient Medications   Medication Sig Dispense Refill    alprazolam (XANAX) 0.5 MG tablet Take 0.5 mg by mouth 2 (two) times daily as needed for Anxiety.      amlodipine-benazepril 5-10 mg (LOTREL) 5-10 mg per capsule Take 1 capsule by mouth once daily. 90 capsule 0     "atorvastatin (LIPITOR) 10 MG tablet Take 1 tablet (10 mg total) by mouth once daily. 90 tablet 1    cholecalciferol, vitamin D3, (VITAMIN D3) 50 mcg (2,000 unit) Tab Take 1 tablet (2,000 Units total) by mouth once daily. 30 tablet 0    FLUoxetine (PROZAC) 40 MG capsule Take 40 mg by mouth once daily.       nicotine (NICODERM CQ) 7 mg/24 hr Place 1 patch onto the skin once daily. 28 patch 0    umeclidinium-vilanteroL (ANORO ELLIPTA) 62.5-25 mcg/actuation DsDv Inhale 1 puff into the lungs once daily. Controller 1 each 11     No current facility-administered medications for this visit.        Alpha-1 Antitrypsin:  Last PFT:   Last CT:10/27/20 5 mm nodule seen on previous CT earlier not seen on this one, but too much motion    Review of Systems  General: Feeling ok.  Eyes: Vision is good.  ENT:  No sinusitis or pharyngitis.   Heart:: No chest pain or palpitations.  Lungs: No cough, sputum, or wheezing.  GI: No Nausea, vomiting, constipation, diarrhea, or reflux.  : Nocturia  Down to once.  Musculoskeletal: No joint pain or myalgias. Wearing a brace for her L2 fracture.  Skin: No lesions or rashes.  Neuro: No headaches or neuropathy.  Lymph: No edema or adenopathy.  Psych: No anxiety or depression.  Endo: gaining weight    OBJECTIVE:      BP (!) 140/85 (BP Location: Left arm, Patient Position: Sitting, BP Method: Medium (Manual))   Pulse 71   Temp 97.4 °F (36.3 °C)   Ht 5' 6" (1.676 m)   Wt 71.6 kg (157 lb 12.8 oz)   SpO2 97%   BMI 25.47 kg/m²     Physical Exam  GENERAL: Midaged patient in no distress.  HEENT: Pupils equal and reactive. Extraocular movements intact. Nose intact.      Pharynx moist.  NECK: Supple.   HEART: Regular rate and rhythm. No murmur or gallop auscultated.  LUNGS: Clear to auscultation and percussion. Lung excursion symmetrical. No change in fremitus. No adventitial noises.  ABDOMEN: Bowel sounds present. Non-tender, no masses palpated.  EXTREMITIES: Normal muscle tone and joint " movement, no cyanosis or clubbing.   LYMPHATICS: No adenopathy palpated, no edema.  SKIN: Dry, intact, no lesions.   NEURO: Cranial nerves II-XII intact. Motor strength 5/5 bilaterally, upper and lower extremities.  PSYCH: Appropriate affect.    Assessment:       1. Chronic obstructive pulmonary disease, unspecified COPD type    2. CARO (obstructive sleep apnea)          Plan:       Chronic obstructive pulmonary disease, unspecified COPD type    CARO (obstructive sleep apnea)         Follow up in about 3 months (around 3/22/2021).    Covid vaccine when available   Continue Anoro  Call me if you don't get your CPAP by the first week in January  Will ask Lisa to  oxygen

## 2020-12-28 ENCOUNTER — TELEPHONE (OUTPATIENT)
Dept: PULMONOLOGY | Facility: CLINIC | Age: 60
End: 2020-12-28

## 2020-12-28 NOTE — TELEPHONE ENCOUNTER
Patient called Lisa francis written on a prescription pad that it is ok to  oxygen an send that with notes fax to : [phone] along with demographics

## 2020-12-29 ENCOUNTER — TELEPHONE (OUTPATIENT)
Dept: PULMONOLOGY | Facility: CLINIC | Age: 60
End: 2020-12-29

## 2021-01-05 ENCOUNTER — TELEPHONE (OUTPATIENT)
Dept: PULMONOLOGY | Facility: CLINIC | Age: 61
End: 2021-01-05

## 2021-01-08 ENCOUNTER — TELEPHONE (OUTPATIENT)
Dept: FAMILY MEDICINE | Facility: CLINIC | Age: 61
End: 2021-01-08

## 2021-01-11 ENCOUNTER — OFFICE VISIT (OUTPATIENT)
Dept: CARDIOLOGY | Facility: CLINIC | Age: 61
End: 2021-01-11
Payer: OTHER GOVERNMENT

## 2021-01-11 VITALS
SYSTOLIC BLOOD PRESSURE: 140 MMHG | WEIGHT: 158 LBS | DIASTOLIC BLOOD PRESSURE: 82 MMHG | HEART RATE: 72 BPM | BODY MASS INDEX: 25.39 KG/M2 | HEIGHT: 66 IN | OXYGEN SATURATION: 98 % | RESPIRATION RATE: 16 BRPM

## 2021-01-11 DIAGNOSIS — F41.9 ANXIETY: ICD-10-CM

## 2021-01-11 DIAGNOSIS — G47.33 OSA (OBSTRUCTIVE SLEEP APNEA): ICD-10-CM

## 2021-01-11 DIAGNOSIS — R55 SYNCOPE AND COLLAPSE: Primary | ICD-10-CM

## 2021-01-11 DIAGNOSIS — I10 HYPERTENSION, UNSPECIFIED TYPE: ICD-10-CM

## 2021-01-11 DIAGNOSIS — J44.9 CHRONIC OBSTRUCTIVE PULMONARY DISEASE, UNSPECIFIED COPD TYPE: ICD-10-CM

## 2021-01-11 DIAGNOSIS — R94.31 LONG QT INTERVAL: ICD-10-CM

## 2021-01-11 DIAGNOSIS — M54.16 LUMBAR RADICULOPATHY: Primary | ICD-10-CM

## 2021-01-11 DIAGNOSIS — S32.020D COMPRESSION FRACTURE OF L2 VERTEBRA WITH ROUTINE HEALING, SUBSEQUENT ENCOUNTER: ICD-10-CM

## 2021-01-11 PROCEDURE — 99205 OFFICE O/P NEW HI 60 MIN: CPT | Mod: S$GLB,,, | Performed by: INTERNAL MEDICINE

## 2021-01-11 PROCEDURE — 99205 PR OFFICE/OUTPT VISIT, NEW, LEVL V, 60-74 MIN: ICD-10-PCS | Mod: S$GLB,,, | Performed by: INTERNAL MEDICINE

## 2021-01-11 PROCEDURE — 93000 ELECTROCARDIOGRAM COMPLETE: CPT | Mod: S$GLB,,, | Performed by: INTERNAL MEDICINE

## 2021-01-11 PROCEDURE — 93000 EKG 12-LEAD: ICD-10-PCS | Mod: S$GLB,,, | Performed by: INTERNAL MEDICINE

## 2021-01-11 RX ORDER — CETIRIZINE HYDROCHLORIDE 10 MG/1
10 TABLET ORAL DAILY
COMMUNITY
Start: 2021-01-08 | End: 2021-02-11

## 2021-01-11 RX ORDER — FLUTICASONE PROPIONATE 50 MCG
1 SPRAY, SUSPENSION (ML) NASAL DAILY
COMMUNITY
Start: 2021-01-08 | End: 2021-03-24

## 2021-01-11 RX ORDER — CALCIUM CARBONATE 500(1250)
1 TABLET,CHEWABLE ORAL DAILY
COMMUNITY

## 2021-01-11 RX ORDER — DOXYCYCLINE 100 MG/1
100 CAPSULE ORAL
COMMUNITY
Start: 2021-01-08 | End: 2021-02-11

## 2021-01-12 ENCOUNTER — HOSPITAL ENCOUNTER (OUTPATIENT)
Dept: RADIOLOGY | Facility: HOSPITAL | Age: 61
Discharge: HOME | End: 2021-01-12
Attending: NEUROLOGICAL SURGERY
Payer: OTHER GOVERNMENT

## 2021-01-12 DIAGNOSIS — M54.16 LUMBAR RADICULOPATHY: ICD-10-CM

## 2021-01-12 PROCEDURE — 72100 X-RAY EXAM L-S SPINE 2/3 VWS: CPT | Mod: TC,PO

## 2021-01-25 DIAGNOSIS — J43.2 CENTRILOBULAR EMPHYSEMA: ICD-10-CM

## 2021-01-25 DIAGNOSIS — J43.2 CENTRILOBULAR EMPHYSEMA: Primary | ICD-10-CM

## 2021-01-25 RX ORDER — UMECLIDINIUM BROMIDE AND VILANTEROL TRIFENATATE 62.5; 25 UG/1; UG/1
1 POWDER RESPIRATORY (INHALATION) DAILY
Qty: 3 EACH | Refills: 4 | Status: SHIPPED | OUTPATIENT
Start: 2021-01-25 | End: 2021-09-28 | Stop reason: SDUPTHER

## 2021-01-25 RX ORDER — UMECLIDINIUM BROMIDE AND VILANTEROL TRIFENATATE 62.5; 25 UG/1; UG/1
1 POWDER RESPIRATORY (INHALATION) DAILY
Qty: 3 EACH | Refills: 4 | Status: SHIPPED | OUTPATIENT
Start: 2021-01-25 | End: 2021-04-25

## 2021-02-01 ENCOUNTER — LAB VISIT (OUTPATIENT)
Dept: LAB | Facility: HOSPITAL | Age: 61
End: 2021-02-01
Attending: NURSE PRACTITIONER
Payer: OTHER GOVERNMENT

## 2021-02-01 DIAGNOSIS — R55 SYNCOPE AND COLLAPSE: ICD-10-CM

## 2021-02-01 LAB
ANION GAP SERPL CALC-SCNC: 16 MMOL/L (ref 8–16)
BUN SERPL-MCNC: 24 MG/DL (ref 6–20)
CALCIUM SERPL-MCNC: 9.2 MG/DL (ref 8.7–10.5)
CHLORIDE SERPL-SCNC: 99 MMOL/L (ref 95–110)
CO2 SERPL-SCNC: 24 MMOL/L (ref 23–29)
CREAT SERPL-MCNC: 0.6 MG/DL (ref 0.5–1.4)
EST. GFR  (AFRICAN AMERICAN): >60 ML/MIN/1.73 M^2
EST. GFR  (NON AFRICAN AMERICAN): >60 ML/MIN/1.73 M^2
GLUCOSE SERPL-MCNC: 93 MG/DL (ref 70–110)
MAGNESIUM SERPL-MCNC: 2.2 MG/DL (ref 1.6–2.6)
POTASSIUM SERPL-SCNC: 3.6 MMOL/L (ref 3.5–5.1)
SODIUM SERPL-SCNC: 139 MMOL/L (ref 136–145)

## 2021-02-01 PROCEDURE — 80048 BASIC METABOLIC PNL TOTAL CA: CPT

## 2021-02-01 PROCEDURE — 36415 COLL VENOUS BLD VENIPUNCTURE: CPT

## 2021-02-01 PROCEDURE — 83735 ASSAY OF MAGNESIUM: CPT

## 2021-02-02 ENCOUNTER — HOSPITAL ENCOUNTER (OUTPATIENT)
Dept: RADIOLOGY | Facility: CLINIC | Age: 61
Discharge: HOME | End: 2021-02-02
Attending: NURSE PRACTITIONER
Payer: OTHER GOVERNMENT

## 2021-02-02 ENCOUNTER — HOSPITAL ENCOUNTER (OUTPATIENT)
Dept: CARDIOLOGY | Facility: CLINIC | Age: 61
Discharge: HOME | End: 2021-02-02
Attending: NURSE PRACTITIONER
Payer: OTHER GOVERNMENT

## 2021-02-02 VITALS — WEIGHT: 156 LBS | BODY MASS INDEX: 25.07 KG/M2 | HEIGHT: 66 IN

## 2021-02-02 DIAGNOSIS — R55 SYNCOPE AND COLLAPSE: ICD-10-CM

## 2021-02-02 PROCEDURE — A9502 STRESS TEST WITH MYOCARDIAL PERFUSION (CUPID ONLY): ICD-10-PCS | Mod: S$GLB,,, | Performed by: INTERNAL MEDICINE

## 2021-02-02 PROCEDURE — 93306 TTE W/DOPPLER COMPLETE: CPT | Mod: S$GLB,,, | Performed by: INTERNAL MEDICINE

## 2021-02-02 PROCEDURE — 93015 STRESS TEST WITH MYOCARDIAL PERFUSION (CUPID ONLY): ICD-10-PCS | Mod: S$GLB,,, | Performed by: INTERNAL MEDICINE

## 2021-02-02 PROCEDURE — A9502 TC99M TETROFOSMIN: HCPCS | Mod: S$GLB,,, | Performed by: INTERNAL MEDICINE

## 2021-02-02 PROCEDURE — 93306 ECHO (CUPID ONLY): ICD-10-PCS | Mod: S$GLB,,, | Performed by: INTERNAL MEDICINE

## 2021-02-02 PROCEDURE — 93015 CV STRESS TEST SUPVJ I&R: CPT | Mod: S$GLB,,, | Performed by: INTERNAL MEDICINE

## 2021-02-02 PROCEDURE — 78452 HT MUSCLE IMAGE SPECT MULT: CPT | Mod: S$GLB,,, | Performed by: INTERNAL MEDICINE

## 2021-02-02 PROCEDURE — 78452 STRESS TEST WITH MYOCARDIAL PERFUSION (CUPID ONLY): ICD-10-PCS | Mod: S$GLB,,, | Performed by: INTERNAL MEDICINE

## 2021-02-02 RX ORDER — REGADENOSON 0.08 MG/ML
0.4 INJECTION, SOLUTION INTRAVENOUS ONCE
Status: COMPLETED | OUTPATIENT
Start: 2021-02-02 | End: 2021-02-02

## 2021-02-02 RX ADMIN — REGADENOSON 0.4 MG: 0.08 INJECTION, SOLUTION INTRAVENOUS at 08:02

## 2021-02-03 ENCOUNTER — HOSPITAL ENCOUNTER (OUTPATIENT)
Dept: CARDIOLOGY | Facility: CLINIC | Age: 61
Discharge: HOME | End: 2021-02-03
Attending: NURSE PRACTITIONER
Payer: OTHER GOVERNMENT

## 2021-02-03 VITALS — HEIGHT: 66 IN | BODY MASS INDEX: 25.08 KG/M2 | WEIGHT: 156.06 LBS

## 2021-02-03 DIAGNOSIS — R55 SYNCOPE AND COLLAPSE: ICD-10-CM

## 2021-02-03 PROCEDURE — 93880 CV US DOPPLER CAROTID (CUPID ONLY): ICD-10-PCS | Mod: S$GLB,,, | Performed by: INTERNAL MEDICINE

## 2021-02-03 PROCEDURE — 93224 HOLTER MONITOR - 48 HOUR (CUPID ONLY): ICD-10-PCS | Mod: S$GLB,,, | Performed by: INTERNAL MEDICINE

## 2021-02-03 PROCEDURE — 93880 EXTRACRANIAL BILAT STUDY: CPT | Mod: S$GLB,,, | Performed by: INTERNAL MEDICINE

## 2021-02-03 PROCEDURE — 93224 XTRNL ECG REC UP TO 48 HRS: CPT | Mod: S$GLB,,, | Performed by: INTERNAL MEDICINE

## 2021-02-08 LAB
CV PHARM DOSE: 0.4 MG
CV STRESS BASE HR: 67 BPM
EJECTION FRACTION- HIGH: 65 %
END DIASTOLIC INDEX-HIGH: 158 ML/M2
END DIASTOLIC INDEX-LOW: 94 ML/M2
END SYSTOLIC INDEX-HIGH: 71 ML/M2
END SYSTOLIC INDEX-LOW: 33 ML/M2
NUC STRESS DIASTOLIC VOLUME INDEX: 45
NUC STRESS EJECTION FRACTION: 94 %
NUC STRESS SYSTOLIC VOLUME INDEX: 3
OHS CV CPX 1 MINUTE RECOVERY HEART RATE: 86 BPM
OHS CV CPX 85 PERCENT MAX PREDICTED HEART RATE MALE: 131
OHS CV CPX MAX PREDICTED HEART RATE: 154
OHS CV CPX PATIENT IS FEMALE: 1
OHS CV CPX PATIENT IS MALE: 0
OHS CV CPX PEAK HEAR RATE: 102 BPM
OHS CV CPX PERCENT MAX PREDICTED HEART RATE ACHIEVED: 66
RETIRED EF AND QEF - SEE NOTES: 53 %

## 2021-02-09 ENCOUNTER — TELEPHONE (OUTPATIENT)
Dept: SMOKING CESSATION | Facility: CLINIC | Age: 61
End: 2021-02-09

## 2021-02-11 ENCOUNTER — OFFICE VISIT (OUTPATIENT)
Dept: CARDIOLOGY | Facility: CLINIC | Age: 61
End: 2021-02-11
Payer: OTHER GOVERNMENT

## 2021-02-11 VITALS
BODY MASS INDEX: 25.55 KG/M2 | HEIGHT: 66 IN | WEIGHT: 159 LBS | HEART RATE: 77 BPM | SYSTOLIC BLOOD PRESSURE: 128 MMHG | DIASTOLIC BLOOD PRESSURE: 74 MMHG | OXYGEN SATURATION: 98 % | RESPIRATION RATE: 16 BRPM

## 2021-02-11 DIAGNOSIS — Z87.898 HISTORY OF SYNCOPE: ICD-10-CM

## 2021-02-11 DIAGNOSIS — G47.33 OSA (OBSTRUCTIVE SLEEP APNEA): ICD-10-CM

## 2021-02-11 DIAGNOSIS — I45.81 LONG Q-T SYNDROME: ICD-10-CM

## 2021-02-11 DIAGNOSIS — E87.6 HYPOKALEMIA: Primary | ICD-10-CM

## 2021-02-11 DIAGNOSIS — M25.50 ARTHRALGIA, UNSPECIFIED JOINT: ICD-10-CM

## 2021-02-11 DIAGNOSIS — S32.020D COMPRESSION FRACTURE OF L2 VERTEBRA WITH ROUTINE HEALING, SUBSEQUENT ENCOUNTER: ICD-10-CM

## 2021-02-11 DIAGNOSIS — J44.9 CHRONIC OBSTRUCTIVE PULMONARY DISEASE, UNSPECIFIED COPD TYPE: ICD-10-CM

## 2021-02-11 DIAGNOSIS — I10 HYPERTENSION, UNSPECIFIED TYPE: ICD-10-CM

## 2021-02-11 DIAGNOSIS — F41.9 ANXIETY: ICD-10-CM

## 2021-02-11 LAB
AORTIC ROOT ANNULUS: 3 CM
AORTIC VALVE CUSP SEPERATION: 1.9 CM
AV INDEX (PROSTH): 1.13
AV MEAN GRADIENT: 3 MMHG
AV PEAK GRADIENT: 4 MMHG
AV VALVE AREA: 3.53 CM2
AV VELOCITY RATIO: 0.99
BSA FOR ECHO PROCEDURE: 1.82 M2
CV ECHO LV RWT: 0.58 CM
DOP CALC AO PEAK VEL: 1.03 M/S
DOP CALC AO VTI: 24.7 CM
DOP CALC LVOT AREA: 3.1 CM2
DOP CALC LVOT DIAMETER: 2 CM
DOP CALC LVOT PEAK VEL: 1.02 M/S
DOP CALC LVOT STROKE VOLUME: 87.29 CM3
DOP CALCLVOT PEAK VEL VTI: 27.8 CM
E WAVE DECELERATION TIME: 201 MS
E/A RATIO: 1.27
E/E' RATIO: 10.13 M/S
ECHO LV POSTERIOR WALL: 0.96 CM (ref 0.6–1.1)
FRACTIONAL SHORTENING: 41 % (ref 28–44)
INTERVENTRICULAR SEPTUM: 1.12 CM (ref 0.6–1.1)
IVRT: 79 MS
LEFT ATRIUM SIZE: 2.9 CM
LEFT CCA DIST DIAS: 20 CM/S
LEFT CCA DIST SYS: 65 CM/S
LEFT CCA MID DIAS: 20 CM/S
LEFT CCA MID SYS: 75 CM/S
LEFT CCA PROX DIAS: 18 CM/S
LEFT CCA PROX SYS: 86 CM/S
LEFT ECA DIAS: 26 CM/S
LEFT ECA SYS: 187 CM/S
LEFT ICA DIST DIAS: 32 CM/S
LEFT ICA DIST SYS: 118 CM/S
LEFT ICA MID DIAS: 51 CM/S
LEFT ICA MID SYS: 175 CM/S
LEFT ICA PROX DIAS: 44 CM/S
LEFT ICA PROX SYS: 143 CM/S
LEFT INTERNAL DIMENSION IN SYSTOLE: 1.95 CM (ref 2.1–4)
LEFT VENTRICLE MASS INDEX: 56 G/M2
LEFT VENTRICULAR INTERNAL DIMENSION IN DIASTOLE: 3.31 CM (ref 3.5–6)
LEFT VENTRICULAR MASS: 100.71 G
LEFT VERTEBRAL DIAS: 15 CM/S
LEFT VERTEBRAL SYS: 45 CM/S
LV LATERAL E/E' RATIO: 10.13 M/S
LV SEPTAL E/E' RATIO: 10.13 M/S
MV PEAK A VEL: 0.64 M/S
MV PEAK E VEL: 0.81 M/S
OHS CV CAROTID RIGHT ICA EDV HIGHEST: 33
OHS CV CAROTID ULTRASOUND LEFT ICA/CCA RATIO: 2.69
OHS CV CAROTID ULTRASOUND RIGHT ICA/CCA RATIO: 1.61
OHS CV PV CAROTID LEFT HIGHEST CCA: 86
OHS CV PV CAROTID LEFT HIGHEST ICA: 175
OHS CV PV CAROTID RIGHT HIGHEST CCA: 89
OHS CV PV CAROTID RIGHT HIGHEST ICA: 100
OHS CV US CAROTID LEFT HIGHEST EDV: 51
PISA TR MAX VEL: 2.3 M/S
RIGHT CCA DIST DIAS: 21 CM/S
RIGHT CCA DIST SYS: 62 CM/S
RIGHT CCA MID DIAS: 20 CM/S
RIGHT CCA MID SYS: 74 CM/S
RIGHT CCA PROX DIAS: 16 CM/S
RIGHT CCA PROX SYS: 89 CM/S
RIGHT ECA DIAS: 14 CM/S
RIGHT ECA SYS: 77 CM/S
RIGHT ICA DIST DIAS: 23 CM/S
RIGHT ICA DIST SYS: 92 CM/S
RIGHT ICA MID DIAS: 33 CM/S
RIGHT ICA MID SYS: 100 CM/S
RIGHT ICA PROX DIAS: 23 CM/S
RIGHT ICA PROX SYS: 69 CM/S
RIGHT VENTRICULAR END-DIASTOLIC DIMENSION: 2.31 CM
RIGHT VERTEBRAL DIAS: 15 CM/S
RIGHT VERTEBRAL SYS: 55 CM/S
TDI LATERAL: 0.08 M/S
TDI SEPTAL: 0.08 M/S
TDI: 0.08 M/S
TR MAX PG: 21 MMHG

## 2021-02-11 PROCEDURE — 99213 PR OFFICE/OUTPT VISIT, EST, LEVL III, 20-29 MIN: ICD-10-PCS | Mod: S$GLB,,, | Performed by: INTERNAL MEDICINE

## 2021-02-11 PROCEDURE — 99213 OFFICE O/P EST LOW 20 MIN: CPT | Mod: S$GLB,,, | Performed by: INTERNAL MEDICINE

## 2021-02-24 ENCOUNTER — TELEPHONE (OUTPATIENT)
Dept: SMOKING CESSATION | Facility: CLINIC | Age: 61
End: 2021-02-24

## 2021-03-14 LAB
OHS CV EVENT MONITOR DAY: 2
OHS CV HOLTER LENGTH DECIMAL HOURS: 96
OHS CV HOLTER LENGTH HOURS: 48
OHS CV HOLTER LENGTH MINUTES: 0

## 2021-03-24 ENCOUNTER — OFFICE VISIT (OUTPATIENT)
Dept: PULMONOLOGY | Facility: CLINIC | Age: 61
End: 2021-03-24
Payer: OTHER GOVERNMENT

## 2021-03-24 VITALS
OXYGEN SATURATION: 98 % | WEIGHT: 163 LBS | DIASTOLIC BLOOD PRESSURE: 75 MMHG | BODY MASS INDEX: 26.2 KG/M2 | HEIGHT: 66 IN | SYSTOLIC BLOOD PRESSURE: 150 MMHG | HEART RATE: 76 BPM

## 2021-03-24 DIAGNOSIS — J43.2 CENTRILOBULAR EMPHYSEMA: Primary | ICD-10-CM

## 2021-03-24 DIAGNOSIS — J44.9 CHRONIC OBSTRUCTIVE PULMONARY DISEASE, UNSPECIFIED COPD TYPE: ICD-10-CM

## 2021-03-24 DIAGNOSIS — G47.33 OSA (OBSTRUCTIVE SLEEP APNEA): ICD-10-CM

## 2021-03-24 PROCEDURE — 99214 PR OFFICE/OUTPT VISIT, EST, LEVL IV, 30-39 MIN: ICD-10-PCS | Mod: S$GLB,,, | Performed by: INTERNAL MEDICINE

## 2021-03-24 PROCEDURE — 99214 OFFICE O/P EST MOD 30 MIN: CPT | Mod: S$GLB,,, | Performed by: INTERNAL MEDICINE

## 2021-04-29 ENCOUNTER — PATIENT MESSAGE (OUTPATIENT)
Dept: RESEARCH | Facility: HOSPITAL | Age: 61
End: 2021-04-29

## 2021-05-28 ENCOUNTER — PATIENT MESSAGE (OUTPATIENT)
Dept: FAMILY MEDICINE | Facility: CLINIC | Age: 61
End: 2021-05-28

## 2021-05-28 DIAGNOSIS — I10 ESSENTIAL HYPERTENSION: Primary | ICD-10-CM

## 2021-05-28 DIAGNOSIS — E55.9 VITAMIN D DEFICIENCY: ICD-10-CM

## 2021-05-28 DIAGNOSIS — E78.00 PURE HYPERCHOLESTEROLEMIA: ICD-10-CM

## 2021-05-31 ENCOUNTER — LAB VISIT (OUTPATIENT)
Dept: LAB | Facility: HOSPITAL | Age: 61
End: 2021-05-31
Attending: NURSE PRACTITIONER
Payer: OTHER GOVERNMENT

## 2021-05-31 DIAGNOSIS — I10 ESSENTIAL HYPERTENSION: ICD-10-CM

## 2021-05-31 DIAGNOSIS — E55.9 VITAMIN D DEFICIENCY: ICD-10-CM

## 2021-05-31 DIAGNOSIS — E78.00 PURE HYPERCHOLESTEROLEMIA: ICD-10-CM

## 2021-05-31 LAB
25(OH)D3+25(OH)D2 SERPL-MCNC: 31 NG/ML (ref 30–96)
ALBUMIN SERPL BCP-MCNC: 4.6 G/DL (ref 3.5–5.2)
ALP SERPL-CCNC: 50 U/L (ref 55–135)
ALT SERPL W/O P-5'-P-CCNC: 27 U/L (ref 10–44)
ANION GAP SERPL CALC-SCNC: 9 MMOL/L (ref 8–16)
AST SERPL-CCNC: 32 U/L (ref 10–40)
BASOPHILS # BLD AUTO: 0.07 K/UL (ref 0–0.2)
BASOPHILS NFR BLD: 1.3 % (ref 0–1.9)
BILIRUB SERPL-MCNC: 1.7 MG/DL (ref 0.1–1)
BUN SERPL-MCNC: 16 MG/DL (ref 6–20)
CALCIUM SERPL-MCNC: 8.9 MG/DL (ref 8.7–10.5)
CHLORIDE SERPL-SCNC: 104 MMOL/L (ref 95–110)
CHOLEST SERPL-MCNC: 202 MG/DL (ref 120–199)
CHOLEST/HDLC SERPL: 2.5 {RATIO} (ref 2–5)
CO2 SERPL-SCNC: 26 MMOL/L (ref 23–29)
CREAT SERPL-MCNC: 0.8 MG/DL (ref 0.5–1.4)
DIFFERENTIAL METHOD: ABNORMAL
EOSINOPHIL # BLD AUTO: 0.2 K/UL (ref 0–0.5)
EOSINOPHIL NFR BLD: 3 % (ref 0–8)
ERYTHROCYTE [DISTWIDTH] IN BLOOD BY AUTOMATED COUNT: 13.2 % (ref 11.5–14.5)
EST. GFR  (AFRICAN AMERICAN): >60 ML/MIN/1.73 M^2
EST. GFR  (NON AFRICAN AMERICAN): >60 ML/MIN/1.73 M^2
GLUCOSE SERPL-MCNC: 90 MG/DL (ref 70–110)
HCT VFR BLD AUTO: 38.7 % (ref 37–48.5)
HDLC SERPL-MCNC: 81 MG/DL (ref 40–75)
HDLC SERPL: 40.1 % (ref 20–50)
HGB BLD-MCNC: 13.1 G/DL (ref 12–16)
IMM GRANULOCYTES # BLD AUTO: 0.02 K/UL (ref 0–0.04)
IMM GRANULOCYTES NFR BLD AUTO: 0.4 % (ref 0–0.5)
LDLC SERPL CALC-MCNC: 107.8 MG/DL (ref 63–159)
LYMPHOCYTES # BLD AUTO: 1.5 K/UL (ref 1–4.8)
LYMPHOCYTES NFR BLD: 26.2 % (ref 18–48)
MCH RBC QN AUTO: 31.2 PG (ref 27–31)
MCHC RBC AUTO-ENTMCNC: 33.9 G/DL (ref 32–36)
MCV RBC AUTO: 92 FL (ref 82–98)
MONOCYTES # BLD AUTO: 0.4 K/UL (ref 0.3–1)
MONOCYTES NFR BLD: 7.2 % (ref 4–15)
NEUTROPHILS # BLD AUTO: 3.5 K/UL (ref 1.8–7.7)
NEUTROPHILS NFR BLD: 61.9 % (ref 38–73)
NONHDLC SERPL-MCNC: 121 MG/DL
NRBC BLD-RTO: 0 /100 WBC
PLATELET # BLD AUTO: 234 K/UL (ref 150–450)
PMV BLD AUTO: 9.8 FL (ref 9.2–12.9)
POTASSIUM SERPL-SCNC: 3.8 MMOL/L (ref 3.5–5.1)
PROT SERPL-MCNC: 7.3 G/DL (ref 6–8.4)
RBC # BLD AUTO: 4.2 M/UL (ref 4–5.4)
SODIUM SERPL-SCNC: 139 MMOL/L (ref 136–145)
TRIGL SERPL-MCNC: 66 MG/DL (ref 30–150)
WBC # BLD AUTO: 5.58 K/UL (ref 3.9–12.7)

## 2021-05-31 PROCEDURE — 80053 COMPREHEN METABOLIC PANEL: CPT | Performed by: NURSE PRACTITIONER

## 2021-05-31 PROCEDURE — 36415 COLL VENOUS BLD VENIPUNCTURE: CPT | Performed by: NURSE PRACTITIONER

## 2021-05-31 PROCEDURE — 82306 VITAMIN D 25 HYDROXY: CPT | Performed by: NURSE PRACTITIONER

## 2021-05-31 PROCEDURE — 85025 COMPLETE CBC W/AUTO DIFF WBC: CPT | Performed by: NURSE PRACTITIONER

## 2021-05-31 PROCEDURE — 80061 LIPID PANEL: CPT | Performed by: NURSE PRACTITIONER

## 2021-06-01 ENCOUNTER — OFFICE VISIT (OUTPATIENT)
Dept: FAMILY MEDICINE | Facility: CLINIC | Age: 61
End: 2021-06-01
Payer: OTHER GOVERNMENT

## 2021-06-01 ENCOUNTER — TELEPHONE (OUTPATIENT)
Dept: FAMILY MEDICINE | Facility: CLINIC | Age: 61
End: 2021-06-01

## 2021-06-01 ENCOUNTER — CLINICAL SUPPORT (OUTPATIENT)
Dept: SMOKING CESSATION | Facility: CLINIC | Age: 61
End: 2021-06-01
Payer: COMMERCIAL

## 2021-06-01 VITALS
WEIGHT: 161 LBS | HEART RATE: 84 BPM | DIASTOLIC BLOOD PRESSURE: 80 MMHG | TEMPERATURE: 98 F | BODY MASS INDEX: 25.88 KG/M2 | HEIGHT: 66 IN | OXYGEN SATURATION: 95 % | SYSTOLIC BLOOD PRESSURE: 130 MMHG

## 2021-06-01 DIAGNOSIS — E78.00 PURE HYPERCHOLESTEROLEMIA: ICD-10-CM

## 2021-06-01 DIAGNOSIS — E55.9 VITAMIN D DEFICIENCY: ICD-10-CM

## 2021-06-01 DIAGNOSIS — R10.31 RIGHT LOWER QUADRANT ABDOMINAL PAIN: ICD-10-CM

## 2021-06-01 DIAGNOSIS — F41.8 DEPRESSION WITH ANXIETY: ICD-10-CM

## 2021-06-01 DIAGNOSIS — F17.200 NICOTINE DEPENDENCE: Primary | ICD-10-CM

## 2021-06-01 DIAGNOSIS — I10 ESSENTIAL HYPERTENSION: Primary | ICD-10-CM

## 2021-06-01 PROCEDURE — 99407 PR TOBACCO USE CESSATION INTENSIVE >10 MINUTES: ICD-10-PCS | Mod: S$GLB,,,

## 2021-06-01 PROCEDURE — 99214 PR OFFICE/OUTPT VISIT, EST, LEVL IV, 30-39 MIN: ICD-10-PCS | Mod: S$GLB,,, | Performed by: NURSE PRACTITIONER

## 2021-06-01 PROCEDURE — 99214 OFFICE O/P EST MOD 30 MIN: CPT | Mod: S$GLB,,, | Performed by: NURSE PRACTITIONER

## 2021-06-01 PROCEDURE — 99407 BEHAV CHNG SMOKING > 10 MIN: CPT | Mod: S$GLB,,,

## 2021-06-01 RX ORDER — ATORVASTATIN CALCIUM 10 MG/1
10 TABLET, FILM COATED ORAL DAILY
Qty: 90 TABLET | Refills: 1 | Status: SHIPPED | OUTPATIENT
Start: 2021-06-01 | End: 2021-12-01 | Stop reason: SDUPTHER

## 2021-06-01 RX ORDER — AMLODIPINE AND BENAZEPRIL HYDROCHLORIDE 5; 10 MG/1; MG/1
1 CAPSULE ORAL DAILY
Qty: 90 CAPSULE | Refills: 1 | Status: SHIPPED | OUTPATIENT
Start: 2021-06-01 | End: 2021-12-01 | Stop reason: SDUPTHER

## 2021-06-02 ENCOUNTER — TELEPHONE (OUTPATIENT)
Dept: FAMILY MEDICINE | Facility: CLINIC | Age: 61
End: 2021-06-02

## 2021-06-08 ENCOUNTER — HOSPITAL ENCOUNTER (OUTPATIENT)
Dept: RADIOLOGY | Facility: HOSPITAL | Age: 61
Discharge: HOME | End: 2021-06-08
Attending: NURSE PRACTITIONER
Payer: OTHER GOVERNMENT

## 2021-06-08 ENCOUNTER — TELEPHONE (OUTPATIENT)
Dept: FAMILY MEDICINE | Facility: CLINIC | Age: 61
End: 2021-06-08

## 2021-06-08 DIAGNOSIS — R10.31 RIGHT LOWER QUADRANT ABDOMINAL PAIN: ICD-10-CM

## 2021-06-08 PROCEDURE — 76700 US EXAM ABDOM COMPLETE: CPT | Mod: TC

## 2021-06-08 PROCEDURE — 76857 US EXAM PELVIC LIMITED: CPT | Mod: TC

## 2021-08-11 ENCOUNTER — OFFICE VISIT (OUTPATIENT)
Dept: CARDIOLOGY | Facility: CLINIC | Age: 61
End: 2021-08-11
Payer: OTHER GOVERNMENT

## 2021-08-11 VITALS
DIASTOLIC BLOOD PRESSURE: 84 MMHG | HEART RATE: 66 BPM | WEIGHT: 169 LBS | HEIGHT: 66 IN | SYSTOLIC BLOOD PRESSURE: 132 MMHG | BODY MASS INDEX: 27.16 KG/M2 | OXYGEN SATURATION: 98 %

## 2021-08-11 DIAGNOSIS — I45.81 LONG Q-T SYNDROME: Primary | ICD-10-CM

## 2021-08-11 DIAGNOSIS — Z87.898 HISTORY OF SYNCOPE: ICD-10-CM

## 2021-08-11 DIAGNOSIS — J44.9 CHRONIC OBSTRUCTIVE PULMONARY DISEASE, UNSPECIFIED COPD TYPE: ICD-10-CM

## 2021-08-11 DIAGNOSIS — F41.9 ANXIETY: ICD-10-CM

## 2021-08-11 DIAGNOSIS — I10 HYPERTENSION, UNSPECIFIED TYPE: ICD-10-CM

## 2021-08-11 DIAGNOSIS — G47.33 OSA (OBSTRUCTIVE SLEEP APNEA): ICD-10-CM

## 2021-08-11 PROCEDURE — 99214 OFFICE O/P EST MOD 30 MIN: CPT | Mod: S$GLB,,, | Performed by: INTERNAL MEDICINE

## 2021-08-11 PROCEDURE — 99214 PR OFFICE/OUTPT VISIT, EST, LEVL IV, 30-39 MIN: ICD-10-PCS | Mod: S$GLB,,, | Performed by: INTERNAL MEDICINE

## 2021-08-26 ENCOUNTER — OFFICE VISIT (OUTPATIENT)
Dept: PSYCHIATRY | Facility: CLINIC | Age: 61
End: 2021-08-26
Payer: OTHER GOVERNMENT

## 2021-08-26 VITALS
BODY MASS INDEX: 26.42 KG/M2 | HEART RATE: 71 BPM | HEIGHT: 66 IN | DIASTOLIC BLOOD PRESSURE: 74 MMHG | SYSTOLIC BLOOD PRESSURE: 139 MMHG | WEIGHT: 164.38 LBS

## 2021-08-26 DIAGNOSIS — F33.1 MODERATE EPISODE OF RECURRENT MAJOR DEPRESSIVE DISORDER: ICD-10-CM

## 2021-08-26 DIAGNOSIS — F41.1 GAD (GENERALIZED ANXIETY DISORDER): Primary | ICD-10-CM

## 2021-08-26 PROCEDURE — 99999 PR PBB SHADOW E&M-EST. PATIENT-LVL V: ICD-10-PCS | Mod: PBBFAC,,, | Performed by: PHYSICIAN ASSISTANT

## 2021-08-26 PROCEDURE — 99999 PR PBB SHADOW E&M-EST. PATIENT-LVL V: CPT | Mod: PBBFAC,,, | Performed by: PHYSICIAN ASSISTANT

## 2021-08-26 PROCEDURE — 99215 OFFICE O/P EST HI 40 MIN: CPT | Mod: PBBFAC,PN | Performed by: PHYSICIAN ASSISTANT

## 2021-08-26 PROCEDURE — 90792 PR PSYCHIATRIC DIAGNOSTIC EVALUATION W/MEDICAL SERVICES: ICD-10-PCS | Mod: ,,, | Performed by: PHYSICIAN ASSISTANT

## 2021-08-26 PROCEDURE — 90792 PSYCH DIAG EVAL W/MED SRVCS: CPT | Mod: ,,, | Performed by: PHYSICIAN ASSISTANT

## 2021-08-26 RX ORDER — ALPRAZOLAM 0.5 MG/1
0.5 TABLET ORAL 2 TIMES DAILY PRN
Qty: 60 TABLET | Refills: 0 | Status: SHIPPED | OUTPATIENT
Start: 2021-08-26 | End: 2021-09-29 | Stop reason: SDUPTHER

## 2021-08-27 DIAGNOSIS — Z51.81 MEDICATION MONITORING ENCOUNTER: Primary | ICD-10-CM

## 2021-08-27 RX ORDER — VORTIOXETINE 10 MG/1
10 TABLET, FILM COATED ORAL DAILY
Qty: 30 TABLET | Refills: 1 | Status: SHIPPED | OUTPATIENT
Start: 2021-08-27 | End: 2021-09-29

## 2021-09-02 ENCOUNTER — TELEPHONE (OUTPATIENT)
Dept: PSYCHIATRY | Facility: CLINIC | Age: 61
End: 2021-09-02

## 2021-09-28 ENCOUNTER — OFFICE VISIT (OUTPATIENT)
Dept: PULMONOLOGY | Facility: CLINIC | Age: 61
End: 2021-09-28
Payer: OTHER GOVERNMENT

## 2021-09-28 VITALS
SYSTOLIC BLOOD PRESSURE: 147 MMHG | HEART RATE: 70 BPM | BODY MASS INDEX: 27.32 KG/M2 | WEIGHT: 170 LBS | DIASTOLIC BLOOD PRESSURE: 78 MMHG | OXYGEN SATURATION: 98 % | HEIGHT: 66 IN

## 2021-09-28 DIAGNOSIS — J44.9 CHRONIC OBSTRUCTIVE PULMONARY DISEASE, UNSPECIFIED COPD TYPE: ICD-10-CM

## 2021-09-28 DIAGNOSIS — J43.2 CENTRILOBULAR EMPHYSEMA: ICD-10-CM

## 2021-09-28 DIAGNOSIS — G47.33 OSA (OBSTRUCTIVE SLEEP APNEA): ICD-10-CM

## 2021-09-28 DIAGNOSIS — Z87.891 PERSONAL HISTORY OF TOBACCO USE, PRESENTING HAZARDS TO HEALTH: Primary | ICD-10-CM

## 2021-09-28 PROCEDURE — 99214 OFFICE O/P EST MOD 30 MIN: CPT | Mod: S$GLB,,, | Performed by: INTERNAL MEDICINE

## 2021-09-28 PROCEDURE — 99214 PR OFFICE/OUTPT VISIT, EST, LEVL IV, 30-39 MIN: ICD-10-PCS | Mod: S$GLB,,, | Performed by: INTERNAL MEDICINE

## 2021-09-28 RX ORDER — UMECLIDINIUM BROMIDE AND VILANTEROL TRIFENATATE 62.5; 25 UG/1; UG/1
1 POWDER RESPIRATORY (INHALATION) DAILY
Qty: 3 EACH | Refills: 4 | Status: SHIPPED | OUTPATIENT
Start: 2021-09-28 | End: 2022-05-26 | Stop reason: SDUPTHER

## 2021-09-29 ENCOUNTER — OFFICE VISIT (OUTPATIENT)
Dept: PSYCHIATRY | Facility: CLINIC | Age: 61
End: 2021-09-29
Payer: OTHER GOVERNMENT

## 2021-09-29 VITALS
WEIGHT: 169.75 LBS | HEART RATE: 66 BPM | DIASTOLIC BLOOD PRESSURE: 70 MMHG | HEIGHT: 66 IN | BODY MASS INDEX: 27.28 KG/M2 | SYSTOLIC BLOOD PRESSURE: 133 MMHG

## 2021-09-29 DIAGNOSIS — F33.1 MODERATE EPISODE OF RECURRENT MAJOR DEPRESSIVE DISORDER: Primary | ICD-10-CM

## 2021-09-29 DIAGNOSIS — F41.1 GAD (GENERALIZED ANXIETY DISORDER): ICD-10-CM

## 2021-09-29 PROCEDURE — 99214 OFFICE O/P EST MOD 30 MIN: CPT | Mod: S$PBB,,, | Performed by: PHYSICIAN ASSISTANT

## 2021-09-29 PROCEDURE — 99999 PR PBB SHADOW E&M-EST. PATIENT-LVL III: CPT | Mod: PBBFAC,,, | Performed by: PHYSICIAN ASSISTANT

## 2021-09-29 PROCEDURE — 99214 PR OFFICE/OUTPT VISIT, EST, LEVL IV, 30-39 MIN: ICD-10-PCS | Mod: S$PBB,,, | Performed by: PHYSICIAN ASSISTANT

## 2021-09-29 PROCEDURE — 99999 PR PBB SHADOW E&M-EST. PATIENT-LVL III: ICD-10-PCS | Mod: PBBFAC,,, | Performed by: PHYSICIAN ASSISTANT

## 2021-09-29 PROCEDURE — 99213 OFFICE O/P EST LOW 20 MIN: CPT | Mod: PBBFAC,PN | Performed by: PHYSICIAN ASSISTANT

## 2021-09-29 RX ORDER — ALPRAZOLAM 0.5 MG/1
0.5 TABLET ORAL 2 TIMES DAILY PRN
Qty: 60 TABLET | Refills: 0 | Status: SHIPPED | OUTPATIENT
Start: 2021-09-29 | End: 2021-10-29 | Stop reason: SDUPTHER

## 2021-10-04 ENCOUNTER — OFFICE VISIT (OUTPATIENT)
Dept: PSYCHIATRY | Facility: CLINIC | Age: 61
End: 2021-10-04
Payer: OTHER GOVERNMENT

## 2021-10-04 DIAGNOSIS — F33.1 MODERATE EPISODE OF RECURRENT MAJOR DEPRESSIVE DISORDER: ICD-10-CM

## 2021-10-04 PROCEDURE — 99999 PR PBB SHADOW E&M-EST. PATIENT-LVL II: ICD-10-PCS | Mod: PBBFAC,,, | Performed by: SOCIAL WORKER

## 2021-10-04 PROCEDURE — 99212 OFFICE O/P EST SF 10 MIN: CPT | Mod: PBBFAC,PN | Performed by: SOCIAL WORKER

## 2021-10-04 PROCEDURE — 90791 PSYCH DIAGNOSTIC EVALUATION: CPT | Mod: ,,, | Performed by: SOCIAL WORKER

## 2021-10-04 PROCEDURE — 99999 PR PBB SHADOW E&M-EST. PATIENT-LVL II: CPT | Mod: PBBFAC,,, | Performed by: SOCIAL WORKER

## 2021-10-04 PROCEDURE — 90791 PR PSYCHIATRIC DIAGNOSTIC EVALUATION: ICD-10-PCS | Mod: ,,, | Performed by: SOCIAL WORKER

## 2021-10-05 ENCOUNTER — HOSPITAL ENCOUNTER (OUTPATIENT)
Dept: RADIOLOGY | Facility: HOSPITAL | Age: 61
Discharge: HOME | End: 2021-10-05
Attending: INTERNAL MEDICINE
Payer: OTHER GOVERNMENT

## 2021-10-05 ENCOUNTER — TELEPHONE (OUTPATIENT)
Dept: PULMONOLOGY | Facility: CLINIC | Age: 61
End: 2021-10-05

## 2021-10-05 DIAGNOSIS — Z87.891 PERSONAL HISTORY OF TOBACCO USE, PRESENTING HAZARDS TO HEALTH: ICD-10-CM

## 2021-10-05 PROCEDURE — 71271 CT THORAX LUNG CANCER SCR C-: CPT | Mod: TC,PO

## 2021-10-29 ENCOUNTER — OFFICE VISIT (OUTPATIENT)
Dept: PSYCHIATRY | Facility: CLINIC | Age: 61
End: 2021-10-29
Payer: OTHER GOVERNMENT

## 2021-10-29 VITALS
BODY MASS INDEX: 27.46 KG/M2 | HEART RATE: 68 BPM | SYSTOLIC BLOOD PRESSURE: 138 MMHG | HEIGHT: 66 IN | WEIGHT: 170.88 LBS | DIASTOLIC BLOOD PRESSURE: 71 MMHG

## 2021-10-29 DIAGNOSIS — F33.1 MODERATE EPISODE OF RECURRENT MAJOR DEPRESSIVE DISORDER: Primary | ICD-10-CM

## 2021-10-29 DIAGNOSIS — F41.1 GAD (GENERALIZED ANXIETY DISORDER): ICD-10-CM

## 2021-10-29 PROCEDURE — 99214 OFFICE O/P EST MOD 30 MIN: CPT | Mod: S$PBB,,, | Performed by: PHYSICIAN ASSISTANT

## 2021-10-29 PROCEDURE — 99999 PR PBB SHADOW E&M-EST. PATIENT-LVL III: CPT | Mod: PBBFAC,,, | Performed by: PHYSICIAN ASSISTANT

## 2021-10-29 PROCEDURE — 99213 OFFICE O/P EST LOW 20 MIN: CPT | Mod: PBBFAC,PN | Performed by: PHYSICIAN ASSISTANT

## 2021-10-29 PROCEDURE — 99999 PR PBB SHADOW E&M-EST. PATIENT-LVL III: ICD-10-PCS | Mod: PBBFAC,,, | Performed by: PHYSICIAN ASSISTANT

## 2021-10-29 PROCEDURE — 99214 PR OFFICE/OUTPT VISIT, EST, LEVL IV, 30-39 MIN: ICD-10-PCS | Mod: S$PBB,,, | Performed by: PHYSICIAN ASSISTANT

## 2021-10-29 RX ORDER — ALPRAZOLAM 0.5 MG/1
0.5 TABLET ORAL 2 TIMES DAILY PRN
Qty: 60 TABLET | Refills: 0 | Status: SHIPPED | OUTPATIENT
Start: 2021-10-29 | End: 2021-11-30 | Stop reason: SDUPTHER

## 2021-11-09 ENCOUNTER — OFFICE VISIT (OUTPATIENT)
Dept: PSYCHIATRY | Facility: CLINIC | Age: 61
End: 2021-11-09
Payer: OTHER GOVERNMENT

## 2021-11-09 DIAGNOSIS — F41.1 GAD (GENERALIZED ANXIETY DISORDER): Primary | ICD-10-CM

## 2021-11-09 DIAGNOSIS — F33.1 MODERATE EPISODE OF RECURRENT MAJOR DEPRESSIVE DISORDER: ICD-10-CM

## 2021-11-09 PROCEDURE — 99999 PR PBB SHADOW E&M-EST. PATIENT-LVL I: CPT | Mod: PBBFAC,,, | Performed by: SOCIAL WORKER

## 2021-11-09 PROCEDURE — 99999 PR PBB SHADOW E&M-EST. PATIENT-LVL I: ICD-10-PCS | Mod: PBBFAC,,, | Performed by: SOCIAL WORKER

## 2021-11-09 PROCEDURE — 99211 OFF/OP EST MAY X REQ PHY/QHP: CPT | Mod: PBBFAC,PN | Performed by: SOCIAL WORKER

## 2021-11-09 PROCEDURE — 90837 PR PSYCHOTHERAPY W/PATIENT, 60 MIN: ICD-10-PCS | Mod: ,,, | Performed by: SOCIAL WORKER

## 2021-11-09 PROCEDURE — 90837 PSYTX W PT 60 MINUTES: CPT | Mod: ,,, | Performed by: SOCIAL WORKER

## 2021-11-23 ENCOUNTER — OFFICE VISIT (OUTPATIENT)
Dept: PSYCHIATRY | Facility: CLINIC | Age: 61
End: 2021-11-23
Payer: OTHER GOVERNMENT

## 2021-11-23 DIAGNOSIS — F33.1 MODERATE EPISODE OF RECURRENT MAJOR DEPRESSIVE DISORDER: ICD-10-CM

## 2021-11-23 DIAGNOSIS — F41.1 GAD (GENERALIZED ANXIETY DISORDER): Primary | ICD-10-CM

## 2021-11-23 PROCEDURE — 99999 PR PBB SHADOW E&M-EST. PATIENT-LVL I: ICD-10-PCS | Mod: PBBFAC,,, | Performed by: SOCIAL WORKER

## 2021-11-23 PROCEDURE — 99999 PR PBB SHADOW E&M-EST. PATIENT-LVL I: CPT | Mod: PBBFAC,,, | Performed by: SOCIAL WORKER

## 2021-11-23 PROCEDURE — 90834 PR PSYCHOTHERAPY W/PATIENT, 45 MIN: ICD-10-PCS | Mod: ,,, | Performed by: SOCIAL WORKER

## 2021-11-23 PROCEDURE — 99211 OFF/OP EST MAY X REQ PHY/QHP: CPT | Mod: PBBFAC,PN | Performed by: SOCIAL WORKER

## 2021-11-23 PROCEDURE — 90834 PSYTX W PT 45 MINUTES: CPT | Mod: ,,, | Performed by: SOCIAL WORKER

## 2021-11-30 DIAGNOSIS — F41.1 GAD (GENERALIZED ANXIETY DISORDER): ICD-10-CM

## 2021-11-30 RX ORDER — ALPRAZOLAM 0.5 MG/1
0.5 TABLET ORAL 2 TIMES DAILY PRN
Qty: 60 TABLET | Refills: 0 | Status: SHIPPED | OUTPATIENT
Start: 2021-11-30 | End: 2021-12-29 | Stop reason: SDUPTHER

## 2021-12-01 ENCOUNTER — OFFICE VISIT (OUTPATIENT)
Dept: FAMILY MEDICINE | Facility: CLINIC | Age: 61
End: 2021-12-01
Payer: OTHER GOVERNMENT

## 2021-12-01 VITALS
SYSTOLIC BLOOD PRESSURE: 120 MMHG | DIASTOLIC BLOOD PRESSURE: 80 MMHG | BODY MASS INDEX: 27.16 KG/M2 | HEART RATE: 81 BPM | OXYGEN SATURATION: 95 % | TEMPERATURE: 98 F | WEIGHT: 169 LBS | HEIGHT: 66 IN

## 2021-12-01 DIAGNOSIS — E55.9 VITAMIN D DEFICIENCY: ICD-10-CM

## 2021-12-01 DIAGNOSIS — Z23 NEED FOR INFLUENZA VACCINATION: ICD-10-CM

## 2021-12-01 DIAGNOSIS — Z12.31 ENCOUNTER FOR SCREENING MAMMOGRAM FOR BREAST CANCER: ICD-10-CM

## 2021-12-01 DIAGNOSIS — I10 ESSENTIAL HYPERTENSION: Primary | ICD-10-CM

## 2021-12-01 DIAGNOSIS — M70.62 TROCHANTERIC BURSITIS OF LEFT HIP: ICD-10-CM

## 2021-12-01 DIAGNOSIS — E78.00 PURE HYPERCHOLESTEROLEMIA: ICD-10-CM

## 2021-12-01 PROCEDURE — 99214 OFFICE O/P EST MOD 30 MIN: CPT | Mod: 25,S$GLB,, | Performed by: NURSE PRACTITIONER

## 2021-12-01 PROCEDURE — 90471 FLU VACCINE (QUAD) GREATER THAN OR EQUAL TO 3YO PRESERVATIVE FREE IM: ICD-10-PCS | Mod: S$GLB,,, | Performed by: NURSE PRACTITIONER

## 2021-12-01 PROCEDURE — 90686 IIV4 VACC NO PRSV 0.5 ML IM: CPT | Mod: S$GLB,,, | Performed by: NURSE PRACTITIONER

## 2021-12-01 PROCEDURE — 90471 IMMUNIZATION ADMIN: CPT | Mod: S$GLB,,, | Performed by: NURSE PRACTITIONER

## 2021-12-01 PROCEDURE — 90686 FLU VACCINE (QUAD) GREATER THAN OR EQUAL TO 3YO PRESERVATIVE FREE IM: ICD-10-PCS | Mod: S$GLB,,, | Performed by: NURSE PRACTITIONER

## 2021-12-01 PROCEDURE — 99214 PR OFFICE/OUTPT VISIT, EST, LEVL IV, 30-39 MIN: ICD-10-PCS | Mod: 25,S$GLB,, | Performed by: NURSE PRACTITIONER

## 2021-12-01 RX ORDER — AMLODIPINE AND BENAZEPRIL HYDROCHLORIDE 5; 10 MG/1; MG/1
1 CAPSULE ORAL DAILY
Qty: 90 CAPSULE | Refills: 1 | Status: SHIPPED | OUTPATIENT
Start: 2021-12-01 | End: 2022-02-08 | Stop reason: SDUPTHER

## 2021-12-01 RX ORDER — MELOXICAM 15 MG/1
15 TABLET ORAL DAILY
Qty: 14 TABLET | Refills: 0 | Status: SHIPPED | OUTPATIENT
Start: 2021-12-01 | End: 2022-01-05

## 2021-12-01 RX ORDER — DICLOFENAC SODIUM 10 MG/G
4 GEL TOPICAL 4 TIMES DAILY
Qty: 100 G | Refills: 0 | Status: SHIPPED | OUTPATIENT
Start: 2021-12-01 | End: 2022-04-06

## 2021-12-01 RX ORDER — ATORVASTATIN CALCIUM 10 MG/1
10 TABLET, FILM COATED ORAL DAILY
Qty: 90 TABLET | Refills: 1 | Status: SHIPPED | OUTPATIENT
Start: 2021-12-01 | End: 2022-03-02 | Stop reason: SDUPTHER

## 2021-12-07 ENCOUNTER — OFFICE VISIT (OUTPATIENT)
Dept: PSYCHIATRY | Facility: CLINIC | Age: 61
End: 2021-12-07
Payer: OTHER GOVERNMENT

## 2021-12-07 DIAGNOSIS — F33.1 MODERATE EPISODE OF RECURRENT MAJOR DEPRESSIVE DISORDER: ICD-10-CM

## 2021-12-07 DIAGNOSIS — F41.1 GAD (GENERALIZED ANXIETY DISORDER): Primary | ICD-10-CM

## 2021-12-07 PROCEDURE — 99211 OFF/OP EST MAY X REQ PHY/QHP: CPT | Mod: PBBFAC,PN | Performed by: SOCIAL WORKER

## 2021-12-07 PROCEDURE — 90834 PR PSYCHOTHERAPY W/PATIENT, 45 MIN: ICD-10-PCS | Mod: ,,, | Performed by: SOCIAL WORKER

## 2021-12-07 PROCEDURE — 99999 PR PBB SHADOW E&M-EST. PATIENT-LVL I: CPT | Mod: PBBFAC,,, | Performed by: SOCIAL WORKER

## 2021-12-07 PROCEDURE — 99999 PR PBB SHADOW E&M-EST. PATIENT-LVL I: ICD-10-PCS | Mod: PBBFAC,,, | Performed by: SOCIAL WORKER

## 2021-12-07 PROCEDURE — 90834 PSYTX W PT 45 MINUTES: CPT | Mod: ,,, | Performed by: SOCIAL WORKER

## 2021-12-09 ENCOUNTER — HOSPITAL ENCOUNTER (OUTPATIENT)
Dept: RADIOLOGY | Facility: HOSPITAL | Age: 61
Discharge: HOME | End: 2021-12-09
Attending: NURSE PRACTITIONER
Payer: OTHER GOVERNMENT

## 2021-12-09 ENCOUNTER — TELEPHONE (OUTPATIENT)
Dept: FAMILY MEDICINE | Facility: CLINIC | Age: 61
End: 2021-12-09
Payer: OTHER GOVERNMENT

## 2021-12-09 DIAGNOSIS — Z12.31 ENCOUNTER FOR SCREENING MAMMOGRAM FOR BREAST CANCER: ICD-10-CM

## 2021-12-09 DIAGNOSIS — N63.10 BREAST MASS, RIGHT: Primary | ICD-10-CM

## 2021-12-09 DIAGNOSIS — R92.8 ABNORMAL MAMMOGRAM: ICD-10-CM

## 2021-12-09 PROCEDURE — 77067 SCR MAMMO BI INCL CAD: CPT | Mod: TC,PO

## 2021-12-20 ENCOUNTER — HOSPITAL ENCOUNTER (OUTPATIENT)
Dept: RADIOLOGY | Facility: HOSPITAL | Age: 61
Discharge: HOME | End: 2021-12-20
Attending: NURSE PRACTITIONER
Payer: OTHER GOVERNMENT

## 2021-12-20 ENCOUNTER — TELEPHONE (OUTPATIENT)
Dept: FAMILY MEDICINE | Facility: CLINIC | Age: 61
End: 2021-12-20
Payer: OTHER GOVERNMENT

## 2021-12-20 DIAGNOSIS — R92.8 ABNORMAL MAMMOGRAM: ICD-10-CM

## 2021-12-20 PROCEDURE — 77061 BREAST TOMOSYNTHESIS UNI: CPT | Mod: TC,PO,RT

## 2021-12-21 ENCOUNTER — OFFICE VISIT (OUTPATIENT)
Dept: PSYCHIATRY | Facility: CLINIC | Age: 61
End: 2021-12-21
Payer: OTHER GOVERNMENT

## 2021-12-21 DIAGNOSIS — F33.1 MODERATE EPISODE OF RECURRENT MAJOR DEPRESSIVE DISORDER: ICD-10-CM

## 2021-12-21 DIAGNOSIS — F41.1 GAD (GENERALIZED ANXIETY DISORDER): Primary | ICD-10-CM

## 2021-12-21 PROCEDURE — 99999 PR PBB SHADOW E&M-EST. PATIENT-LVL I: ICD-10-PCS | Mod: PBBFAC,,, | Performed by: SOCIAL WORKER

## 2021-12-21 PROCEDURE — 90834 PSYTX W PT 45 MINUTES: CPT | Mod: ,,, | Performed by: SOCIAL WORKER

## 2021-12-21 PROCEDURE — 90834 PR PSYCHOTHERAPY W/PATIENT, 45 MIN: ICD-10-PCS | Mod: ,,, | Performed by: SOCIAL WORKER

## 2021-12-21 PROCEDURE — 99211 OFF/OP EST MAY X REQ PHY/QHP: CPT | Mod: PBBFAC,PN | Performed by: SOCIAL WORKER

## 2021-12-21 PROCEDURE — 99999 PR PBB SHADOW E&M-EST. PATIENT-LVL I: CPT | Mod: PBBFAC,,, | Performed by: SOCIAL WORKER

## 2021-12-29 DIAGNOSIS — F41.1 GAD (GENERALIZED ANXIETY DISORDER): ICD-10-CM

## 2021-12-29 RX ORDER — ALPRAZOLAM 0.5 MG/1
0.5 TABLET ORAL 2 TIMES DAILY PRN
Qty: 60 TABLET | Refills: 0 | Status: SHIPPED | OUTPATIENT
Start: 2021-12-29 | End: 2022-02-02 | Stop reason: SDUPTHER

## 2022-01-05 ENCOUNTER — OFFICE VISIT (OUTPATIENT)
Dept: PSYCHIATRY | Facility: CLINIC | Age: 62
End: 2022-01-05
Payer: OTHER GOVERNMENT

## 2022-01-05 VITALS
SYSTOLIC BLOOD PRESSURE: 125 MMHG | WEIGHT: 171.5 LBS | HEIGHT: 66 IN | BODY MASS INDEX: 27.56 KG/M2 | DIASTOLIC BLOOD PRESSURE: 76 MMHG | HEART RATE: 65 BPM

## 2022-01-05 DIAGNOSIS — F33.1 MODERATE EPISODE OF RECURRENT MAJOR DEPRESSIVE DISORDER: ICD-10-CM

## 2022-01-05 DIAGNOSIS — F41.1 GAD (GENERALIZED ANXIETY DISORDER): Primary | ICD-10-CM

## 2022-01-05 PROCEDURE — 99999 PR PBB SHADOW E&M-EST. PATIENT-LVL III: CPT | Mod: PBBFAC,,, | Performed by: PHYSICIAN ASSISTANT

## 2022-01-05 PROCEDURE — 99214 OFFICE O/P EST MOD 30 MIN: CPT | Mod: S$PBB,,, | Performed by: PHYSICIAN ASSISTANT

## 2022-01-05 PROCEDURE — 99213 OFFICE O/P EST LOW 20 MIN: CPT | Mod: PBBFAC,PN | Performed by: PHYSICIAN ASSISTANT

## 2022-01-05 PROCEDURE — 99999 PR PBB SHADOW E&M-EST. PATIENT-LVL III: ICD-10-PCS | Mod: PBBFAC,,, | Performed by: PHYSICIAN ASSISTANT

## 2022-01-05 PROCEDURE — 99214 PR OFFICE/OUTPT VISIT, EST, LEVL IV, 30-39 MIN: ICD-10-PCS | Mod: S$PBB,,, | Performed by: PHYSICIAN ASSISTANT
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
## 2022-01-27 ENCOUNTER — TELEPHONE (OUTPATIENT)
Dept: SMOKING CESSATION | Facility: CLINIC | Age: 62
End: 2022-01-27
Payer: OTHER GOVERNMENT

## 2022-01-28 ENCOUNTER — OFFICE VISIT (OUTPATIENT)
Dept: PSYCHIATRY | Facility: CLINIC | Age: 62
End: 2022-01-28
Payer: OTHER GOVERNMENT

## 2022-01-28 DIAGNOSIS — F33.1 MODERATE EPISODE OF RECURRENT MAJOR DEPRESSIVE DISORDER: ICD-10-CM

## 2022-01-28 DIAGNOSIS — F41.1 GAD (GENERALIZED ANXIETY DISORDER): Primary | ICD-10-CM

## 2022-01-28 PROCEDURE — 90834 PSYTX W PT 45 MINUTES: CPT | Mod: ,,, | Performed by: SOCIAL WORKER

## 2022-01-28 PROCEDURE — 99999 PR PBB SHADOW E&M-EST. PATIENT-LVL II: CPT | Mod: PBBFAC,,, | Performed by: SOCIAL WORKER

## 2022-01-28 PROCEDURE — 99999 PR PBB SHADOW E&M-EST. PATIENT-LVL II: ICD-10-PCS | Mod: PBBFAC,,, | Performed by: SOCIAL WORKER

## 2022-01-28 PROCEDURE — 90834 PR PSYCHOTHERAPY W/PATIENT, 45 MIN: ICD-10-PCS | Mod: ,,, | Performed by: SOCIAL WORKER

## 2022-01-28 PROCEDURE — 99212 OFFICE O/P EST SF 10 MIN: CPT | Mod: PBBFAC,PN | Performed by: SOCIAL WORKER

## 2022-01-28 NOTE — PROGRESS NOTES
Individual Psychotherapy (PhD/LCSW)    1/28/2022    Site:  [facility]         Therapeutic Intervention: Met with patient.  Outpatient - Insight oriented psychotherapy 45 min - CPT code 45102, Outpatient - Behavior modifying psychotherapy 45 min - CPT code 64385 and Outpatient - Supportive psychotherapy 45 min - CPT Code 30511    Chief complaint/reason for encounter: depression and anxiety             Interval history and content of current session:  Client was referred to treatment by Apryl REHMAN to address depression and anxiety.  Client arrived to session 10 minutes late but was fully engaged.  Client shared that she has been having a rough time.  She has been stressed with school, her sister's failing health, and still mourning her father in law.   and client processed client's emotions and her insight as to what is going on in her life.  Client shared that she had a fear of becoming like her mother and her sister regarding giving up on things.   encouraged client to focus on the reality of the situation and to look at where she is in her life versus where her sister and her mother were in their life at client's age.  Client reported that she did not look at it that way.  Client to continue in one-to-one sessions.    Treatment plan:  · Target symptoms: depression, anxiety   · Why chosen therapy is appropriate versus another modality: relevant to diagnosis, patient responds to this modality, evidence based practice  · Outcome monitoring methods: self-report  · Therapeutic intervention type: insight oriented psychotherapy, behavior modifying psychotherapy, supportive psychotherapy    Risk parameters:  Patient reports no suicidal ideation  Patient reports no homicidal ideation  Patient reports no self-injurious behavior  Patient reports no violent behavior          CSSRS was completed: No risk for suicide    Verbal deficits: None    Patient's response to intervention:  The patient's response to  intervention is accepting.    Progress toward goals and other mental status changes:  The patient's progress toward goals is fair , good.    Diagnosis:     ICD-10-CM ICD-9-CM   1. VANDANA (generalized anxiety disorder)  F41.1 300.02   2. Moderate episode of recurrent major depressive disorder  F33.1 296.32       Plan:  individual psychotherapy and Ct to continue to see Apryl SHERIE for psych med mgt Pt to go to ED or call 911 if symptoms worsen or if she has thoughts of harming self and/or others. Pt verbalized understanding.    Return to clinic: as scheduled    Length of Service (minutes): 45      Each patient to whom he or she provides medical services by telemedicine is: (1) informed of the relationship between the physician and patient and the respective role of any other health care provider with respect to management of the patient; and (2) notified that he or she may decline to receive medical services by telemedicine and may withdraw from such care at any time.

## 2022-02-02 DIAGNOSIS — F41.1 GAD (GENERALIZED ANXIETY DISORDER): ICD-10-CM

## 2022-02-02 RX ORDER — ALPRAZOLAM 0.5 MG/1
0.5 TABLET ORAL 2 TIMES DAILY PRN
Qty: 60 TABLET | Refills: 0 | Status: SHIPPED | OUTPATIENT
Start: 2022-02-02 | End: 2022-03-03 | Stop reason: SDUPTHER

## 2022-02-02 NOTE — TELEPHONE ENCOUNTER
Pt called requesting a refill on alprazolam 0.5 mg  Last refill: 12/29  Last visit: 1/05  Follow up: 4/06

## 2022-02-08 DIAGNOSIS — I10 ESSENTIAL HYPERTENSION: ICD-10-CM

## 2022-02-08 RX ORDER — AMLODIPINE AND BENAZEPRIL HYDROCHLORIDE 5; 10 MG/1; MG/1
1 CAPSULE ORAL DAILY
Qty: 90 CAPSULE | Refills: 1 | Status: SHIPPED | OUTPATIENT
Start: 2022-02-08 | End: 2022-06-01 | Stop reason: SDUPTHER

## 2022-02-11 ENCOUNTER — OFFICE VISIT (OUTPATIENT)
Dept: PSYCHIATRY | Facility: CLINIC | Age: 62
End: 2022-02-11
Payer: OTHER GOVERNMENT

## 2022-02-11 DIAGNOSIS — F41.1 GAD (GENERALIZED ANXIETY DISORDER): Primary | ICD-10-CM

## 2022-02-11 DIAGNOSIS — F33.1 MODERATE EPISODE OF RECURRENT MAJOR DEPRESSIVE DISORDER: ICD-10-CM

## 2022-02-11 PROCEDURE — 99999 PR PBB SHADOW E&M-EST. PATIENT-LVL II: CPT | Mod: PBBFAC,,, | Performed by: SOCIAL WORKER

## 2022-02-11 PROCEDURE — 99212 OFFICE O/P EST SF 10 MIN: CPT | Mod: PBBFAC,PN | Performed by: SOCIAL WORKER

## 2022-02-11 PROCEDURE — 90834 PSYTX W PT 45 MINUTES: CPT | Mod: ,,, | Performed by: SOCIAL WORKER

## 2022-02-11 PROCEDURE — 99999 PR PBB SHADOW E&M-EST. PATIENT-LVL II: ICD-10-PCS | Mod: PBBFAC,,, | Performed by: SOCIAL WORKER

## 2022-02-11 PROCEDURE — 90834 PR PSYCHOTHERAPY W/PATIENT, 45 MIN: ICD-10-PCS | Mod: ,,, | Performed by: SOCIAL WORKER

## 2022-02-11 NOTE — PROGRESS NOTES
Individual Psychotherapy (PhD/LCSW)    2/11/2022    Site:  [facility]         Therapeutic Intervention: Met with patient.  Outpatient - Insight oriented psychotherapy 45 min - CPT code 40338, Outpatient - Behavior modifying psychotherapy 45 min - CPT code 25915 and Outpatient - Supportive psychotherapy 45 min - CPT Code 08288    Chief complaint/reason for encounter: depression, anxiety and job and family stress             Interval history and content of current session:  Client was referred to treatment by Apryl REHMAN to address depression and anxiety.  Client arrived to session and was fully engaged.  Client continues to share about stress at work and stress at home.  She reported that she her  do not see eye to eye on a lot of things and that she feels that he does not show an of compassion or empathy.  Client reports that she often thinks about if she is going to stay in her marriage or not.  Client shared that she passed by her estranged son's house and saw a moving van in the yd.  Client was sad by this as the relationship continues to be damaged.  Client reported that she is still on strong in her daniel the  does not believe in spirituality or Gnosticist.  Client to continue in one-to-one sessions.    Treatment plan:  · Target symptoms: depression, anxiety , work stress, family stress  · Why chosen therapy is appropriate versus another modality: relevant to diagnosis, patient responds to this modality, evidence based practice  · Outcome monitoring methods: self-report  · Therapeutic intervention type: insight oriented psychotherapy, behavior modifying psychotherapy, supportive psychotherapy    Risk parameters:  Patient reports no suicidal ideation  Patient reports no homicidal ideation  Patient reports no self-injurious behavior  Patient reports no violent behavior          CSSRS was completed: No risk    Verbal deficits: None    Patient's response to intervention:  The patient's response to intervention  is accepting.    Progress toward goals and other mental status changes:  The patient's progress toward goals is fair , good.    Diagnosis:     ICD-10-CM ICD-9-CM   1. VANDANA (generalized anxiety disorder)  F41.1 300.02   2. Moderate episode of recurrent major depressive disorder  F33.1 296.32       Plan:  individual psychotherapy and Ct to continue to see Apryl REHMAN for psych med mgt Pt to go to ED or call 911 if symptoms worsen or if she has thoughts of harming self and/or others. Pt verbalized understanding.    Return to clinic: as scheduled    Length of Service (minutes): 45      Each patient to whom he or she provides medical services by telemedicine is: (1) informed of the relationship between the physician and patient and the respective role of any other health care provider with respect to management of the patient; and (2) notified that he or she may decline to receive medical services by telemedicine and may withdraw from such care at any time.

## 2022-02-16 ENCOUNTER — OFFICE VISIT (OUTPATIENT)
Dept: CARDIOLOGY | Facility: CLINIC | Age: 62
End: 2022-02-16
Payer: OTHER GOVERNMENT

## 2022-02-16 VITALS
OXYGEN SATURATION: 98 % | HEART RATE: 68 BPM | RESPIRATION RATE: 16 BRPM | HEIGHT: 66 IN | SYSTOLIC BLOOD PRESSURE: 126 MMHG | BODY MASS INDEX: 27 KG/M2 | DIASTOLIC BLOOD PRESSURE: 72 MMHG | WEIGHT: 168 LBS

## 2022-02-16 DIAGNOSIS — F41.9 ANXIETY: ICD-10-CM

## 2022-02-16 DIAGNOSIS — Z87.898 HISTORY OF SYNCOPE: ICD-10-CM

## 2022-02-16 DIAGNOSIS — J44.9 CHRONIC OBSTRUCTIVE PULMONARY DISEASE, UNSPECIFIED COPD TYPE: ICD-10-CM

## 2022-02-16 DIAGNOSIS — I10 HYPERTENSION, UNSPECIFIED TYPE: Primary | ICD-10-CM

## 2022-02-16 DIAGNOSIS — G47.33 OSA (OBSTRUCTIVE SLEEP APNEA): ICD-10-CM

## 2022-02-16 PROCEDURE — 99214 OFFICE O/P EST MOD 30 MIN: CPT | Mod: S$GLB,,, | Performed by: NURSE PRACTITIONER

## 2022-02-16 PROCEDURE — 93000 ELECTROCARDIOGRAM COMPLETE: CPT | Mod: S$GLB,,, | Performed by: INTERNAL MEDICINE

## 2022-02-16 PROCEDURE — 99214 PR OFFICE/OUTPT VISIT, EST, LEVL IV, 30-39 MIN: ICD-10-PCS | Mod: S$GLB,,, | Performed by: NURSE PRACTITIONER

## 2022-02-16 PROCEDURE — 93000 EKG 12-LEAD: ICD-10-PCS | Mod: S$GLB,,, | Performed by: INTERNAL MEDICINE

## 2022-02-16 NOTE — PROGRESS NOTES
Subjective:    Patient ID:  Luann Borjas is a 61 y.o. female  Chief Complaint   Patient presents with    Hypertension       HPI:  Patient presents today for follow-up appointment.  Patient denies any chest pain, dizziness, shortness of breath, palpitations, or syncope.  Patient has been doing well and taking medications as ordered.  Denies any falls or head injuries.  Denies any blood in the stool or in the urine.        Review of patient's allergies indicates:   Allergen Reactions    Erythromycin Anaphylaxis    Buspirone     Ciprofloxacin     Levaquin [levofloxacin]     Zithromax z-masoud [azithromycin]        Past Medical History:   Diagnosis Date    Anxiety     Arthritis     Cataract of left eye     Hyperlipidemia     Sleep apnea     cpap     Past Surgical History:   Procedure Laterality Date    BASAL CELL CARCINOMA EXCISION  2019    lip    CATARACT EXTRACTION W/  INTRAOCULAR LENS IMPLANT Right 2019    Procedure: EXTRACTION, CATARACT, WITH IOL INSERTION;  Surgeon: Carmelo Perez II, MD;  Location: [facility];  Service: Ophthalmology;  Laterality: Right;    ECTOPIC PREGNANCY SURGERY      EYE SURGERY Right     cataracts    HYSTERECTOMY       Social History     Tobacco Use    Smoking status: Former Smoker     Packs/day: 0.50     Types: Cigarettes     Start date: 1988     Quit date: 10/25/2020     Years since quittin.3    Smokeless tobacco: Never Used   Substance Use Topics    Alcohol use: Yes     Alcohol/week: 2.0 standard drinks     Types: 2 Glasses of wine per week    Drug use: No     Family History   Problem Relation Age of Onset    COPD Mother     Liver disease Mother     Thyroid disease Mother     COPD Father     Diabetes Father     Hypertension Father     Pacemaker/defibrilator Father     COPD Sister     Hypertension Sister     Hypertension Brother         Review of Systems:   Constitution: Negative for diaphoresis and fever.   HEENT: Negative for nosebleeds.     Cardiovascular: Negative for chest pain       No dyspnea on exertion       No leg swelling        No palpitations  Respiratory: Negative for shortness of breath and wheezing.    Hematologic/Lymphatic: Negative for bleeding problem. Does not bruise/bleed easily.   Skin: Negative for color change and rash.   Musculoskeletal: Negative for falls and myalgias.   Gastrointestinal: Negative for hematemesis and hematochezia.   Genitourinary: Negative for hematuria.   Neurological: Negative for dizziness and light-headedness.   Psychiatric/Behavioral: Negative for altered mental status and memory loss.          Objective:        Vitals:    02/16/22 1621   BP: 126/72   Pulse: 68   Resp: 16       Lab Results   Component Value Date    WBC 5.58 05/31/2021    HGB 13.1 05/31/2021    HCT 38.7 05/31/2021     05/31/2021    CHOL 202 (H) 05/31/2021    TRIG 66 05/31/2021    HDL 81 (H) 05/31/2021    ALT 27 05/31/2021    AST 32 05/31/2021     05/31/2021    K 3.8 05/31/2021     05/31/2021    CREATININE 0.8 05/31/2021    BUN 16 05/31/2021    CO2 26 05/31/2021    TSH 1.790 09/16/2020        ECHOCARDIOGRAM RESULTS  Results for orders placed during the hospital encounter of 02/02/21    Echo Color Flow Doppler? Yes    Interpretation Summary  · The left ventricle is normal in size with concentric remodeling and normal systolic function. The estimated ejection fraction is 70%  · Normal left ventricular diastolic function.  · Normal right ventricular size with normal right ventricular systolic function.  · Mild tricuspid regurgitation.        CURRENT/PREVIOUS VISIT EKG  Results for orders placed or performed in visit on 01/11/21   IN OFFICE EKG 12-LEAD (to [place])    Collection Time: 01/11/21  4:41 PM    Narrative    Test Reason : R55,    Vent. Rate : 064 BPM     Atrial Rate : 064 BPM     P-R Int : 162 ms          QRS Dur : 082 ms      QT Int : 446 ms       P-R-T Axes : 045 061 058 degrees     QTc Int : 460 ms    Normal sinus  rhythm  Cannot rule out Anterior infarct ,age undetermined  Abnormal ECG  When compared with ECG of 26-OCT-2020 08:59,  Questionable change in QRS duration  Confirmed by Ozzy Tobin MD ([id number]) on 1/13/2021 3:57:42 PM    Referred By: TAMIR LOMBARDO           Confirmed By:Ozzy Tobin MD     No valid procedures specified.   Results for orders placed during the hospital encounter of 02/02/21    Nuclear Stress - Cardiology Interpreted    Interpretation Summary    Normal myocardial perfusion scan. There is no evidence of myocardial ischemia or infarction.    The gated perfusion images showed an ejection fraction of 94% post stress. Normal ejection fraction is greater than 53%.    There is normal wall motion at rest and post stress.    LV cavity size is normal at rest and normal at stress.    The EKG portion of this study is negative for ischemia.    The patient reported no chest pain during the stress test.      Physical Exam:  CONSTITUTIONAL: No fever, no chills  HEENT: Normocephalic, atraumatic,pupils reactive to light                 NECK:  No JVD no carotid bruit  CVS: S1S2+, RRR  LUNGS: Clear  ABDOMEN: Soft, NT, BS+  EXTREMITIES: No cyanosis, edema  : No loja catheter  NEURO: AAO X 3  PSY: Normal affect      Medication List with Changes/Refills   Current Medications    ALPRAZOLAM (XANAX) 0.5 MG TABLET    Take 1 tablet (0.5 mg total) by mouth 2 (two) times daily as needed for Anxiety.    AMLODIPINE-BENAZEPRIL 5-10 MG (LOTREL) 5-10 MG PER CAPSULE    Take 1 capsule by mouth once daily.    ATORVASTATIN (LIPITOR) 10 MG TABLET    Take 1 tablet (10 mg total) by mouth once daily.    CALCIUM CARBONATE (OS-MELIA) 500 MG CALCIUM (1,250 MG) CHEWABLE TABLET    Take 1 tablet by mouth once daily.    CHOLECALCIFEROL, VITAMIN D3, (VITAMIN D3) 50 MCG (2,000 UNIT) TAB    Take 1 tablet (2,000 Units total) by mouth once daily.    DICLOFENAC SODIUM (VOLTAREN) 1 % GEL    Apply 4 g topically 4 (four) times daily. for 7 days     UMECLIDINIUM-VILANTEROL (ANORO ELLIPTA) 62.5-25 MCG/ACTUATION DSDV    Inhale 1 puff into the lungs once daily. Controller             Assessment:       1. Hypertension, unspecified type    2. History of syncope    3. CARO (obstructive sleep apnea)    4. Anxiety    5. Chronic obstructive pulmonary disease, unspecified COPD type         Plan:     1. EKG today shows sinus rhythm with no ST-T wave changes noted.   2. She has been doing well overall despite stress at work. Her BP and HR are stable today although she had a hard day at work today.   Continue current medications.   3. Recommend low fat low cholesterol diet and regular exercise.   4. Follow up in about 1 year. May call or return sooner if problems arise.      Problem List Items Addressed This Visit        Unprioritized    Anxiety    CARO (obstructive sleep apnea)    Hypertension - Primary    Relevant Orders    IN OFFICE EKG 12-LEAD (to [place])    Chronic obstructive pulmonary disease    History of syncope    Relevant Orders    IN OFFICE EKG 12-LEAD (to [place])          Follow up in about 1 year (around 2/16/2023).

## 2022-02-25 ENCOUNTER — OFFICE VISIT (OUTPATIENT)
Dept: PSYCHIATRY | Facility: CLINIC | Age: 62
End: 2022-02-25
Payer: OTHER GOVERNMENT

## 2022-02-25 DIAGNOSIS — F41.1 GAD (GENERALIZED ANXIETY DISORDER): Primary | ICD-10-CM

## 2022-02-25 DIAGNOSIS — F33.1 MODERATE EPISODE OF RECURRENT MAJOR DEPRESSIVE DISORDER: ICD-10-CM

## 2022-02-25 PROCEDURE — 99999 PR PBB SHADOW E&M-EST. PATIENT-LVL II: CPT | Mod: PBBFAC,,, | Performed by: SOCIAL WORKER

## 2022-02-25 PROCEDURE — 90834 PSYTX W PT 45 MINUTES: CPT | Mod: S$PBB,,, | Performed by: SOCIAL WORKER

## 2022-02-25 PROCEDURE — 90834 PR PSYCHOTHERAPY W/PATIENT, 45 MIN: ICD-10-PCS | Mod: S$PBB,,, | Performed by: SOCIAL WORKER

## 2022-02-25 PROCEDURE — 99212 OFFICE O/P EST SF 10 MIN: CPT | Mod: PBBFAC,PN | Performed by: SOCIAL WORKER

## 2022-02-25 PROCEDURE — 99999 PR PBB SHADOW E&M-EST. PATIENT-LVL II: ICD-10-PCS | Mod: PBBFAC,,, | Performed by: SOCIAL WORKER

## 2022-02-25 NOTE — PROGRESS NOTES
Individual Psychotherapy (PhD/LCSW)    2/25/2022    Site:  [place]         Therapeutic Intervention: Met with patient.  Outpatient - Insight oriented psychotherapy 45 min - CPT code 57497, Outpatient - Behavior modifying psychotherapy 45 min - CPT code 50635 and Outpatient - Supportive psychotherapy 45 min - CPT Code 55972    Chief complaint/reason for encounter: depression, anxiety and family and work stress             Interval history and content of current session: Client was referred to treatment by Apryl REHMAN to address depression and anxiety.  Client arrived to session and was fully engaged.  Client continues to have stress at home and at work.   and client discussed coping skills for client to use to relax herself prior to going to work.  Client agreed.  She reported that she has started doing more spiritual things to help her and that she does feel a little better.  Social Work and client also processed the importance of acceptance and client had an boundaries.  Client to continue in one-to-one sessions.    Treatment plan:  · Target symptoms: depression, anxiety , work stress, family stress  · Why chosen therapy is appropriate versus another modality: relevant to diagnosis, patient responds to this modality, evidence based practice  · Outcome monitoring methods: self-report  · Therapeutic intervention type: insight oriented psychotherapy, behavior modifying psychotherapy, supportive psychotherapy    Risk parameters:  Patient reports no suicidal ideation  Patient reports no homicidal ideation  Patient reports no self-injurious behavior  Patient reports no violent behavior          CSSRS was completed: No risk    Verbal deficits: None    Patient's response to intervention:  The patient's response to intervention is accepting.    Progress toward goals and other mental status changes:  The patient's progress toward goals is fair , good.    Diagnosis:     ICD-10-CM ICD-9-CM   1. VANDANA (generalized  anxiety disorder)  F41.1 300.02   2. Moderate episode of recurrent major depressive disorder  F33.1 296.32       Plan:  individual psychotherapy and Ct to continue to see Apryl SHERIE for psych med mgt Pt to go to ED or call 911 if symptoms worsen or if she has thoughts of harming self and/or others. Pt verbalized understanding.    Return to clinic: as scheduled    Length of Service (minutes): 45      Each patient to whom he or she provides medical services by telemedicine is: (1) informed of the relationship between the physician and patient and the respective role of any other health care provider with respect to management of the patient; and (2) notified that he or she may decline to receive medical services by telemedicine and may withdraw from such care at any time.

## 2022-03-02 DIAGNOSIS — E78.00 PURE HYPERCHOLESTEROLEMIA: ICD-10-CM

## 2022-03-02 RX ORDER — ATORVASTATIN CALCIUM 10 MG/1
10 TABLET, FILM COATED ORAL DAILY
Qty: 90 TABLET | Refills: 1 | Status: SHIPPED | OUTPATIENT
Start: 2022-03-02 | End: 2022-06-01 | Stop reason: SDUPTHER

## 2022-03-03 DIAGNOSIS — F41.1 GAD (GENERALIZED ANXIETY DISORDER): ICD-10-CM

## 2022-03-03 RX ORDER — ALPRAZOLAM 0.5 MG/1
0.5 TABLET ORAL 2 TIMES DAILY PRN
Qty: 60 TABLET | Refills: 0 | Status: SHIPPED | OUTPATIENT
Start: 2022-03-03 | End: 2022-04-06 | Stop reason: SDUPTHER

## 2022-03-03 NOTE — TELEPHONE ENCOUNTER
Pt is requesting medication refill on ALPRAZolam (XANAX) 0.5 MG tablet  Last refill: 02/02/2022  Last visit: 01/05/22  Follow Up: 04/06/2022    Verified pharmacy correct.

## 2022-03-11 ENCOUNTER — OFFICE VISIT (OUTPATIENT)
Dept: PSYCHIATRY | Facility: CLINIC | Age: 62
End: 2022-03-11
Payer: OTHER GOVERNMENT

## 2022-03-11 DIAGNOSIS — F41.1 GAD (GENERALIZED ANXIETY DISORDER): Primary | ICD-10-CM

## 2022-03-11 DIAGNOSIS — Z63.8 FAMILY CONFLICT: ICD-10-CM

## 2022-03-11 DIAGNOSIS — F33.1 MODERATE EPISODE OF RECURRENT MAJOR DEPRESSIVE DISORDER: ICD-10-CM

## 2022-03-11 PROCEDURE — 99999 PR PBB SHADOW E&M-EST. PATIENT-LVL I: ICD-10-PCS | Mod: PBBFAC,,, | Performed by: SOCIAL WORKER

## 2022-03-11 PROCEDURE — 99211 OFF/OP EST MAY X REQ PHY/QHP: CPT | Mod: PBBFAC,PN | Performed by: SOCIAL WORKER

## 2022-03-11 PROCEDURE — 90834 PSYTX W PT 45 MINUTES: CPT | Mod: ,,, | Performed by: SOCIAL WORKER

## 2022-03-11 PROCEDURE — 90834 PR PSYCHOTHERAPY W/PATIENT, 45 MIN: ICD-10-PCS | Mod: ,,, | Performed by: SOCIAL WORKER

## 2022-03-11 PROCEDURE — 99999 PR PBB SHADOW E&M-EST. PATIENT-LVL I: CPT | Mod: PBBFAC,,, | Performed by: SOCIAL WORKER

## 2022-03-11 SDOH — SOCIAL DETERMINANTS OF HEALTH (SDOH): OTHER SPECIFIED PROBLEMS RELATED TO PRIMARY SUPPORT GROUP: Z63.8

## 2022-03-25 ENCOUNTER — OFFICE VISIT (OUTPATIENT)
Dept: PSYCHIATRY | Facility: CLINIC | Age: 62
End: 2022-03-25
Payer: OTHER GOVERNMENT

## 2022-03-25 DIAGNOSIS — F41.1 GAD (GENERALIZED ANXIETY DISORDER): Primary | ICD-10-CM

## 2022-03-25 DIAGNOSIS — F33.1 MODERATE EPISODE OF RECURRENT MAJOR DEPRESSIVE DISORDER: ICD-10-CM

## 2022-03-25 DIAGNOSIS — Z63.8 FAMILY CONFLICT: ICD-10-CM

## 2022-03-25 PROCEDURE — 90834 PR PSYCHOTHERAPY W/PATIENT, 45 MIN: ICD-10-PCS | Mod: ,,, | Performed by: SOCIAL WORKER

## 2022-03-25 PROCEDURE — 99999 PR PBB SHADOW E&M-EST. PATIENT-LVL II: ICD-10-PCS | Mod: PBBFAC,,, | Performed by: SOCIAL WORKER

## 2022-03-25 PROCEDURE — 99999 PR PBB SHADOW E&M-EST. PATIENT-LVL II: CPT | Mod: PBBFAC,,, | Performed by: SOCIAL WORKER

## 2022-03-25 PROCEDURE — 99212 OFFICE O/P EST SF 10 MIN: CPT | Mod: PBBFAC,PN | Performed by: SOCIAL WORKER

## 2022-03-25 PROCEDURE — 90834 PSYTX W PT 45 MINUTES: CPT | Mod: ,,, | Performed by: SOCIAL WORKER

## 2022-03-25 SDOH — SOCIAL DETERMINANTS OF HEALTH (SDOH): OTHER SPECIFIED PROBLEMS RELATED TO PRIMARY SUPPORT GROUP: Z63.8

## 2022-03-25 NOTE — PROGRESS NOTES
Individual Psychotherapy (PhD/LCSW)    3/25/2022    Site:  [place]         Therapeutic Intervention: Met with patient.  Outpatient - Insight oriented psychotherapy 45 min - CPT code 12276, Outpatient - Behavior modifying psychotherapy 45 min - CPT code 70130 and Outpatient - Supportive psychotherapy 45 min - CPT Code 42138    Chief complaint/reason for encounter: depression, anxiety and family stress and loss             Interval history and content of current session: Ct was referred to tx by Apryl REHMAN to address depression, anxiety, family issues, and work stress. Ct arrived to session 10 minutes late. Ct shared about going to her sister's  and spending time with her brother in law and niece. She reported that she plans to go back to [place] soon but with her . She continues to have stress at work. She is more than likely going to retire after this year.Upon next visit sw and ct will resume with ct working on coping skills to manage distress.     Treatment plan:  · Target symptoms: depression, anxiety , work stress, family stress, loss  · Why chosen therapy is appropriate versus another modality: relevant to diagnosis, patient responds to this modality, evidence based practice  · Outcome monitoring methods: self-report  · Therapeutic intervention type: insight oriented psychotherapy, behavior modifying psychotherapy, supportive psychotherapy    Risk parameters:  Patient reports no suicidal ideation  Patient reports no homicidal ideation  Patient reports no self-injurious behavior  Patient reports no violent behavior          CSSRS was completed:     Verbal deficits: None    Patient's response to intervention:  The patient's response to intervention is accepting.    Progress toward goals and other mental status changes:  The patient's progress toward goals is fair .    Diagnosis:     ICD-10-CM ICD-9-CM   1. VANDANA (generalized anxiety disorder)  F41.1 300.02   2. Moderate episode of recurrent major  depressive disorder  F33.1 296.32   3. Family conflict  Z63.8 V61.9       Plan:  individual psychotherapy and Ct to follow up with Apryl REHMAN for psych med mgt Pt to go to ED or call 911 if symptoms worsen or if she has thoughts of harming self and/or others. Pt verbalized understanding.    Return to clinic: as scheduled    Length of Service (minutes): 45      Each patient to whom he or she provides medical services by telemedicine is: (1) informed of the relationship between the physician and patient and the respective role of any other health care provider with respect to management of the patient; and (2) notified that he or she may decline to receive medical services by telemedicine and may withdraw from such care at any time.

## 2022-03-30 ENCOUNTER — OFFICE VISIT (OUTPATIENT)
Dept: PULMONOLOGY | Facility: CLINIC | Age: 62
End: 2022-03-30
Payer: OTHER GOVERNMENT

## 2022-03-30 VITALS
SYSTOLIC BLOOD PRESSURE: 124 MMHG | DIASTOLIC BLOOD PRESSURE: 70 MMHG | BODY MASS INDEX: 26.47 KG/M2 | HEART RATE: 91 BPM | WEIGHT: 164 LBS | OXYGEN SATURATION: 97 %

## 2022-03-30 DIAGNOSIS — J44.9 CHRONIC OBSTRUCTIVE PULMONARY DISEASE, UNSPECIFIED COPD TYPE: Primary | ICD-10-CM

## 2022-03-30 DIAGNOSIS — G47.33 OSA (OBSTRUCTIVE SLEEP APNEA): ICD-10-CM

## 2022-03-30 PROCEDURE — 99213 OFFICE O/P EST LOW 20 MIN: CPT | Mod: S$GLB,,, | Performed by: NURSE PRACTITIONER

## 2022-03-30 PROCEDURE — 99213 PR OFFICE/OUTPT VISIT, EST, LEVL III, 20-29 MIN: ICD-10-PCS | Mod: S$GLB,,, | Performed by: NURSE PRACTITIONER

## 2022-03-30 RX ORDER — MELOXICAM 15 MG/1
TABLET ORAL
COMMUNITY
Start: 2021-12-01 | End: 2022-04-06

## 2022-03-30 RX ORDER — ALBUTEROL SULFATE 90 UG/1
2 AEROSOL, METERED RESPIRATORY (INHALATION) EVERY 6 HOURS PRN
Qty: 18 G | Refills: 6 | Status: SHIPPED | OUTPATIENT
Start: 2022-03-30 | End: 2024-04-01

## 2022-03-30 RX ORDER — VORTIOXETINE 10 MG/1
TABLET, FILM COATED ORAL
COMMUNITY
Start: 2021-08-27 | End: 2022-05-26

## 2022-03-30 RX ORDER — FLUOXETINE HYDROCHLORIDE 40 MG/1
CAPSULE ORAL
COMMUNITY
Start: 2021-06-17 | End: 2022-04-06

## 2022-03-30 NOTE — PATIENT INSTRUCTIONS
Continue Anoro  Keep Sleeping on CPAP nightly  Ct in October   PFT  Ventolin 2 puffs as needed for shortness of breath

## 2022-03-30 NOTE — PROGRESS NOTES
SUBJECTIVE:    Patient ID: Luann Borjas is a 62 y.o. female.    Chief Complaint: COPD (6 month follow up copd )    The patient returns with no new complaints. She is using Anoro for her COPD.  She is not having any breathing issues. She sleeps on her CPAP every night and feels great benefit from it.  She does not have a rescue inhaler.  Her latest CT of chest was stable.     Past Medical History:   Diagnosis Date    Anxiety     Arthritis     Cataract of left eye     Hyperlipidemia     Sleep apnea     cpap     Past Surgical History:   Procedure Laterality Date    BASAL CELL CARCINOMA EXCISION  09/2019    lip    CATARACT EXTRACTION W/  INTRAOCULAR LENS IMPLANT Right 11/13/2019    Procedure: EXTRACTION, CATARACT, WITH IOL INSERTION;  Surgeon: Carmelo Perez II, MD;  Location: [facility] OR;  Service: Ophthalmology;  Laterality: Right;    ECTOPIC PREGNANCY SURGERY      EYE SURGERY Right     cataracts    HYSTERECTOMY       Family History   Problem Relation Age of Onset    COPD Mother     Liver disease Mother     Thyroid disease Mother     COPD Father     Diabetes Father     Hypertension Father     Pacemaker/defibrilator Father     COPD Sister     Hypertension Sister     Hypertension Brother         Social History:   Marital Status:   Occupation: teacher   Alcohol History:  reports current alcohol use of about 2.0 standard drinks of alcohol per week.  Tobacco History:  reports that she quit smoking about 17 months ago. Her smoking use included cigarettes. She started smoking about 33 years ago. She smoked 0.50 packs per day. She has never used smokeless tobacco.  Drug History:  reports no history of drug use.    Review of patient's allergies indicates:   Allergen Reactions    Erythromycin Anaphylaxis    Buspirone     Ciprofloxacin     Levaquin [levofloxacin]     Zithromax z-masoud [azithromycin]        Current Outpatient Medications   Medication Sig Dispense Refill    ALPRAZolam (XANAX) 0.5 MG  tablet Take 1 tablet (0.5 mg total) by mouth 2 (two) times daily as needed for Anxiety. 60 tablet 0    amlodipine-benazepril 5-10 mg (LOTREL) 5-10 mg per capsule Take 1 capsule by mouth once daily. 90 capsule 1    atorvastatin (LIPITOR) 10 MG tablet Take 1 tablet (10 mg total) by mouth once daily. 90 tablet 1    calcium carbonate (OS-MELIA) 500 mg calcium (1,250 mg) chewable tablet Take 1 tablet by mouth once daily.      cholecalciferol, vitamin D3, (VITAMIN D3) 50 mcg (2,000 unit) Tab Take 1 tablet (2,000 Units total) by mouth once daily. 30 tablet 0    FLUoxetine 40 MG capsule       meloxicam (MOBIC) 15 MG tablet       umeclidinium-vilanteroL (ANORO ELLIPTA) 62.5-25 mcg/actuation DsDv Inhale 1 puff into the lungs once daily. Controller 3 each 4    vortioxetine (TRINTELLIX) 10 mg Tab       diclofenac sodium (VOLTAREN) 1 % Gel Apply 4 g topically 4 (four) times daily. for 7 days 100 g 0     No current facility-administered medications for this visit.         Last PFT: 11/6/20  Moderate obstruction  Last CT:10/27/2021 5 mm nodule seen on previous CT earlier not seen on this one, but too much motion    Review of Systems  General: Feeling well  Eyes: Vision is good.  ENT:  allergies  Heart:: No chest pain or palpitations.  Lungs: no dyspnea, no cough   GI: No Nausea, vomiting, constipation, diarrhea, or reflux.  : Nocturia once sometimes  Musculoskeletal: No joint pain or myalgias.  Skin: No lesions or rashes.  Neuro: No headaches or neuropathy.  Lymph: No edema or adenopathy.  Psych: anxiety and depression  Endo: gaining weight    OBJECTIVE:      /70 (BP Location: Left arm, Patient Position: Sitting)   Pulse 91   Wt 74.4 kg (164 lb)   SpO2 97%   BMI 26.47 kg/m²     Physical Exam  GENERAL: Midaged patient in no distress.  HEENT: Pupils equal and reactive. Extraocular movements intact. Nose intact.      Pharynx moist.  NECK: Supple.   HEART: Regular rate and rhythm. No murmur or gallop  auscultated.  LUNGS: Clear to auscultation and percussion. Lung excursion symmetrical. No change in fremitus. No adventitial noises.  ABDOMEN: Bowel sounds present. Non-tender, no masses palpated.  EXTREMITIES: Normal muscle tone and joint movement, no cyanosis or clubbing.   LYMPHATICS: No adenopathy palpated, no edema.  SKIN: Dry, intact, no lesions.   NEURO: Cranial nerves II-XII intact. Motor strength 5/5 bilaterally, upper and lower extremities.  PSYCH: Appropriate affect.    Assessment:       1. Chronic obstructive pulmonary disease, unspecified COPD type    2. CARO (obstructive sleep apnea)       Plan:       Chronic obstructive pulmonary disease, unspecified COPD type  -     Complete PFT with bronchodilator; Future    CARO (obstructive sleep apnea)         Follow up in about 6 months (around 9/30/2022).      Continue Anoro  Keep Sleeping on CPAP nightly  Ct in October   PFT  Ventolin 2 puffs as needed for shortness of breath

## 2022-04-06 ENCOUNTER — OFFICE VISIT (OUTPATIENT)
Dept: PSYCHIATRY | Facility: CLINIC | Age: 62
End: 2022-04-06
Payer: OTHER GOVERNMENT

## 2022-04-06 VITALS
BODY MASS INDEX: 27.07 KG/M2 | HEIGHT: 66 IN | SYSTOLIC BLOOD PRESSURE: 131 MMHG | DIASTOLIC BLOOD PRESSURE: 77 MMHG | HEART RATE: 67 BPM | WEIGHT: 168.44 LBS

## 2022-04-06 DIAGNOSIS — F41.1 GAD (GENERALIZED ANXIETY DISORDER): ICD-10-CM

## 2022-04-06 DIAGNOSIS — F33.1 MODERATE EPISODE OF RECURRENT MAJOR DEPRESSIVE DISORDER: Primary | ICD-10-CM

## 2022-04-06 PROCEDURE — 90833 PR PSYCHOTHERAPY W/PATIENT W/E&M, 30 MIN (ADD ON): ICD-10-PCS | Mod: ,,, | Performed by: PHYSICIAN ASSISTANT

## 2022-04-06 PROCEDURE — 99999 PR PBB SHADOW E&M-EST. PATIENT-LVL IV: ICD-10-PCS | Mod: PBBFAC,,, | Performed by: PHYSICIAN ASSISTANT

## 2022-04-06 PROCEDURE — 99214 PR OFFICE/OUTPT VISIT, EST, LEVL IV, 30-39 MIN: ICD-10-PCS | Mod: S$PBB,,, | Performed by: PHYSICIAN ASSISTANT

## 2022-04-06 PROCEDURE — 99214 OFFICE O/P EST MOD 30 MIN: CPT | Mod: PBBFAC,PN | Performed by: PHYSICIAN ASSISTANT

## 2022-04-06 PROCEDURE — 99214 OFFICE O/P EST MOD 30 MIN: CPT | Mod: S$PBB,,, | Performed by: PHYSICIAN ASSISTANT

## 2022-04-06 PROCEDURE — 99999 PR PBB SHADOW E&M-EST. PATIENT-LVL IV: CPT | Mod: PBBFAC,,, | Performed by: PHYSICIAN ASSISTANT

## 2022-04-06 PROCEDURE — 90833 PSYTX W PT W E/M 30 MIN: CPT | Mod: ,,, | Performed by: PHYSICIAN ASSISTANT

## 2022-04-06 RX ORDER — ALPRAZOLAM 0.5 MG/1
0.5 TABLET ORAL 2 TIMES DAILY PRN
Qty: 60 TABLET | Refills: 0 | Status: SHIPPED | OUTPATIENT
Start: 2022-04-06 | End: 2022-05-12 | Stop reason: SDUPTHER
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
## 2022-04-11 ENCOUNTER — OFFICE VISIT (OUTPATIENT)
Dept: PSYCHIATRY | Facility: CLINIC | Age: 62
End: 2022-04-11
Payer: OTHER GOVERNMENT

## 2022-04-11 DIAGNOSIS — F33.1 MODERATE EPISODE OF RECURRENT MAJOR DEPRESSIVE DISORDER: ICD-10-CM

## 2022-04-11 DIAGNOSIS — F41.1 GAD (GENERALIZED ANXIETY DISORDER): Primary | ICD-10-CM

## 2022-04-11 DIAGNOSIS — Z63.8 FAMILY CONFLICT: ICD-10-CM

## 2022-04-11 PROCEDURE — 90834 PSYTX W PT 45 MINUTES: CPT | Mod: ,,, | Performed by: SOCIAL WORKER

## 2022-04-11 PROCEDURE — 99212 OFFICE O/P EST SF 10 MIN: CPT | Mod: PBBFAC,PN | Performed by: SOCIAL WORKER

## 2022-04-11 PROCEDURE — 90834 PR PSYCHOTHERAPY W/PATIENT, 45 MIN: ICD-10-PCS | Mod: ,,, | Performed by: SOCIAL WORKER

## 2022-04-11 PROCEDURE — 99999 PR PBB SHADOW E&M-EST. PATIENT-LVL II: ICD-10-PCS | Mod: PBBFAC,,, | Performed by: SOCIAL WORKER

## 2022-04-11 PROCEDURE — 99999 PR PBB SHADOW E&M-EST. PATIENT-LVL II: CPT | Mod: PBBFAC,,, | Performed by: SOCIAL WORKER

## 2022-04-11 SDOH — SOCIAL DETERMINANTS OF HEALTH (SDOH): OTHER SPECIFIED PROBLEMS RELATED TO PRIMARY SUPPORT GROUP: Z63.8

## 2022-04-11 NOTE — PROGRESS NOTES
Individual Psychotherapy (PhD/LCSW)    4/11/2022    Site:  [facility]         Therapeutic Intervention: Met with patient.  Outpatient - Insight oriented psychotherapy 45 min - CPT code 49515, Outpatient - Behavior modifying psychotherapy 45 min - CPT code 28380 and Outpatient - Supportive psychotherapy 45 min - CPT Code 89733    Chief complaint/reason for encounter: depression, anxiety and family and marital issues             Interval history and content of current session:  Client was referred to treatment by Apryl REHMAN to address depression and anxiety.  Client arrived to session on a few minutes late and was fully engaged.  Client shared that she had another blow up with her .  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  Client reported that her  is passive-aggressive and went without speaking to her for 4 days.  She reported then he started communicating with her again like nothing was wrong.  Client reports that she typically cowers down and does not advocate for herself.   and client discussed the importance of advocating for herself, setting boundaries, and being assertive.   and client discussed that this would not happen overnight before client to start practicing.   provided client with boundaries packet to be reviewed at next session.  Client to continue in one-to-one sessions.    Treatment plan:  · Target symptoms: depression, anxiety , family and marital issues  · Why chosen therapy is appropriate versus another modality: relevant to diagnosis, patient responds to this modality, evidence based practice  · Outcome monitoring methods: self-report  · Therapeutic intervention type: insight oriented psychotherapy, behavior modifying psychotherapy, supportive psychotherapy    Risk parameters:  Patient reports no suicidal ideation  Patient reports no homicidal ideation  Patient reports no self-injurious  behavior  Patient reports no violent behavior          CSSRS was completed: No risk    Verbal deficits: None    Patient's response to intervention:  The patient's response to intervention is accepting.    Progress toward goals and other mental status changes:  The patient's progress toward goals is fair , good.    Diagnosis:     ICD-10-CM ICD-9-CM   1. VANDANA (generalized anxiety disorder)  F41.1 300.02   2. Moderate episode of recurrent major depressive disorder  F33.1 296.32   3. Family conflict  Z63.8 V61.9       Plan:  individual psychotherapy and Ct to continue to see Apryl REHMAN for psych med mgt Pt to go to ED or call 911 if symptoms worsen or if she has thoughts of harming self and/or others. Pt verbalized understanding.    Return to clinic: as scheduled    Length of Service (minutes): 45      Each patient to whom he or she provides medical services by telemedicine is: (1) informed of the relationship between the physician and patient and the respective role of any other health care provider with respect to management of the patient; and (2) notified that he or she may decline to receive medical services by telemedicine and may withdraw from such care at any time.

## 2022-04-22 ENCOUNTER — HOSPITAL ENCOUNTER (OUTPATIENT)
Dept: PULMONOLOGY | Facility: HOSPITAL | Age: 62
Discharge: HOME | End: 2022-04-22
Attending: NURSE PRACTITIONER
Payer: OTHER GOVERNMENT

## 2022-04-22 DIAGNOSIS — J44.9 CHRONIC OBSTRUCTIVE PULMONARY DISEASE, UNSPECIFIED COPD TYPE: ICD-10-CM

## 2022-04-22 PROCEDURE — 94727 GAS DIL/WSHOT DETER LNG VOL: CPT

## 2022-04-22 PROCEDURE — 94729 DIFFUSING CAPACITY: CPT

## 2022-04-22 PROCEDURE — 94010 BREATHING CAPACITY TEST: CPT

## 2022-04-25 ENCOUNTER — TELEPHONE (OUTPATIENT)
Dept: PULMONOLOGY | Facility: CLINIC | Age: 62
End: 2022-04-25

## 2022-04-25 ENCOUNTER — OFFICE VISIT (OUTPATIENT)
Dept: PSYCHIATRY | Facility: CLINIC | Age: 62
End: 2022-04-25
Payer: OTHER GOVERNMENT

## 2022-04-25 DIAGNOSIS — F33.1 MODERATE EPISODE OF RECURRENT MAJOR DEPRESSIVE DISORDER: ICD-10-CM

## 2022-04-25 DIAGNOSIS — F41.1 GAD (GENERALIZED ANXIETY DISORDER): Primary | ICD-10-CM

## 2022-04-25 DIAGNOSIS — Z63.8 FAMILY CONFLICT: ICD-10-CM

## 2022-04-25 PROCEDURE — 99999 PR PBB SHADOW E&M-EST. PATIENT-LVL II: ICD-10-PCS | Mod: PBBFAC,,, | Performed by: SOCIAL WORKER

## 2022-04-25 PROCEDURE — 99212 OFFICE O/P EST SF 10 MIN: CPT | Mod: PBBFAC,PN | Performed by: SOCIAL WORKER

## 2022-04-25 PROCEDURE — 99999 PR PBB SHADOW E&M-EST. PATIENT-LVL II: CPT | Mod: PBBFAC,,, | Performed by: SOCIAL WORKER

## 2022-04-25 PROCEDURE — 90834 PSYTX W PT 45 MINUTES: CPT | Mod: ,,, | Performed by: SOCIAL WORKER

## 2022-04-25 PROCEDURE — 90834 PR PSYCHOTHERAPY W/PATIENT, 45 MIN: ICD-10-PCS | Mod: ,,, | Performed by: SOCIAL WORKER

## 2022-04-25 SDOH — SOCIAL DETERMINANTS OF HEALTH (SDOH): OTHER SPECIFIED PROBLEMS RELATED TO PRIMARY SUPPORT GROUP: Z63.8

## 2022-04-25 NOTE — PROGRESS NOTES
Individual Psychotherapy (PhD/LCSW)    4/25/2022    Site:  [facility]         Therapeutic Intervention: Met with patient.  Outpatient - Insight oriented psychotherapy 45 min - CPT code 88023, Outpatient - Behavior modifying psychotherapy 45 min - CPT code 08680 and Outpatient - Supportive psychotherapy 45 min - CPT Code 98108    Chief complaint/reason for encounter: depression, anxiety and family stress             Interval history and content of current session: Ct was referred to tx by Apryl REHMAN to address anxiety, depression, work stress and family stress. Ct arrived to session and was fully engaged. Ct shared that she had a good time with her friends who came in from out of town. She shared that she continues to have grief and distress over her relationships with her  and with her son. SW and ct processed the importance of ct managing her feelings in a healthy way and not trying to rosenbaum or avoid her feelings. SW and ct reviewed coping thoughts and acceptance. Ct was receptive to feedback. Ct to continue in 1:1 sessions.     Treatment plan:  · Target symptoms: depression, anxiety , family stress  · Why chosen therapy is appropriate versus another modality: relevant to diagnosis, patient responds to this modality, evidence based practice  · Outcome monitoring methods: self-report  · Therapeutic intervention type: insight oriented psychotherapy, behavior modifying psychotherapy, supportive psychotherapy    Risk parameters:  Patient reports no suicidal ideation  Patient reports no homicidal ideation  Patient reports no self-injurious behavior  Patient reports no violent behavior          CSSRS was completed: No plan or intent. Ct reported that she has been depressed and has questioned her purpose.     Verbal deficits: None    Patient's response to intervention:  The patient's response to intervention is accepting.    Progress toward goals and other mental status changes:  The patient's progress toward goals is  fair .    Diagnosis:     ICD-10-CM ICD-9-CM   1. VANDANA (generalized anxiety disorder)  F41.1 300.02   2. Moderate episode of recurrent major depressive disorder  F33.1 296.32   3. Family conflict  Z63.8 V61.9       Plan:  individual psychotherapy and Ct to continue to follow up with Apryl SHERIE for psych med mgt Pt to go to ED or call 911 if symptoms worsen or if she has thoughts of harming self and/or others. Pt verbalized understanding.    Return to clinic: as scheduled    Length of Service (minutes): 45      Each patient to whom he or she provides medical services by telemedicine is: (1) informed of the relationship between the physician and patient and the respective role of any other health care provider with respect to management of the patient; and (2) notified that he or she may decline to receive medical services by telemedicine and may withdraw from such care at any time.

## 2022-05-10 ENCOUNTER — OFFICE VISIT (OUTPATIENT)
Dept: PSYCHIATRY | Facility: CLINIC | Age: 62
End: 2022-05-10
Payer: OTHER GOVERNMENT

## 2022-05-10 DIAGNOSIS — F33.1 MODERATE EPISODE OF RECURRENT MAJOR DEPRESSIVE DISORDER: ICD-10-CM

## 2022-05-10 DIAGNOSIS — F41.1 GAD (GENERALIZED ANXIETY DISORDER): Primary | ICD-10-CM

## 2022-05-10 DIAGNOSIS — Z63.8 FAMILY CONFLICT: ICD-10-CM

## 2022-05-10 PROCEDURE — 90834 PSYTX W PT 45 MINUTES: CPT | Mod: ,,, | Performed by: SOCIAL WORKER

## 2022-05-10 PROCEDURE — 90834 PR PSYCHOTHERAPY W/PATIENT, 45 MIN: ICD-10-PCS | Mod: ,,, | Performed by: SOCIAL WORKER

## 2022-05-10 PROCEDURE — 99999 PR PBB SHADOW E&M-EST. PATIENT-LVL II: ICD-10-PCS | Mod: PBBFAC,,, | Performed by: SOCIAL WORKER

## 2022-05-10 PROCEDURE — 99212 OFFICE O/P EST SF 10 MIN: CPT | Mod: PBBFAC,PN | Performed by: SOCIAL WORKER

## 2022-05-10 PROCEDURE — 99999 PR PBB SHADOW E&M-EST. PATIENT-LVL II: CPT | Mod: PBBFAC,,, | Performed by: SOCIAL WORKER

## 2022-05-10 SDOH — SOCIAL DETERMINANTS OF HEALTH (SDOH): OTHER SPECIFIED PROBLEMS RELATED TO PRIMARY SUPPORT GROUP: Z63.8

## 2022-05-10 NOTE — PROGRESS NOTES
Individual Psychotherapy (PhD/LCSW)    5/10/2022    Site:  [facility]         Therapeutic Intervention: Met with patient.  Outpatient - Insight oriented psychotherapy 45 min - CPT code 28695, Outpatient - Behavior modifying psychotherapy 45 min - CPT code 78910 and Outpatient - Supportive psychotherapy 45 min - CPT Code 57320    Chief complaint/reason for encounter: depression, anxiety and family stress             Interval history and content of current session: Ct was referred to tx by Apryl REHMAN to address depression ane anxiety. Ct arrived to session and was fully engaged. Ct was sad AEB her crying throughout the entire session. She reported that she has been thinking about her son and with it being mother's day it was hard for her. SW and ct processed the importance of trusting the process. Ct reported that she did not want to feel the emotions. SW and ct discussed the importance of ct using her coping thoughts to manage the distress. Ct to continue in 1:1 session.       Treatment plan:  · Target symptoms: depression, anxiety , family stress  · Why chosen therapy is appropriate versus another modality: relevant to diagnosis, patient responds to this modality, evidence based practice  · Outcome monitoring methods: self-report  · Therapeutic intervention type: insight oriented psychotherapy, behavior modifying psychotherapy, supportive psychotherapy    Risk parameters:  Patient reports no suicidal ideation  Patient reports no homicidal ideation  Patient reports no self-injurious behavior  Patient reports no violent behavior          CSSRS was completed:     Verbal deficits: None    Patient's response to intervention:  The patient's response to intervention is accepting.    Progress toward goals and other mental status changes:  The patient's progress toward goals is fair .    Diagnosis:     ICD-10-CM ICD-9-CM   1. VANDANA (generalized anxiety disorder)  F41.1 300.02   2. Moderate episode of recurrent major depressive  disorder  F33.1 296.32   3. Family conflict  Z63.8 V61.9       Plan:  individual psychotherapy and Ct to continue to see Apryl REHMAN for psych med mgt Pt to go to ED or call 911 if symptoms worsen or if she has thoughts of harming self and/or others. Pt verbalized understanding.    Return to clinic: as scheduled    Length of Service (minutes): 45      Each patient to whom he or she provides medical services by telemedicine is: (1) informed of the relationship between the physician and patient and the respective role of any other health care provider with respect to management of the patient; and (2) notified that he or she may decline to receive medical services by telemedicine and may withdraw from such care at any time.

## 2022-05-12 DIAGNOSIS — F41.1 GAD (GENERALIZED ANXIETY DISORDER): ICD-10-CM

## 2022-05-12 RX ORDER — ALPRAZOLAM 0.5 MG/1
0.5 TABLET ORAL 2 TIMES DAILY PRN
Qty: 60 TABLET | Refills: 0 | Status: SHIPPED | OUTPATIENT
Start: 2022-05-12 | End: 2022-07-20

## 2022-05-12 NOTE — TELEPHONE ENCOUNTER
Pt called requesting a refill on alprazolam 0.5 mg  Last refill: 4/06  Last visit: 4/06  Follow up: 7/11

## 2022-05-13 DIAGNOSIS — J44.9 CHRONIC OBSTRUCTIVE PULMONARY DISEASE, UNSPECIFIED COPD TYPE: Primary | ICD-10-CM

## 2022-05-15 RX ORDER — UMECLIDINIUM BROMIDE AND VILANTEROL TRIFENATATE 62.5; 25 UG/1; UG/1
1 POWDER RESPIRATORY (INHALATION) DAILY
Qty: 3 EACH | Refills: 5 | Status: SHIPPED | OUTPATIENT
Start: 2022-05-15 | End: 2022-08-08 | Stop reason: SDUPTHER

## 2022-05-24 ENCOUNTER — OFFICE VISIT (OUTPATIENT)
Dept: PSYCHIATRY | Facility: CLINIC | Age: 62
End: 2022-05-24
Payer: OTHER GOVERNMENT

## 2022-05-24 DIAGNOSIS — F33.1 MODERATE EPISODE OF RECURRENT MAJOR DEPRESSIVE DISORDER: ICD-10-CM

## 2022-05-24 DIAGNOSIS — Z63.8 FAMILY CONFLICT: ICD-10-CM

## 2022-05-24 DIAGNOSIS — F41.1 GAD (GENERALIZED ANXIETY DISORDER): Primary | ICD-10-CM

## 2022-05-24 PROCEDURE — 99999 PR PBB SHADOW E&M-EST. PATIENT-LVL II: ICD-10-PCS | Mod: PBBFAC,,, | Performed by: SOCIAL WORKER

## 2022-05-24 PROCEDURE — 99999 PR PBB SHADOW E&M-EST. PATIENT-LVL II: CPT | Mod: PBBFAC,,, | Performed by: SOCIAL WORKER

## 2022-05-24 PROCEDURE — 90834 PR PSYCHOTHERAPY W/PATIENT, 45 MIN: ICD-10-PCS | Mod: ,,, | Performed by: SOCIAL WORKER

## 2022-05-24 PROCEDURE — 90834 PSYTX W PT 45 MINUTES: CPT | Mod: ,,, | Performed by: SOCIAL WORKER

## 2022-05-24 PROCEDURE — 99212 OFFICE O/P EST SF 10 MIN: CPT | Mod: PBBFAC,PN | Performed by: SOCIAL WORKER

## 2022-05-24 SDOH — SOCIAL DETERMINANTS OF HEALTH (SDOH): OTHER SPECIFIED PROBLEMS RELATED TO PRIMARY SUPPORT GROUP: Z63.8
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
## 2022-05-25 ENCOUNTER — PATIENT MESSAGE (OUTPATIENT)
Dept: FAMILY MEDICINE | Facility: CLINIC | Age: 62
End: 2022-05-25

## 2022-05-25 NOTE — TELEPHONE ENCOUNTER
Called pt regarding below message. Advised pt that prescription has been submitted. Pt verbalized understanding with no further questions.      Graft Cartilage Fenestration Text: The cartilage was fenestrated with a 2mm punch biopsy to help facilitate graft survival and healing.

## 2022-05-26 ENCOUNTER — OFFICE VISIT (OUTPATIENT)
Dept: PSYCHIATRY | Facility: CLINIC | Age: 62
End: 2022-05-26
Payer: OTHER GOVERNMENT

## 2022-05-26 VITALS
BODY MASS INDEX: 25.92 KG/M2 | SYSTOLIC BLOOD PRESSURE: 145 MMHG | WEIGHT: 161.31 LBS | HEIGHT: 66 IN | DIASTOLIC BLOOD PRESSURE: 82 MMHG | HEART RATE: 73 BPM

## 2022-05-26 DIAGNOSIS — F33.1 MODERATE EPISODE OF RECURRENT MAJOR DEPRESSIVE DISORDER: Primary | ICD-10-CM

## 2022-05-26 DIAGNOSIS — F41.1 GAD (GENERALIZED ANXIETY DISORDER): ICD-10-CM

## 2022-05-26 PROCEDURE — 99999 PR PBB SHADOW E&M-EST. PATIENT-LVL IV: ICD-10-PCS | Mod: PBBFAC,,, | Performed by: PHYSICIAN ASSISTANT

## 2022-05-26 PROCEDURE — 99214 OFFICE O/P EST MOD 30 MIN: CPT | Mod: PBBFAC,PN | Performed by: PHYSICIAN ASSISTANT

## 2022-05-26 PROCEDURE — 99999 PR PBB SHADOW E&M-EST. PATIENT-LVL IV: CPT | Mod: PBBFAC,,, | Performed by: PHYSICIAN ASSISTANT

## 2022-05-26 PROCEDURE — 99214 OFFICE O/P EST MOD 30 MIN: CPT | Mod: S$PBB,,, | Performed by: PHYSICIAN ASSISTANT

## 2022-05-26 PROCEDURE — 99214 PR OFFICE/OUTPT VISIT, EST, LEVL IV, 30-39 MIN: ICD-10-PCS | Mod: S$PBB,,, | Performed by: PHYSICIAN ASSISTANT

## 2022-05-26 NOTE — PATIENT INSTRUCTIONS
[phone] option 3    OVERVIEW  [facility] is an intensive outpatient program for individuals facing mental health challenges requiring more intensive treatment than traditional outpatient therapy and medication management. This structured day program is designed to assist patients with their current psychological and medication management needs while teaching them the skills necessary for achieving and maintaining greater health and well-being.    [facility]  The [facility] is an intensive outpatient program for individuals facing mental health challenges requiring more intensive treatment than traditional outpatient therapy and medication management. This structured day program is designed to assist patients with their current psychological and medication management needs while teaching them the skills necessary for achieving and maintaining greater health and well-being.    Many patients in the program experience problems such as:    Depression  Anxiety  Sleep difficulties  PTSD  Panic Attacks and Phobias  Bipolar Disorder  Chronic Medical Issues  Relationship issues  Work stress  Grief and Loss  Parent-child relational issues  Program Structure  Our team of psychiatrists, clinical psychologists, social workers, and psychiatric nurses address the broad scope of patients needs. New patients receive a thorough assessment that includes a full psychiatric interview, a review of their medical history and current medication regimen. If needed, meetings are conducted with the referring professional or family members.    Our holistic program skillfully navigates every patients biopsychosocial needs with evidence based and supportive therapies. We are adept at distinguishing between diagnoses and other medical or psychological illnesses, pinpointing areas of treatment and formulating the most effective treatment plans, in  collaboration with the patient.    Through their active participation in the program, patients are taught ways to take personal responsibility for their overall health and well-being.    The daily program consists of group therapy and educational classes, with individual therapy and medication management when needed. The program emphasizes group therapy process and application of skills in a safe, nonjudgmental environment. These sessions facilitate the development of skills to assist in regaining a higher level of functioning and enhancing quality of life.    Themes covered in treatment:    Stress management  Cognitive restructuring  Assertiveness  Communication skills  Behavioral health  Emotion regulation  Interpersonal effectiveness  Relaxation Training  Mindfulness  Healthy eating  Physical activity  Continuity of Care  To maintain the progress made in treatment, all patients are strongly encouraged to participate in aftercare activities such as individual counseling, follow up with psychiatrist if needed and/or the [facility]'s aftercare groups.    Enrollment  To discuss enrollment into the [facility], call [phone] option 3 or toll-free at [phone].
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
## 2022-05-31 ENCOUNTER — PATIENT MESSAGE (OUTPATIENT)
Dept: FAMILY MEDICINE | Facility: CLINIC | Age: 62
End: 2022-05-31

## 2022-05-31 ENCOUNTER — LAB VISIT (OUTPATIENT)
Dept: LAB | Facility: HOSPITAL | Age: 62
End: 2022-05-31
Attending: NURSE PRACTITIONER
Payer: OTHER GOVERNMENT

## 2022-05-31 DIAGNOSIS — I10 ESSENTIAL HYPERTENSION: ICD-10-CM

## 2022-05-31 DIAGNOSIS — E78.00 PURE HYPERCHOLESTEROLEMIA: ICD-10-CM

## 2022-05-31 DIAGNOSIS — E55.9 VITAMIN D DEFICIENCY: ICD-10-CM

## 2022-05-31 LAB
25(OH)D3+25(OH)D2 SERPL-MCNC: 43 NG/ML (ref 30–96)
ALBUMIN SERPL BCP-MCNC: 4.7 G/DL (ref 3.5–5.2)
ALP SERPL-CCNC: 59 U/L (ref 55–135)
ALT SERPL W/O P-5'-P-CCNC: 29 U/L (ref 10–44)
ANION GAP SERPL CALC-SCNC: 10 MMOL/L (ref 8–16)
AST SERPL-CCNC: 27 U/L (ref 10–40)
BILIRUB SERPL-MCNC: 1.3 MG/DL (ref 0.1–1)
BILIRUB UR QL STRIP: NEGATIVE
BILIRUB UR QL STRIP: NEGATIVE
BUN SERPL-MCNC: 11 MG/DL (ref 8–23)
CALCIUM SERPL-MCNC: 9.3 MG/DL (ref 8.7–10.5)
CHLORIDE SERPL-SCNC: 103 MMOL/L (ref 95–110)
CHOLEST SERPL-MCNC: 163 MG/DL (ref 120–199)
CHOLEST/HDLC SERPL: 2.5 {RATIO} (ref 2–5)
CLARITY UR: ABNORMAL
CLARITY UR: ABNORMAL
CO2 SERPL-SCNC: 26 MMOL/L (ref 23–29)
COLOR UR: YELLOW
COLOR UR: YELLOW
CREAT SERPL-MCNC: 0.8 MG/DL (ref 0.5–1.4)
EST. GFR  (AFRICAN AMERICAN): >60 ML/MIN/1.73 M^2
EST. GFR  (NON AFRICAN AMERICAN): >60 ML/MIN/1.73 M^2
GLUCOSE SERPL-MCNC: 116 MG/DL (ref 70–110)
GLUCOSE UR QL STRIP: NEGATIVE
GLUCOSE UR QL STRIP: NEGATIVE
HDLC SERPL-MCNC: 66 MG/DL (ref 40–75)
HDLC SERPL: 40.5 % (ref 20–50)
HGB UR QL STRIP: NEGATIVE
HGB UR QL STRIP: NEGATIVE
KETONES UR QL STRIP: NEGATIVE
KETONES UR QL STRIP: NEGATIVE
LDLC SERPL CALC-MCNC: 83.6 MG/DL (ref 63–159)
LEUKOCYTE ESTERASE UR QL STRIP: NEGATIVE
LEUKOCYTE ESTERASE UR QL STRIP: NEGATIVE
NITRITE UR QL STRIP: NEGATIVE
NITRITE UR QL STRIP: NEGATIVE
NONHDLC SERPL-MCNC: 97 MG/DL
PH UR STRIP: 6 [PH] (ref 5–8)
PH UR STRIP: 6 [PH] (ref 5–8)
POTASSIUM SERPL-SCNC: 3.3 MMOL/L (ref 3.5–5.1)
PROT SERPL-MCNC: 7.2 G/DL (ref 6–8.4)
PROT UR QL STRIP: ABNORMAL
PROT UR QL STRIP: ABNORMAL
SODIUM SERPL-SCNC: 139 MMOL/L (ref 136–145)
SP GR UR STRIP: 1.02 (ref 1–1.03)
SP GR UR STRIP: 1.02 (ref 1–1.03)
TRIGL SERPL-MCNC: 67 MG/DL (ref 30–150)
URN SPEC COLLECT METH UR: ABNORMAL
URN SPEC COLLECT METH UR: ABNORMAL
UROBILINOGEN UR STRIP-ACNC: NEGATIVE EU/DL
UROBILINOGEN UR STRIP-ACNC: NEGATIVE EU/DL

## 2022-05-31 PROCEDURE — 81003 URINALYSIS AUTO W/O SCOPE: CPT | Performed by: NURSE PRACTITIONER

## 2022-05-31 PROCEDURE — 36415 COLL VENOUS BLD VENIPUNCTURE: CPT | Performed by: NURSE PRACTITIONER

## 2022-05-31 PROCEDURE — 80061 LIPID PANEL: CPT | Performed by: NURSE PRACTITIONER

## 2022-05-31 PROCEDURE — 80053 COMPREHEN METABOLIC PANEL: CPT | Performed by: NURSE PRACTITIONER

## 2022-05-31 PROCEDURE — 82306 VITAMIN D 25 HYDROXY: CPT | Performed by: NURSE PRACTITIONER

## 2022-05-31 NOTE — PROGRESS NOTES
SUBJECTIVE:      Patient ID: Luann Borjas is a 62 y.o. female.    Chief Complaint: Hypertension and Back Pain    Mrs Borjas is following up on htn, vit D def and hyperlipidemia. She is following with Dr Ravi for sleep apnea and copd. Following with Dr Tobin for cardiology. Following with Apryl Adams for psyc.Has chronic low back pain previously followed by Dr Bear, asking for a new referral. Also needs a new referral for derm. Potassium was a little low on labs, she had a bout of diarrhea a week ago. Glucose was a little elevated on labs. She is remodeling her house and says she has not been eating good     Hypertension  This is a chronic problem. The problem is unchanged. The problem is controlled. Pertinent negatives include no chest pain, headaches, neck pain, palpitations, peripheral edema or shortness of breath. Risk factors for coronary artery disease include smoking/tobacco exposure. Past treatments include calcium channel blockers and ACE inhibitors. The current treatment provides significant improvement.   Hyperlipidemia  This is a chronic problem. The problem is controlled. Recent lipid tests were reviewed and are normal. Pertinent negatives include no chest pain or shortness of breath. Current antihyperlipidemic treatment includes statins. The current treatment provides significant improvement of lipids.       Past Surgical History:   Procedure Laterality Date    BASAL CELL CARCINOMA EXCISION  09/2019    lip    CATARACT EXTRACTION W/  INTRAOCULAR LENS IMPLANT Right 11/13/2019    Procedure: EXTRACTION, CATARACT, WITH IOL INSERTION;  Surgeon: Carmelo Perez II, MD;  Location: [facility] OR;  Service: Ophthalmology;  Laterality: Right;    ECTOPIC PREGNANCY SURGERY      EYE SURGERY Right     cataracts    HYSTERECTOMY       Family History   Problem Relation Age of Onset    COPD Mother     Liver disease Mother     Thyroid disease Mother     COPD Father     Diabetes Father     Hypertension  Father     Pacemaker/defibrilator Father     COPD Sister     Hypertension Sister     Hypertension Brother       Social History     Socioeconomic History    Marital status:    Tobacco Use    Smoking status: Former Smoker     Packs/day: 0.50     Types: Cigarettes     Start date: 1988     Quit date: 10/25/2020     Years since quittin.6    Smokeless tobacco: Never Used   Substance and Sexual Activity    Alcohol use: Yes     Alcohol/week: 2.0 standard drinks     Types: 2 Glasses of wine per week    Drug use: No    Sexual activity: Not Currently     Current Outpatient Medications   Medication Sig Dispense Refill    albuterol (VENTOLIN HFA) 90 mcg/actuation inhaler Inhale 2 puffs into the lungs every 6 (six) hours as needed for Wheezing. Rescue 18 g 6    ALPRAZolam (XANAX) 0.5 MG tablet Take 1 tablet (0.5 mg total) by mouth 2 (two) times daily as needed for Anxiety. 60 tablet 0    calcium carbonate (OS-MELIA) 500 mg calcium (1,250 mg) chewable tablet Take 1 tablet by mouth once daily.      cholecalciferol, vitamin D3, (VITAMIN D3) 50 mcg (2,000 unit) Tab Take 1 tablet (2,000 Units total) by mouth once daily. 30 tablet 0    umeclidinium-vilanteroL (ANORO ELLIPTA) 62.5-25 mcg/actuation DsDv Inhale 1 puff into the lungs once daily. Controller 3 each 5    vilazodone (VIIBRYD) 10 mg (7)- 20 mg (23) DsPk See dose pack instructions 30 tablet 0    amlodipine-benazepril 5-10 mg (LOTREL) 5-10 mg per capsule Take 1 capsule by mouth once daily. 90 capsule 1    atorvastatin (LIPITOR) 10 MG tablet Take 1 tablet (10 mg total) by mouth once daily. 90 tablet 1    potassium chloride SA (K-DUR,KLOR-CON) 20 MEQ tablet Take 1 tablet (20 mEq total) by mouth once daily. 4 tablet 0     No current facility-administered medications for this visit.     Review of patient's allergies indicates:   Allergen Reactions    Erythromycin Anaphylaxis    Buspirone     Ciprofloxacin     Levaquin [levofloxacin]     Zithromax  "z-masoud [azithromycin]       Past Medical History:   Diagnosis Date    Anxiety     Arthritis     Cataract of left eye     Hyperlipidemia     Sleep apnea     cpap     Past Surgical History:   Procedure Laterality Date    BASAL CELL CARCINOMA EXCISION  09/2019    lip    CATARACT EXTRACTION W/  INTRAOCULAR LENS IMPLANT Right 11/13/2019    Procedure: EXTRACTION, CATARACT, WITH IOL INSERTION;  Surgeon: Carmelo Perez II, MD;  Location: [facility];  Service: Ophthalmology;  Laterality: Right;    ECTOPIC PREGNANCY SURGERY      EYE SURGERY Right     cataracts    HYSTERECTOMY         Review of Systems   Constitutional: Negative for activity change, appetite change, chills, diaphoresis and unexpected weight change.   HENT: Negative for ear discharge, hearing loss, rhinorrhea, trouble swallowing and voice change.    Eyes: Negative for photophobia, pain, discharge and visual disturbance.   Respiratory: Negative for chest tightness, shortness of breath, wheezing and stridor.    Cardiovascular: Negative for chest pain and palpitations.   Gastrointestinal: Negative for abdominal pain, blood in stool, constipation, diarrhea and vomiting.   Endocrine: Negative for cold intolerance, heat intolerance, polydipsia and polyuria.   Genitourinary: Negative for difficulty urinating, dysuria, flank pain, hematuria and menstrual problem.   Musculoskeletal: Negative for arthralgias, joint swelling, neck pain and neck stiffness.   Skin: Negative for pallor.   Neurological: Negative for dizziness, speech difficulty, weakness and headaches.   Hematological: Does not bruise/bleed easily.   Psychiatric/Behavioral: Positive for dysphoric mood. Negative for confusion, self-injury, sleep disturbance and suicidal ideas. The patient is not nervous/anxious.       OBJECTIVE:      Vitals:    06/01/22 0740   BP: 110/60   Pulse: 72   Temp: 97.9 °F (36.6 °C)   SpO2: 96%   Weight: 71.2 kg (157 lb)   Height: 5' 6" (1.676 m)     Physical Exam  Vitals and " nursing note reviewed.   Constitutional:       General: She is not in acute distress.     Appearance: She is well-developed.   HENT:      Head: Normocephalic and atraumatic.      Right Ear: Tympanic membrane normal.      Left Ear: Tympanic membrane normal.      Nose: Nose normal.      Mouth/Throat:      Pharynx: Uvula midline.   Eyes:      General: Lids are normal.      Conjunctiva/sclera: Conjunctivae normal.      Pupils: Pupils are equal, round, and reactive to light.      Right eye: Pupil is round and reactive.      Left eye: Pupil is round and reactive.   Neck:      Thyroid: No thyromegaly.      Vascular: No JVD.      Trachea: Trachea normal.   Cardiovascular:      Rate and Rhythm: Normal rate and regular rhythm.      Pulses: Normal pulses.      Heart sounds: Normal heart sounds. No murmur heard.  Pulmonary:      Effort: Pulmonary effort is normal. No tachypnea or respiratory distress.      Breath sounds: Normal breath sounds. No wheezing, rhonchi or rales.   Abdominal:      General: Bowel sounds are normal.      Palpations: Abdomen is soft.      Tenderness: There is no abdominal tenderness.   Musculoskeletal:         General: Normal range of motion.      Cervical back: Normal range of motion and neck supple.      Lumbar back: Tenderness present.      Right lower leg: No edema.      Left lower leg: No edema.   Lymphadenopathy:      Cervical: No cervical adenopathy.   Skin:     General: Skin is warm and dry.      Findings: No rash.   Neurological:      Mental Status: She is alert and oriented to person, place, and time.   Psychiatric:         Mood and Affect: Mood normal.         Speech: Speech normal.         Behavior: Behavior normal. Behavior is cooperative.         Thought Content: Thought content normal.         Judgment: Judgment normal.         Office Visit on 06/01/2022   Component Date Value Ref Range Status    Hemoglobin A1C 06/01/2022 5.2  % Final   ]  Office Visit on 06/01/2022   Component Date Value  Ref Range Status    Hemoglobin A1C 06/01/2022 5.2  % Final   Lab Visit on 05/31/2022   Component Date Value Ref Range Status    Sodium 05/31/2022 139  136 - 145 mmol/L Final    Potassium 05/31/2022 3.3 (A) 3.5 - 5.1 mmol/L Final    Chloride 05/31/2022 103  95 - 110 mmol/L Final    CO2 05/31/2022 26  23 - 29 mmol/L Final    Glucose 05/31/2022 116 (A) 70 - 110 mg/dL Final    BUN 05/31/2022 11  8 - 23 mg/dL Final    Creatinine 05/31/2022 0.8  0.5 - 1.4 mg/dL Final    Calcium 05/31/2022 9.3  8.7 - 10.5 mg/dL Final    Total Protein 05/31/2022 7.2  6.0 - 8.4 g/dL Final    Albumin 05/31/2022 4.7  3.5 - 5.2 g/dL Final    Total Bilirubin 05/31/2022 1.3 (A) 0.1 - 1.0 mg/dL Final    Comment: For infants and newborns, interpretation of results should be based  on gestational age, weight and in agreement with clinical  observations.    Premature Infant recommended reference ranges:  Up to 24 hours.............<8.0 mg/dL  Up to 48 hours............<12.0 mg/dL  3-5 days..................<15.0 mg/dL  6-29 days.................<15.0 mg/dL      Alkaline Phosphatase 05/31/2022 59  55 - 135 U/L Final    AST 05/31/2022 27  10 - 40 U/L Final    ALT 05/31/2022 29  10 - 44 U/L Final    Anion Gap 05/31/2022 10  8 - 16 mmol/L Final    eGFR if African American 05/31/2022 >60.0  >60 mL/min/1.73 m^2 Final    eGFR if non African American 05/31/2022 >60.0  >60 mL/min/1.73 m^2 Final    Comment: Calculation used to obtain the estimated glomerular filtration  rate (eGFR) is the CKD-EPI equation.       Cholesterol 05/31/2022 163  120 - 199 mg/dL Final    Comment: The National Cholesterol Education Program (NCEP) has set the  following guidelines (reference ranges) for Cholesterol:  Optimal.....................<200 mg/dL  Borderline High.............200-239 mg/dL  High........................> or = 240 mg/dL      Triglycerides 05/31/2022 67  30 - 150 mg/dL Final    Comment: The National Cholesterol Education Program (NCEP) has set  the  following guidelines (reference values) for triglycerides:  Normal......................<150 mg/dL  Borderline High.............150-199 mg/dL  High........................200-499 mg/dL      HDL 05/31/2022 66  40 - 75 mg/dL Final    Comment: The National Cholesterol Education Program (NCEP) has set the  following guidelines (reference values) for HDL Cholesterol:  Low...............<40 mg/dL  Optimal...........>60 mg/dL      LDL Cholesterol 05/31/2022 83.6  63.0 - 159.0 mg/dL Final    Comment: The National Cholesterol Education Program (NCEP) has set the  following guidelines (reference values) for LDL Cholesterol:  Optimal.......................<130 mg/dL  Borderline High...............130-159 mg/dL  High..........................160-189 mg/dL  Very High.....................>190 mg/dL      HDL/Cholesterol Ratio 05/31/2022 40.5  20.0 - 50.0 % Final    Total Cholesterol/HDL Ratio 05/31/2022 2.5  2.0 - 5.0 Final    Non-HDL Cholesterol 05/31/2022 97  mg/dL Final    Comment: Risk category and Non-HDL cholesterol goals:  Coronary heart disease (CHD)or equivalent (10-year risk of CHD >20%):  Non-HDL cholesterol goal     <130 mg/dL  Two or more CHD risk factors and 10-year risk of CHD <= 20%:  Non-HDL cholesterol goal     <160 mg/dL  0 to 1 CHD risk factor:  Non-HDL cholesterol goal     <190 mg/dL      Specimen UA 05/31/2022 Urine, Clean Catch   Final    Color, UA 05/31/2022 Yellow  Yellow, Straw, Margto Final    Appearance, UA 05/31/2022 Hazy (A) Clear Final    pH, UA 05/31/2022 6.0  5.0 - 8.0 Final    Specific Gravity, UA 05/31/2022 1.025  1.005 - 1.030 Final    Protein, UA 05/31/2022 Trace (A) Negative Final    Comment: Recommend a 24 hour urine protein or a urine   protein/creatinine ratio if globulin induced proteinuria is  clinically suspected.      Glucose, UA 05/31/2022 Negative  Negative Final    Ketones, UA 05/31/2022 Negative  Negative Final    Bilirubin (UA) 05/31/2022 Negative  Negative Final     Occult Blood UA 05/31/2022 Negative  Negative Final    Nitrite, UA 05/31/2022 Negative  Negative Final    Urobilinogen, UA 05/31/2022 Negative  Negative EU/dL Final    Leukocytes, UA 05/31/2022 Negative  Negative Final    Vit D, 25-Hydroxy 05/31/2022 43  30 - 96 ng/mL Final    Comment: Vitamin D deficiency.........<10 ng/mL                              Vitamin D insufficiency......10-29 ng/mL       Vitamin D sufficiency........> or equal to 30 ng/mL  Vitamin D toxicity............>100 ng/mL      Specimen UA 05/31/2022 Urine, Clean Catch   Final    Color, UA 05/31/2022 Yellow  Yellow, Straw, Margot Final    Appearance, UA 05/31/2022 Hazy (A) Clear Final    pH, UA 05/31/2022 6.0  5.0 - 8.0 Final    Specific Gravity, UA 05/31/2022 1.025  1.005 - 1.030 Final    Protein, UA 05/31/2022 Trace (A) Negative Final    Comment: Recommend a 24 hour urine protein or a urine   protein/creatinine ratio if globulin induced proteinuria is  clinically suspected.      Glucose, UA 05/31/2022 Negative  Negative Final    Ketones, UA 05/31/2022 Negative  Negative Final    Bilirubin (UA) 05/31/2022 Negative  Negative Final    Occult Blood UA 05/31/2022 Negative  Negative Final    Nitrite, UA 05/31/2022 Negative  Negative Final    Urobilinogen, UA 05/31/2022 Negative  Negative EU/dL Final    Leukocytes, UA 05/31/2022 Negative  Negative Final   ]  Assessment:       1. Essential hypertension    2. Pure hypercholesterolemia    3. Vitamin D deficiency    4. Depression with anxiety    5. Elevated glucose    6. Positive depression screening    7. Chronic bilateral low back pain without sciatica    8. Encounter for skin care    9. Low serum potassium        Plan:       Essential hypertension  -     amlodipine-benazepril 5-10 mg (LOTREL) 5-10 mg per capsule; Take 1 capsule by mouth once daily.  Dispense: 90 capsule; Refill: 1    Pure hypercholesterolemia  -     atorvastatin (LIPITOR) 10 MG tablet; Take 1 tablet (10 mg total) by  mouth once daily.  Dispense: 90 tablet; Refill: 1    Vitamin D deficiency  Cont otc vit D supplement    Depression with anxiety  She is following with Apryl Adams, medication recently changed to viibry. She has a follow up with her counselor tomorrow    Elevated glucose  -     Hemoglobin A1C, POCT                              5.2    Positive depression screening  Comments:  I have reviewed the positive depression score which warrants active treatment with psychotherapy and/or medications.    Chronic bilateral low back pain without sciatica  -     Ambulatory referral/consult to Neurosurgery; Future; Expected date: 06/08/2022    Encounter for skin care  -     Ambulatory referral/consult to Dermatology; Future; Expected date: 06/08/2022    Low serum potassium  -     potassium chloride SA (K-DUR,KLOR-CON) 20 MEQ tablet; Take 1 tablet (20 mEq total) by mouth once daily.  Dispense: 4 tablet; Refill: 0  Will replace potassium for 4 days then replace in diet      Follow up in about 6 months (around 12/1/2022) for htn .      6/1/2022 YULIANA Farooq, FNP

## 2022-06-01 ENCOUNTER — OFFICE VISIT (OUTPATIENT)
Dept: FAMILY MEDICINE | Facility: CLINIC | Age: 62
End: 2022-06-01
Payer: OTHER GOVERNMENT

## 2022-06-01 ENCOUNTER — TELEPHONE (OUTPATIENT)
Dept: FAMILY MEDICINE | Facility: CLINIC | Age: 62
End: 2022-06-01

## 2022-06-01 VITALS
SYSTOLIC BLOOD PRESSURE: 110 MMHG | OXYGEN SATURATION: 96 % | DIASTOLIC BLOOD PRESSURE: 60 MMHG | WEIGHT: 157 LBS | HEART RATE: 72 BPM | TEMPERATURE: 98 F | BODY MASS INDEX: 25.23 KG/M2 | HEIGHT: 66 IN

## 2022-06-01 DIAGNOSIS — Z13.31 POSITIVE DEPRESSION SCREENING: ICD-10-CM

## 2022-06-01 DIAGNOSIS — Z76.89 ENCOUNTER FOR SKIN CARE: ICD-10-CM

## 2022-06-01 DIAGNOSIS — M54.50 CHRONIC BILATERAL LOW BACK PAIN WITHOUT SCIATICA: ICD-10-CM

## 2022-06-01 DIAGNOSIS — I10 ESSENTIAL HYPERTENSION: Primary | ICD-10-CM

## 2022-06-01 DIAGNOSIS — E78.00 PURE HYPERCHOLESTEROLEMIA: ICD-10-CM

## 2022-06-01 DIAGNOSIS — E87.6 LOW SERUM POTASSIUM: ICD-10-CM

## 2022-06-01 DIAGNOSIS — G89.29 CHRONIC BILATERAL LOW BACK PAIN WITHOUT SCIATICA: ICD-10-CM

## 2022-06-01 DIAGNOSIS — E55.9 VITAMIN D DEFICIENCY: ICD-10-CM

## 2022-06-01 DIAGNOSIS — R73.09 ELEVATED GLUCOSE: ICD-10-CM

## 2022-06-01 DIAGNOSIS — F41.8 DEPRESSION WITH ANXIETY: ICD-10-CM

## 2022-06-01 LAB — HBA1C MFR BLD: 5.2 %

## 2022-06-01 PROCEDURE — 99214 OFFICE O/P EST MOD 30 MIN: CPT | Mod: S$GLB,,, | Performed by: NURSE PRACTITIONER

## 2022-06-01 PROCEDURE — 99214 PR OFFICE/OUTPT VISIT, EST, LEVL IV, 30-39 MIN: ICD-10-PCS | Mod: S$GLB,,, | Performed by: NURSE PRACTITIONER

## 2022-06-01 PROCEDURE — 83036 POCT HEMOGLOBIN A1C: ICD-10-PCS | Mod: QW,,, | Performed by: NURSE PRACTITIONER

## 2022-06-01 PROCEDURE — 83036 HEMOGLOBIN GLYCOSYLATED A1C: CPT | Mod: QW,,, | Performed by: NURSE PRACTITIONER

## 2022-06-01 RX ORDER — ATORVASTATIN CALCIUM 10 MG/1
10 TABLET, FILM COATED ORAL DAILY
Qty: 90 TABLET | Refills: 1 | Status: SHIPPED | OUTPATIENT
Start: 2022-06-01 | End: 2022-09-07

## 2022-06-01 RX ORDER — AMLODIPINE AND BENAZEPRIL HYDROCHLORIDE 5; 10 MG/1; MG/1
1 CAPSULE ORAL DAILY
Qty: 90 CAPSULE | Refills: 1 | Status: SHIPPED | OUTPATIENT
Start: 2022-06-01 | End: 2022-08-08 | Stop reason: SDUPTHER

## 2022-06-01 RX ORDER — POTASSIUM CHLORIDE 20 MEQ/1
20 TABLET, EXTENDED RELEASE ORAL DAILY
Qty: 4 TABLET | Refills: 0 | Status: SHIPPED | OUTPATIENT
Start: 2022-06-01 | End: 2022-06-02

## 2022-06-01 NOTE — TELEPHONE ENCOUNTER
----- Message from Kaorline Murry RN sent at 6/1/2022  4:37 PM CDT -----  A referral was sent to Dr. Fields in [facility] work que. He is not able to see this referral. You may should fax a referral to his office staff to schedule.

## 2022-06-02 ENCOUNTER — OFFICE VISIT (OUTPATIENT)
Dept: PSYCHIATRY | Facility: CLINIC | Age: 62
End: 2022-06-02
Payer: OTHER GOVERNMENT

## 2022-06-02 DIAGNOSIS — F41.1 GAD (GENERALIZED ANXIETY DISORDER): ICD-10-CM

## 2022-06-02 DIAGNOSIS — Z63.8 FAMILY CONFLICT: ICD-10-CM

## 2022-06-02 DIAGNOSIS — F33.1 MODERATE EPISODE OF RECURRENT MAJOR DEPRESSIVE DISORDER: Primary | ICD-10-CM

## 2022-06-02 PROCEDURE — 90834 PSYTX W PT 45 MINUTES: CPT | Mod: ,,, | Performed by: SOCIAL WORKER

## 2022-06-02 PROCEDURE — 99999 PR PBB SHADOW E&M-EST. PATIENT-LVL II: CPT | Mod: PBBFAC,,, | Performed by: SOCIAL WORKER

## 2022-06-02 PROCEDURE — 99999 PR PBB SHADOW E&M-EST. PATIENT-LVL II: ICD-10-PCS | Mod: PBBFAC,,, | Performed by: SOCIAL WORKER

## 2022-06-02 PROCEDURE — 99212 OFFICE O/P EST SF 10 MIN: CPT | Mod: PBBFAC,PN | Performed by: SOCIAL WORKER

## 2022-06-02 PROCEDURE — 90834 PR PSYCHOTHERAPY W/PATIENT, 45 MIN: ICD-10-PCS | Mod: ,,, | Performed by: SOCIAL WORKER

## 2022-06-02 SDOH — SOCIAL DETERMINANTS OF HEALTH (SDOH): OTHER SPECIFIED PROBLEMS RELATED TO PRIMARY SUPPORT GROUP: Z63.8

## 2022-06-02 NOTE — PROGRESS NOTES
Individual Psychotherapy (PhD/LCSW)    6/2/2022    Site:  [facility]         Therapeutic Intervention: Met with patient.  Outpatient - Insight oriented psychotherapy 45 min - CPT code 91922, Outpatient - Behavior modifying psychotherapy 45 min - CPT code 02303 and Outpatient - Supportive psychotherapy 45 min - CPT Code 51827    Chief complaint/reason for encounter: depression and anxiety             Interval history and content of current session: Ct was referred to tx by Apryl REHMAN to address depression, anxiety, and family stress. Ct arrived to session on time and was fully engaged. Ct shared that she has not had much time to think about her depression due to having people from out of town stay at her home. She reported that she has followed up with the [facility] referrals and is waiting to hear from [facility] regarding the wellness program. Ct and SW processed ct's anxiety and how much of her anxiety and her depression is caused by her thoughts. SW and ct processed how changing thoughts can change emotions. Ct engaged in communication role play with SW and was able to understand how her thoughts contribute to her feelings. SW provided ct with handouts on distorted thinking, depressive thinking and relaxation techniques and coping thoughts. Ct to continue in 1:1 sessions.       Treatment plan:  · Target symptoms: depression, anxiety family stress  · Why chosen therapy is appropriate versus another modality: relevant to diagnosis, patient responds to this modality, evidence based practice  · Outcome monitoring methods: self-report  · Therapeutic intervention type: insight oriented psychotherapy, behavior modifying psychotherapy, supportive psychotherapy    Risk parameters:  Patient reports no suicidal ideation  Patient reports no homicidal ideation  Patient reports no self-injurious behavior  Patient reports no violent behavior          CSSRS was completed: No risk    Verbal deficits: None    Patient's response to intervention:  The  patient's response to intervention is accepting.    Progress toward goals and other mental status changes:  The patient's progress toward goals is fair , some.    Diagnosis:     ICD-10-CM ICD-9-CM   1. Moderate episode of recurrent major depressive disorder  F33.1 296.32   2. VANDANA (generalized anxiety disorder)  F41.1 300.02   3. Family conflict  Z63.8 V61.9       Plan:  individual psychotherapy and Ct to continue to see Apryl SHERIE for psych med mgt Pt to go to ED or call 911 if symptoms worsen or if she has thoughts of harming self and/or others. Pt verbalized understanding.    Return to clinic: as scheduled    Length of Service (minutes): 45      Each patient to whom he or she provides medical services by telemedicine is: (1) informed of the relationship between the physician and patient and the respective role of any other health care provider with respect to management of the patient; and (2) notified that he or she may decline to receive medical services by telemedicine and may withdraw from such care at any time.

## 2022-06-03 ENCOUNTER — TELEPHONE (OUTPATIENT)
Dept: PSYCHIATRY | Facility: CLINIC | Age: 62
End: 2022-06-03
Payer: OTHER GOVERNMENT

## 2022-06-03 NOTE — TELEPHONE ENCOUNTER
----- Message from Apryl Adams PA-C sent at 5/26/2022 10:31 AM CDT -----  Ekg has been completed

## 2022-06-03 NOTE — TELEPHONE ENCOUNTER
----- Message from Cristina Forbes LCSW sent at 6/3/2022 10:06 AM CDT -----  Regarding: Meds  Ry Pink,   Ms. Kong said that her new medication is causing dizziness, fast heart beat, restlessness, and suppressed appetite. She did mention that she quit drinking and quit taking benzo. I told her that I would let you know. I also told her to reach out to you.   Thanks  Cristina

## 2022-06-06 ENCOUNTER — TELEPHONE (OUTPATIENT)
Dept: PSYCHIATRY | Facility: CLINIC | Age: 62
End: 2022-06-06
Payer: OTHER GOVERNMENT

## 2022-06-06 ENCOUNTER — HOSPITAL ENCOUNTER (OUTPATIENT)
Dept: PREADMISSION TESTING | Facility: HOSPITAL | Age: 62
Discharge: HOME | End: 2022-06-06
Attending: PHYSICIAN ASSISTANT
Payer: OTHER GOVERNMENT

## 2022-06-06 DIAGNOSIS — F33.1 MODERATE EPISODE OF RECURRENT MAJOR DEPRESSIVE DISORDER: ICD-10-CM

## 2022-06-06 PROCEDURE — 93005 ELECTROCARDIOGRAM TRACING: CPT | Performed by: INTERNAL MEDICINE

## 2022-06-06 PROCEDURE — 93010 EKG 12-LEAD: ICD-10-PCS | Mod: ,,, | Performed by: INTERNAL MEDICINE

## 2022-06-06 PROCEDURE — 93010 ELECTROCARDIOGRAM REPORT: CPT | Mod: ,,, | Performed by: INTERNAL MEDICINE

## 2022-06-06 NOTE — TELEPHONE ENCOUNTER
Spoke with Luann. Got EKG this morning. She is feeling better - side effects have improved. She has remained on Viibryd and will keep us updated.

## 2022-06-07 ENCOUNTER — OFFICE VISIT (OUTPATIENT)
Dept: PSYCHIATRY | Facility: CLINIC | Age: 62
End: 2022-06-07
Payer: OTHER GOVERNMENT

## 2022-06-07 DIAGNOSIS — Z63.8 FAMILY CONFLICT: ICD-10-CM

## 2022-06-07 DIAGNOSIS — F41.1 GAD (GENERALIZED ANXIETY DISORDER): ICD-10-CM

## 2022-06-07 DIAGNOSIS — F33.1 MODERATE EPISODE OF RECURRENT MAJOR DEPRESSIVE DISORDER: Primary | ICD-10-CM

## 2022-06-07 PROCEDURE — 99999 PR PBB SHADOW E&M-EST. PATIENT-LVL II: CPT | Mod: PBBFAC,,, | Performed by: SOCIAL WORKER

## 2022-06-07 PROCEDURE — 99999 PR PBB SHADOW E&M-EST. PATIENT-LVL II: ICD-10-PCS | Mod: PBBFAC,,, | Performed by: SOCIAL WORKER

## 2022-06-07 PROCEDURE — 90837 PSYTX W PT 60 MINUTES: CPT | Mod: ,,, | Performed by: SOCIAL WORKER

## 2022-06-07 PROCEDURE — 99212 OFFICE O/P EST SF 10 MIN: CPT | Mod: PBBFAC,PN | Performed by: SOCIAL WORKER

## 2022-06-07 PROCEDURE — 90837 PR PSYCHOTHERAPY W/PATIENT, 60 MIN: ICD-10-PCS | Mod: ,,, | Performed by: SOCIAL WORKER

## 2022-06-07 SDOH — SOCIAL DETERMINANTS OF HEALTH (SDOH): OTHER SPECIFIED PROBLEMS RELATED TO PRIMARY SUPPORT GROUP: Z63.8

## 2022-06-07 NOTE — PROGRESS NOTES
Individual Psychotherapy (PhD/LCSW)    6/7/2022    Site:  [facility]         Therapeutic Intervention: Met with patient.  Outpatient - Insight oriented psychotherapy 60 min - CPT code 18731, Outpatient - Behavior modifying psychotherapy 60 min - CPT code 29780 and Outpatient - Supportive psychotherapy 60 min - CPT Code 94429    Chief complaint/reason for encounter: depression, anxiety and family stress             Interval history and content of current session: Ct was referred to tx by Apryl REHMAN to address depression, anxiety, and family stress. Ct arrived to session on time and was fully engaged. Ct shared that she reviewed the handouts that this SW provided and that they gave her some insight. Ct shared that she is feeling much better than she was in her last visit. She reported that the medications appear to be working. She reported that she was going to put the IOP on hold for now because she has some engagements coming up and would not know when she would be able to start. Ct and sw processed boundaries. Ct was able to acknowledge how she may have crossed boundaries with her son and daughter in law. Ct shared about things that she could do to work on herself spiritually. Ct to continue in 1:1 sessions.     Treatment plan:  · Target symptoms: depression, anxiety , family stress  · Why chosen therapy is appropriate versus another modality: relevant to diagnosis, patient responds to this modality, evidence based practice  · Outcome monitoring methods: self-report  · Therapeutic intervention type: insight oriented psychotherapy, behavior modifying psychotherapy, supportive psychotherapy    Risk parameters:  Patient reports no suicidal ideation  Patient reports no homicidal ideation  Patient reports no self-injurious behavior  Patient reports no violent behavior          CSSRS was completed: No risk    Verbal deficits: None    Patient's response to intervention:  The patient's response to intervention is  accepting.    Progress toward goals and other mental status changes:  The patient's progress toward goals is fair , some.    Diagnosis:     ICD-10-CM ICD-9-CM   1. Moderate episode of recurrent major depressive disorder  F33.1 296.32   2. VANDANA (generalized anxiety disorder)  F41.1 300.02   3. Family conflict  Z63.8 V61.9       Plan:  individual psychotherapy and Ct to continue to follow up with Apryl SHERIE for psych med mgt Pt to go to ED or call 911 if symptoms worsen or if she has thoughts of harming self and/or others. Pt verbalized understanding.    Return to clinic: as scheduled    Length of Service (minutes): 60      Each patient to whom he or she provides medical services by telemedicine is: (1) informed of the relationship between the physician and patient and the respective role of any other health care provider with respect to management of the patient; and (2) notified that he or she may decline to receive medical services by telemedicine and may withdraw from such care at any time.

## 2022-06-13 ENCOUNTER — OFFICE VISIT (OUTPATIENT)
Dept: PSYCHIATRY | Facility: CLINIC | Age: 62
End: 2022-06-13
Payer: OTHER GOVERNMENT

## 2022-06-13 VITALS
BODY MASS INDEX: 25.47 KG/M2 | SYSTOLIC BLOOD PRESSURE: 147 MMHG | HEIGHT: 66 IN | DIASTOLIC BLOOD PRESSURE: 75 MMHG | HEART RATE: 57 BPM | WEIGHT: 158.5 LBS

## 2022-06-13 DIAGNOSIS — F33.1 MODERATE EPISODE OF RECURRENT MAJOR DEPRESSIVE DISORDER: Primary | ICD-10-CM

## 2022-06-13 DIAGNOSIS — F41.1 GAD (GENERALIZED ANXIETY DISORDER): ICD-10-CM

## 2022-06-13 PROCEDURE — 99999 PR PBB SHADOW E&M-EST. PATIENT-LVL III: ICD-10-PCS | Mod: PBBFAC,,, | Performed by: PHYSICIAN ASSISTANT

## 2022-06-13 PROCEDURE — 99999 PR PBB SHADOW E&M-EST. PATIENT-LVL III: CPT | Mod: PBBFAC,,, | Performed by: PHYSICIAN ASSISTANT

## 2022-06-13 PROCEDURE — 99214 PR OFFICE/OUTPT VISIT, EST, LEVL IV, 30-39 MIN: ICD-10-PCS | Mod: S$PBB,,, | Performed by: PHYSICIAN ASSISTANT

## 2022-06-13 PROCEDURE — 99213 OFFICE O/P EST LOW 20 MIN: CPT | Mod: PBBFAC,PN | Performed by: PHYSICIAN ASSISTANT

## 2022-06-13 PROCEDURE — 99214 OFFICE O/P EST MOD 30 MIN: CPT | Mod: S$PBB,,, | Performed by: PHYSICIAN ASSISTANT

## 2022-06-13 RX ORDER — VILAZODONE HYDROCHLORIDE 20 MG/1
20 TABLET ORAL DAILY
Qty: 30 TABLET | Refills: 1 | Status: SHIPPED | OUTPATIENT
Start: 2022-06-13 | End: 2022-07-11 | Stop reason: SDUPTHER
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
## 2022-06-21 ENCOUNTER — OFFICE VISIT (OUTPATIENT)
Dept: PSYCHIATRY | Facility: CLINIC | Age: 62
End: 2022-06-21
Payer: OTHER GOVERNMENT

## 2022-06-21 DIAGNOSIS — Z63.8 FAMILY CONFLICT: ICD-10-CM

## 2022-06-21 DIAGNOSIS — F33.1 MODERATE EPISODE OF RECURRENT MAJOR DEPRESSIVE DISORDER: Primary | ICD-10-CM

## 2022-06-21 DIAGNOSIS — F41.1 GAD (GENERALIZED ANXIETY DISORDER): ICD-10-CM

## 2022-06-21 PROCEDURE — 99999 PR PBB SHADOW E&M-EST. PATIENT-LVL II: ICD-10-PCS | Mod: PBBFAC,,, | Performed by: SOCIAL WORKER

## 2022-06-21 PROCEDURE — 99999 PR PBB SHADOW E&M-EST. PATIENT-LVL II: CPT | Mod: PBBFAC,,, | Performed by: SOCIAL WORKER

## 2022-06-21 PROCEDURE — 90834 PSYTX W PT 45 MINUTES: CPT | Mod: ,,, | Performed by: SOCIAL WORKER

## 2022-06-21 PROCEDURE — 90834 PR PSYCHOTHERAPY W/PATIENT, 45 MIN: ICD-10-PCS | Mod: ,,, | Performed by: SOCIAL WORKER

## 2022-06-21 PROCEDURE — 99212 OFFICE O/P EST SF 10 MIN: CPT | Mod: PBBFAC,PN | Performed by: SOCIAL WORKER

## 2022-06-21 SDOH — SOCIAL DETERMINANTS OF HEALTH (SDOH): OTHER SPECIFIED PROBLEMS RELATED TO PRIMARY SUPPORT GROUP: Z63.8

## 2022-06-21 NOTE — PROGRESS NOTES
Individual Psychotherapy (PhD/LCSW)    6/21/2022    Site:  [facility]         Therapeutic Intervention: Met with patient.  Outpatient - Insight oriented psychotherapy 45 min - CPT code 05546, Outpatient - Behavior modifying psychotherapy 45 min - CPT code 38728 and Outpatient - Supportive psychotherapy 45 min - CPT Code 59258    Chief complaint/reason for encounter: depression, anxiety and marital and family issues             Interval history and content of current session: Ct was referred to tx by Apryl REHMAN to address depression, anxiety, family and marital issues. Ct arrived to session on time and was fully engaged. Ct reported that her relationship with her son is still estranged though he reached out to his father for father's day. Ct reported that it upset her but she processed it. Ct shared about having communication issues with her . Ct shared that she has a tendency to be passive. Sw and ct reviwed communication styles and how ct could be more assertive. Sw and ct engaged in role play where ct had to take a passive or aggressive statement and make it assertive. Ct reported that she would like to go down to meeting 1x per month for sessions. Ct to continue in 1:1 sessions.     Treatment plan:  · Target symptoms: depression, anxiety , marital and family issues  · Why chosen therapy is appropriate versus another modality: relevant to diagnosis, patient responds to this modality, evidence based practice  · Outcome monitoring methods: self-report  · Therapeutic intervention type: insight oriented psychotherapy, behavior modifying psychotherapy, supportive psychotherapy    Risk parameters:  Patient reports no suicidal ideation  Patient reports no homicidal ideation  Patient reports no self-injurious behavior  Patient reports no violent behavior          CSSRS was completed: No risk    Verbal deficits: None    Patient's response to intervention:  The patient's response to intervention is accepting.    Progress  toward goals and other mental status changes:  The patient's progress toward goals is fair , good, some.    Diagnosis:     ICD-10-CM ICD-9-CM   1. Moderate episode of recurrent major depressive disorder  F33.1 296.32   2. VANDANA (generalized anxiety disorder)  F41.1 300.02   3. Family conflict  Z63.8 V61.9       Plan:  individual psychotherapy and Ct to continue to see Apryl REHMAN for psych med mgt Pt to go to ED or call 911 if symptoms worsen or if she has thoughts of harming self and/or others. Pt verbalized understanding.    Return to clinic: as scheduled    Length of Service (minutes): 45      Each patient to whom he or she provides medical services by telemedicine is: (1) informed of the relationship between the physician and patient and the respective role of any other health care provider with respect to management of the patient; and (2) notified that he or she may decline to receive medical services by telemedicine and may withdraw from such care at any time.

## 2022-07-05 ENCOUNTER — OFFICE VISIT (OUTPATIENT)
Dept: PSYCHIATRY | Facility: CLINIC | Age: 62
End: 2022-07-05
Payer: OTHER GOVERNMENT

## 2022-07-05 DIAGNOSIS — F41.1 GAD (GENERALIZED ANXIETY DISORDER): ICD-10-CM

## 2022-07-05 DIAGNOSIS — Z63.8 FAMILY CONFLICT: ICD-10-CM

## 2022-07-05 DIAGNOSIS — F33.1 MODERATE EPISODE OF RECURRENT MAJOR DEPRESSIVE DISORDER: Primary | ICD-10-CM

## 2022-07-05 PROCEDURE — 99999 PR PBB SHADOW E&M-EST. PATIENT-LVL II: CPT | Mod: PBBFAC,,, | Performed by: SOCIAL WORKER

## 2022-07-05 PROCEDURE — 99212 OFFICE O/P EST SF 10 MIN: CPT | Mod: PBBFAC,PN | Performed by: SOCIAL WORKER

## 2022-07-05 PROCEDURE — 99999 PR PBB SHADOW E&M-EST. PATIENT-LVL II: ICD-10-PCS | Mod: PBBFAC,,, | Performed by: SOCIAL WORKER

## 2022-07-05 PROCEDURE — 90834 PSYTX W PT 45 MINUTES: CPT | Mod: ,,, | Performed by: SOCIAL WORKER

## 2022-07-05 PROCEDURE — 90834 PR PSYCHOTHERAPY W/PATIENT, 45 MIN: ICD-10-PCS | Mod: ,,, | Performed by: SOCIAL WORKER

## 2022-07-05 SDOH — SOCIAL DETERMINANTS OF HEALTH (SDOH): OTHER SPECIFIED PROBLEMS RELATED TO PRIMARY SUPPORT GROUP: Z63.8

## 2022-07-05 NOTE — PROGRESS NOTES
Individual Psychotherapy (PhD/LCSW)    7/5/2022    Site:  [facility]         Therapeutic Intervention: Met with patient.  Outpatient - Supportive psychotherapy 45 min - CPT Code 04329, Outpatient - Interactive psychotherapy 45 min - CPT code 98654 and Outpatient - Insight oriented psychotherapy 45 min - CPT code 15277    Chief complaint/reason for encounter: depression, anxiety and family stress             Interval history and content of current session: Ct was referred to tx by Apryl REHMAN to address depression, anxiety, and family stress. Ct arrived to session and was fully engaged. Ct brought in a book that her friend gave her about parents of estranged kids. Ct shared that she planned to read this book in an effort to move forward regarding her relationship with her son. Ct mentioned multiple triggers. She ID her triggers as her relationship with her son, her kitchen re model, feeling that she does not have any friends, and mourning her sister. She reported that she visited her niece and brother in law over the weekend and it was good but sad. Ct shared that there were some unresolved issues that she had with her sister. SW and ct processed the importance of ct understanding that it's okay for her to have multiple emotions about 1 subject. SW encouraged ct to tackle her triggers one at a time. Ct to continue in 1:1 sessions.     Treatment plan:  · Target symptoms: depression, substance abuse, family stress  · Why chosen therapy is appropriate versus another modality: relevant to diagnosis, patient responds to this modality, evidence based practice  · Outcome monitoring methods: self-report  · Therapeutic intervention type: insight oriented psychotherapy, behavior modifying psychotherapy, supportive psychotherapy    Risk parameters:  Patient reports no suicidal ideation  Patient reports no homicidal ideation  Patient reports no self-injurious behavior  Patient reports no violent behavior          CSSRS was completed:  No    Verbal deficits: None    Patient's response to intervention:  The patient's response to intervention is accepting.    Progress toward goals and other mental status changes:  The patient's progress toward goals is fair , some.    Diagnosis:     ICD-10-CM ICD-9-CM   1. Moderate episode of recurrent major depressive disorder  F33.1 296.32   2. VANDANA (generalized anxiety disorder)  F41.1 300.02   3. Family conflict  Z63.8 V61.9       Plan:  individual psychotherapy and Ct to continue to follow up with Apryl REHMAN for psych med mgt Pt to go to ED or call 911 if symptoms worsen or if she has thoughts of harming self and/or others. Pt verbalized understanding.    Return to clinic: as scheduled    Length of Service (minutes): 45      Each patient to whom he or she provides medical services by telemedicine is: (1) informed of the relationship between the physician and patient and the respective role of any other health care provider with respect to management of the patient; and (2) notified that he or she may decline to receive medical services by telemedicine and may withdraw from such care at any time.

## 2022-07-11 DIAGNOSIS — F33.1 MODERATE EPISODE OF RECURRENT MAJOR DEPRESSIVE DISORDER: ICD-10-CM

## 2022-07-11 RX ORDER — VILAZODONE HYDROCHLORIDE 20 MG/1
20 TABLET ORAL DAILY
Qty: 90 TABLET | Refills: 0 | Status: SHIPPED | OUTPATIENT
Start: 2022-07-11 | End: 2022-09-21 | Stop reason: SDUPTHER

## 2022-07-11 NOTE — TELEPHONE ENCOUNTER
Pt called requesting Rx for Viibryd 20 mg to be sent to a different pharmacy. Wants to use [facility].   Last refill: 6/13  Last visit: 6/13  Follow up: 7/20

## 2022-07-20 ENCOUNTER — OFFICE VISIT (OUTPATIENT)
Dept: PSYCHIATRY | Facility: CLINIC | Age: 62
End: 2022-07-20
Payer: OTHER GOVERNMENT

## 2022-07-20 VITALS
DIASTOLIC BLOOD PRESSURE: 79 MMHG | BODY MASS INDEX: 24.94 KG/M2 | HEIGHT: 66 IN | HEART RATE: 69 BPM | SYSTOLIC BLOOD PRESSURE: 138 MMHG | WEIGHT: 155.19 LBS

## 2022-07-20 DIAGNOSIS — F41.1 GAD (GENERALIZED ANXIETY DISORDER): ICD-10-CM

## 2022-07-20 DIAGNOSIS — F33.1 MODERATE EPISODE OF RECURRENT MAJOR DEPRESSIVE DISORDER: Primary | ICD-10-CM

## 2022-07-20 PROCEDURE — 99214 OFFICE O/P EST MOD 30 MIN: CPT | Mod: S$PBB,,, | Performed by: PHYSICIAN ASSISTANT

## 2022-07-20 PROCEDURE — 99999 PR PBB SHADOW E&M-EST. PATIENT-LVL III: ICD-10-PCS | Mod: PBBFAC,,, | Performed by: PHYSICIAN ASSISTANT

## 2022-07-20 PROCEDURE — 99999 PR PBB SHADOW E&M-EST. PATIENT-LVL III: CPT | Mod: PBBFAC,,, | Performed by: PHYSICIAN ASSISTANT

## 2022-07-20 PROCEDURE — 90833 PSYTX W PT W E/M 30 MIN: CPT | Mod: ,,, | Performed by: PHYSICIAN ASSISTANT

## 2022-07-20 PROCEDURE — 99214 PR OFFICE/OUTPT VISIT, EST, LEVL IV, 30-39 MIN: ICD-10-PCS | Mod: S$PBB,,, | Performed by: PHYSICIAN ASSISTANT

## 2022-07-20 PROCEDURE — 99213 OFFICE O/P EST LOW 20 MIN: CPT | Mod: PBBFAC,PN | Performed by: PHYSICIAN ASSISTANT

## 2022-07-20 PROCEDURE — 90833 PR PSYCHOTHERAPY W/PATIENT W/E&M, 30 MIN (ADD ON): ICD-10-PCS | Mod: ,,, | Performed by: PHYSICIAN ASSISTANT
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
## 2022-08-08 DIAGNOSIS — J44.9 CHRONIC OBSTRUCTIVE PULMONARY DISEASE, UNSPECIFIED COPD TYPE: ICD-10-CM

## 2022-08-08 DIAGNOSIS — I10 ESSENTIAL HYPERTENSION: ICD-10-CM

## 2022-08-08 RX ORDER — UMECLIDINIUM BROMIDE AND VILANTEROL TRIFENATATE 62.5; 25 UG/1; UG/1
1 POWDER RESPIRATORY (INHALATION) DAILY
Qty: 3 EACH | Refills: 5 | Status: SHIPPED | OUTPATIENT
Start: 2022-08-08 | End: 2023-08-10 | Stop reason: SDUPTHER

## 2022-08-08 RX ORDER — AMLODIPINE AND BENAZEPRIL HYDROCHLORIDE 5; 10 MG/1; MG/1
1 CAPSULE ORAL DAILY
Qty: 90 CAPSULE | Refills: 1 | Status: SHIPPED | OUTPATIENT
Start: 2022-08-08 | End: 2022-12-01 | Stop reason: SDUPTHER

## 2022-08-09 ENCOUNTER — OFFICE VISIT (OUTPATIENT)
Dept: PSYCHIATRY | Facility: CLINIC | Age: 62
End: 2022-08-09
Payer: OTHER GOVERNMENT

## 2022-08-09 DIAGNOSIS — Z63.8 FAMILY CONFLICT: ICD-10-CM

## 2022-08-09 DIAGNOSIS — F33.1 MODERATE EPISODE OF RECURRENT MAJOR DEPRESSIVE DISORDER: Primary | ICD-10-CM

## 2022-08-09 DIAGNOSIS — F41.1 GAD (GENERALIZED ANXIETY DISORDER): ICD-10-CM

## 2022-08-09 PROCEDURE — 99999 PR PBB SHADOW E&M-EST. PATIENT-LVL II: CPT | Mod: PBBFAC,,, | Performed by: SOCIAL WORKER

## 2022-08-09 PROCEDURE — 90837 PSYTX W PT 60 MINUTES: CPT | Mod: ,,, | Performed by: SOCIAL WORKER

## 2022-08-09 PROCEDURE — 99212 OFFICE O/P EST SF 10 MIN: CPT | Mod: PBBFAC,PN | Performed by: SOCIAL WORKER

## 2022-08-09 PROCEDURE — 90837 PR PSYCHOTHERAPY W/PATIENT, 60 MIN: ICD-10-PCS | Mod: ,,, | Performed by: SOCIAL WORKER

## 2022-08-09 PROCEDURE — 99999 PR PBB SHADOW E&M-EST. PATIENT-LVL II: ICD-10-PCS | Mod: PBBFAC,,, | Performed by: SOCIAL WORKER

## 2022-08-09 SDOH — SOCIAL DETERMINANTS OF HEALTH (SDOH): OTHER SPECIFIED PROBLEMS RELATED TO PRIMARY SUPPORT GROUP: Z63.8
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
## 2022-08-25 ENCOUNTER — OFFICE VISIT (OUTPATIENT)
Dept: PSYCHIATRY | Facility: CLINIC | Age: 62
End: 2022-08-25
Payer: OTHER GOVERNMENT

## 2022-08-25 DIAGNOSIS — F41.1 GAD (GENERALIZED ANXIETY DISORDER): ICD-10-CM

## 2022-08-25 DIAGNOSIS — F33.1 MODERATE EPISODE OF RECURRENT MAJOR DEPRESSIVE DISORDER: Primary | ICD-10-CM

## 2022-08-25 DIAGNOSIS — Z63.8 FAMILY CONFLICT: ICD-10-CM

## 2022-08-25 PROCEDURE — 99999 PR PBB SHADOW E&M-EST. PATIENT-LVL II: CPT | Mod: PBBFAC,,, | Performed by: SOCIAL WORKER

## 2022-08-25 PROCEDURE — 99999 PR PBB SHADOW E&M-EST. PATIENT-LVL II: ICD-10-PCS | Mod: PBBFAC,,, | Performed by: SOCIAL WORKER

## 2022-08-25 PROCEDURE — 99212 OFFICE O/P EST SF 10 MIN: CPT | Mod: PBBFAC,PN | Performed by: SOCIAL WORKER

## 2022-08-25 PROCEDURE — 90834 PR PSYCHOTHERAPY W/PATIENT, 45 MIN: ICD-10-PCS | Mod: ,,, | Performed by: SOCIAL WORKER

## 2022-08-25 PROCEDURE — 90834 PSYTX W PT 45 MINUTES: CPT | Mod: ,,, | Performed by: SOCIAL WORKER

## 2022-08-25 SDOH — SOCIAL DETERMINANTS OF HEALTH (SDOH): OTHER SPECIFIED PROBLEMS RELATED TO PRIMARY SUPPORT GROUP: Z63.8

## 2022-08-25 NOTE — PROGRESS NOTES
Individual Psychotherapy (PhD/LCSW)    8/25/2022    Site:  [facility]         Therapeutic Intervention: Met with patient.  Outpatient - Insight oriented psychotherapy 45 min - CPT code 09418, Outpatient - Behavior modifying psychotherapy 45 min - CPT code 18038 and Outpatient - Supportive psychotherapy 45 min - CPT Code 87231    Chief complaint/reason for encounter: depression, anxiety and family issues             Interval history and content of current session: Ct was referred to tx by Apryl REHMAN to address depression, anxiety, and marital/family issues. Ct arrived to session and was fully engaged. Ct reported that she has bene more active. She started volunteering at school, spending more time with her friends and she has started setting boundaries with her . Ct reported that she has been inviting him places and he does not want to go. Ct is making progress. Ct to continue in 1:1 sessions.       Treatment plan:  · Target symptoms: depression, anxiety , family stress  · Why chosen therapy is appropriate versus another modality: relevant to diagnosis, patient responds to this modality, evidence based practice  · Outcome monitoring methods: self-report  · Therapeutic intervention type: insight oriented psychotherapy, behavior modifying psychotherapy, supportive psychotherapy    Risk parameters:  Patient reports no suicidal ideation  Patient reports no homicidal ideation  Patient reports no self-injurious behavior  Patient reports no violent behavior          CSSRS was completed: No risk    Mental Status Exam:  General Appearance:  unremarkable, age appropriate   Speech: normal tone, normal rate, normal pitch, normal volume      Level of Cooperation: cooperative      Thought Processes: normal and logical   Mood: steady, improved more positive      Thought Content: normal, no suicidality, no homicidality, delusions, or paranoia   Affect: congruent and appropriate   Orientation: Oriented x3   Memory: recent >  intact    Attention Span & Concentration: intact   Fund of General Knowledge: intact and appropriate to age and level of education   Abstract Reasoning: interpretation of similarities was abstract   Judgment & Insight: intact     Language intact             Verbal deficits: None    Patient's response to intervention:  The patient's response to intervention is accepting.    Progress toward goals and other mental status changes:  The patient's progress toward goals is fair , good, some.    Diagnosis:     ICD-10-CM ICD-9-CM   1. Moderate episode of recurrent major depressive disorder  F33.1 296.32   2. VANDANA (generalized anxiety disorder)  F41.1 300.02   3. Family conflict  Z63.8 V61.9       Plan:  individual psychotherapy and Ct to continue to follow up with Arpyl REHMAN for psych med mgt Pt to go to ED or call 911 if symptoms worsen or if she has thoughts of harming self and/or others. Pt verbalized understanding.    Return to clinic: as scheduled    Length of Service (minutes): 45      Each patient to whom he or she provides medical services by telemedicine is: (1) informed of the relationship between the physician and patient and the respective role of any other health care provider with respect to management of the patient; and (2) notified that he or she may decline to receive medical services by telemedicine and may withdraw from such care at any time.

## 2022-09-06 ENCOUNTER — OFFICE VISIT (OUTPATIENT)
Dept: PSYCHIATRY | Facility: CLINIC | Age: 62
End: 2022-09-06
Payer: OTHER GOVERNMENT

## 2022-09-06 DIAGNOSIS — F41.1 GAD (GENERALIZED ANXIETY DISORDER): ICD-10-CM

## 2022-09-06 DIAGNOSIS — F33.1 MODERATE EPISODE OF RECURRENT MAJOR DEPRESSIVE DISORDER: Primary | ICD-10-CM

## 2022-09-06 DIAGNOSIS — Z63.8 FAMILY CONFLICT: ICD-10-CM

## 2022-09-06 PROCEDURE — 99999 PR PBB SHADOW E&M-EST. PATIENT-LVL II: ICD-10-PCS | Mod: PBBFAC,,, | Performed by: SOCIAL WORKER

## 2022-09-06 PROCEDURE — 99999 PR PBB SHADOW E&M-EST. PATIENT-LVL II: CPT | Mod: PBBFAC,,, | Performed by: SOCIAL WORKER

## 2022-09-06 PROCEDURE — 99212 OFFICE O/P EST SF 10 MIN: CPT | Mod: PBBFAC,PN | Performed by: SOCIAL WORKER

## 2022-09-06 PROCEDURE — 90834 PSYTX W PT 45 MINUTES: CPT | Mod: ,,, | Performed by: SOCIAL WORKER

## 2022-09-06 PROCEDURE — 90834 PR PSYCHOTHERAPY W/PATIENT, 45 MIN: ICD-10-PCS | Mod: ,,, | Performed by: SOCIAL WORKER

## 2022-09-06 SDOH — SOCIAL DETERMINANTS OF HEALTH (SDOH): OTHER SPECIFIED PROBLEMS RELATED TO PRIMARY SUPPORT GROUP: Z63.8

## 2022-09-06 NOTE — PROGRESS NOTES
"Individual Psychotherapy (PhD/LCSW)    9/6/2022    Site:  [facility]         Therapeutic Intervention: Met with patient.  Outpatient - Insight oriented psychotherapy 45 min - CPT code 29675, Outpatient - Behavior modifying psychotherapy 45 min - CPT code 36014, and Outpatient - Supportive psychotherapy 45 min - CPT Code 09348    Chief complaint/reason for encounter: depression, anxiety, and family distress             Interval history and content of current session: Ct was referred to tx by Apryl REHMAN to address depression, anxiety, and marital/family issues. Ct arrived to session and was fully engaged.  Ct was tearful throughout session. Ct shared that she maintained a boundary with her  3 x this past week. She reported that he got angry with her on multiple occassions for no reason. She reported that she did not like one of the songs he was listening to, she invited some friends over, and she was invited to a birthday party with friends. She reported that on all 3 occassions, he got angry. She reported that she left the room while he was yelling. She reported that he apologized for 1 of the issues. Ct shared that she did not realize how hard setting the boundaries and change would be. SW reminded ct to set realistic expectations of herself and that things were not going to happen overnight. SW encouraged ct to saturate herself with motivational and encouraging podcasts, journaling, etc. Ct agreed.  Ct asked SW on multiple occassions if SW thought that she was "crazy" or "wrong". SW let ct know that the only opinion that mattered was her own. Ct to continue in 1:1 sessions.       Treatment plan:  Target symptoms: depression, anxiety , family distress  Why chosen therapy is appropriate versus another modality: relevant to diagnosis, patient responds to this modality, evidence based practice  Outcome monitoring methods: self-report  Therapeutic intervention type: insight oriented psychotherapy, behavior modifying " psychotherapy, supportive psychotherapy    Risk parameters:  Patient reports no suicidal ideation  Patient reports no homicidal ideation  Patient reports no self-injurious behavior  Patient reports no violent behavior          CSSRS was completed: no risk    Mental Status Exam:  General Appearance:  unremarkable, age appropriate   Speech: normal tone, normal rate, normal pitch, normal volume      Level of Cooperation: cooperative      Thought Processes: normal and logical   Mood: sad      Thought Content: normal, no suicidality, no homicidality, delusions, or paranoia   Affect: appropriate   Orientation: Oriented x3   Memory: recent >  intact   Attention Span & Concentration: intact   Fund of General Knowledge: intact and appropriate to age and level of education   Abstract Reasoning: interpretation of similarities was abstract   Judgment & Insight: intact     Language intact             Verbal deficits: None    Patient's response to intervention:  The patient's response to intervention is accepting.    Progress toward goals and other mental status changes:  The patient's progress toward goals is fair , some .    Diagnosis:     ICD-10-CM ICD-9-CM   1. Moderate episode of recurrent major depressive disorder  F33.1 296.32   2. VANDANA (generalized anxiety disorder)  F41.1 300.02   3. Family conflict  Z63.8 V61.9       Plan:  individual psychotherapy and ct to continue to follow up with Apryl duque psych med mgt  Pt to go to ED or call 911 if symptoms worsen or if she has thoughts of harming self and/or others. Pt verbalized understanding.    Return to clinic: as scheduled    Length of Service (minutes): 45      Each patient to whom he or she provides medical services by telemedicine is: (1) informed of the relationship between the physician and patient and the respective role of any other health care provider with respect to management of the patient; and (2) notified that he or she may decline to receive medical services  by telemedicine and may withdraw from such care at any time.

## 2022-09-21 ENCOUNTER — OFFICE VISIT (OUTPATIENT)
Dept: PSYCHIATRY | Facility: CLINIC | Age: 62
End: 2022-09-21
Payer: OTHER GOVERNMENT

## 2022-09-21 VITALS
DIASTOLIC BLOOD PRESSURE: 85 MMHG | BODY MASS INDEX: 24.61 KG/M2 | HEART RATE: 72 BPM | SYSTOLIC BLOOD PRESSURE: 137 MMHG | HEIGHT: 66 IN | WEIGHT: 153.13 LBS

## 2022-09-21 DIAGNOSIS — F41.1 GAD (GENERALIZED ANXIETY DISORDER): ICD-10-CM

## 2022-09-21 DIAGNOSIS — F33.1 MODERATE EPISODE OF RECURRENT MAJOR DEPRESSIVE DISORDER: Primary | ICD-10-CM

## 2022-09-21 DIAGNOSIS — Z63.8 FAMILY CONFLICT: ICD-10-CM

## 2022-09-21 PROCEDURE — 99214 OFFICE O/P EST MOD 30 MIN: CPT | Mod: S$PBB,,, | Performed by: PHYSICIAN ASSISTANT

## 2022-09-21 PROCEDURE — 90834 PSYTX W PT 45 MINUTES: CPT | Mod: ,,, | Performed by: SOCIAL WORKER

## 2022-09-21 PROCEDURE — 99213 OFFICE O/P EST LOW 20 MIN: CPT | Mod: PBBFAC,PN | Performed by: PHYSICIAN ASSISTANT

## 2022-09-21 PROCEDURE — 99999 PR PBB SHADOW E&M-EST. PATIENT-LVL III: ICD-10-PCS | Mod: PBBFAC,,, | Performed by: PHYSICIAN ASSISTANT

## 2022-09-21 PROCEDURE — 99999 PR PBB SHADOW E&M-EST. PATIENT-LVL II: ICD-10-PCS | Mod: PBBFAC,,, | Performed by: SOCIAL WORKER

## 2022-09-21 PROCEDURE — 90834 PR PSYCHOTHERAPY W/PATIENT, 45 MIN: ICD-10-PCS | Mod: ,,, | Performed by: SOCIAL WORKER

## 2022-09-21 PROCEDURE — 99214 PR OFFICE/OUTPT VISIT, EST, LEVL IV, 30-39 MIN: ICD-10-PCS | Mod: S$PBB,,, | Performed by: PHYSICIAN ASSISTANT

## 2022-09-21 PROCEDURE — 99999 PR PBB SHADOW E&M-EST. PATIENT-LVL II: CPT | Mod: PBBFAC,,, | Performed by: SOCIAL WORKER

## 2022-09-21 PROCEDURE — 99999 PR PBB SHADOW E&M-EST. PATIENT-LVL III: CPT | Mod: PBBFAC,,, | Performed by: PHYSICIAN ASSISTANT

## 2022-09-21 PROCEDURE — 99212 OFFICE O/P EST SF 10 MIN: CPT | Mod: PBBFAC,27,PN | Performed by: SOCIAL WORKER

## 2022-09-21 RX ORDER — VILAZODONE HYDROCHLORIDE 20 MG/1
20 TABLET ORAL DAILY
Qty: 90 TABLET | Refills: 0 | Status: SHIPPED | OUTPATIENT
Start: 2022-09-21 | End: 2022-11-14 | Stop reason: SDUPTHER

## 2022-09-21 SDOH — SOCIAL DETERMINANTS OF HEALTH (SDOH): OTHER SPECIFIED PROBLEMS RELATED TO PRIMARY SUPPORT GROUP: Z63.8
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
## 2022-09-22 NOTE — PROGRESS NOTES
Individual Psychotherapy (PhD/LCSW)    9/21/2022    Site:  [facility]         Therapeutic Intervention: Met with patient.  Outpatient - Insight oriented psychotherapy 45 min - CPT code 62447, Outpatient - Behavior modifying psychotherapy 45 min - CPT code 57828, and Outpatient - Supportive psychotherapy 45 min - CPT Code 72253    Chief complaint/reason for encounter: depression, anxiety, and family distress             Interval history and content of current session: Ct was referred to tx by Apryl REHMAN to address depression, anxiety, and marital/family issues. Ct arrived to session and was fully engaged.  Ct presented as more calm, and relaxed. Ct shared that she had just come back from vacation. She shared about an issue that she had with a fellow wife of one of her 's colleagues. Ct shared that she continues to set boundaries but that it was hard for her. She reported that she has been listening to motivational/inspirational pod casts and they have been helpful. Sw and ct discussed the importance of ct understanding that setting boundaries takes time and to not give up, this is a new way of communicating for the ct. SW and ct also processed the importance of ct reviewing her boundaries information regularly so that she would be more inclined to use the skill.  Ct shared that she met other women on her trip who had similar issues with their adult children. 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 Ct to continue in 1:1 sessions.       Treatment plan:  Target symptoms: depression, anxiety , family stress  Why chosen therapy is appropriate versus another modality: relevant to diagnosis, patient responds to this modality, evidence based practice  Outcome monitoring methods: self-report  Therapeutic intervention type: insight oriented psychotherapy, behavior modifying psychotherapy, supportive psychotherapy, interactive psychotherapy    Risk parameters:  Patient reports no  suicidal ideation  Patient reports no homicidal ideation  Patient reports no self-injurious behavior  Patient reports no violent behavior          CSSRS was completed:  No risk    Mental Status Exam:  General Appearance:  unremarkable, age appropriate   Speech: normal tone, normal rate, normal pitch, normal volume      Level of Cooperation: cooperative      Thought Processes: normal and logical   Mood: steady      Thought Content: normal, no suicidality, no homicidality, delusions, or paranoia   Affect: congruent and appropriate   Orientation: Oriented x3   Memory: recent >  intact   Attention Span & Concentration: intact   Fund of General Knowledge: intact and appropriate to age and level of education   Abstract Reasoning: interpretation of similarities was abstract   Judgment & Insight: intact     Language intact             Verbal deficits: None    Patient's response to intervention:  The patient's response to intervention is accepting.    Progress toward goals and other mental status changes:  The patient's progress toward goals is fair , increased progress .    Diagnosis:     ICD-10-CM ICD-9-CM   1. Moderate episode of recurrent major depressive disorder  F33.1 296.32   2. VANDANA (generalized anxiety disorder)  F41.1 300.02   3. Family conflict  Z63.8 V61.9       Plan:  individual psychotherapy and follow up with Apryl duque psych med mgt  Pt to go to ED or call 911 if symptoms worsen or if she has thoughts of harming self and/or others. Pt verbalized understanding.    Return to clinic: as scheduled    Length of Service (minutes): 45      Each patient to whom he or she provides medical services by telemedicine is: (1) informed of the relationship between the physician and patient and the respective role of any other health care provider with respect to management of the patient; and (2) notified that he or she may decline to receive medical services by telemedicine and may withdraw from such care at any time.

## 2022-09-28 ENCOUNTER — OFFICE VISIT (OUTPATIENT)
Dept: PULMONOLOGY | Facility: CLINIC | Age: 62
End: 2022-09-28
Payer: OTHER GOVERNMENT

## 2022-09-28 VITALS
DIASTOLIC BLOOD PRESSURE: 64 MMHG | HEIGHT: 66 IN | HEART RATE: 78 BPM | WEIGHT: 152 LBS | TEMPERATURE: 98 F | OXYGEN SATURATION: 96 % | BODY MASS INDEX: 24.43 KG/M2 | SYSTOLIC BLOOD PRESSURE: 140 MMHG

## 2022-09-28 DIAGNOSIS — J43.9 PULMONARY EMPHYSEMA, UNSPECIFIED EMPHYSEMA TYPE: ICD-10-CM

## 2022-09-28 DIAGNOSIS — J45.909 ASTHMA, UNSPECIFIED ASTHMA SEVERITY, UNSPECIFIED WHETHER COMPLICATED, UNSPECIFIED WHETHER PERSISTENT: Primary | ICD-10-CM

## 2022-09-28 DIAGNOSIS — Z87.891 FORMER SMOKER: ICD-10-CM

## 2022-09-28 DIAGNOSIS — G47.33 OSA (OBSTRUCTIVE SLEEP APNEA): ICD-10-CM

## 2022-09-28 PROCEDURE — 99213 PR OFFICE/OUTPT VISIT, EST, LEVL III, 20-29 MIN: ICD-10-PCS | Mod: S$GLB,,, | Performed by: NURSE PRACTITIONER

## 2022-09-28 PROCEDURE — 99213 OFFICE O/P EST LOW 20 MIN: CPT | Mod: S$GLB,,, | Performed by: NURSE PRACTITIONER

## 2022-09-28 RX ORDER — UMECLIDINIUM BROMIDE AND VILANTEROL TRIFENATATE 62.5; 25 UG/1; UG/1
1 POWDER RESPIRATORY (INHALATION) DAILY
Qty: 3 EACH | Refills: 5 | Status: SHIPPED | OUTPATIENT
Start: 2022-09-28 | End: 2023-02-02 | Stop reason: SDUPTHER

## 2022-10-07 ENCOUNTER — TELEPHONE (OUTPATIENT)
Dept: PULMONOLOGY | Facility: CLINIC | Age: 62
End: 2022-10-07

## 2022-10-07 ENCOUNTER — HOSPITAL ENCOUNTER (OUTPATIENT)
Dept: RADIOLOGY | Facility: HOSPITAL | Age: 62
Discharge: HOME | End: 2022-10-07
Attending: NURSE PRACTITIONER
Payer: OTHER GOVERNMENT

## 2022-10-07 DIAGNOSIS — Z87.891 FORMER SMOKER: ICD-10-CM

## 2022-10-07 PROCEDURE — 71271 CT THORAX LUNG CANCER SCR C-: CPT | Mod: TC,PO

## 2022-10-07 NOTE — TELEPHONE ENCOUNTER
Ct chest  IMPRESSION:  Prior granulomatous disease with stable 6 mm noncalcified nodule within the left lower lobe     Coronary artery calcification     No new nodules or infiltrates     LUNG-RADS CATEGORY 2: BENIGN APPEARANCE OR BEHAVIOR     RECOMMENDATION: Continue annual screening with LDCT in 12 months.

## 2022-10-10 ENCOUNTER — TELEPHONE (OUTPATIENT)
Dept: PULMONOLOGY | Facility: CLINIC | Age: 62
End: 2022-10-10

## 2022-10-10 ENCOUNTER — PATIENT MESSAGE (OUTPATIENT)
Dept: PULMONOLOGY | Facility: CLINIC | Age: 62
End: 2022-10-10

## 2022-10-27 ENCOUNTER — PATIENT MESSAGE (OUTPATIENT)
Dept: FAMILY MEDICINE | Facility: CLINIC | Age: 62
End: 2022-10-27

## 2022-10-28 ENCOUNTER — OFFICE VISIT (OUTPATIENT)
Dept: PSYCHIATRY | Facility: CLINIC | Age: 62
End: 2022-10-28
Payer: OTHER GOVERNMENT

## 2022-10-28 DIAGNOSIS — Z63.8 FAMILY CONFLICT: ICD-10-CM

## 2022-10-28 DIAGNOSIS — F41.1 GAD (GENERALIZED ANXIETY DISORDER): ICD-10-CM

## 2022-10-28 DIAGNOSIS — F33.1 MODERATE EPISODE OF RECURRENT MAJOR DEPRESSIVE DISORDER: Primary | ICD-10-CM

## 2022-10-28 PROCEDURE — 99212 OFFICE O/P EST SF 10 MIN: CPT | Mod: PBBFAC,PN | Performed by: SOCIAL WORKER

## 2022-10-28 PROCEDURE — 90837 PR PSYCHOTHERAPY W/PATIENT, 60 MIN: ICD-10-PCS | Mod: ,,, | Performed by: SOCIAL WORKER

## 2022-10-28 PROCEDURE — 99999 PR PBB SHADOW E&M-EST. PATIENT-LVL II: ICD-10-PCS | Mod: PBBFAC,,, | Performed by: SOCIAL WORKER

## 2022-10-28 PROCEDURE — 99999 PR PBB SHADOW E&M-EST. PATIENT-LVL II: CPT | Mod: PBBFAC,,, | Performed by: SOCIAL WORKER

## 2022-10-28 PROCEDURE — 90837 PSYTX W PT 60 MINUTES: CPT | Mod: ,,, | Performed by: SOCIAL WORKER

## 2022-10-28 SDOH — SOCIAL DETERMINANTS OF HEALTH (SDOH): OTHER SPECIFIED PROBLEMS RELATED TO PRIMARY SUPPORT GROUP: Z63.8

## 2022-10-28 NOTE — PROGRESS NOTES
Individual Psychotherapy (PhD/LCSW)    10/28/2022    Site:  [facility]         Therapeutic Intervention: Met with patient.  Outpatient - Insight oriented psychotherapy 60 min - CPT code 65961, Outpatient - Behavior modifying psychotherapy 60 min - CPT code 21455, and Outpatient - Supportive psychotherapy 60 min - CPT Code 59753    Chief complaint/reason for encounter: depression, anxiety, and family issues             Interval history and content of current session:  Ct was referred to tx by Apryl REHMAN to address depression, anxiety, and marital/family issues. Ct arrived to session and was fully engaged. Ct shared that things have been fine. She reported that she had a couple of days where she cried all day. Ct shared that there was a time in her life where she never cried. SW and ct discussed the importance ct trusting the process and to quit fighting the cries, they will go away. Ct was receptive to feedback.  Client shared that she has been setting boundaries with her  though it has been difficult she has been able to stick with them.  She reported that she and her  have a few trips planned and she is looking for to those trips.  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  Client shared that she was able to recognize that she had a core belief of being inadequate and not being good enough.  Client was able to acknowledge that this is the cause of a lot of her automatic negative thinking.  Client to work on refuting negative core beliefs and to continue in one-to-one sessions.      Treatment plan:  Target symptoms: depression, anxiety , family stress  Why chosen therapy is appropriate versus another modality: relevant to diagnosis, patient responds to this modality, evidence based practice  Outcome monitoring methods: self-report  Therapeutic intervention type: insight oriented psychotherapy, behavior modifying psychotherapy, supportive psychotherapy    Risk parameters:  Patient reports no  suicidal ideation  Patient reports no homicidal ideation  Patient reports no self-injurious behavior  Patient reports no violent behavior          CSSRS was completed: No risk    Mental Status Exam:  General Appearance:  unremarkable, age appropriate   Speech: normal tone, normal rate, normal pitch, normal volume      Level of Cooperation: cooperative      Thought Processes: normal and logical   Mood: steady      Thought Content: normal, no suicidality, no homicidality, delusions, or paranoia   Affect: congruent and appropriate   Orientation: Oriented x3   Memory: recent >  intact   Attention Span & Concentration: intact   Fund of General Knowledge: intact and appropriate to age and level of education   Abstract Reasoning: interpretation of similarities was abstract   Judgment & Insight: intact     Language intact             Verbal deficits: None    Patient's response to intervention:  The patient's response to intervention is accepting.    Progress toward goals and other mental status changes:  The patient's progress toward goals is fair , good, improvement .    Diagnosis:     ICD-10-CM ICD-9-CM   1. Moderate episode of recurrent major depressive disorder  F33.1 296.32   2. VANDANA (generalized anxiety disorder)  F41.1 300.02   3. Family conflict  Z63.8 V61.9       Plan:  individual psychotherapy and Ct to continue to follow up with Apryl duque psych med mgt  Pt to go to ED or call 911 if symptoms worsen or if she has thoughts of harming self and/or others. Pt verbalized understanding.    Return to clinic: as scheduled    Length of Service (minutes): 60      Each patient to whom he or she provides medical services by telemedicine is: (1) informed of the relationship between the physician and patient and the respective role of any other health care provider with respect to management of the patient; and (2) notified that he or she may decline to receive medical services by telemedicine and may withdraw from such care  at any time.

## 2022-11-14 ENCOUNTER — OFFICE VISIT (OUTPATIENT)
Dept: PSYCHIATRY | Facility: CLINIC | Age: 62
End: 2022-11-14
Payer: OTHER GOVERNMENT

## 2022-11-14 VITALS
BODY MASS INDEX: 25.12 KG/M2 | DIASTOLIC BLOOD PRESSURE: 69 MMHG | HEART RATE: 75 BPM | HEIGHT: 66 IN | WEIGHT: 156.31 LBS | SYSTOLIC BLOOD PRESSURE: 156 MMHG

## 2022-11-14 DIAGNOSIS — F33.1 MODERATE EPISODE OF RECURRENT MAJOR DEPRESSIVE DISORDER: Primary | ICD-10-CM

## 2022-11-14 DIAGNOSIS — F41.1 GAD (GENERALIZED ANXIETY DISORDER): ICD-10-CM

## 2022-11-14 DIAGNOSIS — Z63.8 FAMILY CONFLICT: ICD-10-CM

## 2022-11-14 PROCEDURE — 99214 PR OFFICE/OUTPT VISIT, EST, LEVL IV, 30-39 MIN: ICD-10-PCS | Mod: S$PBB,,, | Performed by: PHYSICIAN ASSISTANT

## 2022-11-14 PROCEDURE — 90834 PR PSYCHOTHERAPY W/PATIENT, 45 MIN: ICD-10-PCS | Mod: ,,, | Performed by: SOCIAL WORKER

## 2022-11-14 PROCEDURE — 99213 OFFICE O/P EST LOW 20 MIN: CPT | Mod: PBBFAC,PN | Performed by: PHYSICIAN ASSISTANT

## 2022-11-14 PROCEDURE — 99999 PR PBB SHADOW E&M-EST. PATIENT-LVL II: ICD-10-PCS | Mod: PBBFAC,,, | Performed by: SOCIAL WORKER

## 2022-11-14 PROCEDURE — 99214 OFFICE O/P EST MOD 30 MIN: CPT | Mod: S$PBB,,, | Performed by: PHYSICIAN ASSISTANT

## 2022-11-14 PROCEDURE — 99999 PR PBB SHADOW E&M-EST. PATIENT-LVL III: ICD-10-PCS | Mod: PBBFAC,,, | Performed by: PHYSICIAN ASSISTANT

## 2022-11-14 PROCEDURE — 99212 OFFICE O/P EST SF 10 MIN: CPT | Mod: PBBFAC,27,PN | Performed by: SOCIAL WORKER

## 2022-11-14 PROCEDURE — 99999 PR PBB SHADOW E&M-EST. PATIENT-LVL II: CPT | Mod: PBBFAC,,, | Performed by: SOCIAL WORKER

## 2022-11-14 PROCEDURE — 90834 PSYTX W PT 45 MINUTES: CPT | Mod: ,,, | Performed by: SOCIAL WORKER

## 2022-11-14 PROCEDURE — 99999 PR PBB SHADOW E&M-EST. PATIENT-LVL III: CPT | Mod: PBBFAC,,, | Performed by: PHYSICIAN ASSISTANT

## 2022-11-14 RX ORDER — VILAZODONE HYDROCHLORIDE 20 MG/1
20 TABLET ORAL DAILY
Qty: 90 TABLET | Refills: 0 | Status: SHIPPED | OUTPATIENT
Start: 2022-11-14 | End: 2023-02-10 | Stop reason: SDUPTHER

## 2022-11-14 RX ORDER — ALPRAZOLAM 0.5 MG/1
0.5 TABLET ORAL DAILY PRN
Qty: 30 TABLET | Refills: 0 | Status: SHIPPED | OUTPATIENT
Start: 2022-11-14 | End: 2023-02-02 | Stop reason: SDUPTHER

## 2022-11-14 SDOH — SOCIAL DETERMINANTS OF HEALTH (SDOH): OTHER SPECIFIED PROBLEMS RELATED TO PRIMARY SUPPORT GROUP: Z63.8
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
## 2022-11-14 NOTE — PROGRESS NOTES
Individual Psychotherapy (PhD/LCSW)    11/14/2022    Site:  [facility]         Therapeutic Intervention: Met with patient.  Outpatient - Insight oriented psychotherapy 45 min - CPT code 72451, Outpatient - Behavior modifying psychotherapy 45 min - CPT code 13151, and Outpatient - Supportive psychotherapy 45 min - CPT Code 84030    Chief complaint/reason for encounter: depression, anxiety, and family distress             Interval history and content of current session: Ct was referred to tx by Apryl REHMAN to address depression, anxiety, and marital/family issues. Ct arrived to session and was fully engaged. Ct shared that things have been going fine. She reported that she set a boundary with her  and he did not like it. SW reminded ct that setting boundaries would be hard in the beginning. Ct shared that her son is still not speaking to her. Ct shared that it still bothers her when she seees her friend with their grand kids and she is not allowed to see her own. Ct shared that this time of the year is difficult for her because of her relationship with her son. SW and ct processed the importance of ct recognizing that she's done all she could and that the ball is in her son's court. Ct shared that she continues to have issues with her  but nonetheless, she is taking care of herself and doing things that she wants to do with or without him. Ct to continue to set boundaries and continue in 1:1 sessions.       Treatment plan:  Target symptoms: depression, anxiety , family distress  Why chosen therapy is appropriate versus another modality: relevant to diagnosis, patient responds to this modality, evidence based practice  Outcome monitoring methods: self-report  Therapeutic intervention type: insight oriented psychotherapy, behavior modifying psychotherapy, supportive psychotherapy    Risk parameters:  Patient reports no suicidal ideation  Patient reports no homicidal ideation  Patient reports no self-injurious  behavior  Patient reports no violent behavior          CSSRS was completed: No risk    Mental Status Exam:  General Appearance:  unremarkable, age appropriate   Speech: normal tone, normal rate, normal pitch, normal volume      Level of Cooperation: cooperative      Thought Processes: normal and logical   Mood: steady, sad at times      Thought Content: normal, no suicidality, no homicidality, delusions, or paranoia   Affect: congruent and appropriate   Orientation: Oriented x3   Memory: recent >  intact   Attention Span & Concentration: intact   Fund of General Knowledge: intact and appropriate to age and level of education   Abstract Reasoning: interpretation of similarities was abstract   Judgment & Insight: intact     Language intact             Verbal deficits: None    Patient's response to intervention:  The patient's response to intervention is accepting.    Progress toward goals and other mental status changes:  The patient's progress toward goals is fair , good, some .    Diagnosis:     ICD-10-CM ICD-9-CM   1. Moderate episode of recurrent major depressive disorder  F33.1 296.32   2. VANDANA (generalized anxiety disorder)  F41.1 300.02   3. Family conflict  Z63.8 V61.9       Plan:  individual psychotherapy and Ct to follow up with Apryl duque psych med mgt  Pt to go to ED or call 911 if symptoms worsen or if she has thoughts of harming self and/or others. Pt verbalized understanding.    Return to clinic: as scheduled    Length of Service (minutes): 45      Each patient to whom he or she provides medical services by telemedicine is: (1) informed of the relationship between the physician and patient and the respective role of any other health care provider with respect to management of the patient; and (2) notified that he or she may decline to receive medical services by telemedicine and may withdraw from such care at any time.

## 2022-11-21 ENCOUNTER — OFFICE VISIT (OUTPATIENT)
Dept: PSYCHIATRY | Facility: CLINIC | Age: 62
End: 2022-11-21
Payer: OTHER GOVERNMENT

## 2022-11-21 DIAGNOSIS — Z63.8 FAMILY CONFLICT: ICD-10-CM

## 2022-11-21 DIAGNOSIS — F41.1 GAD (GENERALIZED ANXIETY DISORDER): ICD-10-CM

## 2022-11-21 DIAGNOSIS — F33.1 MODERATE EPISODE OF RECURRENT MAJOR DEPRESSIVE DISORDER: Primary | ICD-10-CM

## 2022-11-21 PROCEDURE — 99999 PR PBB SHADOW E&M-EST. PATIENT-LVL II: CPT | Mod: PBBFAC,,, | Performed by: SOCIAL WORKER

## 2022-11-21 PROCEDURE — 90837 PSYTX W PT 60 MINUTES: CPT | Mod: ,,, | Performed by: SOCIAL WORKER

## 2022-11-21 PROCEDURE — 99212 OFFICE O/P EST SF 10 MIN: CPT | Mod: PBBFAC,PN | Performed by: SOCIAL WORKER

## 2022-11-21 PROCEDURE — 90837 PR PSYCHOTHERAPY W/PATIENT, 60 MIN: ICD-10-PCS | Mod: ,,, | Performed by: SOCIAL WORKER

## 2022-11-21 PROCEDURE — 99999 PR PBB SHADOW E&M-EST. PATIENT-LVL II: ICD-10-PCS | Mod: PBBFAC,,, | Performed by: SOCIAL WORKER

## 2022-11-21 SDOH — SOCIAL DETERMINANTS OF HEALTH (SDOH): OTHER SPECIFIED PROBLEMS RELATED TO PRIMARY SUPPORT GROUP: Z63.8

## 2022-11-21 NOTE — PROGRESS NOTES
Individual Psychotherapy (PhD/LCSW)    11/21/2022    Site:  [facility]         Therapeutic Intervention: Met with patient.  Outpatient - Insight oriented psychotherapy 60 min - CPT code 58534, Outpatient - Behavior modifying psychotherapy 60 min - CPT code 53548, and Outpatient - Supportive psychotherapy 60 min - CPT Code 19839    Chief complaint/reason for encounter: depression, anxiety, and family stress             Interval history and content of current session:  Ct was referred to tx by Apryl REHMAN to address depression, anxiety, and marital/family issues. Ct arrived to session and was fully engaged. Ct shared that things have been going okay. She reported that she and her  continue to have communication issues but she no longer argues with him. She reported that she is continuing to set boundaries when communicating with him. She reported that she continues to spend time with her friends and being supportive of them. She reported that she has been going to Buddhist, praying more,. She still is hurt by her son's unwillingness to have anything to do with her. She is coming to acceptance about that situation. Ct and SW also discussed the importance of ct being okay with crying and how crying could mean transition and change in life. Ct was receptive to that. Ct to continue in 1:1 session.       Treatment plan:  Target symptoms: depression, anxiety , family stress  Why chosen therapy is appropriate versus another modality: relevant to diagnosis, patient responds to this modality, evidence based practice  Outcome monitoring methods: self-report  Therapeutic intervention type: insight oriented psychotherapy, behavior modifying psychotherapy, supportive psychotherapy    Risk parameters:  Patient reports no suicidal ideation  Patient reports no homicidal ideation  Patient reports no self-injurious behavior  Patient reports no violent behavior      CSSRS was completed: No risk    Mental Status Exam:  General Appearance:   unremarkable, age appropriate   Speech: normal tone, normal rate, normal pitch, normal volume      Level of Cooperation: cooperative      Thought Processes: normal and logical   Mood: steady      Thought Content: normal, no suicidality, no homicidality, delusions, or paranoia   Affect: congruent and appropriate   Orientation: Oriented x3   Memory: recent >  intact   Attention Span & Concentration: intact   Fund of General Knowledge: intact and appropriate to age and level of education   Abstract Reasoning: interpretation of similarities was abstract   Judgment & Insight: intact     Language intact       Verbal deficits: None    Patient's response to intervention:  The patient's response to intervention is accepting.    Progress toward goals and other mental status changes:  The patient's progress toward goals is fair , good, improved .    Diagnosis:     ICD-10-CM ICD-9-CM   1. Moderate episode of recurrent major depressive disorder  F33.1 296.32   2. VANDANA (generalized anxiety disorder)  F41.1 300.02   3. Family conflict  Z63.8 V61.9       Plan:  individual psychotherapy and Ct to follow up with Apryl REHMAN for psych med mgt  Pt to go to ED or call 911 if symptoms worsen or if she has thoughts of harming self and/or others. Pt verbalized understanding.    Return to clinic: as scheduled    Length of Service (minutes): 60      Each patient to whom he or she provides medical services by telemedicine is: (1) informed of the relationship between the physician and patient and the respective role of any other health care provider with respect to management of the patient; and (2) notified that he or she may decline to receive medical services by telemedicine and may withdraw from such care at any time.

## 2022-11-21 NOTE — TELEPHONE ENCOUNTER
----- Message from Allen Callahan NP sent at 1/21/2019  4:01 PM CST -----  Bone density scan shows osteopenia, which means your bones are less dense than normal but not quite osteoporosis. I recommend taking at least calcium 1200mg and Vitamin D of 1000 IU daily. In addition, I recommend light weight exercises to prevent progression to osteoporosis. We can repeat the bone density scan in 2-4 yrs.   No

## 2022-12-01 ENCOUNTER — OFFICE VISIT (OUTPATIENT)
Dept: FAMILY MEDICINE | Facility: CLINIC | Age: 62
End: 2022-12-01
Payer: OTHER GOVERNMENT

## 2022-12-01 VITALS
HEIGHT: 66 IN | HEART RATE: 73 BPM | BODY MASS INDEX: 25.07 KG/M2 | SYSTOLIC BLOOD PRESSURE: 126 MMHG | DIASTOLIC BLOOD PRESSURE: 70 MMHG | TEMPERATURE: 98 F | OXYGEN SATURATION: 96 % | WEIGHT: 156 LBS

## 2022-12-01 DIAGNOSIS — Z12.31 OTHER SCREENING MAMMOGRAM: ICD-10-CM

## 2022-12-01 DIAGNOSIS — I10 ESSENTIAL HYPERTENSION: ICD-10-CM

## 2022-12-01 DIAGNOSIS — Z00.00 PREVENTATIVE HEALTH CARE: Primary | ICD-10-CM

## 2022-12-01 DIAGNOSIS — Z78.0 POST-MENOPAUSAL: ICD-10-CM

## 2022-12-01 DIAGNOSIS — E78.00 PURE HYPERCHOLESTEROLEMIA: ICD-10-CM

## 2022-12-01 DIAGNOSIS — Z23 NEED FOR INFLUENZA VACCINATION: ICD-10-CM

## 2022-12-01 PROCEDURE — 90686 IIV4 VACC NO PRSV 0.5 ML IM: CPT | Mod: S$GLB,,, | Performed by: NURSE PRACTITIONER

## 2022-12-01 PROCEDURE — 90471 IMMUNIZATION ADMIN: CPT | Mod: S$GLB,,, | Performed by: NURSE PRACTITIONER

## 2022-12-01 PROCEDURE — 99396 PR PREVENTIVE VISIT,EST,40-64: ICD-10-PCS | Mod: 25,S$GLB,, | Performed by: NURSE PRACTITIONER

## 2022-12-01 PROCEDURE — 90686 FLU VACCINE (QUAD) GREATER THAN OR EQUAL TO 3YO PRESERVATIVE FREE IM: ICD-10-PCS | Mod: S$GLB,,, | Performed by: NURSE PRACTITIONER

## 2022-12-01 PROCEDURE — 90471 FLU VACCINE (QUAD) GREATER THAN OR EQUAL TO 3YO PRESERVATIVE FREE IM: ICD-10-PCS | Mod: S$GLB,,, | Performed by: NURSE PRACTITIONER

## 2022-12-01 PROCEDURE — 99396 PREV VISIT EST AGE 40-64: CPT | Mod: 25,S$GLB,, | Performed by: NURSE PRACTITIONER

## 2022-12-01 RX ORDER — AMLODIPINE AND BENAZEPRIL HYDROCHLORIDE 5; 10 MG/1; MG/1
1 CAPSULE ORAL DAILY
Qty: 90 CAPSULE | Refills: 1 | Status: SHIPPED | OUTPATIENT
Start: 2022-12-01 | End: 2023-02-02 | Stop reason: SDUPTHER

## 2022-12-12 ENCOUNTER — OFFICE VISIT (OUTPATIENT)
Dept: PSYCHIATRY | Facility: CLINIC | Age: 62
End: 2022-12-12
Payer: OTHER GOVERNMENT

## 2022-12-12 DIAGNOSIS — F33.1 MODERATE EPISODE OF RECURRENT MAJOR DEPRESSIVE DISORDER: Primary | ICD-10-CM

## 2022-12-12 DIAGNOSIS — Z63.8 FAMILY CONFLICT: ICD-10-CM

## 2022-12-12 DIAGNOSIS — F41.1 GAD (GENERALIZED ANXIETY DISORDER): ICD-10-CM

## 2022-12-12 PROCEDURE — 99999 PR PBB SHADOW E&M-EST. PATIENT-LVL II: CPT | Mod: PBBFAC,,, | Performed by: SOCIAL WORKER

## 2022-12-12 PROCEDURE — 90837 PSYTX W PT 60 MINUTES: CPT | Mod: ,,, | Performed by: SOCIAL WORKER

## 2022-12-12 PROCEDURE — 99212 OFFICE O/P EST SF 10 MIN: CPT | Mod: PBBFAC,PN | Performed by: SOCIAL WORKER

## 2022-12-12 PROCEDURE — 99999 PR PBB SHADOW E&M-EST. PATIENT-LVL II: ICD-10-PCS | Mod: PBBFAC,,, | Performed by: SOCIAL WORKER

## 2022-12-12 PROCEDURE — 90837 PR PSYCHOTHERAPY W/PATIENT, 60 MIN: ICD-10-PCS | Mod: ,,, | Performed by: SOCIAL WORKER

## 2022-12-12 SDOH — SOCIAL DETERMINANTS OF HEALTH (SDOH): OTHER SPECIFIED PROBLEMS RELATED TO PRIMARY SUPPORT GROUP: Z63.8

## 2022-12-12 NOTE — PROGRESS NOTES
Individual Psychotherapy (PhD/LCSW)    12/12/2022    Site:  [facility]         Therapeutic Intervention: Met with patient.  Outpatient - Insight oriented psychotherapy 60 min - CPT code 41037, Outpatient - Behavior modifying psychotherapy 60 min - CPT code 14312, and Outpatient - Supportive psychotherapy 60 min - CPT Code 76987    Chief complaint/reason for encounter: depression, anxiety, and family stress             Interval history and content of current session:  Ct was referred to tx by Apryl REHMAN to address depression, anxiety, and marital/family issues. Ct arrived to session and was fully engaged. Ct shared that she continues to try to set boundaries with her  but she struggles. SW and ct discussed things that ct had in her control. SW encouraged ct to explore why her 's behaviors bother her so much considering his actions are consistent. SW and ct processed the importance of acceptance and that acceptance does not mean that she condones his behavior, it means that she is willing to deal with it for now. Ct verbalized understanding. Ct shared that her son will not communicate with her. She reported that she had concerns about his mental health. She shared how her  and son had lunch last week and it was superficial. SW suggested to to ct do the [employer] virtual support groups for family members of individuals with mental illness. Ct to continue in 1:1 sessions.      Treatment plan:  Target symptoms: depression, anxiety , family stress  Why chosen therapy is appropriate versus another modality: relevant to diagnosis, patient responds to this modality, evidence based practice  Outcome monitoring methods: self-report  Therapeutic intervention type: insight oriented psychotherapy, behavior modifying psychotherapy, supportive psychotherapy    Risk parameters:  Patient reports no suicidal ideation  Patient reports no homicidal ideation  Patient reports no self-injurious behavior  Patient reports no  violent behavior      CSSRS was completed: No risk    Mental Status Exam:  General Appearance:  unremarkable, age appropriate   Speech: normal tone, normal rate, normal pitch, normal volume      Level of Cooperation: cooperative      Thought Processes: normal and logical   Mood: steady      Thought Content: normal, no suicidality, no homicidality, delusions, or paranoia   Affect: congruent and appropriate   Orientation: Oriented x3   Memory: recent >  intact   Attention Span & Concentration: intact   Fund of General Knowledge: intact and appropriate to age and level of education   Abstract Reasoning: interpretation of similarities was abstract   Judgment & Insight: intact     Language intact       Verbal deficits: None    Patient's response to intervention:  The patient's response to intervention is accepting.    Progress toward goals and other mental status changes:  The patient's progress toward goals is fair , good, improved .    Diagnosis:     ICD-10-CM ICD-9-CM   1. Moderate episode of recurrent major depressive disorder  F33.1 296.32   2. VANDANA (generalized anxiety disorder)  F41.1 300.02   3. Family conflict  Z63.8 V61.9       Plan:  individual psychotherapy and Ct to follow up with Apryl REHMAN for psych med mgt  Pt to go to ED or call 911 if symptoms worsen or if she has thoughts of harming self and/or others. Pt verbalized understanding.    Return to clinic: as scheduled    Length of Service (minutes): 60      Each patient to whom he or she provides medical services by telemedicine is: (1) informed of the relationship between the physician and patient and the respective role of any other health care provider with respect to management of the patient; and (2) notified that he or she may decline to receive medical services by telemedicine and may withdraw from such care at any time.

## 2022-12-20 ENCOUNTER — PATIENT MESSAGE (OUTPATIENT)
Dept: FAMILY MEDICINE | Facility: CLINIC | Age: 62
End: 2022-12-20

## 2022-12-20 ENCOUNTER — HOSPITAL ENCOUNTER (OUTPATIENT)
Dept: RADIOLOGY | Facility: HOSPITAL | Age: 62
Discharge: HOME | End: 2022-12-20
Attending: NURSE PRACTITIONER
Payer: OTHER GOVERNMENT

## 2022-12-20 DIAGNOSIS — Z78.0 POST-MENOPAUSAL: ICD-10-CM

## 2022-12-20 DIAGNOSIS — Z12.31 OTHER SCREENING MAMMOGRAM: ICD-10-CM

## 2022-12-20 PROCEDURE — 77063 BREAST TOMOSYNTHESIS BI: CPT | Mod: TC,PO

## 2022-12-20 PROCEDURE — 77080 DXA BONE DENSITY AXIAL: CPT | Mod: TC,PO

## 2022-12-21 ENCOUNTER — PATIENT MESSAGE (OUTPATIENT)
Dept: FAMILY MEDICINE | Facility: CLINIC | Age: 62
End: 2022-12-21

## 2022-12-21 ENCOUNTER — LAB VISIT (OUTPATIENT)
Dept: LAB | Facility: HOSPITAL | Age: 62
End: 2022-12-21
Attending: NURSE PRACTITIONER
Payer: OTHER GOVERNMENT

## 2022-12-21 DIAGNOSIS — Z00.00 PREVENTATIVE HEALTH CARE: ICD-10-CM

## 2022-12-21 LAB
25(OH)D3+25(OH)D2 SERPL-MCNC: 39 NG/ML (ref 30–96)
ALBUMIN SERPL BCP-MCNC: 4.5 G/DL (ref 3.5–5.2)
ALP SERPL-CCNC: 69 U/L (ref 55–135)
ALT SERPL W/O P-5'-P-CCNC: 25 U/L (ref 10–44)
ANION GAP SERPL CALC-SCNC: 9 MMOL/L (ref 8–16)
AST SERPL-CCNC: 30 U/L (ref 10–40)
BASOPHILS # BLD AUTO: 0.08 K/UL (ref 0–0.2)
BASOPHILS NFR BLD: 1.4 % (ref 0–1.9)
BILIRUB SERPL-MCNC: 1.3 MG/DL (ref 0.1–1)
BILIRUB UR QL STRIP: NEGATIVE
BUN SERPL-MCNC: 13 MG/DL (ref 8–23)
CALCIUM SERPL-MCNC: 9.4 MG/DL (ref 8.7–10.5)
CHLORIDE SERPL-SCNC: 104 MMOL/L (ref 95–110)
CHOLEST SERPL-MCNC: 191 MG/DL (ref 120–199)
CHOLEST/HDLC SERPL: 2.1 {RATIO} (ref 2–5)
CLARITY UR: CLEAR
CO2 SERPL-SCNC: 28 MMOL/L (ref 23–29)
COLOR UR: YELLOW
CREAT SERPL-MCNC: 0.8 MG/DL (ref 0.5–1.4)
DIFFERENTIAL METHOD: ABNORMAL
EOSINOPHIL # BLD AUTO: 0.2 K/UL (ref 0–0.5)
EOSINOPHIL NFR BLD: 3 % (ref 0–8)
ERYTHROCYTE [DISTWIDTH] IN BLOOD BY AUTOMATED COUNT: 13 % (ref 11.5–14.5)
EST. GFR  (NO RACE VARIABLE): >60 ML/MIN/1.73 M^2
GLUCOSE SERPL-MCNC: 97 MG/DL (ref 70–110)
GLUCOSE UR QL STRIP: NEGATIVE
HCT VFR BLD AUTO: 44.8 % (ref 37–48.5)
HDLC SERPL-MCNC: 93 MG/DL (ref 40–75)
HDLC SERPL: 48.7 % (ref 20–50)
HGB BLD-MCNC: 14.5 G/DL (ref 12–16)
HGB UR QL STRIP: NEGATIVE
IMM GRANULOCYTES # BLD AUTO: 0.02 K/UL (ref 0–0.04)
IMM GRANULOCYTES NFR BLD AUTO: 0.4 % (ref 0–0.5)
KETONES UR QL STRIP: NEGATIVE
LDLC SERPL CALC-MCNC: 62 MG/DL (ref 63–159)
LEUKOCYTE ESTERASE UR QL STRIP: NEGATIVE
LYMPHOCYTES # BLD AUTO: 1.1 K/UL (ref 1–4.8)
LYMPHOCYTES NFR BLD: 19.4 % (ref 18–48)
MCH RBC QN AUTO: 31.7 PG (ref 27–31)
MCHC RBC AUTO-ENTMCNC: 32.4 G/DL (ref 32–36)
MCV RBC AUTO: 98 FL (ref 82–98)
MONOCYTES # BLD AUTO: 0.3 K/UL (ref 0.3–1)
MONOCYTES NFR BLD: 6 % (ref 4–15)
NEUTROPHILS # BLD AUTO: 3.9 K/UL (ref 1.8–7.7)
NEUTROPHILS NFR BLD: 69.8 % (ref 38–73)
NITRITE UR QL STRIP: NEGATIVE
NONHDLC SERPL-MCNC: 98 MG/DL
NRBC BLD-RTO: 0 /100 WBC
PH UR STRIP: 7 [PH] (ref 5–8)
PLATELET # BLD AUTO: 271 K/UL (ref 150–450)
PMV BLD AUTO: 9.6 FL (ref 9.2–12.9)
POTASSIUM SERPL-SCNC: 4.2 MMOL/L (ref 3.5–5.1)
PROT SERPL-MCNC: 7.6 G/DL (ref 6–8.4)
PROT UR QL STRIP: NEGATIVE
RBC # BLD AUTO: 4.57 M/UL (ref 4–5.4)
SODIUM SERPL-SCNC: 141 MMOL/L (ref 136–145)
SP GR UR STRIP: 1.01 (ref 1–1.03)
TRIGL SERPL-MCNC: 180 MG/DL (ref 30–150)
TSH SERPL DL<=0.005 MIU/L-ACNC: 1.53 UIU/ML (ref 0.34–5.6)
URN SPEC COLLECT METH UR: NORMAL
UROBILINOGEN UR STRIP-ACNC: NEGATIVE EU/DL
WBC # BLD AUTO: 5.62 K/UL (ref 3.9–12.7)

## 2022-12-21 PROCEDURE — 85025 COMPLETE CBC W/AUTO DIFF WBC: CPT | Performed by: NURSE PRACTITIONER

## 2022-12-21 PROCEDURE — 81003 URINALYSIS AUTO W/O SCOPE: CPT | Performed by: NURSE PRACTITIONER

## 2022-12-21 PROCEDURE — 82306 VITAMIN D 25 HYDROXY: CPT | Performed by: NURSE PRACTITIONER

## 2022-12-21 PROCEDURE — 80061 LIPID PANEL: CPT | Performed by: NURSE PRACTITIONER

## 2022-12-21 PROCEDURE — 80053 COMPREHEN METABOLIC PANEL: CPT | Performed by: NURSE PRACTITIONER

## 2022-12-21 PROCEDURE — 84443 ASSAY THYROID STIM HORMONE: CPT | Performed by: NURSE PRACTITIONER

## 2022-12-21 PROCEDURE — 36415 COLL VENOUS BLD VENIPUNCTURE: CPT | Performed by: NURSE PRACTITIONER

## 2022-12-28 ENCOUNTER — OFFICE VISIT (OUTPATIENT)
Dept: PSYCHIATRY | Facility: CLINIC | Age: 62
End: 2022-12-28
Payer: OTHER GOVERNMENT

## 2022-12-28 DIAGNOSIS — F41.1 GAD (GENERALIZED ANXIETY DISORDER): ICD-10-CM

## 2022-12-28 DIAGNOSIS — F33.1 MODERATE EPISODE OF RECURRENT MAJOR DEPRESSIVE DISORDER: Primary | ICD-10-CM

## 2022-12-28 DIAGNOSIS — Z63.8 FAMILY CONFLICT: ICD-10-CM

## 2022-12-28 PROCEDURE — 99999 PR PBB SHADOW E&M-EST. PATIENT-LVL II: CPT | Mod: PBBFAC,,, | Performed by: SOCIAL WORKER

## 2022-12-28 PROCEDURE — 99212 OFFICE O/P EST SF 10 MIN: CPT | Mod: PBBFAC,PN | Performed by: SOCIAL WORKER

## 2022-12-28 PROCEDURE — 90834 PSYTX W PT 45 MINUTES: CPT | Mod: ,,, | Performed by: SOCIAL WORKER

## 2022-12-28 PROCEDURE — 90834 PR PSYCHOTHERAPY W/PATIENT, 45 MIN: ICD-10-PCS | Mod: ,,, | Performed by: SOCIAL WORKER

## 2022-12-28 PROCEDURE — 99999 PR PBB SHADOW E&M-EST. PATIENT-LVL II: ICD-10-PCS | Mod: PBBFAC,,, | Performed by: SOCIAL WORKER

## 2022-12-28 SDOH — SOCIAL DETERMINANTS OF HEALTH (SDOH): OTHER SPECIFIED PROBLEMS RELATED TO PRIMARY SUPPORT GROUP: Z63.8

## 2022-12-28 NOTE — PROGRESS NOTES
Individual Psychotherapy (PhD/LCSW)    12/28/2022    Site:  [facility]         Therapeutic Intervention: Met with patient.  Outpatient - Insight oriented psychotherapy 45 min - CPT code 94541, Outpatient - Behavior modifying psychotherapy 45 min - CPT code 98664, and Outpatient - Supportive psychotherapy 45 min - CPT Code 46015    Chief complaint/reason for encounter: depression, anxiety, and family stress             Interval history and content of current session: Ct was referred to tx by Apryl REHMAN to address depression, anxiety, and marital/family issues. Ct arrived to session and was fully engaged. Ct was tearful throughout session. Ct continues to work on acceptance of her relationship with her son. SW and ct processed ct's pattern of reaching out to unavailable people. Ct reported that she is going skiing with some friends and her  will stay home. Ct shared that she continues to attend her new Christianity. SW encouraged ct to continue to work on herself and not focus on others' emotions. Ct to continue in  1:1 sessions.       Treatment plan:  Target symptoms: depression, anxiety , family stress  Why chosen therapy is appropriate versus another modality: relevant to diagnosis, patient responds to this modality, evidence based practice  Outcome monitoring methods: self-report  Therapeutic intervention type: insight oriented psychotherapy, behavior modifying psychotherapy, supportive psychotherapy    Risk parameters:  Patient reports no suicidal ideation  Patient reports no homicidal ideation  Patient reports no self-injurious behavior  Patient reports no violent behavior      CSSRS was completed: No risk    Mental Status Exam:  General Appearance:  unremarkable, age appropriate   Speech: normal tone, normal rate, normal pitch, normal volume      Level of Cooperation: cooperative      Thought Processes: normal and logical   Mood: sad      Thought Content: normal, no suicidality, no homicidality, delusions, or  paranoia   Affect: congruent and appropriate   Orientation: Oriented x3   Memory: recent >  intact   Attention Span & Concentration: intact   Fund of General Knowledge: intact and appropriate to age and level of education   Abstract Reasoning: interpretation of similarities was abstract   Judgment & Insight: intact     Language intact       Verbal deficits: None    Patient's response to intervention:  The patient's response to intervention is accepting.    Progress toward goals and other mental status changes:  The patient's progress toward goals is fair , improved .    Diagnosis:     ICD-10-CM ICD-9-CM   1. Moderate episode of recurrent major depressive disorder  F33.1 296.32   2. VANDANA (generalized anxiety disorder)  F41.1 300.02   3. Family conflict  Z63.8 V61.9       Plan:  individual psychotherapy and Ct to follow up with Apryl ERHMAN for psych med mgt  Pt to go to ED or call 911 if symptoms worsen or if she has thoughts of harming self and/or others. Pt verbalized understanding.    Return to clinic: as scheduled    Length of Service (minutes): 45      Each patient to whom he or she provides medical services by telemedicine is: (1) informed of the relationship between the physician and patient and the respective role of any other health care provider with respect to management of the patient; and (2) notified that he or she may decline to receive medical services by telemedicine and may withdraw from such care at any time.

## 2023-01-27 ENCOUNTER — OFFICE VISIT (OUTPATIENT)
Dept: PSYCHIATRY | Facility: CLINIC | Age: 63
End: 2023-01-27
Payer: OTHER GOVERNMENT

## 2023-01-27 DIAGNOSIS — Z63.8 FAMILY CONFLICT: ICD-10-CM

## 2023-01-27 DIAGNOSIS — F41.1 GAD (GENERALIZED ANXIETY DISORDER): ICD-10-CM

## 2023-01-27 DIAGNOSIS — F33.1 MODERATE EPISODE OF RECURRENT MAJOR DEPRESSIVE DISORDER: Primary | ICD-10-CM

## 2023-01-27 PROCEDURE — 90837 PSYTX W PT 60 MINUTES: CPT | Mod: ,,, | Performed by: SOCIAL WORKER

## 2023-01-27 PROCEDURE — 99999 PR PBB SHADOW E&M-EST. PATIENT-LVL II: CPT | Mod: PBBFAC,,, | Performed by: SOCIAL WORKER

## 2023-01-27 PROCEDURE — 90837 PR PSYCHOTHERAPY W/PATIENT, 60 MIN: ICD-10-PCS | Mod: ,,, | Performed by: SOCIAL WORKER

## 2023-01-27 PROCEDURE — 99212 OFFICE O/P EST SF 10 MIN: CPT | Mod: PBBFAC,PN | Performed by: SOCIAL WORKER

## 2023-01-27 PROCEDURE — 99999 PR PBB SHADOW E&M-EST. PATIENT-LVL II: ICD-10-PCS | Mod: PBBFAC,,, | Performed by: SOCIAL WORKER

## 2023-01-27 SDOH — SOCIAL DETERMINANTS OF HEALTH (SDOH): OTHER SPECIFIED PROBLEMS RELATED TO PRIMARY SUPPORT GROUP: Z63.8

## 2023-01-27 NOTE — PROGRESS NOTES
Individual Psychotherapy (PhD/LCSW)    1/27/2023    Site:  [facility]         Therapeutic Intervention: Met with patient.  Outpatient - Insight oriented psychotherapy 60 min - CPT code 41944, Outpatient - Behavior modifying psychotherapy 60 min - CPT code 40854, and Outpatient - Supportive psychotherapy 60 min - CPT Code 08735    Chief complaint/reason for encounter: depression, anxiety, and marital issues             Interval history and content of current session: Ct was referred to tx by Apryl REHMAN to address depression, anxiety, and marital/family issues. Ct arrived to session and was fully engaged. Ct was tearful throughout session.  Client shared that she went on vacation for 14 days with some friends and that she had a great time.  She reported that even though she did not go with her , they argued whenever he would call.   and client processed client continuing to set boundaries with her .  Client had fears about leaving her  due to fears of being alone.   and client processed the importance of client focusing on only the facts and not fears.   and client will continue to work on boundaries.  Client was encouraged to continue to engage in activities that make her feel good such as hanging out with her friends, going to her Sabianism functions, helping out at the school etc..  Client to continue in one-to-one sessions.      Treatment plan:  Target symptoms: depression, anxiety , marital issues  Why chosen therapy is appropriate versus another modality: relevant to diagnosis, patient responds to this modality, evidence based practice  Outcome monitoring methods: self-report  Therapeutic intervention type: insight oriented psychotherapy, behavior modifying psychotherapy, supportive psychotherapy    Risk parameters:  Patient reports no suicidal ideation  Patient reports no homicidal ideation  Patient reports no self-injurious behavior  Patient reports no violent  behavior    CSSRS was completed: No risk    Mental Status Exam:  General Appearance:  unremarkable, age appropriate   Speech: normal tone, normal rate, normal pitch, normal volume      Level of Cooperation: cooperative      Thought Processes: normal and logical   Mood: steady      Thought Content: normal, no suicidality, no homicidality, delusions, or paranoia   Affect: congruent and appropriate   Orientation: Oriented x3   Memory: recent >  intact   Attention Span & Concentration: intact   Fund of General Knowledge: intact and appropriate to age and level of education   Abstract Reasoning: interpretation of similarities was abstract   Judgment & Insight: intact     Language intact       Verbal deficits: None    Patient's response to intervention:  The patient's response to intervention is accepting.    Progress toward goals and other mental status changes:  The patient's progress toward goals is fair , improved .    Diagnosis:     ICD-10-CM ICD-9-CM   1. Moderate episode of recurrent major depressive disorder  F33.1 296.32   2. VANDANA (generalized anxiety disorder)  F41.1 300.02   3. Family conflict  Z63.8 V61.9       Plan:  individual psychotherapy and Ct to follow up with Apryl duque psych med mgt  Pt to go to ED or call 911 if symptoms worsen or if she has thoughts of harming self and/or others. Pt verbalized understanding.    Return to clinic: as scheduled    Length of Service (minutes): 60      Each patient to whom he or she provides medical services by telemedicine is: (1) informed of the relationship between the physician and patient and the respective role of any other health care provider with respect to management of the patient; and (2) notified that he or she may decline to receive medical services by telemedicine and may withdraw from such care at any time.

## 2023-02-02 DIAGNOSIS — F41.1 GAD (GENERALIZED ANXIETY DISORDER): ICD-10-CM

## 2023-02-02 RX ORDER — ALPRAZOLAM 0.5 MG/1
0.5 TABLET ORAL DAILY PRN
Qty: 30 TABLET | Refills: 0 | Status: SHIPPED | OUTPATIENT
Start: 2023-02-02 | End: 2023-03-23 | Stop reason: SDUPTHER

## 2023-02-10 ENCOUNTER — OFFICE VISIT (OUTPATIENT)
Dept: PSYCHIATRY | Facility: CLINIC | Age: 63
End: 2023-02-10
Payer: OTHER GOVERNMENT

## 2023-02-10 VITALS
HEART RATE: 67 BPM | SYSTOLIC BLOOD PRESSURE: 132 MMHG | HEIGHT: 66 IN | DIASTOLIC BLOOD PRESSURE: 76 MMHG | WEIGHT: 157.63 LBS | BODY MASS INDEX: 25.33 KG/M2

## 2023-02-10 DIAGNOSIS — F33.1 MODERATE EPISODE OF RECURRENT MAJOR DEPRESSIVE DISORDER: Primary | ICD-10-CM

## 2023-02-10 DIAGNOSIS — F41.1 GAD (GENERALIZED ANXIETY DISORDER): ICD-10-CM

## 2023-02-10 PROCEDURE — 99214 PR OFFICE/OUTPT VISIT, EST, LEVL IV, 30-39 MIN: ICD-10-PCS | Mod: S$PBB,,, | Performed by: PHYSICIAN ASSISTANT

## 2023-02-10 PROCEDURE — 99213 OFFICE O/P EST LOW 20 MIN: CPT | Mod: PBBFAC,PN | Performed by: PHYSICIAN ASSISTANT

## 2023-02-10 PROCEDURE — 99999 PR PBB SHADOW E&M-EST. PATIENT-LVL III: CPT | Mod: PBBFAC,,, | Performed by: PHYSICIAN ASSISTANT

## 2023-02-10 PROCEDURE — 99999 PR PBB SHADOW E&M-EST. PATIENT-LVL III: ICD-10-PCS | Mod: PBBFAC,,, | Performed by: PHYSICIAN ASSISTANT

## 2023-02-10 PROCEDURE — 99214 OFFICE O/P EST MOD 30 MIN: CPT | Mod: S$PBB,,, | Performed by: PHYSICIAN ASSISTANT

## 2023-02-10 RX ORDER — VILAZODONE HYDROCHLORIDE 20 MG/1
20 TABLET ORAL DAILY
Qty: 90 TABLET | Refills: 0 | Status: SHIPPED | OUTPATIENT
Start: 2023-02-10 | End: 2023-05-22
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
## 2023-03-01 ENCOUNTER — OFFICE VISIT (OUTPATIENT)
Dept: PSYCHIATRY | Facility: CLINIC | Age: 63
End: 2023-03-01
Payer: OTHER GOVERNMENT

## 2023-03-01 DIAGNOSIS — Z63.8 FAMILY CONFLICT: ICD-10-CM

## 2023-03-01 DIAGNOSIS — F33.1 MODERATE EPISODE OF RECURRENT MAJOR DEPRESSIVE DISORDER: Primary | ICD-10-CM

## 2023-03-01 DIAGNOSIS — F41.1 GAD (GENERALIZED ANXIETY DISORDER): ICD-10-CM

## 2023-03-01 PROCEDURE — 99212 OFFICE O/P EST SF 10 MIN: CPT | Mod: PBBFAC,PN | Performed by: SOCIAL WORKER

## 2023-03-01 PROCEDURE — 90837 PR PSYCHOTHERAPY W/PATIENT, 60 MIN: ICD-10-PCS | Mod: ,,, | Performed by: SOCIAL WORKER

## 2023-03-01 PROCEDURE — 99999 PR PBB SHADOW E&M-EST. PATIENT-LVL II: ICD-10-PCS | Mod: PBBFAC,,, | Performed by: SOCIAL WORKER

## 2023-03-01 PROCEDURE — 99999 PR PBB SHADOW E&M-EST. PATIENT-LVL II: CPT | Mod: PBBFAC,,, | Performed by: SOCIAL WORKER

## 2023-03-01 PROCEDURE — 90837 PSYTX W PT 60 MINUTES: CPT | Mod: ,,, | Performed by: SOCIAL WORKER

## 2023-03-01 SDOH — SOCIAL DETERMINANTS OF HEALTH (SDOH): OTHER SPECIFIED PROBLEMS RELATED TO PRIMARY SUPPORT GROUP: Z63.8

## 2023-03-03 NOTE — PROGRESS NOTES
Individual Psychotherapy (PhD/LCSW)    3/1/2023    Site:  [facility]         Therapeutic Intervention: Met with patient.  Outpatient - Insight oriented psychotherapy 60 min - CPT code 99673, Outpatient - Behavior modifying psychotherapy 60 min - CPT code 72868, and Outpatient - Supportive psychotherapy 60 min - CPT Code 67768    Chief complaint/reason for encounter: depression, anxiety, and marital issues             Interval history and content of current session: Ct was referred to tx by Apryl REHMAN to address depression, anxiety, and marital/family issues. Ct arrived to session and was fully engaged.  Client shared that she missed her last session due to oversleeping.  She reported that she had gone to a weekend retreat and had a wonderful time.  Client continues to work on her daniel and spirituality and states that those are more or less her main coping skills at this time.  She continues to set more more boundaries with her  and tries to do more things for herself without fear of hurting her 's feelings.  Client presented in a much better mood than in prior sessions.   and client processed the importance of client continuing to investing herself and continue to set healthy boundaries.  Client has made some progress.  Client to continue in one-to-one sessions.      Treatment plan:  Target symptoms: depression, anxiety , marital issues  Why chosen therapy is appropriate versus another modality: relevant to diagnosis, patient responds to this modality, evidence based practice  Outcome monitoring methods: self-report  Therapeutic intervention type: insight oriented psychotherapy, behavior modifying psychotherapy, supportive psychotherapy    Risk parameters:  Patient reports no suicidal ideation  Patient reports no homicidal ideation  Patient reports no self-injurious behavior  Patient reports no violent behavior    CSSRS was completed: No risk    Mental Status Exam:  General Appearance:   unremarkable, age appropriate   Speech: normal tone, normal rate, normal pitch, normal volume      Level of Cooperation: cooperative      Thought Processes: normal and logical   Mood: steady      Thought Content: normal, no suicidality, no homicidality, delusions, or paranoia   Affect: congruent and appropriate   Orientation: Oriented x3   Memory: recent >  intact   Attention Span & Concentration: intact   Fund of General Knowledge: intact and appropriate to age and level of education   Abstract Reasoning: interpretation of similarities was abstract   Judgment & Insight: intact     Language intact       Verbal deficits: None    Patient's response to intervention:  The patient's response to intervention is accepting.    Progress toward goals and other mental status changes:  The patient's progress toward goals is fair , improved .    Diagnosis:     ICD-10-CM ICD-9-CM   1. Moderate episode of recurrent major depressive disorder  F33.1 296.32   2. VANDANA (generalized anxiety disorder)  F41.1 300.02   3. Family conflict  Z63.8 V61.9       Plan:  individual psychotherapy and ct to follow up with Apryl REHMAN for psych med mgt  Pt to go to ED or call 911 if symptoms worsen or if she has thoughts of harming self and/or others. Pt verbalized understanding.    Return to clinic: as scheduled    Length of Service (minutes): 60      Each patient to whom he or she provides medical services by telemedicine is: (1) informed of the relationship between the physician and patient and the respective role of any other health care provider with respect to management of the patient; and (2) notified that he or she may decline to receive medical services by telemedicine and may withdraw from such care at any time.

## 2023-03-16 ENCOUNTER — OFFICE VISIT (OUTPATIENT)
Dept: CARDIOLOGY | Facility: CLINIC | Age: 63
End: 2023-03-16
Payer: OTHER GOVERNMENT

## 2023-03-16 VITALS
RESPIRATION RATE: 16 BRPM | SYSTOLIC BLOOD PRESSURE: 120 MMHG | OXYGEN SATURATION: 97 % | WEIGHT: 154 LBS | HEART RATE: 68 BPM | BODY MASS INDEX: 24.75 KG/M2 | HEIGHT: 66 IN | DIASTOLIC BLOOD PRESSURE: 76 MMHG

## 2023-03-16 DIAGNOSIS — I45.81 LONG Q-T SYNDROME: Primary | ICD-10-CM

## 2023-03-16 DIAGNOSIS — I10 HYPERTENSION, UNSPECIFIED TYPE: ICD-10-CM

## 2023-03-16 DIAGNOSIS — E78.1 HYPERTRIGLYCERIDEMIA: ICD-10-CM

## 2023-03-16 DIAGNOSIS — J44.9 CHRONIC OBSTRUCTIVE PULMONARY DISEASE, UNSPECIFIED COPD TYPE: ICD-10-CM

## 2023-03-16 DIAGNOSIS — G47.33 OSA (OBSTRUCTIVE SLEEP APNEA): ICD-10-CM

## 2023-03-16 PROCEDURE — 93010 EKG 12-LEAD: ICD-10-PCS | Mod: S$PBB,,, | Performed by: INTERNAL MEDICINE

## 2023-03-16 PROCEDURE — 93010 ELECTROCARDIOGRAM REPORT: CPT | Mod: S$PBB,,, | Performed by: INTERNAL MEDICINE

## 2023-03-16 PROCEDURE — 99999 PR PBB SHADOW E&M-EST. PATIENT-LVL IV: ICD-10-PCS | Mod: PBBFAC,,,

## 2023-03-16 PROCEDURE — 99214 OFFICE O/P EST MOD 30 MIN: CPT | Mod: PBBFAC,PN

## 2023-03-16 PROCEDURE — 99214 PR OFFICE/OUTPT VISIT, EST, LEVL IV, 30-39 MIN: ICD-10-PCS | Mod: S$PBB,,,

## 2023-03-16 PROCEDURE — 93005 ELECTROCARDIOGRAM TRACING: CPT | Mod: PBBFAC,PN | Performed by: INTERNAL MEDICINE

## 2023-03-16 PROCEDURE — 99214 OFFICE O/P EST MOD 30 MIN: CPT | Mod: S$PBB,,,

## 2023-03-16 PROCEDURE — 99999 PR PBB SHADOW E&M-EST. PATIENT-LVL IV: CPT | Mod: PBBFAC,,,

## 2023-03-16 NOTE — ASSESSMENT & PLAN NOTE
Patient has elevated triglycerides on recent lipid panel.  Patient start taking omega-3 fatty acids daily.  Continue low-sodium heart healthy diet.  Reduce saturated fats.

## 2023-03-16 NOTE — ASSESSMENT & PLAN NOTE
Patient denies shortness of breath with exertion or any anginal equivalent symptoms.  Managed by pulmonology.

## 2023-03-16 NOTE — ASSESSMENT & PLAN NOTE
Patient's blood pressure today is 120/76.  Continue amlodipine-benazepril 5-10 milligrams daily.  Continue to monitor blood pressure at home.  Recommend low-sodium heart healthy diet.

## 2023-03-16 NOTE — PROGRESS NOTES
Subjective:    Patient ID:  Luann Borjas is a 62 y.o. female patient here for evaluation Follow-up      History of Present Illness:     Patient is here for a 1 year follow-up.  She denies chest pain, palpitations, shortness of breath, lightheadedness, dizziness, jaw neck or arm pain, edema or bleeding.  She states she is doing well overall.  She is due to go on a mission trip to [place] in the near future.  Blood pressure today in office is 120/76.  EKG shows sinus bradycardia with low-voltage QRS.  No acute STT wave changes.        Review of patient's allergies indicates:   Allergen Reactions    Erythromycin Anaphylaxis    Buspirone     Ciprofloxacin     Levaquin [levofloxacin]     Zithromax z-masoud [azithromycin]        Past Medical History:   Diagnosis Date    Anxiety     Arthritis     Cataract of left eye     Hyperlipidemia     Sleep apnea     cpap     Past Surgical History:   Procedure Laterality Date    BASAL CELL CARCINOMA EXCISION  2019    lip    CATARACT EXTRACTION W/  INTRAOCULAR LENS IMPLANT Right 2019    Procedure: EXTRACTION, CATARACT, WITH IOL INSERTION;  Surgeon: Carmelo Perez II, MD;  [facility]: [facility];  Service: Ophthalmology;  Laterality: Right;    ECTOPIC PREGNANCY SURGERY      EYE SURGERY Right     cataracts    HYSTERECTOMY       Social History     Tobacco Use    Smoking status: Former     Packs/day: 0.50     Types: Cigarettes     Start date: 1988     Quit date: 10/25/2020     Years since quittin.3    Smokeless tobacco: Never   Substance Use Topics    Alcohol use: Yes     Alcohol/week: 2.0 standard drinks     Types: 2 Glasses of wine per week    Drug use: No        Review of Systems:    As noted in HPI in addition      REVIEW OF SYSTEMS  CARDIOVASCULAR: No recent chest pain, palpitations, arm, neck, or jaw pain  RESPIRATORY: No recent fever, cough chills, SOB or congestion  : No blood in the urine  GI: No Nausea, vomiting, constipation, diarrhea, blood, or  reflux.  MUSCULOSKELETAL: No myalgias  NEURO: No lightheadedness or dizziness  EYES: No Double vision, blurry, vision or headache              Objective        Vitals:    03/16/23 0833   BP: 120/76   Pulse: 68   Resp: 16       LIPIDS - LAST 2   Lab Results   Component Value Date    CHOL 191 12/21/2022    CHOL 163 05/31/2022    HDL 93 (H) 12/21/2022    HDL 66 05/31/2022    LDLCALC 62.0 (L) 12/21/2022    LDLCALC 83.6 05/31/2022    TRIG 180 (H) 12/21/2022    TRIG 67 05/31/2022    CHOLHDL 48.7 12/21/2022    CHOLHDL 40.5 05/31/2022       CBC - LAST 2  Lab Results   Component Value Date    WBC 5.62 12/21/2022    WBC 5.58 05/31/2021    RBC 4.57 12/21/2022    RBC 4.20 05/31/2021    HGB 14.5 12/21/2022    HGB 13.1 05/31/2021    HCT 44.8 12/21/2022    HCT 38.7 05/31/2021    MCV 98 12/21/2022    MCV 92 05/31/2021    MCH 31.7 (H) 12/21/2022    MCH 31.2 (H) 05/31/2021    MCHC 32.4 12/21/2022    MCHC 33.9 05/31/2021    RDW 13.0 12/21/2022    RDW 13.2 05/31/2021     12/21/2022     05/31/2021    MPV 9.6 12/21/2022    MPV 9.8 05/31/2021    GRAN 3.9 12/21/2022    GRAN 69.8 12/21/2022    LYMPH 1.1 12/21/2022    LYMPH 19.4 12/21/2022    MONO 0.3 12/21/2022    MONO 6.0 12/21/2022    BASO 0.08 12/21/2022    BASO 0.07 05/31/2021    NRBC 0 12/21/2022    NRBC 0 05/31/2021       CHEMISTRY & LIVER FUNCTION - LAST 2  Lab Results   Component Value Date     12/21/2022     05/31/2022    K 4.2 12/21/2022    K 3.3 (L) 05/31/2022     12/21/2022     05/31/2022    CO2 28 12/21/2022    CO2 26 05/31/2022    ANIONGAP 9 12/21/2022    ANIONGAP 10 05/31/2022    BUN 13 12/21/2022    BUN 11 05/31/2022    CREATININE 0.8 12/21/2022    CREATININE 0.8 05/31/2022    GLU 97 12/21/2022     (H) 05/31/2022    CALCIUM 9.4 12/21/2022    CALCIUM 9.3 05/31/2022    MG 2.2 02/01/2021    MG 2.1 10/28/2020    ALBUMIN 4.5 12/21/2022    ALBUMIN 4.7 05/31/2022    PROT 7.6 12/21/2022    PROT 7.2 05/31/2022    ALKPHOS 69 12/21/2022     ALKPHOS 59 05/31/2022    ALT 25 12/21/2022    ALT 29 05/31/2022    AST 30 12/21/2022    AST 27 05/31/2022    BILITOT 1.3 (H) 12/21/2022    BILITOT 1.3 (H) 05/31/2022        CARDIAC PROFILE - LAST 2  No results found for: BNP, CPK, CPKMB, LDH, TROPONINI, TROPONINIHS     COAGULATION - LAST 2  No results found for: LABPT, INR, APTT    ENDOCRINE & PSA - LAST 2  Lab Results   Component Value Date    TSH 1.530 12/21/2022    TSH 1.790 09/16/2020        ECHOCARDIOGRAM RESULTS  Results for orders placed during the hospital encounter of 02/02/21    Echo Color Flow Doppler? Yes    Interpretation Summary  · The left ventricle is normal in size with concentric remodeling and normal systolic function. The estimated ejection fraction is 70%  · Normal left ventricular diastolic function.  · Normal right ventricular size with normal right ventricular systolic function.  · Mild tricuspid regurgitation.      CURRENT/PREVIOUS VISIT EKG  Results for orders placed or performed during the hospital encounter of 06/06/22   EKG 12-lead    Collection Time: 06/06/22  8:34 AM    Narrative    Test Reason : F33.1,    Vent. Rate : 063 BPM     Atrial Rate : 063 BPM     P-R Int : 158 ms          QRS Dur : 078 ms      QT Int : 426 ms       P-R-T Axes : 061 052 058 degrees     QTc Int : 435 ms    Normal sinus rhythm  Low voltage QRS  Borderline Abnormal ECG  When compared with ECG of 16-FEB-2022 16:33,  No significant change was found  Confirmed by Rosalio Tobin MD ([id number]) on 6/11/2022 5:05:08 PM    Referred By: ANDRIY RUSH           Confirmed By:Rosalio Tobin MD     No valid procedures specified.   Results for orders placed during the hospital encounter of 02/02/21    Nuclear Stress - Cardiology Interpreted    Interpretation Summary    Normal myocardial perfusion scan. There is no evidence of myocardial ischemia or infarction.    The gated perfusion images showed an ejection fraction of 94% post stress. Normal ejection fraction is greater than  53%.    There is normal wall motion at rest and post stress.    LV cavity size is normal at rest and normal at stress.    The EKG portion of this study is negative for ischemia.    The patient reported no chest pain during the stress test.    No valid procedures specified.    PHYSICAL EXAM  CONSTITUTIONAL: Well built, well nourished in no apparent distress  NECK: no carotid bruit, no JVD  LUNGS: CTA  CHEST WALL: no tenderness  HEART: regular rate and rhythm, S1, S2 normal, no murmur, click, rub or gallop   ABDOMEN: soft, non-tender; bowel sounds normal  EXTREMITIES: Extremities normal, no edema, no calf tenderness noted  NEURO: AAO X 3    I HAVE REVIEWED :    The vital signs, nurses notes, and all the pertinent radiology and labs.        Current Outpatient Medications   Medication Instructions    albuterol (VENTOLIN HFA) 90 mcg/actuation inhaler 2 puffs, Inhalation, Every 6 hours PRN, Rescue    ALPRAZolam (XANAX) 0.5 mg, Oral, Daily PRN    amlodipine-benazepril 5-10 mg (LOTREL) 5-10 mg per capsule 1 capsule, Oral, Daily    atorvastatin (LIPITOR) 10 MG tablet TAKE 1 TABLET(10 MG) BY MOUTH EVERY DAY    calcium carbonate (OS-MELIA) 500 mg calcium (1,250 mg) chewable tablet 1 tablet, Oral, Daily    cholecalciferol (vitamin D3) (VITAMIN D3) 2,000 Units, Oral, Daily    umeclidinium-vilanteroL (ANORO ELLIPTA) 62.5-25 mcg/actuation DsDv 1 puff, Inhalation, Daily, Controller    umeclidinium-vilanteroL (ANORO ELLIPTA) 62.5-25 mcg/actuation DsDv 1 puff, Inhalation, Daily, Controller    vilazodone (VIIBRYD) 20 mg, Oral, Daily          Assessment & Plan     Long Q-T syndrome  QTC today in office is 426.  Patient is asymptomatic.  She denies any acute symptoms or anginal equivalent symptoms.  She denies palpitations or irregular heartbeats.  We will continue to monitor.    Chronic obstructive pulmonary disease  Patient denies shortness of breath with exertion or any anginal equivalent symptoms.  Managed by  pulmonology.    Hypertension  Patient's blood pressure today is 120/76.  Continue amlodipine-benazepril 5-10 milligrams daily.  Continue to monitor blood pressure at home.  Recommend low-sodium heart healthy diet.    Hypertriglyceridemia  Patient has elevated triglycerides on recent lipid panel.  Patient start taking omega-3 fatty acids daily.  Continue low-sodium heart healthy diet.  Reduce saturated fats.    CARO (obstructive sleep apnea)  Recommend compliant to CPAP          Follow up in about 1 year (around 3/16/2024).

## 2023-03-16 NOTE — ASSESSMENT & PLAN NOTE
QTC today in office is 426.  Patient is asymptomatic.  She denies any acute symptoms or anginal equivalent symptoms.  She denies palpitations or irregular heartbeats.  We will continue to monitor.

## 2023-03-23 DIAGNOSIS — F41.1 GAD (GENERALIZED ANXIETY DISORDER): ICD-10-CM

## 2023-03-23 RX ORDER — ALPRAZOLAM 0.5 MG/1
0.5 TABLET ORAL DAILY PRN
Qty: 30 TABLET | Refills: 0 | Status: SHIPPED | OUTPATIENT
Start: 2023-03-23 | End: 2023-04-24 | Stop reason: SDUPTHER

## 2023-03-29 ENCOUNTER — OFFICE VISIT (OUTPATIENT)
Dept: PULMONOLOGY | Facility: CLINIC | Age: 63
End: 2023-03-29
Payer: OTHER GOVERNMENT

## 2023-03-29 VITALS
WEIGHT: 156.63 LBS | HEART RATE: 65 BPM | DIASTOLIC BLOOD PRESSURE: 72 MMHG | BODY MASS INDEX: 25.28 KG/M2 | TEMPERATURE: 98 F | OXYGEN SATURATION: 97 % | SYSTOLIC BLOOD PRESSURE: 128 MMHG

## 2023-03-29 DIAGNOSIS — J44.9 CHRONIC OBSTRUCTIVE PULMONARY DISEASE, UNSPECIFIED COPD TYPE: ICD-10-CM

## 2023-03-29 DIAGNOSIS — G47.33 OSA (OBSTRUCTIVE SLEEP APNEA): Primary | ICD-10-CM

## 2023-03-29 PROCEDURE — 99213 OFFICE O/P EST LOW 20 MIN: CPT | Mod: S$GLB,,, | Performed by: NURSE PRACTITIONER

## 2023-03-29 PROCEDURE — 99213 PR OFFICE/OUTPT VISIT, EST, LEVL III, 20-29 MIN: ICD-10-PCS | Mod: S$GLB,,, | Performed by: NURSE PRACTITIONER

## 2023-03-29 NOTE — PROGRESS NOTES
SUBJECTIVE:    Patient ID: Luann Borjas is a 63 y.o. female.    Chief Complaint: Follow-up and COPD (6 month follow up COPD)    Patient here today feeling well. She is using her Anoro daily. She will be going on a mission trip in May. She is sleeping on her CPAP faithfully every night and does feel benefit from it. However, her compliance report shows a large leak and AHI is 16.8 she sleeps an average of 4 hours and 40 minutes.      Past Medical History:   Diagnosis Date    Anxiety     Arthritis     Cataract of left eye     Hyperlipidemia     Sleep apnea     cpap     Past Surgical History:   Procedure Laterality Date    BASAL CELL CARCINOMA EXCISION  09/2019    lip    CATARACT EXTRACTION W/  INTRAOCULAR LENS IMPLANT Right 11/13/2019    Procedure: EXTRACTION, CATARACT, WITH IOL INSERTION;  Surgeon: Carmelo Perez II, MD;  [facility];  Service: Ophthalmology;  Laterality: Right;    ECTOPIC PREGNANCY SURGERY      EYE SURGERY Right     cataracts    HYSTERECTOMY       Family History   Problem Relation Age of Onset    COPD Mother     Liver disease Mother     Thyroid disease Mother     COPD Father     Diabetes Father     Hypertension Father     Pacemaker/defibrilator Father     COPD Sister     Hypertension Sister     Hypertension Brother         Social History:   Marital Status:   Occupation: teacher   Alcohol History:  reports current alcohol use of about 2.0 standard drinks per week.  Tobacco History:  reports that she quit smoking about 2 years ago. Her smoking use included cigarettes. She started smoking about 34 years ago. She smoked an average of .5 packs per day. She has never used smokeless tobacco.  Drug History:  reports no history of drug use.    Review of patient's allergies indicates:   Allergen Reactions    Erythromycin Anaphylaxis    Buspirone     Ciprofloxacin     Levaquin [levofloxacin]     Zithromax z-masoud [azithromycin]        Current Outpatient Medications   Medication Sig Dispense  Refill    ALPRAZolam (XANAX) 0.5 MG tablet Take 1 tablet (0.5 mg total) by mouth daily as needed for Anxiety. 30 tablet 0    amlodipine-benazepril 5-10 mg (LOTREL) 5-10 mg per capsule Take 1 capsule by mouth once daily. 90 capsule 1    atorvastatin (LIPITOR) 10 MG tablet TAKE 1 TABLET(10 MG) BY MOUTH EVERY DAY 90 tablet 1    calcium carbonate (OS-MELIA) 500 mg calcium (1,250 mg) chewable tablet Take 1 tablet by mouth once daily.      cholecalciferol, vitamin D3, (VITAMIN D3) 50 mcg (2,000 unit) Tab Take 1 tablet (2,000 Units total) by mouth once daily. 30 tablet 0    umeclidinium-vilanteroL (ANORO ELLIPTA) 62.5-25 mcg/actuation DsDv Inhale 1 puff into the lungs once daily. Controller 3 each 5    vilazodone (VIIBRYD) 20 mg Tab Take 1 tablet (20 mg total) by mouth once daily. 90 tablet 0    albuterol (VENTOLIN HFA) 90 mcg/actuation inhaler Inhale 2 puffs into the lungs every 6 (six) hours as needed for Wheezing. Rescue (Patient not taking: Reported on 3/29/2023) 18 g 6    umeclidinium-vilanteroL (ANORO ELLIPTA) 62.5-25 mcg/actuation DsDv Inhale 1 puff into the lungs once daily. Controller 3 each 5     No current facility-administered medications for this visit.         Last PFT: 04/2022  Normal previous  PFT showed moderate obstruction that reversed with bronchodilator    Last CT:10/2022  IMPRESSION:  Prior granulomatous disease with stable 6 mm noncalcified nodule within the left lower lobe     Coronary artery calcification     No new nodules or infiltrates     LUNG-RADS CATEGORY 2: BENIGN APPEARANCE OR BEHAVIOR       Review of Systems  General: Feeling well  Eyes: Vision is good.  ENT:  allergies  Heart:: No chest pain or palpitations.  Lungs: no dyspnea, no cough   GI: No Nausea, vomiting, constipation, diarrhea, or reflux.  : Nocturia once sometimes  Musculoskeletal: No joint pain or myalgias.  Skin: No lesions or rashes.  Neuro: No headaches or neuropathy.  Lymph: No edema or adenopathy.  Psych: anxiety and  depression  Endo: gaining weight    OBJECTIVE:      /72 (BP Location: Right arm, Patient Position: Sitting, BP Method: Medium (Manual))   Pulse 65   Temp 97.9 °F (36.6 °C)   Wt 71 kg (156 lb 9.6 oz)   SpO2 97%   BMI 25.28 kg/m²     Physical Exam  GENERAL: Midaged patient in no distress.  HEENT: Pupils equal and reactive. Extraocular movements intact. Nose intact.      Pharynx moist.  NECK: Supple.   HEART: Regular rate and rhythm. No murmur or gallop auscultated.  LUNGS: Clear to auscultation and percussion. Lung excursion symmetrical. No change in fremitus. No adventitial noises.  ABDOMEN: Bowel sounds present. Non-tender, no masses palpated.  EXTREMITIES: Normal muscle tone and joint movement, no cyanosis or clubbing.   LYMPHATICS: No adenopathy palpated, no edema.  SKIN: Dry, intact, no lesions.   NEURO: Cranial nerves II-XII intact. Motor strength 5/5 bilaterally, upper and lower extremities.  PSYCH: Appropriate affect.    Assessment:       1. CARO (obstructive sleep apnea)    2. Chronic obstructive pulmonary disease, unspecified COPD type           Plan:       CARO (obstructive sleep apnea)  -     CPAP/BIPAP SUPPLIES    Chronic obstructive pulmonary disease, unspecified COPD type             Follow up in about 6 months (around 9/29/2023).      Continue Anoro  Will change to nasal pillows with chin strap  Get download in 3 weeks, if leak gone and AHI high will increase pressure  Call me before you go out the country and will send prednisone and antibiotic to just take with you in case.

## 2023-04-03 ENCOUNTER — OFFICE VISIT (OUTPATIENT)
Dept: PSYCHIATRY | Facility: CLINIC | Age: 63
End: 2023-04-03
Payer: OTHER GOVERNMENT

## 2023-04-03 DIAGNOSIS — Z63.8 FAMILY CONFLICT: ICD-10-CM

## 2023-04-03 DIAGNOSIS — F41.1 GAD (GENERALIZED ANXIETY DISORDER): ICD-10-CM

## 2023-04-03 DIAGNOSIS — F33.1 MODERATE EPISODE OF RECURRENT MAJOR DEPRESSIVE DISORDER: Primary | ICD-10-CM

## 2023-04-03 PROCEDURE — 99212 OFFICE O/P EST SF 10 MIN: CPT | Mod: PBBFAC,PN | Performed by: SOCIAL WORKER

## 2023-04-03 PROCEDURE — 90837 PR PSYCHOTHERAPY W/PATIENT, 60 MIN: ICD-10-PCS | Mod: ,,, | Performed by: SOCIAL WORKER

## 2023-04-03 PROCEDURE — 99999 PR PBB SHADOW E&M-EST. PATIENT-LVL II: CPT | Mod: PBBFAC,,, | Performed by: SOCIAL WORKER

## 2023-04-03 PROCEDURE — 90837 PSYTX W PT 60 MINUTES: CPT | Mod: ,,, | Performed by: SOCIAL WORKER

## 2023-04-03 PROCEDURE — 99999 PR PBB SHADOW E&M-EST. PATIENT-LVL II: ICD-10-PCS | Mod: PBBFAC,,, | Performed by: SOCIAL WORKER

## 2023-04-03 SDOH — SOCIAL DETERMINANTS OF HEALTH (SDOH): OTHER SPECIFIED PROBLEMS RELATED TO PRIMARY SUPPORT GROUP: Z63.8

## 2023-04-03 NOTE — PROGRESS NOTES
Individual Psychotherapy (PhD/LCSW)    4/3/2023    Site:  [facility]         Therapeutic Intervention: Met with patient.  Outpatient - Insight oriented psychotherapy 60 min - CPT code 99871, Outpatient - Behavior modifying psychotherapy 60 min - CPT code 25574, and Outpatient - Supportive psychotherapy 60 min - CPT Code 29885    Chief complaint/reason for encounter: depression, anxiety, and family stress             Interval history and content of current session:  Ct was referred to tx by Apryl REHMAN to address depression, anxiety, and marital/family issues. Ct arrived to session and was fully engaged.  Ct was tearful throughout session.She shared that she sometimes feels that she does not have a purpose. She reported that she has been volunteering at [employer] and working at a part time job when needed. She shared that she continues to set boundaries with her . She shared that her son text her for her birthday and that she cried about it. She shared how she waited before she responded to him. SW and ct processed ct keeping a gratitude journal daily. Ct to continue in 1:1 sessions.       Treatment plan:  Target symptoms: depression, anxiety , family stress  Why chosen therapy is appropriate versus another modality: relevant to diagnosis, patient responds to this modality, evidence based practice  Outcome monitoring methods: self-report  Therapeutic intervention type: insight oriented psychotherapy, behavior modifying psychotherapy, supportive psychotherapy    Risk parameters:  Patient reports no suicidal ideation  Patient reports no homicidal ideation  Patient reports no self-injurious behavior  Patient reports no violent behavior    CSSRS was completed: No risk    Mental Status Exam:  General Appearance:  unremarkable, age appropriate   Speech: normal tone, normal rate, normal pitch, normal volume      Level of Cooperation: cooperative      Thought Processes: normal and logical   Mood: sad      Thought Content:  normal, no suicidality, no homicidality, delusions, or paranoia   Affect: congruent and appropriate   Orientation: Oriented x3   Memory: recent >  intact   Attention Span & Concentration: intact   Fund of General Knowledge: intact and appropriate to age and level of education   Abstract Reasoning: interpretation of similarities was abstract   Judgment & Insight: intact     Language intact       Verbal deficits: None    Patient's response to intervention:  The patient's response to intervention is accepting.    Progress toward goals and other mental status changes:  The patient's progress toward goals is fair , improved .    Diagnosis:     ICD-10-CM ICD-9-CM   1. Moderate episode of recurrent major depressive disorder  F33.1 296.32   2. VANDANA (generalized anxiety disorder)  F41.1 300.02   3. Family conflict  Z63.8 V61.9       Plan:  individual psychotherapy and Ct to follow up with Apryl duque psych med mgt  Pt to go to ED or call 911 if symptoms worsen or if she has thoughts of harming self and/or others. Pt verbalized understanding.    Return to clinic: as scheduled    Length of Service (minutes): 60      Each patient to whom he or she provides medical services by telemedicine is: (1) informed of the relationship between the physician and patient and the respective role of any other health care provider with respect to management of the patient; and (2) notified that he or she may decline to receive medical services by telemedicine and may withdraw from such care at any time.

## 2023-04-04 ENCOUNTER — PATIENT MESSAGE (OUTPATIENT)
Dept: FAMILY MEDICINE | Facility: CLINIC | Age: 63
End: 2023-04-04

## 2023-04-04 ENCOUNTER — PATIENT MESSAGE (OUTPATIENT)
Dept: PULMONOLOGY | Facility: CLINIC | Age: 63
End: 2023-04-04

## 2023-04-04 DIAGNOSIS — Z12.11 ENCOUNTER FOR SCREENING COLONOSCOPY: Primary | ICD-10-CM

## 2023-04-24 DIAGNOSIS — F41.1 GAD (GENERALIZED ANXIETY DISORDER): ICD-10-CM

## 2023-04-25 RX ORDER — ALPRAZOLAM 0.5 MG/1
0.5 TABLET ORAL DAILY PRN
Qty: 30 TABLET | Refills: 0 | Status: SHIPPED | OUTPATIENT
Start: 2023-04-25 | End: 2023-05-24 | Stop reason: SDUPTHER

## 2023-05-01 ENCOUNTER — OFFICE VISIT (OUTPATIENT)
Dept: PSYCHIATRY | Facility: CLINIC | Age: 63
End: 2023-05-01
Payer: OTHER GOVERNMENT

## 2023-05-01 DIAGNOSIS — Z63.8 FAMILY CONFLICT: ICD-10-CM

## 2023-05-01 DIAGNOSIS — F33.1 MODERATE EPISODE OF RECURRENT MAJOR DEPRESSIVE DISORDER: Primary | ICD-10-CM

## 2023-05-01 DIAGNOSIS — F41.1 GAD (GENERALIZED ANXIETY DISORDER): ICD-10-CM

## 2023-05-01 PROCEDURE — 99999 PR PBB SHADOW E&M-EST. PATIENT-LVL II: CPT | Mod: PBBFAC,,, | Performed by: SOCIAL WORKER

## 2023-05-01 PROCEDURE — 90837 PSYTX W PT 60 MINUTES: CPT | Mod: ,,, | Performed by: SOCIAL WORKER

## 2023-05-01 PROCEDURE — 99212 OFFICE O/P EST SF 10 MIN: CPT | Mod: PBBFAC,PN | Performed by: SOCIAL WORKER

## 2023-05-01 PROCEDURE — 99999 PR PBB SHADOW E&M-EST. PATIENT-LVL II: ICD-10-PCS | Mod: PBBFAC,,, | Performed by: SOCIAL WORKER

## 2023-05-01 PROCEDURE — 90837 PR PSYCHOTHERAPY W/PATIENT, 60 MIN: ICD-10-PCS | Mod: ,,, | Performed by: SOCIAL WORKER

## 2023-05-01 SDOH — SOCIAL DETERMINANTS OF HEALTH (SDOH): OTHER SPECIFIED PROBLEMS RELATED TO PRIMARY SUPPORT GROUP: Z63.8

## 2023-05-02 NOTE — PROGRESS NOTES
Individual Psychotherapy (PhD/LCSW)    5/1/2023    Site:  [facility]         Therapeutic Intervention: Met with patient.  Outpatient - Insight oriented psychotherapy 60 min - CPT code 92806, Outpatient - Behavior modifying psychotherapy 60 min - CPT code 41547, and Outpatient - Supportive psychotherapy 60 min - CPT Code 88558    Chief complaint/reason for encounter: depression, anxiety, and family stress             Interval history and content of current session:  Ct was referred to tx by Apryl REHMAN to address depression, anxiety, and marital/family issues. Ct arrived to session and was fully engaged.  Ct was tearful throughout session.She continues to report that she feels that she does not have a purpose. SW and ct processed all of the things that ct is doing to try to bring more meaning, value, and purpose to her life. Ct verbalized understanding. Ct and sw discussed the importance of ct trusting the processes and to live her life one day at a time. Ct to continue in 1:1 sessions.       Treatment plan:  Target symptoms: depression, substance abuse, family stress  Why chosen therapy is appropriate versus another modality: relevant to diagnosis, patient responds to this modality, evidence based practice  Outcome monitoring methods: self-report  Therapeutic intervention type: insight oriented psychotherapy, behavior modifying psychotherapy, supportive psychotherapy    Risk parameters:  Patient reports no suicidal ideation  Patient reports no homicidal ideation  Patient reports no self-injurious behavior  Patient reports no violent behavior    CSSRS was completed:     Mental Status Exam:  General Appearance:  unremarkable, age appropriate   Speech: normal tone, normal rate, normal pitch, normal volume      Level of Cooperation: cooperative      Thought Processes: normal and logical   Mood: steady      Thought Content: normal, no suicidality, no homicidality, delusions, or paranoia   Affect: congruent and appropriate    Orientation: Oriented x3   Memory: recent >  intact   Attention Span & Concentration: intact   Fund of General Knowledge: intact and appropriate to age and level of education   Abstract Reasoning: interpretation of similarities was abstract   Judgment & Insight: intact     Language intact       Verbal deficits: None    Patient's response to intervention:  The patient's response to intervention is accepting.    Progress toward goals and other mental status changes:  The patient's progress toward goals is  some, improved .    Diagnosis:     ICD-10-CM ICD-9-CM   1. Moderate episode of recurrent major depressive disorder  F33.1 296.32   2. VANDANA (generalized anxiety disorder)  F41.1 300.02   3. Family conflict  Z63.8 V61.9       Plan:  individual psychotherapy and Ct to follow up with Apryl REHMAN for psych med mgt  Pt to go to ED or call 911 if symptoms worsen or if she has thoughts of harming self and/or others. Pt verbalized understanding.    Return to clinic: as scheduled    Length of Service (minutes): 60      Each patient to whom he or she provides medical services by telemedicine is: (1) informed of the relationship between the physician and patient and the respective role of any other health care provider with respect to management of the patient; and (2) notified that he or she may decline to receive medical services by telemedicine and may withdraw from such care at any time.

## 2023-05-09 ENCOUNTER — PATIENT MESSAGE (OUTPATIENT)
Dept: PULMONOLOGY | Facility: CLINIC | Age: 63
End: 2023-05-09

## 2023-05-09 RX ORDER — PREDNISONE 10 MG/1
TABLET ORAL
Qty: 20 TABLET | Refills: 0 | Status: SHIPPED | OUTPATIENT
Start: 2023-05-09 | End: 2023-06-05 | Stop reason: ALTCHOICE

## 2023-05-09 RX ORDER — DOXYCYCLINE HYCLATE 100 MG
100 TABLET ORAL 2 TIMES DAILY
Qty: 14 TABLET | Refills: 0 | Status: SHIPPED | OUTPATIENT
Start: 2023-05-09 | End: 2023-06-05 | Stop reason: ALTCHOICE

## 2023-05-20 DIAGNOSIS — F33.1 MODERATE EPISODE OF RECURRENT MAJOR DEPRESSIVE DISORDER: ICD-10-CM

## 2023-05-22 RX ORDER — VILAZODONE HYDROCHLORIDE 20 MG/1
TABLET ORAL
Qty: 90 TABLET | Refills: 0 | Status: SHIPPED | OUTPATIENT
Start: 2023-05-22 | End: 2023-09-06

## 2023-05-22 NOTE — TELEPHONE ENCOUNTER
Medication requested-vibryd   Last RX-2/10/23   Qty-90  Refills-0  Last office visit-2/10/23  Next office visit-6/5/23

## 2023-05-24 ENCOUNTER — TELEPHONE (OUTPATIENT)
Dept: FAMILY MEDICINE | Facility: CLINIC | Age: 63
End: 2023-05-24

## 2023-05-24 ENCOUNTER — PATIENT MESSAGE (OUTPATIENT)
Dept: PSYCHIATRY | Facility: CLINIC | Age: 63
End: 2023-05-24
Payer: OTHER GOVERNMENT

## 2023-05-24 ENCOUNTER — PATIENT MESSAGE (OUTPATIENT)
Dept: FAMILY MEDICINE | Facility: CLINIC | Age: 63
End: 2023-05-24

## 2023-05-24 DIAGNOSIS — I10 ESSENTIAL HYPERTENSION: Primary | ICD-10-CM

## 2023-05-24 DIAGNOSIS — F41.1 GAD (GENERALIZED ANXIETY DISORDER): ICD-10-CM

## 2023-05-24 DIAGNOSIS — E78.00 PURE HYPERCHOLESTEROLEMIA: ICD-10-CM

## 2023-05-24 RX ORDER — ALPRAZOLAM 0.5 MG/1
0.5 TABLET ORAL DAILY PRN
Qty: 30 TABLET | Refills: 0 | Status: SHIPPED | OUTPATIENT
Start: 2023-05-24 | End: 2023-06-28 | Stop reason: SDUPTHER

## 2023-05-24 NOTE — TELEPHONE ENCOUNTER
See message. Refill request on alprazolam 0.5 mg.  Last refill: 4/25  Last visit: 2/10  Follow up: 6/5

## 2023-06-05 ENCOUNTER — OFFICE VISIT (OUTPATIENT)
Dept: PSYCHIATRY | Facility: CLINIC | Age: 63
End: 2023-06-05
Payer: OTHER GOVERNMENT

## 2023-06-05 VITALS
HEIGHT: 66 IN | WEIGHT: 160.19 LBS | BODY MASS INDEX: 25.74 KG/M2 | HEART RATE: 56 BPM | SYSTOLIC BLOOD PRESSURE: 158 MMHG | DIASTOLIC BLOOD PRESSURE: 83 MMHG

## 2023-06-05 DIAGNOSIS — F33.1 MODERATE EPISODE OF RECURRENT MAJOR DEPRESSIVE DISORDER: Primary | ICD-10-CM

## 2023-06-05 DIAGNOSIS — Z63.8 FAMILY CONFLICT: ICD-10-CM

## 2023-06-05 DIAGNOSIS — F41.1 GAD (GENERALIZED ANXIETY DISORDER): ICD-10-CM

## 2023-06-05 PROCEDURE — 99214 OFFICE O/P EST MOD 30 MIN: CPT | Mod: S$PBB,,, | Performed by: PHYSICIAN ASSISTANT

## 2023-06-05 PROCEDURE — 90837 PSYTX W PT 60 MINUTES: CPT | Mod: ,,, | Performed by: SOCIAL WORKER

## 2023-06-05 PROCEDURE — 99999 PR PBB SHADOW E&M-EST. PATIENT-LVL II: ICD-10-PCS | Mod: PBBFAC,,, | Performed by: SOCIAL WORKER

## 2023-06-05 PROCEDURE — 99999 PR PBB SHADOW E&M-EST. PATIENT-LVL II: CPT | Mod: PBBFAC,,, | Performed by: SOCIAL WORKER

## 2023-06-05 PROCEDURE — 99999 PR PBB SHADOW E&M-EST. PATIENT-LVL III: CPT | Mod: PBBFAC,,, | Performed by: PHYSICIAN ASSISTANT

## 2023-06-05 PROCEDURE — 99999 PR PBB SHADOW E&M-EST. PATIENT-LVL III: ICD-10-PCS | Mod: PBBFAC,,, | Performed by: PHYSICIAN ASSISTANT

## 2023-06-05 PROCEDURE — 99214 PR OFFICE/OUTPT VISIT, EST, LEVL IV, 30-39 MIN: ICD-10-PCS | Mod: S$PBB,,, | Performed by: PHYSICIAN ASSISTANT

## 2023-06-05 PROCEDURE — 90837 PR PSYCHOTHERAPY W/PATIENT, 60 MIN: ICD-10-PCS | Mod: ,,, | Performed by: SOCIAL WORKER

## 2023-06-05 PROCEDURE — 99213 OFFICE O/P EST LOW 20 MIN: CPT | Mod: PBBFAC,PN | Performed by: PHYSICIAN ASSISTANT

## 2023-06-05 PROCEDURE — 99212 OFFICE O/P EST SF 10 MIN: CPT | Mod: PBBFAC,27,PN | Performed by: SOCIAL WORKER

## 2023-06-05 SDOH — SOCIAL DETERMINANTS OF HEALTH (SDOH): OTHER SPECIFIED PROBLEMS RELATED TO PRIMARY SUPPORT GROUP: Z63.8
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
## 2023-06-07 NOTE — PROGRESS NOTES
Individual Psychotherapy (PhD/LCSW)    6/5/2023    Site:  [facility]         Therapeutic Intervention: Met with patient.  Outpatient - Insight oriented psychotherapy 60 min - CPT code 74228, Outpatient - Behavior modifying psychotherapy 60 min - CPT code 77513, and Outpatient - Supportive psychotherapy 60 min - CPT Code 47510    Chief complaint/reason for encounter: depression, anxiety, and family stress             Interval history and content of current session:  Ct was referred to tx by Apryl REHMAN to address depression, anxiety, and marital/family issues. Ct arrived to session and was fully engaged.  Ct shared that things have been going better. Ct shared that her  has been more understanding lately. Ct shared that she enjoyed going on her trip to [place] and would like to go again. She shared about her son still not speaking to her. She reported that she continues to send cards for special events whether he responds or not. SW and ct talked about acceptance and her finding purpose. Ct to continue in 1:1 session.       Treatment plan:  Target symptoms: depression, anxiety , family stress  Why chosen therapy is appropriate versus another modality: relevant to diagnosis, patient responds to this modality, evidence based practice  Outcome monitoring methods: self-report  Therapeutic intervention type: insight oriented psychotherapy, behavior modifying psychotherapy, supportive psychotherapy    Risk parameters:  Patient reports no suicidal ideation  Patient reports no homicidal ideation  Patient reports no self-injurious behavior  Patient reports no violent behavior    CSSRS was completed:     Mental Status Exam:  General Appearance:  unremarkable, age appropriate   Speech: normal tone, normal rate, normal pitch, normal volume      Level of Cooperation: cooperative      Thought Processes: normal and logical   Mood: steady      Thought Content: normal, no suicidality, no homicidality, delusions, or paranoia    Affect: congruent and appropriate   Orientation: Oriented x3   Memory: recent >  intact   Attention Span & Concentration: intact   Fund of General Knowledge: intact and appropriate to age and level of education   Abstract Reasoning: interpretation of similarities was abstract   Judgment & Insight: intact     Language intact       Verbal deficits: None    Patient's response to intervention:  The patient's response to intervention is accepting.    Progress toward goals and other mental status changes:  The patient's progress toward goals is  improved .    Diagnosis:     ICD-10-CM ICD-9-CM   1. Moderate episode of recurrent major depressive disorder  F33.1 296.32   2. VANDANA (generalized anxiety disorder)  F41.1 300.02   3. Family conflict  Z63.8 V61.9       Plan:  individual psychotherapy and ct to follow up with Apryl REHMAN for psych med mgt  Pt to go to ED or call 911 if symptoms worsen or if she has thoughts of harming self and/or others. Pt verbalized understanding.    Return to clinic: as scheduled    Length of Service (minutes): 60      Each patient to whom he or she provides medical services by telemedicine is: (1) informed of the relationship between the physician and patient and the respective role of any other health care provider with respect to management of the patient; and (2) notified that he or she may decline to receive medical services by telemedicine and may withdraw from such care at any time.

## 2023-06-27 ENCOUNTER — PATIENT MESSAGE (OUTPATIENT)
Dept: PSYCHIATRY | Facility: CLINIC | Age: 63
End: 2023-06-27
Payer: OTHER GOVERNMENT

## 2023-06-27 DIAGNOSIS — F41.1 GAD (GENERALIZED ANXIETY DISORDER): ICD-10-CM

## 2023-06-28 RX ORDER — ALPRAZOLAM 0.5 MG/1
0.5 TABLET ORAL DAILY PRN
Qty: 30 TABLET | Refills: 0 | Status: SHIPPED | OUTPATIENT
Start: 2023-06-28 | End: 2023-08-01 | Stop reason: SDUPTHER

## 2023-07-13 NOTE — PROGRESS NOTES
SUBJECTIVE:      Patient ID: Luann Borjas is a 63 y.o. female.    Chief Complaint: Hypertension    Mrs Borjas is following up on htn, vit D def and hyperlipidemia. She is following with Dr Ravi for sleep apnea and copd. Following with Dr Tobin for cardiology. Following with Apryl Adams for psyc.doing well today, no complaints. Asking for a new referral to derm for a skin check     Hypertension  This is a chronic problem. The problem is unchanged. The problem is controlled. Pertinent negatives include no chest pain, headaches, neck pain, palpitations, peripheral edema or shortness of breath. Risk factors for coronary artery disease include smoking/tobacco exposure. Past treatments include calcium channel blockers and ACE inhibitors. The current treatment provides significant improvement.   Hyperlipidemia  This is a chronic problem. The problem is controlled. Recent lipid tests were reviewed and are normal. Pertinent negatives include no chest pain or shortness of breath. Current antihyperlipidemic treatment includes statins. The current treatment provides significant improvement of lipids.     Past Surgical History:   Procedure Laterality Date    BASAL CELL CARCINOMA EXCISION  09/2019    lip    CATARACT EXTRACTION W/  INTRAOCULAR LENS IMPLANT Right 11/13/2019    Procedure: EXTRACTION, CATARACT, WITH IOL INSERTION;  Surgeon: Carmelo Perez II, MD;  Location: [facility] OR;  Service: Ophthalmology;  Laterality: Right;    ECTOPIC PREGNANCY SURGERY      EYE SURGERY Right     cataracts    HYSTERECTOMY       Family History   Problem Relation Age of Onset    COPD Mother     Liver disease Mother     Thyroid disease Mother     COPD Father     Diabetes Father     Hypertension Father     Pacemaker/defibrilator Father     COPD Sister     Hypertension Sister     Hypertension Brother       Social History     Socioeconomic History    Marital status:    Tobacco Use    Smoking status: Former     Packs/day: 0.50      Types: Cigarettes     Start date: 1988     Quit date: 10/25/2020     Years since quittin.7     Passive exposure: Past    Smokeless tobacco: Never   Substance and Sexual Activity    Alcohol use: Yes     Alcohol/week: 2.0 standard drinks     Types: 2 Glasses of wine per week    Drug use: No    Sexual activity: Not Currently     Social Determinants of Health     Financial Resource Strain: Low Risk     Difficulty of Paying Living Expenses: Not hard at all   Food Insecurity: No Food Insecurity    Worried About Running Out of Food in the Last Year: Never true    Ran Out of Food in the Last Year: Never true   Transportation Needs: No Transportation Needs    Lack of Transportation (Medical): No    Lack of Transportation (Non-Medical): No   Physical Activity: Insufficiently Active    Days of Exercise per Week: 1 day    Minutes of Exercise per Session: 20 min   Stress: Stress Concern Present    Feeling of Stress : To some extent   Social Connections: Unknown    Frequency of Communication with Friends and Family: More than three times a week    Frequency of Social Gatherings with Friends and Family: Once a week    Active Member of Clubs or Organizations: Yes    Attends Club or Organization Meetings: More than 4 times per year    Marital Status:    Housing Stability: Low Risk     Unable to Pay for Housing in the Last Year: No    Number of Places Lived in the Last Year: 1    Unstable Housing in the Last Year: No     Current Outpatient Medications   Medication Sig Dispense Refill    albuterol (VENTOLIN HFA) 90 mcg/actuation inhaler Inhale 2 puffs into the lungs every 6 (six) hours as needed for Wheezing. Rescue 18 g 6    ALPRAZolam (XANAX) 0.5 MG tablet Take 1 tablet (0.5 mg total) by mouth daily as needed for Anxiety. 30 tablet 0    calcium carbonate (OS-MELIA) 500 mg calcium (1,250 mg) chewable tablet Take 1 tablet by mouth once daily.      cholecalciferol, vitamin D3, (VITAMIN D3) 50 mcg (2,000 unit) Tab Take 1  tablet (2,000 Units total) by mouth once daily. 30 tablet 0    umeclidinium-vilanteroL (ANORO ELLIPTA) 62.5-25 mcg/actuation DsDv Inhale 1 puff into the lungs once daily. Controller 3 each 5    vilazodone (VIIBRYD) 20 mg Tab TAKE 1 TABLET(20 MG) BY MOUTH EVERY DAY 90 tablet 0    amlodipine-benazepril 5-10 mg (LOTREL) 5-10 mg per capsule Take 1 capsule by mouth once daily. 90 capsule 1    atorvastatin (LIPITOR) 10 MG tablet Take 1 tablet (10 mg total) by mouth once daily. 90 tablet 1     No current facility-administered medications for this visit.     Review of patient's allergies indicates:   Allergen Reactions    Erythromycin Anaphylaxis    Buspirone     Ciprofloxacin     Levaquin [levofloxacin]     Zithromax z-masoud [azithromycin]       Past Medical History:   Diagnosis Date    Anxiety     Arthritis     Cataract of left eye     Hyperlipidemia     Sleep apnea     cpap     Past Surgical History:   Procedure Laterality Date    BASAL CELL CARCINOMA EXCISION  09/2019    lip    CATARACT EXTRACTION W/  INTRAOCULAR LENS IMPLANT Right 11/13/2019    Procedure: EXTRACTION, CATARACT, WITH IOL INSERTION;  Surgeon: Carmelo Perez II, MD;  Location: [facility];  Service: Ophthalmology;  Laterality: Right;    ECTOPIC PREGNANCY SURGERY      EYE SURGERY Right     cataracts    HYSTERECTOMY         Review of Systems   Constitutional:  Negative for activity change, appetite change, chills, diaphoresis and unexpected weight change.   HENT:  Negative for ear discharge, hearing loss, rhinorrhea, trouble swallowing and voice change.    Eyes:  Negative for photophobia, pain, discharge and visual disturbance.   Respiratory:  Negative for chest tightness, shortness of breath, wheezing and stridor.    Cardiovascular:  Negative for chest pain and palpitations.   Gastrointestinal:  Negative for abdominal pain, blood in stool, constipation, diarrhea and vomiting.   Endocrine: Negative for cold intolerance, heat intolerance, polydipsia and polyuria.  "  Genitourinary:  Negative for difficulty urinating, dysuria, flank pain, hematuria and menstrual problem.   Musculoskeletal:  Negative for arthralgias, joint swelling, neck pain and neck stiffness.   Skin:  Negative for pallor.   Neurological:  Negative for dizziness, speech difficulty, weakness and headaches.   Hematological:  Does not bruise/bleed easily.   Psychiatric/Behavioral:  Negative for confusion, dysphoric mood, self-injury, sleep disturbance and suicidal ideas. The patient is not nervous/anxious.     OBJECTIVE:      Vitals:    07/14/23 0950   BP: 120/62   Pulse: 74   Temp: 98.2 °F (36.8 °C)   SpO2: 98%   Weight: 73 kg (161 lb)   Height: 5' 6" (1.676 m)     Physical Exam  Vitals and nursing note reviewed.   Constitutional:       General: She is not in acute distress.     Appearance: She is well-developed.   HENT:      Head: Normocephalic and atraumatic.      Right Ear: Tympanic membrane normal.      Left Ear: Tympanic membrane normal.      Nose: Nose normal.      Mouth/Throat:      Pharynx: Uvula midline.   Eyes:      General: Lids are normal.      Conjunctiva/sclera: Conjunctivae normal.      Pupils: Pupils are equal, round, and reactive to light.      Right eye: Pupil is round and reactive.      Left eye: Pupil is round and reactive.   Neck:      Thyroid: No thyromegaly.      Vascular: No JVD.      Trachea: Trachea normal.   Cardiovascular:      Rate and Rhythm: Normal rate and regular rhythm.      Pulses: Normal pulses.      Heart sounds: Normal heart sounds. No murmur heard.  Pulmonary:      Effort: Pulmonary effort is normal. No tachypnea or respiratory distress.      Breath sounds: Normal breath sounds. No wheezing or rales.   Abdominal:      General: Bowel sounds are normal.      Palpations: Abdomen is soft.      Tenderness: There is no abdominal tenderness.   Musculoskeletal:         General: Normal range of motion.      Cervical back: Normal range of motion and neck supple.      Right lower leg: " No edema.      Left lower leg: No edema.   Lymphadenopathy:      Cervical: No cervical adenopathy.   Skin:     General: Skin is warm and dry.      Findings: No rash.   Neurological:      Mental Status: She is alert and oriented to person, place, and time.   Psychiatric:         Mood and Affect: Mood normal.         Speech: Speech normal.         Behavior: Behavior normal. Behavior is cooperative.         Thought Content: Thought content normal.         Judgment: Judgment normal.      Last visit note, most recent available labs, and health maintenance reviewed    Assessment:       1. Essential hypertension    2. Pure hypercholesterolemia    3. Skin exam, screening for cancer    4. Vitamin D deficiency        Plan:       Essential hypertension  -     amlodipine-benazepril 5-10 mg (LOTREL) 5-10 mg per capsule; Take 1 capsule by mouth once daily.  Dispense: 90 capsule; Refill: 1    Pure hypercholesterolemia  -     atorvastatin (LIPITOR) 10 MG tablet; Take 1 tablet (10 mg total) by mouth once daily.  Dispense: 90 tablet; Refill: 1    Skin exam, screening for cancer  -     Ambulatory referral/consult to Dermatology; Future; Expected date: 07/21/2023    Vitamin D deficiency  Cont otc supplement       Follow up in about 6 months (around 1/14/2024) for wellness .      7/14/2023 Allen Callahan, YULIANA, FNP

## 2023-07-14 ENCOUNTER — OFFICE VISIT (OUTPATIENT)
Dept: FAMILY MEDICINE | Facility: CLINIC | Age: 63
End: 2023-07-14
Payer: OTHER GOVERNMENT

## 2023-07-14 VITALS
TEMPERATURE: 98 F | OXYGEN SATURATION: 98 % | BODY MASS INDEX: 25.88 KG/M2 | HEIGHT: 66 IN | WEIGHT: 161 LBS | DIASTOLIC BLOOD PRESSURE: 62 MMHG | HEART RATE: 74 BPM | SYSTOLIC BLOOD PRESSURE: 120 MMHG

## 2023-07-14 DIAGNOSIS — Z12.83 SKIN EXAM, SCREENING FOR CANCER: ICD-10-CM

## 2023-07-14 DIAGNOSIS — E78.00 PURE HYPERCHOLESTEROLEMIA: ICD-10-CM

## 2023-07-14 DIAGNOSIS — I10 ESSENTIAL HYPERTENSION: Primary | ICD-10-CM

## 2023-07-14 DIAGNOSIS — E55.9 VITAMIN D DEFICIENCY: ICD-10-CM

## 2023-07-14 PROCEDURE — 99214 OFFICE O/P EST MOD 30 MIN: CPT | Mod: S$GLB,,, | Performed by: NURSE PRACTITIONER

## 2023-07-14 PROCEDURE — 99214 PR OFFICE/OUTPT VISIT, EST, LEVL IV, 30-39 MIN: ICD-10-PCS | Mod: S$GLB,,, | Performed by: NURSE PRACTITIONER

## 2023-07-14 RX ORDER — AMLODIPINE AND BENAZEPRIL HYDROCHLORIDE 5; 10 MG/1; MG/1
1 CAPSULE ORAL DAILY
Qty: 90 CAPSULE | Refills: 1 | Status: SHIPPED | OUTPATIENT
Start: 2023-07-14 | End: 2023-11-20 | Stop reason: SDUPTHER

## 2023-07-14 RX ORDER — ATORVASTATIN CALCIUM 10 MG/1
10 TABLET, FILM COATED ORAL DAILY
Qty: 90 TABLET | Refills: 1 | Status: SHIPPED | OUTPATIENT
Start: 2023-07-14 | End: 2023-12-01 | Stop reason: SDUPTHER

## 2023-07-19 ENCOUNTER — OFFICE VISIT (OUTPATIENT)
Dept: PSYCHIATRY | Facility: CLINIC | Age: 63
End: 2023-07-19
Payer: OTHER GOVERNMENT

## 2023-07-19 DIAGNOSIS — F33.1 MODERATE EPISODE OF RECURRENT MAJOR DEPRESSIVE DISORDER: ICD-10-CM

## 2023-07-19 DIAGNOSIS — F41.1 GAD (GENERALIZED ANXIETY DISORDER): Primary | ICD-10-CM

## 2023-07-19 DIAGNOSIS — Z63.8 FAMILY CONFLICT: ICD-10-CM

## 2023-07-19 PROCEDURE — 99999 PR PBB SHADOW E&M-EST. PATIENT-LVL II: ICD-10-PCS | Mod: PBBFAC,,, | Performed by: SOCIAL WORKER

## 2023-07-19 PROCEDURE — 99999 PR PBB SHADOW E&M-EST. PATIENT-LVL II: CPT | Mod: PBBFAC,,, | Performed by: SOCIAL WORKER

## 2023-07-19 PROCEDURE — 90837 PR PSYCHOTHERAPY W/PATIENT, 60 MIN: ICD-10-PCS | Mod: ,,, | Performed by: SOCIAL WORKER

## 2023-07-19 PROCEDURE — 99212 OFFICE O/P EST SF 10 MIN: CPT | Mod: PBBFAC,PN | Performed by: SOCIAL WORKER

## 2023-07-19 PROCEDURE — 90837 PSYTX W PT 60 MINUTES: CPT | Mod: ,,, | Performed by: SOCIAL WORKER

## 2023-07-19 SDOH — SOCIAL DETERMINANTS OF HEALTH (SDOH): OTHER SPECIFIED PROBLEMS RELATED TO PRIMARY SUPPORT GROUP: Z63.8

## 2023-07-19 NOTE — PROGRESS NOTES
Individual Psychotherapy (PhD/LCSW)    7/19/2023    Site:  [facility]         Therapeutic Intervention: Met with patient.  Outpatient - Insight oriented psychotherapy 60 min - CPT code 50031, Outpatient - Behavior modifying psychotherapy 60 min - CPT code 89654, and Outpatient - Supportive psychotherapy 60 min - CPT Code 15110    Chief complaint/reason for encounter: depression, anxiety, and family stress             Interval history and content of current session:  Ct was referred to tx by Apryl REHMAN to address depression, anxiety, and marital/family issues. Ct arrived to session and was fully engaged. 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 Ct shared that she continues to have the same stress regarding her  and her son. SW encouraged ct to continue to focus on herself and her health and wellness. SW encouraged ct to set boundaries and to focus on things that she can control. Ct to continue in 1:1 sessions.       Treatment plan:  Target symptoms: depression, anxiety , family stress  Why chosen therapy is appropriate versus another modality: relevant to diagnosis, patient responds to this modality, evidence based practice  Outcome monitoring methods: self-report  Therapeutic intervention type: insight oriented psychotherapy, behavior modifying psychotherapy, supportive psychotherapy    Risk parameters:  Patient reports no suicidal ideation  Patient reports no homicidal ideation  Patient reports no self-injurious behavior  Patient reports no violent behavior    CSSRS was completed:     Mental Status Exam:  General Appearance:  unremarkable, age appropriate   Speech: normal tone, normal rate, normal pitch, normal volume      Level of Cooperation: cooperative      Thought Processes: normal and logical   Mood: steady      Thought Content: normal, no suicidality, no  homicidality, delusions, or paranoia   Affect: congruent and appropriate   Orientation: Oriented x3   Memory: recent >  intact   Attention Span & Concentration: intact   Fund of General Knowledge: intact and appropriate to age and level of education   Abstract Reasoning: interpretation of similarities was abstract   Judgment & Insight: intact     Language intact       Verbal deficits: None    Patient's response to intervention:  The patient's response to intervention is accepting.    Progress toward goals and other mental status changes:  The patient's progress toward goals is  improved .    Diagnosis:     ICD-10-CM ICD-9-CM   1. VANDANA (generalized anxiety disorder)  F41.1 300.02   2. Moderate episode of recurrent major depressive disorder  F33.1 296.32   3. Family conflict  Z63.8 V61.9       Plan:  individual psychotherapy and follow up with Apryl duque psych med mgt  Pt to go to ED or call 911 if symptoms worsen or if she has thoughts of harming self and/or others. Pt verbalized understanding.    Return to clinic: as scheduled    Length of Service (minutes): 60      Each patient to whom he or she provides medical services by telemedicine is: (1) informed of the relationship between the physician and patient and the respective role of any other health care provider with respect to management of the patient; and (2) notified that he or she may decline to receive medical services by telemedicine and may withdraw from such care at any time.

## 2023-07-19 NOTE — PROGRESS NOTES
SUBJECTIVE:      Patient ID: Luann Borjas is a 62 y.o. female.    Chief Complaint: Annual Exam    Mrs Borjas is here today for her annual exam. She is following with Dr Ravi for sleep apnea and copd. Following with Dr Tobin for cardiology. Following with Apryl Adams for psyc. She is due for a mammo and dexa. Due for f/u labs. She says she would like her thyroid checked due to feeling hot mostly at night. She will be due for a colonoscopy next year    Hypertension  This is a chronic problem. The problem is unchanged. The problem is controlled. Pertinent negatives include no chest pain, headaches, neck pain, palpitations, peripheral edema or shortness of breath. Risk factors for coronary artery disease include smoking/tobacco exposure. Past treatments include calcium channel blockers and ACE inhibitors. The current treatment provides significant improvement.   Hyperlipidemia  This is a chronic problem. The problem is controlled. Recent lipid tests were reviewed and are normal. Pertinent negatives include no chest pain or shortness of breath. Current antihyperlipidemic treatment includes statins. The current treatment provides significant improvement of lipids.     Past Surgical History:   Procedure Laterality Date    BASAL CELL CARCINOMA EXCISION  09/2019    lip    CATARACT EXTRACTION W/  INTRAOCULAR LENS IMPLANT Right 11/13/2019    Procedure: EXTRACTION, CATARACT, WITH IOL INSERTION;  Surgeon: Carmelo Perez II, MD;  Location: [facility] OR;  Service: Ophthalmology;  Laterality: Right;    ECTOPIC PREGNANCY SURGERY      EYE SURGERY Right     cataracts    HYSTERECTOMY       Family History   Problem Relation Age of Onset    COPD Mother     Liver disease Mother     Thyroid disease Mother     COPD Father     Diabetes Father     Hypertension Father     Pacemaker/defibrilator Father     COPD Sister     Hypertension Sister     Hypertension Brother       Social History     Socioeconomic History    Marital status:     Tobacco Use    Smoking status: Former     Packs/day: 0.50     Types: Cigarettes     Start date: 1988     Quit date: 10/25/2020     Years since quittin.1    Smokeless tobacco: Never   Substance and Sexual Activity    Alcohol use: Yes     Alcohol/week: 2.0 standard drinks     Types: 2 Glasses of wine per week    Drug use: No    Sexual activity: Not Currently     Social Determinants of Health     Financial Resource Strain: Low Risk     Difficulty of Paying Living Expenses: Not hard at all   Food Insecurity: No Food Insecurity    Worried About Running Out of Food in the Last Year: Never true    Ran Out of Food in the Last Year: Never true   Transportation Needs: No Transportation Needs    Lack of Transportation (Medical): No    Lack of Transportation (Non-Medical): No   Physical Activity: Insufficiently Active    Days of Exercise per Week: 1 day    Minutes of Exercise per Session: 20 min   Stress: Stress Concern Present    Feeling of Stress : To some extent   Social Connections: Unknown    Frequency of Communication with Friends and Family: More than three times a week    Frequency of Social Gatherings with Friends and Family: Once a week    Active Member of Clubs or Organizations: Yes    Attends Club or Organization Meetings: More than 4 times per year    Marital Status:    Housing Stability: Low Risk     Unable to Pay for Housing in the Last Year: No    Number of Places Lived in the Last Year: 1    Unstable Housing in the Last Year: No     Current Outpatient Medications   Medication Sig Dispense Refill    albuterol (VENTOLIN HFA) 90 mcg/actuation inhaler Inhale 2 puffs into the lungs every 6 (six) hours as needed for Wheezing. Rescue 18 g 6    ALPRAZolam (XANAX) 0.5 MG tablet Take 1 tablet (0.5 mg total) by mouth daily as needed for Anxiety. 30 tablet 0    atorvastatin (LIPITOR) 10 MG tablet TAKE 1 TABLET(10 MG) BY MOUTH EVERY DAY 90 tablet 1    calcium carbonate (OS-MELIA) 500 mg calcium  (1,250 mg) chewable tablet Take 1 tablet by mouth once daily.      cholecalciferol, vitamin D3, (VITAMIN D3) 50 mcg (2,000 unit) Tab Take 1 tablet (2,000 Units total) by mouth once daily. 30 tablet 0    umeclidinium-vilanteroL (ANORO ELLIPTA) 62.5-25 mcg/actuation DsDv Inhale 1 puff into the lungs once daily. Controller 3 each 5    umeclidinium-vilanteroL (ANORO ELLIPTA) 62.5-25 mcg/actuation DsDv Inhale 1 puff into the lungs once daily. Controller 3 each 5    vilazodone (VIIBRYD) 20 mg Tab Take 1 tablet (20 mg total) by mouth once daily. 90 tablet 0    amlodipine-benazepril 5-10 mg (LOTREL) 5-10 mg per capsule Take 1 capsule by mouth once daily. 90 capsule 1     No current facility-administered medications for this visit.     Review of patient's allergies indicates:   Allergen Reactions    Erythromycin Anaphylaxis    Buspirone     Ciprofloxacin     Levaquin [levofloxacin]     Zithromax z-masoud [azithromycin]       Past Medical History:   Diagnosis Date    Anxiety     Arthritis     Cataract of left eye     Hyperlipidemia     Sleep apnea     cpap     Past Surgical History:   Procedure Laterality Date    BASAL CELL CARCINOMA EXCISION  09/2019    lip    CATARACT EXTRACTION W/  INTRAOCULAR LENS IMPLANT Right 11/13/2019    Procedure: EXTRACTION, CATARACT, WITH IOL INSERTION;  Surgeon: Carmelo Perez II, MD;  Location: [facility];  Service: Ophthalmology;  Laterality: Right;    ECTOPIC PREGNANCY SURGERY      EYE SURGERY Right     cataracts    HYSTERECTOMY         Review of Systems   Constitutional:  Negative for activity change, appetite change, chills, diaphoresis and unexpected weight change.   HENT:  Negative for ear discharge, hearing loss, rhinorrhea, trouble swallowing and voice change.    Eyes:  Negative for photophobia, pain, discharge and visual disturbance.   Respiratory:  Negative for chest tightness, shortness of breath, wheezing and stridor.    Cardiovascular:  Negative for chest pain and palpitations.  "  Gastrointestinal:  Negative for blood in stool, constipation, diarrhea and vomiting.   Endocrine: Negative for cold intolerance, heat intolerance, polydipsia and polyuria.   Genitourinary:  Negative for difficulty urinating, dysuria, flank pain, hematuria and menstrual problem.   Musculoskeletal:  Negative for arthralgias, joint swelling, neck pain and neck stiffness.   Skin:  Negative for pallor.   Neurological:  Negative for speech difficulty, weakness and headaches.   Hematological:  Does not bruise/bleed easily.   Psychiatric/Behavioral:  Negative for confusion and dysphoric mood.     OBJECTIVE:      Vitals:    12/01/22 0832   BP: 126/70   BP Location: Left arm   Patient Position: Sitting   Pulse: 73   Temp: 98.2 °F (36.8 °C)   TempSrc: Temporal   SpO2: 96%   Weight: 70.8 kg (156 lb)   Height: 5' 6" (1.676 m)     Physical Exam  Vitals and nursing note reviewed.   Constitutional:       General: She is not in acute distress.     Appearance: She is well-developed.   HENT:      Head: Normocephalic and atraumatic.      Right Ear: Tympanic membrane normal.      Left Ear: Tympanic membrane normal.      Nose: Nose normal.      Mouth/Throat:      Pharynx: Uvula midline.   Eyes:      General: Lids are normal.      Conjunctiva/sclera: Conjunctivae normal.      Pupils: Pupils are equal, round, and reactive to light.      Right eye: Pupil is round and reactive.      Left eye: Pupil is round and reactive.   Neck:      Thyroid: No thyromegaly.      Vascular: No JVD.      Trachea: Trachea normal.   Cardiovascular:      Rate and Rhythm: Normal rate and regular rhythm.      Pulses: Normal pulses.      Heart sounds: Normal heart sounds.   Pulmonary:      Effort: Pulmonary effort is normal. No tachypnea or respiratory distress.      Breath sounds: Normal breath sounds.   Abdominal:      General: Bowel sounds are normal.      Palpations: Abdomen is soft.      Tenderness: There is no abdominal tenderness.   Musculoskeletal:         " General: Normal range of motion.      Cervical back: Normal range of motion and neck supple.   Lymphadenopathy:      Cervical: No cervical adenopathy.   Skin:     General: Skin is warm and dry.      Findings: No rash.   Neurological:      Mental Status: She is alert and oriented to person, place, and time.   Psychiatric:         Mood and Affect: Mood normal.         Speech: Speech normal.         Behavior: Behavior normal. Behavior is cooperative.         Thought Content: Thought content normal.      No visits with results within 1 Month(s) from this visit.   Latest known visit with results is:   Office Visit on 06/01/2022   Component Date Value Ref Range Status    Hemoglobin A1C 06/01/2022 5.2  % Final   ]  Assessment:       1. Preventative health care    2. Need for influenza vaccination    3. Essential hypertension    4. Pure hypercholesterolemia    5. Post-menopausal    6. Other screening mammogram        Plan:       Preventative health care  -     CBC Auto Differential; Future; Expected date: 12/15/2022  -     Comprehensive Metabolic Panel; Future; Expected date: 12/15/2022  -     Lipid Panel; Future; Expected date: 12/15/2022  -     TSH; Future; Expected date: 01/12/2023  -     Urinalysis; Future; Expected date: 12/15/2022  -     Vitamin D; Future; Expected date: 12/15/2022  Counseled on age and gender appropriate medical preventative services, including cancer screenings, immunizations, overall nutritional health, need for a consistent exercise regimen and an overall push towards maintaining a vigorous and active lifestyle.     Need for influenza vaccination  -     Influenza - Quadrivalent (PF)    Essential hypertension  -     amlodipine-benazepril 5-10 mg (LOTREL) 5-10 mg per capsule; Take 1 capsule by mouth once daily.  Dispense: 90 capsule; Refill: 1    Pure hypercholesterolemia  Stable on current meds    Post-menopausal  -     DXA Bone Density Spine And Hip; Future; Expected date: 12/01/2022    Other  screening mammogram  -     Mammo Digital Screening Bilat w/ Abad; Future; Expected date: 12/01/2022      Follow up in about 6 months (around 6/1/2023) for htn.      12/1/2022 YULIANA Farooq, FNP           No

## 2023-08-01 ENCOUNTER — PATIENT MESSAGE (OUTPATIENT)
Dept: PSYCHIATRY | Facility: CLINIC | Age: 63
End: 2023-08-01
Payer: OTHER GOVERNMENT

## 2023-08-01 DIAGNOSIS — F41.1 GAD (GENERALIZED ANXIETY DISORDER): ICD-10-CM

## 2023-08-01 NOTE — TELEPHONE ENCOUNTER
Medication requested-alprazolam   Last RX-6-28   Qty-30  Refills-0  Last office visit-6-5  Next office visit-9-6

## 2023-08-02 RX ORDER — ALPRAZOLAM 0.5 MG/1
0.5 TABLET ORAL DAILY PRN
Qty: 30 TABLET | Refills: 0 | Status: SHIPPED | OUTPATIENT
Start: 2023-08-02 | End: 2023-09-06 | Stop reason: SDUPTHER

## 2023-08-10 ENCOUNTER — PATIENT MESSAGE (OUTPATIENT)
Dept: PULMONOLOGY | Facility: CLINIC | Age: 63
End: 2023-08-10

## 2023-08-10 DIAGNOSIS — J44.9 CHRONIC OBSTRUCTIVE PULMONARY DISEASE, UNSPECIFIED COPD TYPE: ICD-10-CM

## 2023-08-10 RX ORDER — UMECLIDINIUM BROMIDE AND VILANTEROL TRIFENATATE 62.5; 25 UG/1; UG/1
1 POWDER RESPIRATORY (INHALATION) DAILY
Qty: 3 EACH | Refills: 5 | Status: SHIPPED | OUTPATIENT
Start: 2023-08-10

## 2023-09-06 ENCOUNTER — OFFICE VISIT (OUTPATIENT)
Dept: PSYCHIATRY | Facility: CLINIC | Age: 63
End: 2023-09-06
Payer: OTHER GOVERNMENT

## 2023-09-06 VITALS
HEART RATE: 70 BPM | SYSTOLIC BLOOD PRESSURE: 155 MMHG | HEIGHT: 66 IN | DIASTOLIC BLOOD PRESSURE: 83 MMHG | BODY MASS INDEX: 25.9 KG/M2 | WEIGHT: 161.19 LBS

## 2023-09-06 DIAGNOSIS — F41.1 GAD (GENERALIZED ANXIETY DISORDER): ICD-10-CM

## 2023-09-06 DIAGNOSIS — F33.1 MODERATE EPISODE OF RECURRENT MAJOR DEPRESSIVE DISORDER: Primary | ICD-10-CM

## 2023-09-06 DIAGNOSIS — Z63.8 FAMILY CONFLICT: ICD-10-CM

## 2023-09-06 PROCEDURE — 99999 PR PBB SHADOW E&M-EST. PATIENT-LVL III: ICD-10-PCS | Mod: PBBFAC,,, | Performed by: PHYSICIAN ASSISTANT

## 2023-09-06 PROCEDURE — 99999 PR PBB SHADOW E&M-EST. PATIENT-LVL III: CPT | Mod: PBBFAC,,, | Performed by: PHYSICIAN ASSISTANT

## 2023-09-06 PROCEDURE — 99213 OFFICE O/P EST LOW 20 MIN: CPT | Mod: PBBFAC,PN | Performed by: PHYSICIAN ASSISTANT

## 2023-09-06 PROCEDURE — 99214 PR OFFICE/OUTPT VISIT, EST, LEVL IV, 30-39 MIN: ICD-10-PCS | Mod: S$PBB,,, | Performed by: PHYSICIAN ASSISTANT

## 2023-09-06 PROCEDURE — 99214 OFFICE O/P EST MOD 30 MIN: CPT | Mod: S$PBB,,, | Performed by: PHYSICIAN ASSISTANT

## 2023-09-06 RX ORDER — VILAZODONE HYDROCHLORIDE 40 MG/1
40 TABLET ORAL DAILY
Qty: 30 TABLET | Refills: 1 | Status: SHIPPED | OUTPATIENT
Start: 2023-09-06 | End: 2023-11-20

## 2023-09-06 RX ORDER — ALPRAZOLAM 0.5 MG/1
0.5 TABLET ORAL DAILY PRN
Qty: 30 TABLET | Refills: 0 | Status: SHIPPED | OUTPATIENT
Start: 2023-09-06 | End: 2023-10-04 | Stop reason: SDUPTHER

## 2023-09-06 SDOH — SOCIAL DETERMINANTS OF HEALTH (SDOH): OTHER SPECIFIED PROBLEMS RELATED TO PRIMARY SUPPORT GROUP: Z63.8
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
## 2023-09-21 ENCOUNTER — OFFICE VISIT (OUTPATIENT)
Dept: PSYCHIATRY | Facility: CLINIC | Age: 63
End: 2023-09-21
Payer: OTHER GOVERNMENT

## 2023-09-21 DIAGNOSIS — F33.1 MODERATE EPISODE OF RECURRENT MAJOR DEPRESSIVE DISORDER: Primary | ICD-10-CM

## 2023-09-21 DIAGNOSIS — F41.1 GAD (GENERALIZED ANXIETY DISORDER): ICD-10-CM

## 2023-09-21 DIAGNOSIS — Z63.8 FAMILY CONFLICT: ICD-10-CM

## 2023-09-21 PROCEDURE — 90834 PSYTX W PT 45 MINUTES: CPT | Mod: ,,, | Performed by: SOCIAL WORKER

## 2023-09-21 PROCEDURE — 90834 PR PSYCHOTHERAPY W/PATIENT, 45 MIN: ICD-10-PCS | Mod: ,,, | Performed by: SOCIAL WORKER

## 2023-09-21 PROCEDURE — 99999 PR PBB SHADOW E&M-EST. PATIENT-LVL II: CPT | Mod: PBBFAC,,, | Performed by: SOCIAL WORKER

## 2023-09-21 PROCEDURE — 99999 PR PBB SHADOW E&M-EST. PATIENT-LVL II: ICD-10-PCS | Mod: PBBFAC,,, | Performed by: SOCIAL WORKER

## 2023-09-21 PROCEDURE — 99212 OFFICE O/P EST SF 10 MIN: CPT | Mod: PBBFAC,PN | Performed by: SOCIAL WORKER

## 2023-09-21 SDOH — SOCIAL DETERMINANTS OF HEALTH (SDOH): OTHER SPECIFIED PROBLEMS RELATED TO PRIMARY SUPPORT GROUP: Z63.8

## 2023-09-22 ENCOUNTER — OFFICE VISIT (OUTPATIENT)
Dept: PULMONOLOGY | Facility: CLINIC | Age: 63
End: 2023-09-22
Payer: OTHER GOVERNMENT

## 2023-09-22 VITALS
WEIGHT: 161.38 LBS | HEART RATE: 62 BPM | BODY MASS INDEX: 26.05 KG/M2 | DIASTOLIC BLOOD PRESSURE: 68 MMHG | OXYGEN SATURATION: 96 % | SYSTOLIC BLOOD PRESSURE: 140 MMHG

## 2023-09-22 DIAGNOSIS — J44.89 ASTHMA WITH COPD: Primary | ICD-10-CM

## 2023-09-22 DIAGNOSIS — Z87.891 FORMER SMOKER: ICD-10-CM

## 2023-09-22 DIAGNOSIS — G47.33 OSA (OBSTRUCTIVE SLEEP APNEA): ICD-10-CM

## 2023-09-22 DIAGNOSIS — J43.2 CENTRILOBULAR EMPHYSEMA: ICD-10-CM

## 2023-09-22 PROBLEM — J44.9 CHRONIC OBSTRUCTIVE PULMONARY DISEASE: Status: RESOLVED | Noted: 2020-12-22 | Resolved: 2023-09-22

## 2023-09-22 PROCEDURE — 99213 OFFICE O/P EST LOW 20 MIN: CPT | Mod: S$GLB,,, | Performed by: NURSE PRACTITIONER

## 2023-09-22 PROCEDURE — 99213 PR OFFICE/OUTPT VISIT, EST, LEVL III, 20-29 MIN: ICD-10-PCS | Mod: S$GLB,,, | Performed by: NURSE PRACTITIONER

## 2023-09-22 NOTE — PROGRESS NOTES
SUBJECTIVE:    Patient ID: Luann Borjas is a 63 y.o. female.    Chief Complaint: Follow-up (6 month follow up CARO, COPD)    Patient here today feeling well. She is using her Anoro daily.  She is sleeping on her CPAP and does feel benefit from it. Her compliance report shows that she is 70% compliant sleeps an average of 7 hours and 39 minutes with an AHI of 4.8.  She did have to use her rescue inhaler more often this summer.  Her PFT in 2020 showed moderate obstruction with ratio reversal to bronchodilator, and her PFT last year was not obstructed.     Past Medical History:   Diagnosis Date    Anxiety     Arthritis     Asthma     Cataract of left eye     Hyperlipidemia     Sleep apnea     cpap     Past Surgical History:   Procedure Laterality Date    BASAL CELL CARCINOMA EXCISION  09/2019    lip    CATARACT EXTRACTION W/  INTRAOCULAR LENS IMPLANT Right 11/13/2019    Procedure: EXTRACTION, CATARACT, WITH IOL INSERTION;  Surgeon: Carmelo Perez II, MD;  Location: [facility] OR;  Service: Ophthalmology;  Laterality: Right;    ECTOPIC PREGNANCY SURGERY      EYE SURGERY Right     cataracts    HYSTERECTOMY       Family History   Problem Relation Age of Onset    COPD Mother     Liver disease Mother     Thyroid disease Mother     COPD Father     Diabetes Father     Hypertension Father     Pacemaker/defibrilator Father     COPD Sister     Hypertension Sister     Hypertension Brother         Social History:   Marital Status:   Occupation: teacher   Alcohol History:  reports current alcohol use of about 2.0 standard drinks of alcohol per week.  Tobacco History:  reports that she quit smoking about 2 years ago. Her smoking use included cigarettes. She started smoking about 34 years ago. She has a 16.0 pack-year smoking history. She has been exposed to tobacco smoke. She has never used smokeless tobacco.  Drug History:  reports no history of drug use.    Review of patient's allergies indicates:   Allergen Reactions     Erythromycin Anaphylaxis    Buspirone     Ciprofloxacin     Levaquin [levofloxacin]     Zithromax z-masoud [azithromycin]        Current Outpatient Medications   Medication Sig Dispense Refill    albuterol (VENTOLIN HFA) 90 mcg/actuation inhaler Inhale 2 puffs into the lungs every 6 (six) hours as needed for Wheezing. Rescue 18 g 6    ALPRAZolam (XANAX) 0.5 MG tablet Take 1 tablet (0.5 mg total) by mouth daily as needed for Anxiety. 30 tablet 0    amlodipine-benazepril 5-10 mg (LOTREL) 5-10 mg per capsule Take 1 capsule by mouth once daily. 90 capsule 1    atorvastatin (LIPITOR) 10 MG tablet Take 1 tablet (10 mg total) by mouth once daily. 90 tablet 1    calcium carbonate (OS-MELIA) 500 mg calcium (1,250 mg) chewable tablet Take 1 tablet by mouth once daily.      cholecalciferol, vitamin D3, (VITAMIN D3) 50 mcg (2,000 unit) Tab Take 1 tablet (2,000 Units total) by mouth once daily. 30 tablet 0    umeclidinium-vilanteroL (ANORO ELLIPTA) 62.5-25 mcg/actuation DsDv Inhale 1 puff into the lungs once daily. Controller 3 each 5    vilazodone (VIIBRYD) 40 mg Tab tablet Take 1 tablet (40 mg total) by mouth once daily. 30 tablet 1     No current facility-administered medications for this visit.         Last PFT: 04/2022  Normal previous  PFT showed moderate obstruction that reversed with bronchodilator    Last CT:10/2022  IMPRESSION:  Prior granulomatous disease with stable 6 mm noncalcified nodule within the left lower lobe     Coronary artery calcification     No new nodules or infiltrates     LUNG-RADS CATEGORY 2: BENIGN APPEARANCE OR BEHAVIOR       Review of Systems  General: Feeling well  Eyes: Vision is good.  ENT:  allergies  Heart:: No chest pain or palpitations.  Lungs: no dyspnea, no cough   GI: No Nausea, vomiting, constipation, diarrhea, or reflux.  : Nocturia once sometimes  Musculoskeletal: No joint pain or myalgias.  Skin: No lesions or rashes.  Neuro: No headaches or neuropathy.  Lymph: No edema or  adenopathy.  Psych: anxiety and depression  Endo: gaining weight    OBJECTIVE:      BP (!) 140/68 (BP Location: Right arm, Patient Position: Sitting, BP Method: Medium (Manual))   Pulse 62   Wt 73.2 kg (161 lb 6.4 oz)   SpO2 96%   BMI 26.05 kg/m²     Physical Exam  GENERAL: Midaged patient in no distress.  HEENT: Pupils equal and reactive. Extraocular movements intact. Nose intact.      Pharynx moist.  NECK: Supple.   HEART: Regular rate and rhythm. No murmur or gallop auscultated.  LUNGS: Clear to auscultation and percussion. Lung excursion symmetrical. No change in fremitus. No adventitial noises.  ABDOMEN: Bowel sounds present. Non-tender, no masses palpated.  EXTREMITIES: Normal muscle tone and joint movement, no cyanosis or clubbing.   LYMPHATICS: No adenopathy palpated, no edema.  SKIN: Dry, intact, no lesions.   NEURO: Cranial nerves II-XII intact. Motor strength 5/5 bilaterally, upper and lower extremities.  PSYCH: Appropriate affect.    Assessment:       1. Asthma with COPD    2. Former smoker    3. CARO (obstructive sleep apnea)    4. Centrilobular emphysema             Plan:       Asthma with COPD    Former smoker  -     CT Chest Lung Screening Low Dose; Future; Expected date: 09/22/2023    CARO (obstructive sleep apnea)    Centrilobular emphysema               Follow up in about 6 months (around 3/22/2024).      Continue Anoro  Keep sleeping on your CPAP   Screening CT scan  PFT   Will call and let you know if if stay on anoro or need to change

## 2023-09-22 NOTE — PATIENT INSTRUCTIONS
Continue Anoro  Keep sleeping on your CPAP   Screening CT scan  PFT   Will call and let you know if if stay on anoro or need to change

## 2023-09-25 NOTE — PROGRESS NOTES
Individual Psychotherapy (PhD/LCSW)    9/21/2023    Site:  [facility]         Therapeutic Intervention: Met with patient.  Outpatient - Insight oriented psychotherapy 45 min - CPT code 68609, Outpatient - Behavior modifying psychotherapy 45 min - CPT code 80217, and Outpatient - Supportive psychotherapy 45 min - CPT Code 18377    Chief complaint/reason for encounter: depression, anxiety, and family stress             Interval history and content of current session: Ct was referred to tx by Apryl REHMAN to address depression, anxiety, and marital/family issues. Ct arrived to session and was fully engaged. Ct shared that she has been depressed and has not been able to do a lot of the things that she was doing to keep busy. 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 Ct shared that thinking about her son continues to be sensitive and emotional area for her. SW and ct processed how ct's thinking affects her emotions and the vicious cycle of depressive thinking. SW and ct also processed ct's tendency to force facts to fit her beliefs about situations. Ct was receptive to feedback. SW provided ct with coping skills and tools for her to use to reinforce healthier thinking- self care tips, podcasts. Ct to continue in 1:1 sessions.       Treatment plan:  Target symptoms: depression, anxiety , family stress  Why chosen therapy is appropriate versus another modality: relevant to diagnosis, patient responds to this modality, evidence based practice  Outcome monitoring methods: self-report  Therapeutic intervention type: insight oriented psychotherapy, behavior modifying psychotherapy, supportive psychotherapy    Risk parameters:  Patient reports no suicidal ideation  Patient reports no homicidal ideation  Patient reports no self-injurious behavior  Patient reports no violent behavior    CSSRS was completed:     Mental Status Exam:  General Appearance:  unremarkable, age appropriate   Speech:  normal tone, normal rate, normal pitch, normal volume      Level of Cooperation: cooperative      Thought Processes: normal and logical   Mood: sad      Thought Content: normal, no suicidality, no homicidality, delusions, or paranoia   Affect: congruent and appropriate   Orientation: Oriented x3   Memory: recent >  intact   Attention Span & Concentration: intact   Fund of General Knowledge: intact and appropriate to age and level of education   Abstract Reasoning: interpretation of similarities was abstract   Judgment & Insight: intact     Language intact       Verbal deficits: None    Patient's response to intervention:  The patient's response to intervention is accepting.    Progress toward goals and other mental status changes:  The patient's progress toward goals is fair .    Diagnosis:     ICD-10-CM ICD-9-CM   1. Moderate episode of recurrent major depressive disorder  F33.1 296.32   2. VANDANA (generalized anxiety disorder)  F41.1 300.02   3. Family conflict  Z63.8 V61.9       Plan:  individual psychotherapy and ct to follow up with Apryl REHMAN for psych med mgt  Pt to go to ED or call 911 if symptoms worsen or if she has thoughts of harming self and/or others. Pt verbalized understanding.    Return to clinic: as scheduled    Length of Service (minutes): 45      A portion of this note was created using two[employer] voice recognition software that occasionally misinterprets phrases or words.    Each patient to whom he or she provides medical services by telemedicine is: (1) informed of the relationship between the physician and patient and the respective role of any other health care provider with respect to management of the patient; and (2) notified that he or she may decline to receive medical services by telemedicine and may withdraw from such care at any time.

## 2023-10-04 ENCOUNTER — PATIENT MESSAGE (OUTPATIENT)
Dept: PSYCHIATRY | Facility: CLINIC | Age: 63
End: 2023-10-04
Payer: OTHER GOVERNMENT

## 2023-10-04 DIAGNOSIS — F41.1 GAD (GENERALIZED ANXIETY DISORDER): ICD-10-CM

## 2023-10-04 RX ORDER — ALPRAZOLAM 0.5 MG/1
0.5 TABLET ORAL DAILY PRN
Qty: 30 TABLET | Refills: 0 | Status: SHIPPED | OUTPATIENT
Start: 2023-10-04 | End: 2023-11-06 | Stop reason: SDUPTHER

## 2023-10-05 ENCOUNTER — TELEPHONE (OUTPATIENT)
Dept: PULMONOLOGY | Facility: CLINIC | Age: 63
End: 2023-10-05

## 2023-10-05 ENCOUNTER — HOSPITAL ENCOUNTER (OUTPATIENT)
Dept: RADIOLOGY | Facility: HOSPITAL | Age: 63
Discharge: HOME | End: 2023-10-05
Attending: NURSE PRACTITIONER
Payer: OTHER GOVERNMENT

## 2023-10-05 DIAGNOSIS — Z87.891 FORMER SMOKER: ICD-10-CM

## 2023-10-05 PROCEDURE — 71271 CT THORAX LUNG CANCER SCR C-: CPT | Mod: TC,PO

## 2023-10-05 NOTE — TELEPHONE ENCOUNTER
Ct chest  IMPRESSION:  1. Stable 6 mm left lower lobe noncalcified pulmonary nodule, with no suspicious pulmonary nodules or masses.  2. Aortic and coronary arterial calcifications.  3. Additional observations as described.     LUNG-RADS CATEGORY 2: BENIGN APPEARANCE OR BEHAVIOR     RECOMMENDATION: Continue annual screening with LDCT in 12 months

## 2023-10-06 ENCOUNTER — PATIENT MESSAGE (OUTPATIENT)
Dept: FAMILY MEDICINE | Facility: CLINIC | Age: 63
End: 2023-10-06

## 2023-10-06 ENCOUNTER — TELEPHONE (OUTPATIENT)
Dept: FAMILY MEDICINE | Facility: CLINIC | Age: 63
End: 2023-10-06

## 2023-10-06 NOTE — TELEPHONE ENCOUNTER
Message left informing patient. Will repeat in a year. Would like to know if she has a cardiologist as well to send report to

## 2023-10-09 ENCOUNTER — PATIENT MESSAGE (OUTPATIENT)
Dept: PULMONOLOGY | Facility: CLINIC | Age: 63
End: 2023-10-09

## 2023-10-16 ENCOUNTER — PATIENT MESSAGE (OUTPATIENT)
Dept: PSYCHIATRY | Facility: CLINIC | Age: 63
End: 2023-10-16
Payer: OTHER GOVERNMENT

## 2023-10-19 ENCOUNTER — OFFICE VISIT (OUTPATIENT)
Dept: PSYCHIATRY | Facility: CLINIC | Age: 63
End: 2023-10-19
Payer: OTHER GOVERNMENT

## 2023-10-19 DIAGNOSIS — F41.1 GAD (GENERALIZED ANXIETY DISORDER): ICD-10-CM

## 2023-10-19 DIAGNOSIS — Z63.8 FAMILY CONFLICT: ICD-10-CM

## 2023-10-19 DIAGNOSIS — F33.1 MODERATE EPISODE OF RECURRENT MAJOR DEPRESSIVE DISORDER: Primary | ICD-10-CM

## 2023-10-19 PROCEDURE — 99999 PR PBB SHADOW E&M-EST. PATIENT-LVL II: CPT | Mod: PBBFAC,,, | Performed by: SOCIAL WORKER

## 2023-10-19 PROCEDURE — 99212 OFFICE O/P EST SF 10 MIN: CPT | Mod: PBBFAC,PN | Performed by: SOCIAL WORKER

## 2023-10-19 PROCEDURE — 99999 PR PBB SHADOW E&M-EST. PATIENT-LVL II: ICD-10-PCS | Mod: PBBFAC,,, | Performed by: SOCIAL WORKER

## 2023-10-19 PROCEDURE — 90834 PR PSYCHOTHERAPY W/PATIENT, 45 MIN: ICD-10-PCS | Mod: ,,, | Performed by: SOCIAL WORKER

## 2023-10-19 PROCEDURE — 90834 PSYTX W PT 45 MINUTES: CPT | Mod: ,,, | Performed by: SOCIAL WORKER

## 2023-10-19 SDOH — SOCIAL DETERMINANTS OF HEALTH (SDOH): OTHER SPECIFIED PROBLEMS RELATED TO PRIMARY SUPPORT GROUP: Z63.8

## 2023-10-19 NOTE — PROGRESS NOTES
"Individual Psychotherapy (PhD/LCSW)    10/19/2023    Site:  [facility]         Therapeutic Intervention: Met with patient.  Outpatient - Insight oriented psychotherapy 45 min - CPT code 22134, Outpatient - Behavior modifying psychotherapy 45 min - CPT code 33691, and Outpatient - Supportive psychotherapy 45 min - CPT Code 73818    Chief complaint/reason for encounter: depression, anxiety, and family stress             Interval history and content of current session: Ct was referred to tx by Apryl REHMAN to address depression, anxiety, and marital/family issues. Ct arrived to session and was fully engaged. 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 Nonetheless, she is still saddened by the status of her relationship with her son. Ct shared that things were " stirred up" by communicating with her brother. Ct shared that her brother has tongue cancer and that he and her sister in law are expecting her to help with his recovery. Ct shared that she will go and help take care of him when she can. She reported that her brother has not spoken to his own kids in 28 years and she shared how she was unsure how she was going to handle not talking to her son that long. SW and ct challenged the automatic thought that it would be the same thing. 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 Ct verbalized agreement and understanding.Ct shared that she was stressed with some of the turmoil at her [employer] and how she feels that she can not to her  about some of these things.  Ct to continue in 1:1 sessions.       Treatment plan:  Target symptoms: depression, anxiety , family stress  Why chosen therapy is appropriate versus another modality: relevant to diagnosis, patient responds to this modality, evidence based practice  Outcome monitoring methods: " self-report  Therapeutic intervention type: insight oriented psychotherapy, behavior modifying psychotherapy, supportive psychotherapy    Risk parameters:  Patient reports no suicidal ideation  Patient reports no homicidal ideation  Patient reports no self-injurious behavior  Patient reports no violent behavior    CSSRS was completed: No risk    Mental Status Exam:  General Appearance:  unremarkable, age appropriate   Speech: normal tone, normal rate, normal pitch, normal volume      Level of Cooperation: cooperative      Thought Processes: normal and logical   Mood: sad      Thought Content: normal, no suicidality, no homicidality, delusions, or paranoia   Affect: congruent and appropriate   Orientation: Oriented x3   Memory: recent >  intact   Attention Span & Concentration: intact   Fund of General Knowledge: intact and appropriate to age and level of education   Abstract Reasoning: interpretation of similarities was abstract   Judgment & Insight: fair, intact     Language intact       Verbal deficits: None    Patient's response to intervention:  The patient's response to intervention is accepting.    Progress toward goals and other mental status changes:  The patient's progress toward goals is fair , some improvement .    Diagnosis:     ICD-10-CM ICD-9-CM   1. Moderate episode of recurrent major depressive disorder  F33.1 296.32   2. VANDANA (generalized anxiety disorder)  F41.1 300.02   3. Family conflict  Z63.8 V61.9       Plan:  individual psychotherapy and ct to follow up with Apryl SHERIE for psych med mgt  Pt to go to ED or call 911 if symptoms worsen or if she has thoughts of harming self and/or others. Pt verbalized understanding.    Return to clinic: as scheduled    Length of Service (minutes): 45      A portion of this note was created using Xook[employer] voice recognition software that occasionally misinterprets phrases or words.    Each patient to whom he or she provides medical services by telemedicine is: (1)  informed of the relationship between the physician and patient and the respective role of any other health care provider with respect to management of the patient; and (2) notified that he or she may decline to receive medical services by telemedicine and may withdraw from such care at any time.

## 2023-11-06 ENCOUNTER — PATIENT MESSAGE (OUTPATIENT)
Dept: PSYCHIATRY | Facility: CLINIC | Age: 63
End: 2023-11-06
Payer: OTHER GOVERNMENT

## 2023-11-06 DIAGNOSIS — F41.1 GAD (GENERALIZED ANXIETY DISORDER): ICD-10-CM

## 2023-11-06 RX ORDER — ALPRAZOLAM 0.5 MG/1
0.5 TABLET ORAL DAILY PRN
Qty: 30 TABLET | Refills: 0 | Status: SHIPPED | OUTPATIENT
Start: 2023-11-06 | End: 2023-12-05 | Stop reason: SDUPTHER

## 2023-11-18 ENCOUNTER — PATIENT MESSAGE (OUTPATIENT)
Dept: PULMONOLOGY | Facility: CLINIC | Age: 63
End: 2023-11-18

## 2023-11-18 DIAGNOSIS — F33.1 MODERATE EPISODE OF RECURRENT MAJOR DEPRESSIVE DISORDER: ICD-10-CM

## 2023-11-19 RX ORDER — UMECLIDINIUM BROMIDE AND VILANTEROL TRIFENATATE 62.5; 25 UG/1; UG/1
1 POWDER RESPIRATORY (INHALATION) DAILY
Qty: 3 EACH | Refills: 5 | Status: SHIPPED | OUTPATIENT
Start: 2023-11-19 | End: 2023-12-11 | Stop reason: SDUPTHER

## 2023-11-20 ENCOUNTER — PATIENT MESSAGE (OUTPATIENT)
Dept: FAMILY MEDICINE | Facility: CLINIC | Age: 63
End: 2023-11-20

## 2023-11-20 DIAGNOSIS — I10 ESSENTIAL HYPERTENSION: ICD-10-CM

## 2023-11-20 RX ORDER — AMLODIPINE AND BENAZEPRIL HYDROCHLORIDE 5; 10 MG/1; MG/1
1 CAPSULE ORAL DAILY
Qty: 90 CAPSULE | Refills: 1 | OUTPATIENT
Start: 2023-11-20

## 2023-11-20 RX ORDER — VILAZODONE HYDROCHLORIDE 40 MG/1
40 TABLET ORAL
Qty: 30 TABLET | Refills: 1 | Status: SHIPPED | OUTPATIENT
Start: 2023-11-20 | End: 2023-12-11 | Stop reason: SDUPTHER

## 2023-11-20 RX ORDER — AMLODIPINE AND BENAZEPRIL HYDROCHLORIDE 5; 10 MG/1; MG/1
1 CAPSULE ORAL DAILY
Qty: 30 CAPSULE | Refills: 1 | Status: SHIPPED | OUTPATIENT
Start: 2023-11-20 | End: 2024-01-16 | Stop reason: SDUPTHER

## 2023-11-20 NOTE — TELEPHONE ENCOUNTER
Medication requested-vilazodone   Last RX-9-6-23   Qty-30  Refills-1  Last office [id number]  Next office [id number]

## 2023-11-21 ENCOUNTER — TELEPHONE (OUTPATIENT)
Dept: PSYCHIATRY | Facility: CLINIC | Age: 63
End: 2023-11-21
Payer: OTHER GOVERNMENT

## 2023-11-21 NOTE — TELEPHONE ENCOUNTER
CaseId:[id number];Status:Approved;Review Type:Prior Auth;Coverage Start Date:10/22/2023;Coverage End Date:12/31/2099;     Faxed approval to pharmacy.

## 2023-11-22 ENCOUNTER — OFFICE VISIT (OUTPATIENT)
Dept: PSYCHIATRY | Facility: CLINIC | Age: 63
End: 2023-11-22
Payer: OTHER GOVERNMENT

## 2023-11-22 DIAGNOSIS — F33.1 MODERATE EPISODE OF RECURRENT MAJOR DEPRESSIVE DISORDER: Primary | ICD-10-CM

## 2023-11-22 DIAGNOSIS — F41.1 GAD (GENERALIZED ANXIETY DISORDER): ICD-10-CM

## 2023-11-22 DIAGNOSIS — Z63.8 FAMILY CONFLICT: ICD-10-CM

## 2023-11-22 PROCEDURE — 99212 OFFICE O/P EST SF 10 MIN: CPT | Mod: PBBFAC,PN | Performed by: SOCIAL WORKER

## 2023-11-22 PROCEDURE — 99999 PR PBB SHADOW E&M-EST. PATIENT-LVL II: ICD-10-PCS | Mod: PBBFAC,,, | Performed by: SOCIAL WORKER

## 2023-11-22 PROCEDURE — 99999 PR PBB SHADOW E&M-EST. PATIENT-LVL II: CPT | Mod: PBBFAC,,, | Performed by: SOCIAL WORKER

## 2023-11-22 PROCEDURE — 90837 PR PSYCHOTHERAPY W/PATIENT, 60 MIN: ICD-10-PCS | Mod: ,,, | Performed by: SOCIAL WORKER

## 2023-11-22 PROCEDURE — 90837 PSYTX W PT 60 MINUTES: CPT | Mod: ,,, | Performed by: SOCIAL WORKER

## 2023-11-22 SDOH — SOCIAL DETERMINANTS OF HEALTH (SDOH): OTHER SPECIFIED PROBLEMS RELATED TO PRIMARY SUPPORT GROUP: Z63.8

## 2023-11-22 NOTE — PROGRESS NOTES
Individual Psychotherapy (PhD/LCSW)    11/22/2023    Site:  [facility]         Therapeutic Intervention: Met with patient.  Outpatient - Insight oriented psychotherapy 60 min - CPT code 94920, Outpatient - Behavior modifying psychotherapy 60 min - CPT code 01149, and Outpatient - Supportive psychotherapy 60 min - CPT Code 00935    Chief complaint/reason for encounter: depression, anxiety, and family stress             Interval history and content of current session:  Ct was referred to tx by Apryl REHMAN to address depression, anxiety, and marital/family issues. Ct arrived to session and was fully engaged.  Ct shared that things have been going okay. She shared that she and her  have been getting along. She shared that they are bonding. She reported that she son continues to be estranged and is not communicating with her, only his father. Ct has started setting boundaries with herself how she handles her son. 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 She is preparing for the holidays and her nieces are coming into town. Ct has made progress regarding her mood and managing her thoughts and emotions. She remains actively engaged in her [employer]. Ct to continue in 1:1 sessions.       Treatment plan:  Target symptoms: depression, anxiety , family stress  Why chosen therapy is appropriate versus another modality: relevant to diagnosis, patient responds to this modality, evidence based practice  Outcome monitoring methods: self-report  Therapeutic intervention type: insight oriented psychotherapy, behavior modifying psychotherapy, supportive psychotherapy    Risk parameters:  Patient reports no suicidal ideation  Patient reports no homicidal ideation  Patient reports no self-injurious behavior  Patient reports no violent behavior    CSSRS was completed:     Mental Status Exam:  General Appearance:  unremarkable, age appropriate   Speech: normal tone, normal rate, normal pitch,  normal volume      Level of Cooperation: cooperative      Thought Processes: normal and logical   Mood: steady      Thought Content: normal, no suicidality, no homicidality, delusions, or paranoia   Affect: congruent and appropriate   Orientation: Oriented x3   Memory: recent >  intact   Attention Span & Concentration: intact   Fund of General Knowledge: intact and appropriate to age and level of education   Abstract Reasoning: interpretation of similarities was abstract   Judgment & Insight: fair, intact     Language intact       Verbal deficits: None    Patient's response to intervention:  The patient's response to intervention is accepting.    Progress toward goals and other mental status changes:  The patient's progress toward goals is  improved .    Diagnosis:     ICD-10-CM ICD-9-CM   1. Moderate episode of recurrent major depressive disorder  F33.1 296.32   2. VANDANA (generalized anxiety disorder)  F41.1 300.02   3. Family conflict  Z63.8 V61.9       Plan:  individual psychotherapy and ct to follow up with Apryl REHMAN for psych med mgt  Pt to go to ED or call 911 if symptoms worsen or if she has thoughts of harming self and/or others. Pt verbalized understanding.    Return to clinic: as scheduled    Length of Service (minutes): 60      A portion of this note was created using [employer] voice recognition software that occasionally misinterprets phrases or words.    Each patient to whom he or she provides medical services by telemedicine is: (1) informed of the relationship between the physician and patient and the respective role of any other health care provider with respect to management of the patient; and (2) notified that he or she may decline to receive medical services by telemedicine and may withdraw from such care at any time.

## 2023-12-01 ENCOUNTER — PATIENT MESSAGE (OUTPATIENT)
Dept: FAMILY MEDICINE | Facility: CLINIC | Age: 63
End: 2023-12-01

## 2023-12-01 DIAGNOSIS — E78.00 PURE HYPERCHOLESTEROLEMIA: ICD-10-CM

## 2023-12-01 RX ORDER — ATORVASTATIN CALCIUM 10 MG/1
10 TABLET, FILM COATED ORAL DAILY
Qty: 90 TABLET | Refills: 1 | Status: SHIPPED | OUTPATIENT
Start: 2023-12-01 | End: 2024-01-16 | Stop reason: SDUPTHER

## 2023-12-05 DIAGNOSIS — F41.1 GAD (GENERALIZED ANXIETY DISORDER): ICD-10-CM

## 2023-12-06 RX ORDER — ALPRAZOLAM 0.5 MG/1
0.5 TABLET ORAL DAILY PRN
Qty: 30 TABLET | Refills: 0 | Status: SHIPPED | OUTPATIENT
Start: 2023-12-06 | End: 2024-01-02 | Stop reason: SDUPTHER

## 2023-12-11 ENCOUNTER — OFFICE VISIT (OUTPATIENT)
Dept: PSYCHIATRY | Facility: CLINIC | Age: 63
End: 2023-12-11
Payer: OTHER GOVERNMENT

## 2023-12-11 VITALS
HEART RATE: 82 BPM | BODY MASS INDEX: 25.78 KG/M2 | HEIGHT: 66 IN | DIASTOLIC BLOOD PRESSURE: 85 MMHG | SYSTOLIC BLOOD PRESSURE: 146 MMHG | WEIGHT: 160.38 LBS

## 2023-12-11 DIAGNOSIS — F33.1 MODERATE EPISODE OF RECURRENT MAJOR DEPRESSIVE DISORDER: Primary | ICD-10-CM

## 2023-12-11 DIAGNOSIS — F41.1 GAD (GENERALIZED ANXIETY DISORDER): ICD-10-CM

## 2023-12-11 DIAGNOSIS — Z63.8 FAMILY CONFLICT: ICD-10-CM

## 2023-12-11 PROCEDURE — 99213 OFFICE O/P EST LOW 20 MIN: CPT | Mod: PBBFAC,PN | Performed by: PHYSICIAN ASSISTANT

## 2023-12-11 PROCEDURE — 99999 PR PBB SHADOW E&M-EST. PATIENT-LVL III: ICD-10-PCS | Mod: PBBFAC,,, | Performed by: PHYSICIAN ASSISTANT

## 2023-12-11 PROCEDURE — 99999 PR PBB SHADOW E&M-EST. PATIENT-LVL III: CPT | Mod: PBBFAC,,, | Performed by: PHYSICIAN ASSISTANT

## 2023-12-11 PROCEDURE — 99214 OFFICE O/P EST MOD 30 MIN: CPT | Mod: S$PBB,,, | Performed by: PHYSICIAN ASSISTANT

## 2023-12-11 PROCEDURE — 99214 PR OFFICE/OUTPT VISIT, EST, LEVL IV, 30-39 MIN: ICD-10-PCS | Mod: S$PBB,,, | Performed by: PHYSICIAN ASSISTANT

## 2023-12-11 RX ORDER — VILAZODONE HYDROCHLORIDE 40 MG/1
40 TABLET ORAL DAILY
Qty: 90 TABLET | Refills: 0 | Status: SHIPPED | OUTPATIENT
Start: 2023-12-11 | End: 2023-12-29 | Stop reason: SDUPTHER

## 2023-12-11 SDOH — SOCIAL DETERMINANTS OF HEALTH (SDOH): OTHER SPECIFIED PROBLEMS RELATED TO PRIMARY SUPPORT GROUP: Z63.8

## 2023-12-11 NOTE — PATIENT INSTRUCTIONS
Please go to emergency department if feeling as though you are a harm to yourself or others or if you are in crisis. Please call the clinic to report any worsening of symptoms or problems associated with medication.    With upcoming holidays, I am sending along a quick reminder that our clinic is closed Christmas and New Year's Day. I will be out of the clinic December 26-29. My colleague, Howard Patel, will be handling refill requests in my absence.  In the case of a medical or mental health emergency, we advise visiting your nearest emergency department or dialing 911 for emergency services.
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
## 2023-12-22 ENCOUNTER — PATIENT MESSAGE (OUTPATIENT)
Dept: PSYCHIATRY | Facility: CLINIC | Age: 63
End: 2023-12-22
Payer: OTHER GOVERNMENT

## 2023-12-29 ENCOUNTER — PATIENT MESSAGE (OUTPATIENT)
Dept: PSYCHIATRY | Facility: CLINIC | Age: 63
End: 2023-12-29
Payer: OTHER GOVERNMENT

## 2023-12-29 DIAGNOSIS — F33.1 MODERATE EPISODE OF RECURRENT MAJOR DEPRESSIVE DISORDER: ICD-10-CM

## 2023-12-29 RX ORDER — VILAZODONE HYDROCHLORIDE 40 MG/1
40 TABLET ORAL DAILY
Qty: 90 TABLET | Refills: 0 | Status: SHIPPED | OUTPATIENT
Start: 2023-12-29 | End: 2024-03-25 | Stop reason: SDUPTHER

## 2024-01-02 ENCOUNTER — PATIENT MESSAGE (OUTPATIENT)
Dept: PSYCHIATRY | Facility: CLINIC | Age: 64
End: 2024-01-02
Payer: OTHER GOVERNMENT

## 2024-01-02 ENCOUNTER — OFFICE VISIT (OUTPATIENT)
Dept: PSYCHIATRY | Facility: CLINIC | Age: 64
End: 2024-01-02
Payer: OTHER GOVERNMENT

## 2024-01-02 DIAGNOSIS — F41.1 GAD (GENERALIZED ANXIETY DISORDER): ICD-10-CM

## 2024-01-02 DIAGNOSIS — F33.1 MODERATE EPISODE OF RECURRENT MAJOR DEPRESSIVE DISORDER: Primary | ICD-10-CM

## 2024-01-02 DIAGNOSIS — Z63.8 FAMILY CONFLICT: ICD-10-CM

## 2024-01-02 PROCEDURE — 99999 PR PBB SHADOW E&M-EST. PATIENT-LVL II: CPT | Mod: PBBFAC,,, | Performed by: SOCIAL WORKER

## 2024-01-02 PROCEDURE — 99212 OFFICE O/P EST SF 10 MIN: CPT | Mod: PBBFAC,PN | Performed by: SOCIAL WORKER

## 2024-01-02 PROCEDURE — 90834 PSYTX W PT 45 MINUTES: CPT | Mod: ,,, | Performed by: SOCIAL WORKER

## 2024-01-02 RX ORDER — ALPRAZOLAM 0.5 MG/1
0.5 TABLET ORAL DAILY PRN
Qty: 30 TABLET | Refills: 0 | Status: SHIPPED | OUTPATIENT
Start: 2024-01-02 | End: 2024-02-01 | Stop reason: SDUPTHER

## 2024-01-02 SDOH — SOCIAL DETERMINANTS OF HEALTH (SDOH): OTHER SPECIFIED PROBLEMS RELATED TO PRIMARY SUPPORT GROUP: Z63.8

## 2024-01-02 NOTE — PROGRESS NOTES
Individual Psychotherapy (PhD/LCSW)    1/2/2024    Site:  [facility]         Therapeutic Intervention: Met with patient.  Outpatient - Insight oriented psychotherapy 45 min - CPT code 43274, Outpatient - Behavior modifying psychotherapy 45 min - CPT code 69406, and Outpatient - Supportive psychotherapy 45 min - CPT Code 35734    Chief complaint/reason for encounter: depression, anxiety, and family stress             Interval history and content of current session: Ct was referred to tx by Apryl REHMAN to address depression, anxiety, and marital/family issues. Ct arrived to session and was fully engaged.  Ct shared that things have been okay. She reported that she spoke to her son on the phone during Thanksgiving for 4 hours. She shared how the conversation was more or less superficial and he jumped from topic to topic. Nonetheless, she was grateful to have spoken with him. Ct discussed some of the past interactions that she had with her daughter in law. SW and ct processed how difficult it could be to have a relationship with a family member with mental illness. SW and ct will resume this topic in next session. Ct to continue in 1:1 sessions.        Treatment plan:  Target symptoms: depression, anxiety , family stress  Why chosen therapy is appropriate versus another modality: relevant to diagnosis, patient responds to this modality, evidence based practice  Outcome monitoring methods: self-report  Therapeutic intervention type: insight oriented psychotherapy, behavior modifying psychotherapy, supportive psychotherapy    Risk parameters:  Patient reports no suicidal ideation  Patient reports no homicidal ideation  Patient reports no self-injurious behavior  Patient reports no violent behavior    CSSRS was completed:     Mental Status Exam:  General Appearance:  unremarkable, age appropriate   Speech: normal tone, normal rate, normal pitch, normal volume      Level of Cooperation: cooperative      Thought Processes: normal  and logical   Mood: steady      Thought Content: normal, no suicidality, no homicidality, delusions, or paranoia   Affect: congruent and appropriate   Orientation: Oriented x3   Memory: recent >  intact   Attention Span & Concentration: intact   Fund of General Knowledge: intact and appropriate to age and level of education   Abstract Reasoning: interpretation of similarities was abstract   Judgment & Insight: intact     Language intact       Verbal deficits: None    Patient's response to intervention:  The patient's response to intervention is accepting.    Progress toward goals and other mental status changes:  The patient's progress toward goals is  some improvement .    Diagnosis:     ICD-10-CM ICD-9-CM   1. Moderate episode of recurrent major depressive disorder  F33.1 296.32   2. VANDANA (generalized anxiety disorder)  F41.1 300.02   3. Family conflict  Z63.8 V61.9       Plan:  individual psychotherapy and ct to follow up with Apryl REHMAN for psych med mgt  Pt to go to ED or call 911 if symptoms worsen or if she has thoughts of harming self and/or others. Pt verbalized understanding.    Return to clinic: as scheduled    Length of Service (minutes): 45      A portion of this note was created using [employer] voice recognition software that occasionally misinterprets phrases or words.    Each patient to whom he or she provides medical services by telemedicine is: (1) informed of the relationship between the physician and patient and the respective role of any other health care provider with respect to management of the patient; and (2) notified that he or she may decline to receive medical services by telemedicine and may withdraw from such care at any time.

## 2024-01-02 NOTE — TELEPHONE ENCOUNTER
Medication requested-xanax   Last RX-12-6-23   Qty-30  Refills-0  Last office [id number]  Next office [id number]

## 2024-01-09 ENCOUNTER — TELEPHONE (OUTPATIENT)
Dept: FAMILY MEDICINE | Facility: CLINIC | Age: 64
End: 2024-01-09
Payer: OTHER GOVERNMENT

## 2024-01-09 ENCOUNTER — PATIENT MESSAGE (OUTPATIENT)
Dept: FAMILY MEDICINE | Facility: CLINIC | Age: 64
End: 2024-01-09
Payer: OTHER GOVERNMENT

## 2024-01-09 DIAGNOSIS — E55.9 VITAMIN D DEFICIENCY: ICD-10-CM

## 2024-01-09 DIAGNOSIS — E78.00 PURE HYPERCHOLESTEROLEMIA: Primary | ICD-10-CM

## 2024-01-09 DIAGNOSIS — I10 ESSENTIAL HYPERTENSION: ICD-10-CM

## 2024-01-09 DIAGNOSIS — Z00.00 PREVENTATIVE HEALTH CARE: ICD-10-CM

## 2024-01-15 ENCOUNTER — LAB VISIT (OUTPATIENT)
Dept: LAB | Facility: HOSPITAL | Age: 64
End: 2024-01-15
Attending: NURSE PRACTITIONER
Payer: OTHER GOVERNMENT

## 2024-01-15 DIAGNOSIS — E55.9 AVITAMINOSIS D: Primary | ICD-10-CM

## 2024-01-15 DIAGNOSIS — E78.00 PURE HYPERCHOLESTEROLEMIA: ICD-10-CM

## 2024-01-15 DIAGNOSIS — Z00.00 ROUTINE GENERAL MEDICAL EXAMINATION AT A HEALTH CARE FACILITY: ICD-10-CM

## 2024-01-15 DIAGNOSIS — Z17.0 ESTROGEN RECEPTOR POSITIVE: ICD-10-CM

## 2024-01-15 LAB
25(OH)D3+25(OH)D2 SERPL-MCNC: 29 NG/ML (ref 30–96)
ALBUMIN SERPL BCP-MCNC: 4.5 G/DL (ref 3.5–5.2)
ALP SERPL-CCNC: 65 U/L (ref 55–135)
ALT SERPL W/O P-5'-P-CCNC: 14 U/L (ref 10–44)
ANION GAP SERPL CALC-SCNC: 10 MMOL/L (ref 8–16)
AST SERPL-CCNC: 18 U/L (ref 10–40)
BASOPHILS # BLD AUTO: 0.04 K/UL (ref 0–0.2)
BASOPHILS NFR BLD: 0.6 % (ref 0–1.9)
BILIRUB SERPL-MCNC: 0.9 MG/DL (ref 0.1–1)
BILIRUB UR QL STRIP: NEGATIVE
BUN SERPL-MCNC: 21 MG/DL (ref 8–23)
CALCIUM SERPL-MCNC: 9.5 MG/DL (ref 8.7–10.5)
CHLORIDE SERPL-SCNC: 107 MMOL/L (ref 95–110)
CHOLEST SERPL-MCNC: 190 MG/DL (ref 120–199)
CHOLEST/HDLC SERPL: 2.2 {RATIO} (ref 2–5)
CLARITY UR: CLEAR
CO2 SERPL-SCNC: 24 MMOL/L (ref 23–29)
COLOR UR: YELLOW
CREAT SERPL-MCNC: 0.8 MG/DL (ref 0.5–1.4)
DIFFERENTIAL METHOD BLD: ABNORMAL
EOSINOPHIL # BLD AUTO: 0.2 K/UL (ref 0–0.5)
EOSINOPHIL NFR BLD: 2.8 % (ref 0–8)
ERYTHROCYTE [DISTWIDTH] IN BLOOD BY AUTOMATED COUNT: 12.3 % (ref 11.5–14.5)
EST. GFR  (NO RACE VARIABLE): >60 ML/MIN/1.73 M^2
GLUCOSE SERPL-MCNC: 105 MG/DL (ref 70–110)
GLUCOSE UR QL STRIP: NEGATIVE
HCT VFR BLD AUTO: 40.5 % (ref 37–48.5)
HDLC SERPL-MCNC: 88 MG/DL (ref 40–75)
HDLC SERPL: 46.3 % (ref 20–50)
HGB BLD-MCNC: 13.5 G/DL (ref 12–16)
HGB UR QL STRIP: NEGATIVE
IMM GRANULOCYTES # BLD AUTO: 0.02 K/UL (ref 0–0.04)
IMM GRANULOCYTES NFR BLD AUTO: 0.3 % (ref 0–0.5)
KETONES UR QL STRIP: NEGATIVE
LDLC SERPL CALC-MCNC: 89 MG/DL (ref 63–159)
LEUKOCYTE ESTERASE UR QL STRIP: NEGATIVE
LYMPHOCYTES # BLD AUTO: 1 K/UL (ref 1–4.8)
LYMPHOCYTES NFR BLD: 14.8 % (ref 18–48)
MCH RBC QN AUTO: 31.6 PG (ref 27–31)
MCHC RBC AUTO-ENTMCNC: 33.3 G/DL (ref 32–36)
MCV RBC AUTO: 95 FL (ref 82–98)
MONOCYTES # BLD AUTO: 0.4 K/UL (ref 0.3–1)
MONOCYTES NFR BLD: 5.2 % (ref 4–15)
NEUTROPHILS # BLD AUTO: 5.1 K/UL (ref 1.8–7.7)
NEUTROPHILS NFR BLD: 76.3 % (ref 38–73)
NITRITE UR QL STRIP: NEGATIVE
NONHDLC SERPL-MCNC: 102 MG/DL
NRBC BLD-RTO: 0 /100 WBC
PH UR STRIP: 6 [PH] (ref 5–8)
PLATELET # BLD AUTO: 272 K/UL (ref 150–450)
PMV BLD AUTO: 9.3 FL (ref 9.2–12.9)
POTASSIUM SERPL-SCNC: 4.1 MMOL/L (ref 3.5–5.1)
PROT SERPL-MCNC: 6.9 G/DL (ref 6–8.4)
PROT UR QL STRIP: NEGATIVE
RBC # BLD AUTO: 4.27 M/UL (ref 4–5.4)
SODIUM SERPL-SCNC: 141 MMOL/L (ref 136–145)
SP GR UR STRIP: 1.02 (ref 1–1.03)
TRIGL SERPL-MCNC: 65 MG/DL (ref 30–150)
TSH SERPL DL<=0.005 MIU/L-ACNC: 1.63 UIU/ML (ref 0.34–5.6)
URN SPEC COLLECT METH UR: NORMAL
UROBILINOGEN UR STRIP-ACNC: NEGATIVE EU/DL
WBC # BLD AUTO: 6.69 K/UL (ref 3.9–12.7)

## 2024-01-15 PROCEDURE — 80053 COMPREHEN METABOLIC PANEL: CPT | Performed by: NURSE PRACTITIONER

## 2024-01-15 PROCEDURE — 81003 URINALYSIS AUTO W/O SCOPE: CPT | Performed by: NURSE PRACTITIONER

## 2024-01-15 PROCEDURE — 36415 COLL VENOUS BLD VENIPUNCTURE: CPT | Performed by: NURSE PRACTITIONER

## 2024-01-15 PROCEDURE — 82306 VITAMIN D 25 HYDROXY: CPT | Performed by: NURSE PRACTITIONER

## 2024-01-15 PROCEDURE — 85025 COMPLETE CBC W/AUTO DIFF WBC: CPT | Performed by: NURSE PRACTITIONER

## 2024-01-15 PROCEDURE — 84443 ASSAY THYROID STIM HORMONE: CPT | Performed by: NURSE PRACTITIONER

## 2024-01-15 PROCEDURE — 80061 LIPID PANEL: CPT | Performed by: NURSE PRACTITIONER

## 2024-01-16 ENCOUNTER — OFFICE VISIT (OUTPATIENT)
Dept: FAMILY MEDICINE | Facility: CLINIC | Age: 64
End: 2024-01-16
Payer: OTHER GOVERNMENT

## 2024-01-16 VITALS
BODY MASS INDEX: 26.2 KG/M2 | DIASTOLIC BLOOD PRESSURE: 60 MMHG | OXYGEN SATURATION: 96 % | SYSTOLIC BLOOD PRESSURE: 126 MMHG | WEIGHT: 163 LBS | HEIGHT: 66 IN | HEART RATE: 72 BPM

## 2024-01-16 DIAGNOSIS — E78.00 PURE HYPERCHOLESTEROLEMIA: Primary | ICD-10-CM

## 2024-01-16 DIAGNOSIS — I10 ESSENTIAL HYPERTENSION: ICD-10-CM

## 2024-01-16 DIAGNOSIS — M67.441 MUCOUS CYST OF DIGIT OF RIGHT HAND: ICD-10-CM

## 2024-01-16 DIAGNOSIS — L98.9 SKIN LESION: ICD-10-CM

## 2024-01-16 DIAGNOSIS — Z23 NEED FOR PNEUMOCOCCAL VACCINATION: ICD-10-CM

## 2024-01-16 DIAGNOSIS — E55.9 VITAMIN D DEFICIENCY: ICD-10-CM

## 2024-01-16 DIAGNOSIS — R42 DIZZINESS: ICD-10-CM

## 2024-01-16 DIAGNOSIS — Z12.31 SCREENING MAMMOGRAM, ENCOUNTER FOR: ICD-10-CM

## 2024-01-16 PROCEDURE — 99499 UNLISTED E&M SERVICE: CPT | Mod: ,,, | Performed by: NURSE PRACTITIONER

## 2024-01-16 PROCEDURE — 99214 OFFICE O/P EST MOD 30 MIN: CPT | Mod: S$GLB,,, | Performed by: NURSE PRACTITIONER

## 2024-01-16 RX ORDER — AMLODIPINE AND BENAZEPRIL HYDROCHLORIDE 5; 10 MG/1; MG/1
1 CAPSULE ORAL DAILY
Qty: 30 CAPSULE | Refills: 1 | Status: SHIPPED | OUTPATIENT
Start: 2024-01-16 | End: 2024-06-17 | Stop reason: SDUPTHER

## 2024-01-16 RX ORDER — ATORVASTATIN CALCIUM 10 MG/1
10 TABLET, FILM COATED ORAL DAILY
Qty: 90 TABLET | Refills: 1 | Status: CANCELLED | OUTPATIENT
Start: 2024-01-16

## 2024-01-16 RX ORDER — CHOLECALCIFEROL (VITAMIN D3) 1250 MCG
1250 TABLET ORAL
Qty: 12 TABLET | Refills: 0 | Status: SHIPPED | OUTPATIENT
Start: 2024-01-16

## 2024-01-16 RX ORDER — AMLODIPINE AND BENAZEPRIL HYDROCHLORIDE 5; 10 MG/1; MG/1
1 CAPSULE ORAL DAILY
Qty: 90 CAPSULE | Refills: 1 | Status: CANCELLED | OUTPATIENT
Start: 2024-01-16

## 2024-01-16 RX ORDER — ATORVASTATIN CALCIUM 10 MG/1
10 TABLET, FILM COATED ORAL DAILY
Qty: 90 TABLET | Refills: 1 | Status: SHIPPED | OUTPATIENT
Start: 2024-01-16 | End: 2024-06-03 | Stop reason: SDUPTHER

## 2024-01-16 NOTE — PROGRESS NOTES
Subjective:        The chief complaint leading to consultation is: f/u htn  The patient location is:   Mrs Borjas was rescheduled as an in person visit    Hypertension  This is a chronic problem. The problem is unchanged. The problem is controlled. Pertinent negatives include no chest pain, headaches, neck pain, palpitations, peripheral edema or shortness of breath. Risk factors for coronary artery disease include smoking/tobacco exposure. Past treatments include calcium channel blockers and ACE inhibitors. The current treatment provides significant improvement.   Hyperlipidemia  This is a chronic problem. The problem is controlled. Recent lipid tests were reviewed and are normal. Pertinent negatives include no chest pain or shortness of breath. Current antihyperlipidemic treatment includes statins. The current treatment provides significant improvement of lipids.       Past Surgical History:   Procedure Laterality Date    BASAL CELL CARCINOMA EXCISION  09/2019    lip    CATARACT EXTRACTION W/  INTRAOCULAR LENS IMPLANT Right 11/13/2019    Procedure: EXTRACTION, CATARACT, WITH IOL INSERTION;  Surgeon: Carmelo Perez II, MD;  Location: [facility] OR;  Service: Ophthalmology;  Laterality: Right;    ECTOPIC PREGNANCY SURGERY      EYE SURGERY Right     cataracts    HYSTERECTOMY       Past Medical History:   Diagnosis Date    Anxiety     Arthritis     Asthma     Cataract of left eye     Hyperlipidemia     Sleep apnea     cpap     Family History   Problem Relation Age of Onset    COPD Mother     Liver disease Mother     Thyroid disease Mother     COPD Father     Diabetes Father     Hypertension Father     Pacemaker/defibrilator Father     COPD Sister     Hypertension Sister     Hypertension Brother         Social History:   Marital Status:   Alcohol History:  reports current alcohol use of about 2.0 standard drinks of alcohol per week.  Tobacco History:  reports that she quit smoking about 3 years ago. Her smoking  use included cigarettes. She started smoking about 35 years ago. She has a 16.0 pack-year smoking history. She has been exposed to tobacco smoke. She has never used smokeless tobacco.  Drug History:  reports no history of drug use.    Review of patient's allergies indicates:   Allergen Reactions    Erythromycin Anaphylaxis    Buspirone     Ciprofloxacin     Levaquin [levofloxacin]     Zithromax z-masoud [azithromycin]        Current Outpatient Medications   Medication Sig Dispense Refill    albuterol (VENTOLIN HFA) 90 mcg/actuation inhaler Inhale 2 puffs into the lungs every 6 (six) hours as needed for Wheezing. Rescue 18 g 6    ALPRAZolam (XANAX) 0.5 MG tablet Take 1 tablet (0.5 mg total) by mouth daily as needed for Anxiety. 30 tablet 0    amlodipine-benazepril 5-10 mg (LOTREL) 5-10 mg per capsule Take 1 capsule by mouth once daily. 30 capsule 1    atorvastatin (LIPITOR) 10 MG tablet Take 1 tablet (10 mg total) by mouth once daily. 90 tablet 1    calcium carbonate (OS-MELIA) 500 mg calcium (1,250 mg) chewable tablet Take 1 tablet by mouth once daily.      cholecalciferol, vitamin D3, (VITAMIN D3) 50 mcg (2,000 unit) Tab Take 1 tablet (2,000 Units total) by mouth once daily. 30 tablet 0    umeclidinium-vilanteroL (ANORO ELLIPTA) 62.5-25 mcg/actuation DsDv Inhale 1 puff into the lungs once daily. Controller 3 each 5    vilazodone (VIIBRYD) 40 mg Tab tablet Take 1 tablet (40 mg total) by mouth once daily. 90 tablet 0     No current facility-administered medications for this visit.       Review of Systems   Constitutional:  Negative for activity change, appetite change, chills, diaphoresis and unexpected weight change.   HENT:  Negative for ear discharge, hearing loss, rhinorrhea, trouble swallowing and voice change.    Eyes:  Negative for photophobia, pain, discharge and visual disturbance.   Respiratory:  Negative for chest tightness, shortness of breath, wheezing and stridor.    Cardiovascular:  Negative for chest pain  and palpitations.   Gastrointestinal:  Negative for abdominal pain, blood in stool, constipation, diarrhea and vomiting.   Endocrine: Negative for cold intolerance, heat intolerance, polydipsia and polyuria.   Genitourinary:  Negative for difficulty urinating, dysuria, flank pain, hematuria and menstrual problem.   Musculoskeletal:  Negative for arthralgias, joint swelling, neck pain and neck stiffness.   Skin:  Negative for pallor.   Neurological:  Negative for speech difficulty, weakness and headaches.   Hematological:  Does not bruise/bleed easily.   Psychiatric/Behavioral:  Negative for confusion and dysphoric mood.          Objective:        Physical Exam:   Physical Exam  Pulmonary:      Effort: Pulmonary effort is normal.   Neurological:      Mental Status: She is oriented to person, place, and time.   Psychiatric:         Mood and Affect: Mood normal.         Thought Content: Thought content normal.         Judgment: Judgment normal.       Lab Visit on 01/15/2024   Component Date Value Ref Range Status    Sodium 01/15/2024 141  136 - 145 mmol/L Final    Potassium 01/15/2024 4.1  3.5 - 5.1 mmol/L Final    Chloride 01/15/2024 107  95 - 110 mmol/L Final    CO2 01/15/2024 24  23 - 29 mmol/L Final    Glucose 01/15/2024 105  70 - 110 mg/dL Final    BUN 01/15/2024 21  8 - 23 mg/dL Final    Creatinine 01/15/2024 0.8  0.5 - 1.4 mg/dL Final    Calcium 01/15/2024 9.5  8.7 - 10.5 mg/dL Final    Total Protein 01/15/2024 6.9  6.0 - 8.4 g/dL Final    Albumin 01/15/2024 4.5  3.5 - 5.2 g/dL Final    Total Bilirubin 01/15/2024 0.9  0.1 - 1.0 mg/dL Final    Comment: For infants and newborns, interpretation of results should be based  on gestational age, weight and in agreement with clinical  observations.    Premature Infant recommended reference ranges:  Up to 24 hours.............<8.0 mg/dL  Up to 48 hours............<12.0 mg/dL  3-5 days..................<15.0 mg/dL  6-29 days.................<15.0 mg/dL      Alkaline  Phosphatase 01/15/2024 65  55 - 135 U/L Final    AST 01/15/2024 18  10 - 40 U/L Final    ALT 01/15/2024 14  10 - 44 U/L Final    eGFR 01/15/2024 >60.0  >60 mL/min/1.73 m^2 Final    Anion Gap 01/15/2024 10  8 - 16 mmol/L Final    Cholesterol 01/15/2024 190  120 - 199 mg/dL Final    Comment: The National Cholesterol Education Program (NCEP) has set the  following guidelines (reference ranges) for Cholesterol:  Optimal.....................<200 mg/dL  Borderline High.............200-239 mg/dL  High........................> or = 240 mg/dL      Triglycerides 01/15/2024 65  30 - 150 mg/dL Final    Comment: The National Cholesterol Education Program (NCEP) has set the  following guidelines (reference values) for triglycerides:  Normal......................<150 mg/dL  Borderline High.............150-199 mg/dL  High........................200-499 mg/dL      HDL 01/15/2024 88 (H)  40 - 75 mg/dL Final    Comment: The National Cholesterol Education Program (NCEP) has set the  following guidelines (reference values) for HDL Cholesterol:  Low...............<40 mg/dL  Optimal...........>60 mg/dL      LDL Cholesterol 01/15/2024 89.0  63.0 - 159.0 mg/dL Final    Comment: The National Cholesterol Education Program (NCEP) has set the  following guidelines (reference values) for LDL Cholesterol:  Optimal.......................<130 mg/dL  Borderline High...............130-159 mg/dL  High..........................160-189 mg/dL  Very High.....................>190 mg/dL      HDL/Cholesterol Ratio 01/15/2024 46.3  20.0 - 50.0 % Final    Total Cholesterol/HDL Ratio 01/15/2024 2.2  2.0 - 5.0 Final    Non-HDL Cholesterol 01/15/2024 102  mg/dL Final    Comment: Risk category and Non-HDL cholesterol goals:  Coronary heart disease (CHD)or equivalent (10-year risk of CHD >20%):  Non-HDL cholesterol goal     <130 mg/dL  Two or more CHD risk factors and 10-year risk of CHD <= 20%:  Non-HDL cholesterol goal     <160 mg/dL  0 to 1 CHD risk  factor:  Non-HDL cholesterol goal     <190 mg/dL      Specimen UA 01/15/2024 Urine, Clean Catch   Final    Color, UA 01/15/2024 Yellow  Yellow, Straw, Margot Final    Appearance, UA 01/15/2024 Clear  Clear Final    pH, UA 01/15/2024 6.0  5.0 - 8.0 Final    Specific Gravity, UA 01/15/2024 1.020  1.005 - 1.030 Final    Protein, UA 01/15/2024 Negative  Negative Final    Comment: Recommend a 24 hour urine protein or a urine   protein/creatinine ratio if globulin induced proteinuria is  clinically suspected.      Glucose, UA 01/15/2024 Negative  Negative Final    Ketones, UA 01/15/2024 Negative  Negative Final    Bilirubin (UA) 01/15/2024 Negative  Negative Final    Occult Blood UA 01/15/2024 Negative  Negative Final    Nitrite, UA 01/15/2024 Negative  Negative Final    Urobilinogen, UA 01/15/2024 Negative  Negative EU/dL Final    Leukocytes, UA 01/15/2024 Negative  Negative Final    TSH 01/15/2024 1.634  0.340 - 5.600 uIU/mL Final    WBC 01/15/2024 6.69  3.90 - 12.70 K/uL Final    RBC 01/15/2024 4.27  4.00 - 5.40 M/uL Final    Hemoglobin 01/15/2024 13.5  12.0 - 16.0 g/dL Final    Hematocrit 01/15/2024 40.5  37.0 - 48.5 % Final    MCV 01/15/2024 95  82 - 98 fL Final    MCH 01/15/2024 31.6 (H)  27.0 - 31.0 pg Final    MCHC 01/15/2024 33.3  32.0 - 36.0 g/dL Final    RDW 01/15/2024 12.3  11.5 - 14.5 % Final    Platelets 01/15/2024 272  150 - 450 K/uL Final    MPV 01/15/2024 9.3  9.2 - 12.9 fL Final    Immature Granulocytes 01/15/2024 0.3  0.0 - 0.5 % Final    Gran # (ANC) 01/15/2024 5.1  1.8 - 7.7 K/uL Final    Immature Grans (Abs) 01/15/2024 0.02  0.00 - 0.04 K/uL Final    Comment: Mild elevation in immature granulocytes is non specific and   can be seen in a variety of conditions including stress response,   acute inflammation, trauma and pregnancy. Correlation with other   laboratory and clinical findings is essential.      Lymph # 01/15/2024 1.0  1.0 - 4.8 K/uL Final    Mono # 01/15/2024 0.4  0.3 - 1.0 K/uL Final    Eos  # 01/15/2024 0.2  0.0 - 0.5 K/uL Final    Baso # 01/15/2024 0.04  0.00 - 0.20 K/uL Final    nRBC 01/15/2024 0  0 /100 WBC Final    Gran % 01/15/2024 76.3 (H)  38.0 - 73.0 % Final    Lymph % 01/15/2024 14.8 (L)  18.0 - 48.0 % Final    Mono % 01/15/2024 5.2  4.0 - 15.0 % Final    Eosinophil % 01/15/2024 2.8  0.0 - 8.0 % Final    Basophil % 01/15/2024 0.6  0.0 - 1.9 % Final    Differential Method 01/15/2024 Automated   Final    Vit D, 25-Hydroxy 01/15/2024 29 (L)  30 - 96 ng/mL Final    Comment: Vitamin D deficiency.........<10 ng/mL                              Vitamin D insufficiency......10-29 ng/mL       Vitamin D sufficiency........> or equal to 30 ng/mL  Vitamin D toxicity............>100 ng/mL     ]         Assessment:       1. Pure hypercholesterolemia    2. Vitamin D deficiency    3. Essential hypertension      Plan:   Pure hypercholesterolemia    Vitamin D deficiency    Essential hypertension      No follow-ups on file.    T

## 2024-01-16 NOTE — PROGRESS NOTES
SUBJECTIVE:      Patient ID: Luann Borjas is a 63 y.o. female.    Chief Complaint: Hypertension    Mrs Borjas is following up on htn, vit D def and hyperlipidemia. She is following with Dr Ravi for sleep apnea and copd. Following with Dr Tobin for cardiology. Following with Apryl Adams for psyc.needs referral to derm for routine check and skin lesion behind her ear. Sayss he is having intermittent dizziness for the past 6 months, worse when on a ladder. Feels it started when her viibry was increased    Hypertension  This is a chronic problem. The problem is unchanged. The problem is controlled. Pertinent negatives include no chest pain, headaches, neck pain, palpitations, peripheral edema or shortness of breath. Risk factors for coronary artery disease include smoking/tobacco exposure. Past treatments include calcium channel blockers and ACE inhibitors. The current treatment provides significant improvement.   Hyperlipidemia  This is a chronic problem. The problem is controlled. Recent lipid tests were reviewed and are normal. Pertinent negatives include no chest pain or shortness of breath. Current antihyperlipidemic treatment includes statins. The current treatment provides significant improvement of lipids.       Past Surgical History:   Procedure Laterality Date    BASAL CELL CARCINOMA EXCISION  09/2019    lip    CATARACT EXTRACTION W/  INTRAOCULAR LENS IMPLANT Right 11/13/2019    Procedure: EXTRACTION, CATARACT, WITH IOL INSERTION;  Surgeon: Carmelo Perez II, MD;  Location: [facility] OR;  Service: Ophthalmology;  Laterality: Right;    ECTOPIC PREGNANCY SURGERY      EYE SURGERY Right     cataracts    HYSTERECTOMY       Family History   Problem Relation Age of Onset    COPD Mother     Liver disease Mother     Thyroid disease Mother     COPD Father     Diabetes Father     Hypertension Father     Pacemaker/defibrilator Father     COPD Sister     Hypertension Sister     Hypertension Brother       Social  History     Socioeconomic History    Marital status:    Tobacco Use    Smoking status: Former     Current packs/day: 0.00     Average packs/day: 0.5 packs/day for 32.0 years (16.0 ttl pk-yrs)     Types: Cigarettes     Start date: 11/5/1988     Quit date: 10/25/2020     Years since quitting: 3.2     Passive exposure: Past    Smokeless tobacco: Never   Substance and Sexual Activity    Alcohol use: Yes     Alcohol/week: 2.0 standard drinks of alcohol     Types: 2 Glasses of wine per week    Drug use: No    Sexual activity: Not Currently     Social Determinants of Health     Financial Resource Strain: Low Risk  (1/16/2024)    Overall Financial Resource Strain (CARDIA)     Difficulty of Paying Living Expenses: Not hard at all   Food Insecurity: No Food Insecurity (1/16/2024)    Hunger Vital Sign     Worried About Running Out of Food in the Last Year: Never true     Ran Out of Food in the Last Year: Never true   Transportation Needs: No Transportation Needs (1/16/2024)    PRAPARE - Transportation     Lack of Transportation (Medical): No     Lack of Transportation (Non-Medical): No   Physical Activity: Insufficiently Active (1/16/2024)    Exercise Vital Sign     Days of Exercise per Week: 1 day     Minutes of Exercise per Session: 20 min   Stress: Stress Concern Present (1/16/2024)    [employer] - Occupational Stress Questionnaire     Feeling of Stress : To some extent   Social Connections: Unknown (1/16/2024)    Social Connection and Isolation Panel [NHANES]     Frequency of Communication with Friends and Family: More than three times a week     Frequency of Social Gatherings with Friends and Family: Twice a week     Active Member of Clubs or Organizations: Yes     Attends Club or Organization Meetings: 1 to 4 times per year     Marital Status:    Housing Stability: Low Risk  (1/16/2024)    Housing Stability Vital Sign     Unable to Pay for Housing in the Last Year: No      Number of Places Lived in the Last Year: 1     Unstable Housing in the Last Year: No     Current Outpatient Medications   Medication Sig Dispense Refill    albuterol (VENTOLIN HFA) 90 mcg/actuation inhaler Inhale 2 puffs into the lungs every 6 (six) hours as needed for Wheezing. Rescue 18 g 6    ALPRAZolam (XANAX) 0.5 MG tablet Take 1 tablet (0.5 mg total) by mouth daily as needed for Anxiety. 30 tablet 0    calcium carbonate (OS-MELIA) 500 mg calcium (1,250 mg) chewable tablet Take 1 tablet by mouth once daily.      cholecalciferol, vitamin D3, (VITAMIN D3) 50 mcg (2,000 unit) Tab Take 1 tablet (2,000 Units total) by mouth once daily. 30 tablet 0    umeclidinium-vilanteroL (ANORO ELLIPTA) 62.5-25 mcg/actuation DsDv Inhale 1 puff into the lungs once daily. Controller 3 each 5    vilazodone (VIIBRYD) 40 mg Tab tablet Take 1 tablet (40 mg total) by mouth once daily. 90 tablet 0    amlodipine-benazepril 5-10 mg (LOTREL) 5-10 mg per capsule Take 1 capsule by mouth once daily. 30 capsule 1    atorvastatin (LIPITOR) 10 MG tablet Take 1 tablet (10 mg total) by mouth once daily. 90 tablet 1    cholecalciferol, vitamin D3, 1,250 mcg (50,000 unit) Tab Take 1,250 mcg by mouth every 7 days. 12 tablet 0     No current facility-administered medications for this visit.     Review of patient's allergies indicates:   Allergen Reactions    Erythromycin Anaphylaxis    Buspirone     Ciprofloxacin     Levaquin [levofloxacin]     Zithromax z-masoud [azithromycin]       Past Medical History:   Diagnosis Date    Anxiety     Arthritis     Asthma     Cataract of left eye     Hyperlipidemia     Sleep apnea     cpap     Past Surgical History:   Procedure Laterality Date    BASAL CELL CARCINOMA EXCISION  09/2019    lip    CATARACT EXTRACTION W/  INTRAOCULAR LENS IMPLANT Right 11/13/2019    Procedure: EXTRACTION, CATARACT, WITH IOL INSERTION;  Surgeon: Carmelo Perez II, MD;  Location: [facility];  Service: Ophthalmology;  Laterality: Right;    ECTOPIC  "PREGNANCY SURGERY      EYE SURGERY Right     cataracts    HYSTERECTOMY         Review of Systems   Constitutional:  Negative for appetite change, chills, diaphoresis and unexpected weight change.   HENT:  Negative for ear discharge, hearing loss, trouble swallowing and voice change.    Eyes:  Negative for photophobia and pain.   Respiratory:  Negative for chest tightness, shortness of breath and stridor.    Cardiovascular:  Negative for chest pain and palpitations.   Gastrointestinal:  Negative for abdominal pain, blood in stool and vomiting.   Endocrine: Negative for cold intolerance and heat intolerance.   Genitourinary:  Negative for difficulty urinating and flank pain.   Musculoskeletal:  Negative for joint swelling, neck pain and neck stiffness.   Skin:  Negative for pallor.   Neurological:  Positive for dizziness. Negative for speech difficulty, weakness and headaches.   Hematological:  Does not bruise/bleed easily.   Psychiatric/Behavioral:  Negative for confusion, dysphoric mood, self-injury, sleep disturbance and suicidal ideas. The patient is not nervous/anxious.       OBJECTIVE:      Vitals:    01/16/24 1240   BP: 126/60   Pulse: 72   SpO2: 96%   Weight: 73.9 kg (163 lb)   Height: 5' 6" (1.676 m)     Physical Exam  Vitals and nursing note reviewed.   Constitutional:       General: She is not in acute distress.     Appearance: She is well-developed.   HENT:      Head: Normocephalic and atraumatic.      Right Ear: Tympanic membrane normal.      Left Ear: Tympanic membrane normal.      Nose: Nose normal.      Mouth/Throat:      Pharynx: Uvula midline.   Eyes:      General: Lids are normal.      Conjunctiva/sclera: Conjunctivae normal.      Pupils: Pupils are equal, round, and reactive to light.      Right eye: Pupil is round and reactive.      Left eye: Pupil is round and reactive.   Neck:      Thyroid: No thyromegaly.      Vascular: No JVD.      Trachea: Trachea normal.   Cardiovascular:      Rate and " Rhythm: Normal rate and regular rhythm.      Pulses: Normal pulses.      Heart sounds: Normal heart sounds. No murmur heard.  Pulmonary:      Effort: Pulmonary effort is normal. No tachypnea or respiratory distress.      Breath sounds: Normal breath sounds. No wheezing, rhonchi or rales.   Abdominal:      General: Bowel sounds are normal.      Palpations: Abdomen is soft.      Tenderness: There is no abdominal tenderness.   Musculoskeletal:         General: Normal range of motion.      Cervical back: Normal range of motion and neck supple.      Right lower leg: No edema.      Left lower leg: No edema.   Lymphadenopathy:      Cervical: No cervical adenopathy.   Skin:     General: Skin is warm and dry.      Findings: No rash.      Comments: Left posterior ear with palpable raised 1cm lesion  Right distal index finger with mucous cyst   Neurological:      Mental Status: She is alert and oriented to person, place, and time.   Psychiatric:         Mood and Affect: Mood normal.         Speech: Speech normal.         Behavior: Behavior normal. Behavior is cooperative.         Thought Content: Thought content normal.         Judgment: Judgment normal.          Lab Visit on 01/15/2024   Component Date Value Ref Range Status    Sodium 01/15/2024 141  136 - 145 mmol/L Final    Potassium 01/15/2024 4.1  3.5 - 5.1 mmol/L Final    Chloride 01/15/2024 107  95 - 110 mmol/L Final    CO2 01/15/2024 24  23 - 29 mmol/L Final    Glucose 01/15/2024 105  70 - 110 mg/dL Final    BUN 01/15/2024 21  8 - 23 mg/dL Final    Creatinine 01/15/2024 0.8  0.5 - 1.4 mg/dL Final    Calcium 01/15/2024 9.5  8.7 - 10.5 mg/dL Final    Total Protein 01/15/2024 6.9  6.0 - 8.4 g/dL Final    Albumin 01/15/2024 4.5  3.5 - 5.2 g/dL Final    Total Bilirubin 01/15/2024 0.9  0.1 - 1.0 mg/dL Final    Comment: For infants and newborns, interpretation of results should be based  on gestational age, weight and in agreement with clinical  observations.    Premature  Infant recommended reference ranges:  Up to 24 hours.............<8.0 mg/dL  Up to 48 hours............<12.0 mg/dL  3-5 days..................<15.0 mg/dL  6-29 days.................<15.0 mg/dL      Alkaline Phosphatase 01/15/2024 65  55 - 135 U/L Final    AST 01/15/2024 18  10 - 40 U/L Final    ALT 01/15/2024 14  10 - 44 U/L Final    eGFR 01/15/2024 >60.0  >60 mL/min/1.73 m^2 Final    Anion Gap 01/15/2024 10  8 - 16 mmol/L Final    Cholesterol 01/15/2024 190  120 - 199 mg/dL Final    Comment: The National Cholesterol Education Program (NCEP) has set the  following guidelines (reference ranges) for Cholesterol:  Optimal.....................<200 mg/dL  Borderline High.............200-239 mg/dL  High........................> or = 240 mg/dL      Triglycerides 01/15/2024 65  30 - 150 mg/dL Final    Comment: The National Cholesterol Education Program (NCEP) has set the  following guidelines (reference values) for triglycerides:  Normal......................<150 mg/dL  Borderline High.............150-199 mg/dL  High........................200-499 mg/dL      HDL 01/15/2024 88 (H)  40 - 75 mg/dL Final    Comment: The National Cholesterol Education Program (NCEP) has set the  following guidelines (reference values) for HDL Cholesterol:  Low...............<40 mg/dL  Optimal...........>60 mg/dL      LDL Cholesterol 01/15/2024 89.0  63.0 - 159.0 mg/dL Final    Comment: The National Cholesterol Education Program (NCEP) has set the  following guidelines (reference values) for LDL Cholesterol:  Optimal.......................<130 mg/dL  Borderline High...............130-159 mg/dL  High..........................160-189 mg/dL  Very High.....................>190 mg/dL      HDL/Cholesterol Ratio 01/15/2024 46.3  20.0 - 50.0 % Final    Total Cholesterol/HDL Ratio 01/15/2024 2.2  2.0 - 5.0 Final    Non-HDL Cholesterol 01/15/2024 102  mg/dL Final    Comment: Risk category and Non-HDL cholesterol goals:  Coronary heart disease (CHD)or  equivalent (10-year risk of CHD >20%):  Non-HDL cholesterol goal     <130 mg/dL  Two or more CHD risk factors and 10-year risk of CHD <= 20%:  Non-HDL cholesterol goal     <160 mg/dL  0 to 1 CHD risk factor:  Non-HDL cholesterol goal     <190 mg/dL      Specimen UA 01/15/2024 Urine, Clean Catch   Final    Color, UA 01/15/2024 Yellow  Yellow, Straw, Margot Final    Appearance, UA 01/15/2024 Clear  Clear Final    pH, UA 01/15/2024 6.0  5.0 - 8.0 Final    Specific Gravity, UA 01/15/2024 1.020  1.005 - 1.030 Final    Protein, UA 01/15/2024 Negative  Negative Final    Comment: Recommend a 24 hour urine protein or a urine   protein/creatinine ratio if globulin induced proteinuria is  clinically suspected.      Glucose, UA 01/15/2024 Negative  Negative Final    Ketones, UA 01/15/2024 Negative  Negative Final    Bilirubin (UA) 01/15/2024 Negative  Negative Final    Occult Blood UA 01/15/2024 Negative  Negative Final    Nitrite, UA 01/15/2024 Negative  Negative Final    Urobilinogen, UA 01/15/2024 Negative  Negative EU/dL Final    Leukocytes, UA 01/15/2024 Negative  Negative Final    TSH 01/15/2024 1.634  0.340 - 5.600 uIU/mL Final    WBC 01/15/2024 6.69  3.90 - 12.70 K/uL Final    RBC 01/15/2024 4.27  4.00 - 5.40 M/uL Final    Hemoglobin 01/15/2024 13.5  12.0 - 16.0 g/dL Final    Hematocrit 01/15/2024 40.5  37.0 - 48.5 % Final    MCV 01/15/2024 95  82 - 98 fL Final    MCH 01/15/2024 31.6 (H)  27.0 - 31.0 pg Final    MCHC 01/15/2024 33.3  32.0 - 36.0 g/dL Final    RDW 01/15/2024 12.3  11.5 - 14.5 % Final    Platelets 01/15/2024 272  150 - 450 K/uL Final    MPV 01/15/2024 9.3  9.2 - 12.9 fL Final    Immature Granulocytes 01/15/2024 0.3  0.0 - 0.5 % Final    Gran # (ANC) 01/15/2024 5.1  1.8 - 7.7 K/uL Final    Immature Grans (Abs) 01/15/2024 0.02  0.00 - 0.04 K/uL Final    Comment: Mild elevation in immature granulocytes is non specific and   can be seen in a variety of conditions including stress response,   acute  inflammation, trauma and pregnancy. Correlation with other   laboratory and clinical findings is essential.      Lymph # 01/15/2024 1.0  1.0 - 4.8 K/uL Final    Mono # 01/15/2024 0.4  0.3 - 1.0 K/uL Final    Eos # 01/15/2024 0.2  0.0 - 0.5 K/uL Final    Baso # 01/15/2024 0.04  0.00 - 0.20 K/uL Final    nRBC 01/15/2024 0  0 /100 WBC Final    Gran % 01/15/2024 76.3 (H)  38.0 - 73.0 % Final    Lymph % 01/15/2024 14.8 (L)  18.0 - 48.0 % Final    Mono % 01/15/2024 5.2  4.0 - 15.0 % Final    Eosinophil % 01/15/2024 2.8  0.0 - 8.0 % Final    Basophil % 01/15/2024 0.6  0.0 - 1.9 % Final    Differential Method 01/15/2024 Automated   Final    Vit D, 25-Hydroxy 01/15/2024 29 (L)  30 - 96 ng/mL Final    Comment: Vitamin D deficiency.........<10 ng/mL                              Vitamin D insufficiency......10-29 ng/mL       Vitamin D sufficiency........> or equal to 30 ng/mL  Vitamin D toxicity............>100 ng/mL     ]    Last visit note, most recent available labs, and health maintenance reviewed    Assessment:       1. Pure hypercholesterolemia    2. Vitamin D deficiency    3. Essential hypertension    4. Screening mammogram, encounter for    5. Skin lesion    6. Mucous cyst of digit of right hand    7. Need for pneumococcal vaccination    8. Dizziness        Plan:       Pure hypercholesterolemia  -     atorvastatin (LIPITOR) 10 MG tablet; Take 1 tablet (10 mg total) by mouth once daily.  Dispense: 90 tablet; Refill: 1    Vitamin D deficiency  -     cholecalciferol, vitamin D3, 1,250 mcg (50,000 unit) Tab; Take 1,250 mcg by mouth every 7 days.  Dispense: 12 tablet; Refill: 0  Once rx complete restart home otc dose    Essential hypertension  -     amlodipine-benazepril 5-10 mg (LOTREL) 5-10 mg per capsule; Take 1 capsule by mouth once daily.  Dispense: 30 capsule; Refill: 1    Screening mammogram, encounter for  -     Mammo Digital Screening [facility] w/ [facility]; Future; Expected date: 01/16/2024    Skin lesion  -     Ambulatory  referral/consult to Dermatology; Future; Expected date: 01/23/2024    Mucous cyst of digit of right hand  -     Ambulatory referral/consult to Dermatology; Future; Expected date: 01/23/2024    Need for pneumococcal vaccination  -     Cancel: Pneumococcal conjugate vaccine 20 (PCV20) *Preferred* (PREVNAR 20) - (IM)    Dizziness  She will check her bp at home when she feels dizzy. Could be related to low bp. Will hydrate well. Also advised to discuss with psyc. If worsens f/u      Follow up in about 6 months (around 7/16/2024) for htn .      1/16/2024 Allen Callahan, YULIANA, FNP

## 2024-01-18 ENCOUNTER — OFFICE VISIT (OUTPATIENT)
Dept: PSYCHIATRY | Facility: CLINIC | Age: 64
End: 2024-01-18
Payer: OTHER GOVERNMENT

## 2024-01-18 DIAGNOSIS — F41.1 GAD (GENERALIZED ANXIETY DISORDER): ICD-10-CM

## 2024-01-18 DIAGNOSIS — F33.1 MODERATE EPISODE OF RECURRENT MAJOR DEPRESSIVE DISORDER: Primary | ICD-10-CM

## 2024-01-18 DIAGNOSIS — Z63.8 FAMILY CONFLICT: ICD-10-CM

## 2024-01-18 PROCEDURE — 90834 PSYTX W PT 45 MINUTES: CPT | Mod: ,,, | Performed by: SOCIAL WORKER

## 2024-01-18 PROCEDURE — 99212 OFFICE O/P EST SF 10 MIN: CPT | Mod: PBBFAC,PN | Performed by: SOCIAL WORKER

## 2024-01-18 PROCEDURE — 99999 PR PBB SHADOW E&M-EST. PATIENT-LVL II: CPT | Mod: PBBFAC,,, | Performed by: SOCIAL WORKER

## 2024-01-18 SDOH — SOCIAL DETERMINANTS OF HEALTH (SDOH): OTHER SPECIFIED PROBLEMS RELATED TO PRIMARY SUPPORT GROUP: Z63.8

## 2024-01-19 ENCOUNTER — HOSPITAL ENCOUNTER (OUTPATIENT)
Dept: RADIOLOGY | Facility: HOSPITAL | Age: 64
Discharge: HOME | End: 2024-01-19
Attending: NURSE PRACTITIONER
Payer: OTHER GOVERNMENT

## 2024-01-19 DIAGNOSIS — Z12.31 SCREENING MAMMOGRAM, ENCOUNTER FOR: ICD-10-CM

## 2024-01-19 PROCEDURE — 77067 SCR MAMMO BI INCL CAD: CPT | Mod: TC,PO

## 2024-01-19 NOTE — PROGRESS NOTES
Individual Psychotherapy (PhD/LCSW)    1/18/2024    Site:  [facility]         Therapeutic Intervention: Met with patient.  Outpatient - Insight oriented psychotherapy 45 min - CPT code 70827, Outpatient - Behavior modifying psychotherapy 45 min - CPT code 20908, and Outpatient - Supportive psychotherapy 45 min - CPT Code 21607    Chief complaint/reason for encounter: depression and anxiety, family stress            Interval history and content of current session: Ct was referred to tx by Apryl REHMAN to address depression, anxiety, and marital/family issues. Ct arrived to session and was fully engaged.  Ct shared that things have been okay. 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 She reported that she and her cousin will be taking a [place] Cruise this summer and she is looking forward to it. Ct shared that not much has changed with her son. SW and ct processed the importance of ct recognizing what she can and can not control and that she's done her best to be there for her son. Ct verbalized understanding. She reported that things are still okay between her and her . She shared that he joined a [employer] which gives her more time to do things with her friends and invite them over. She reported that she is still involved in her [employer] and that continues to be beneficial to her. CT to continue in 1:1 sessions.       Treatment plan:  Target symptoms: depression, anxiety , family stress  Why chosen therapy is appropriate versus another modality: relevant to diagnosis, patient responds to this modality, evidence based practice  Outcome monitoring methods: self-report  Therapeutic intervention type: insight oriented psychotherapy, behavior modifying psychotherapy, supportive psychotherapy    Risk parameters:  Patient reports no suicidal ideation  Patient reports no homicidal ideation  Patient reports no self-injurious behavior  Patient reports no violent behavior    CSSRS was completed:     Mental Status Exam:  General  Appearance:  unremarkable, age appropriate   Speech: normal tone, normal rate, normal pitch, normal volume      Level of Cooperation: cooperative      Thought Processes: normal and logical   Mood: steady      Thought Content: normal, no suicidality, no homicidality, delusions, or paranoia   Affect: congruent and appropriate   Orientation: Oriented x3   Memory: recent >  intact   Attention Span & Concentration: intact   Fund of General Knowledge: intact and appropriate to age and level of education   Abstract Reasoning: interpretation of similarities was abstract   Judgment & Insight: intact     Language intact       Verbal deficits: None    Patient's response to intervention:  The patient's response to intervention is accepting.    Progress toward goals and other mental status changes:  The patient's progress toward goals is fair , improved .    Diagnosis:     ICD-10-CM ICD-9-CM   1. Moderate episode of recurrent major depressive disorder  F33.1 296.32   2. VANDANA (generalized anxiety disorder)  F41.1 300.02   3. Family conflict  Z63.8 V61.9       Plan:  individual psychotherapy and ct to follow up with Apryl REHMAN for psych med mgt  Pt to go to ED or call 911 if symptoms worsen or if she has thoughts of harming self and/or others. Pt verbalized understanding.    Return to clinic: as scheduled    Length of Service (minutes): 45      A portion of this note was created using [employer] voice recognition software that occasionally misinterprets phrases or words.    Each patient to whom he or she provides medical services by telemedicine is: (1) informed of the relationship between the physician and patient and the respective role of any other health care provider with respect to management of the patient; and (2) notified that he or she may decline to receive medical services by telemedicine and may withdraw from such care at any time.

## 2024-01-22 ENCOUNTER — TELEPHONE (OUTPATIENT)
Dept: FAMILY MEDICINE | Facility: CLINIC | Age: 64
End: 2024-01-22
Payer: OTHER GOVERNMENT

## 2024-01-22 NOTE — TELEPHONE ENCOUNTER
----- Message from Allen Callahan NP sent at 1/21/2024  9:30 AM CST -----  mammo neg, repeat annually

## 2024-02-01 DIAGNOSIS — F41.1 GAD (GENERALIZED ANXIETY DISORDER): ICD-10-CM

## 2024-02-02 RX ORDER — ALPRAZOLAM 0.5 MG/1
0.5 TABLET ORAL DAILY PRN
Qty: 30 TABLET | Refills: 0 | Status: SHIPPED | OUTPATIENT
Start: 2024-02-02 | End: 2024-03-05 | Stop reason: SDUPTHER

## 2024-02-02 NOTE — TELEPHONE ENCOUNTER
Medication requested-xanax   Last RX-1-2-24   Qty-30  Refills-0  Last office [id number]  Next office [id number]

## 2024-02-12 ENCOUNTER — OFFICE VISIT (OUTPATIENT)
Dept: PSYCHIATRY | Facility: CLINIC | Age: 64
End: 2024-02-12
Payer: OTHER GOVERNMENT

## 2024-02-12 DIAGNOSIS — Z63.8 FAMILY CONFLICT: ICD-10-CM

## 2024-02-12 DIAGNOSIS — F41.1 GAD (GENERALIZED ANXIETY DISORDER): ICD-10-CM

## 2024-02-12 DIAGNOSIS — F33.1 MODERATE EPISODE OF RECURRENT MAJOR DEPRESSIVE DISORDER: Primary | ICD-10-CM

## 2024-02-12 PROCEDURE — 90834 PSYTX W PT 45 MINUTES: CPT | Mod: ,,, | Performed by: SOCIAL WORKER

## 2024-02-12 PROCEDURE — 99999 PR PBB SHADOW E&M-EST. PATIENT-LVL II: CPT | Mod: PBBFAC,,, | Performed by: SOCIAL WORKER

## 2024-02-12 PROCEDURE — 99212 OFFICE O/P EST SF 10 MIN: CPT | Mod: PBBFAC,PN | Performed by: SOCIAL WORKER

## 2024-02-12 SDOH — SOCIAL DETERMINANTS OF HEALTH (SDOH): OTHER SPECIFIED PROBLEMS RELATED TO PRIMARY SUPPORT GROUP: Z63.8

## 2024-02-12 NOTE — PROGRESS NOTES
Individual Psychotherapy (PhD/LCSW)    2/12/2024    Site:  [facility]         Therapeutic Intervention: Met with patient.  Outpatient - Insight oriented psychotherapy 45 min - CPT code 48496, Outpatient - Behavior modifying psychotherapy 45 min - CPT code 99649, and Outpatient - Supportive psychotherapy 45 min - CPT Code 92313    Chief complaint/reason for encounter: depression, anxiety, and family stress             Interval history and content of current session: Ct was referred to tx by Apryl REHMAN to address depression, anxiety, and marital/family issues. Ct arrived to session and was fully engaged. Ct shared that she had a conflict with one of her nieces last week and she was saddened by it. CT blamed herself for how the situation occurred. SW and ct processed the importance of ct recognizing when to get involved with things and when not to. SW and ct processed parallels in other relationships that ct has. Ct verbalized understanding. SW and ct processed the importance of ct recognizing that she is doing the best she can and that she can not rush her healing. Ct verbalized understanding. Ct to continue in 1:1 sessions.       Treatment plan:  Target symptoms: depression, anxiety , family stress  Why chosen therapy is appropriate versus another modality: relevant to diagnosis, patient responds to this modality, evidence based practice  Outcome monitoring methods: self-report  Therapeutic intervention type: insight oriented psychotherapy, behavior modifying psychotherapy, supportive psychotherapy    Risk parameters:  Patient reports no suicidal ideation  Patient reports no homicidal ideation  Patient reports no self-injurious behavior  Patient reports no violent behavior    CSSRS was completed:     Mental Status Exam:  General Appearance:  unremarkable, age appropriate   Speech: normal tone, normal rate, normal pitch, normal volume      Level of Cooperation: cooperative      Thought Processes: normal and logical   Mood:  steady      Thought Content: normal, no suicidality, no homicidality, delusions, or paranoia   Affect: appropriate   Orientation: Oriented x3   Memory: recent >  intact   Attention Span & Concentration: intact   Fund of General Knowledge: intact and appropriate to age and level of education   Abstract Reasoning: interpretation of similarities was abstract   Judgment & Insight: fair, intact     Language intact       Verbal deficits: None    Patient's response to intervention:  The patient's response to intervention is accepting.    Progress toward goals and other mental status changes:  The patient's progress toward goals is fair , some improvement .    Diagnosis:     ICD-10-CM ICD-9-CM   1. Moderate episode of recurrent major depressive disorder  F33.1 296.32   2. VANDANA (generalized anxiety disorder)  F41.1 300.02   3. Family conflict  Z63.8 V61.9       Plan:  individual psychotherapy and ct to follow up with Apryl REHMAN for psych med mgt  Pt to go to ED or call 911 if symptoms worsen or if she has thoughts of harming self and/or others. Pt verbalized understanding.    Return to clinic: as scheduled    Length of Service (minutes): 45      A portion of this note was created using [employer] voice recognition software that occasionally misinterprets phrases or words.    Each patient to whom he or she provides medical services by telemedicine is: (1) informed of the relationship between the physician and patient and the respective role of any other health care provider with respect to management of the patient; and (2) notified that he or she may decline to receive medical services by telemedicine and may withdraw from such care at any time.

## 2024-02-15 ENCOUNTER — OFFICE VISIT (OUTPATIENT)
Dept: PSYCHIATRY | Facility: CLINIC | Age: 64
End: 2024-02-15
Payer: OTHER GOVERNMENT

## 2024-02-15 VITALS
HEART RATE: 74 BPM | DIASTOLIC BLOOD PRESSURE: 78 MMHG | SYSTOLIC BLOOD PRESSURE: 140 MMHG | WEIGHT: 159.75 LBS | HEIGHT: 66 IN | BODY MASS INDEX: 25.67 KG/M2

## 2024-02-15 DIAGNOSIS — F41.1 GAD (GENERALIZED ANXIETY DISORDER): ICD-10-CM

## 2024-02-15 DIAGNOSIS — F41.0 PANIC ATTACKS: ICD-10-CM

## 2024-02-15 DIAGNOSIS — F33.1 MODERATE EPISODE OF RECURRENT MAJOR DEPRESSIVE DISORDER: Primary | ICD-10-CM

## 2024-02-15 PROCEDURE — 99999 PR PBB SHADOW E&M-EST. PATIENT-LVL III: CPT | Mod: PBBFAC,,, | Performed by: PHYSICIAN ASSISTANT

## 2024-02-15 PROCEDURE — G2211 COMPLEX E/M VISIT ADD ON: HCPCS | Mod: S$PBB,,, | Performed by: PHYSICIAN ASSISTANT

## 2024-02-15 PROCEDURE — 99213 OFFICE O/P EST LOW 20 MIN: CPT | Mod: PBBFAC,PN | Performed by: PHYSICIAN ASSISTANT

## 2024-02-15 PROCEDURE — 99214 OFFICE O/P EST MOD 30 MIN: CPT | Mod: S$PBB,,, | Performed by: PHYSICIAN ASSISTANT
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
## 2024-03-05 ENCOUNTER — PATIENT MESSAGE (OUTPATIENT)
Dept: PSYCHIATRY | Facility: CLINIC | Age: 64
End: 2024-03-05
Payer: OTHER GOVERNMENT

## 2024-03-05 DIAGNOSIS — F41.1 GAD (GENERALIZED ANXIETY DISORDER): ICD-10-CM

## 2024-03-05 RX ORDER — ALPRAZOLAM 0.5 MG/1
0.5 TABLET ORAL DAILY PRN
Qty: 30 TABLET | Refills: 0 | Status: SHIPPED | OUTPATIENT
Start: 2024-03-05 | End: 2024-04-04 | Stop reason: SDUPTHER

## 2024-03-21 ENCOUNTER — TELEPHONE (OUTPATIENT)
Dept: PSYCHIATRY | Facility: CLINIC | Age: 64
End: 2024-03-21
Payer: OTHER GOVERNMENT

## 2024-03-21 NOTE — TELEPHONE ENCOUNTER
Pt called requesting to reschedule appt on 3/26/24. I offered a few appointments but she refused all. Any other date/time would require an override. Please call pt to reschedule.

## 2024-03-24 ENCOUNTER — PATIENT MESSAGE (OUTPATIENT)
Dept: PSYCHIATRY | Facility: CLINIC | Age: 64
End: 2024-03-24
Payer: OTHER GOVERNMENT

## 2024-03-24 DIAGNOSIS — F33.1 MODERATE EPISODE OF RECURRENT MAJOR DEPRESSIVE DISORDER: ICD-10-CM

## 2024-03-25 RX ORDER — VILAZODONE HYDROCHLORIDE 40 MG/1
40 TABLET ORAL DAILY
Qty: 90 TABLET | Refills: 0 | Status: SHIPPED | OUTPATIENT
Start: 2024-03-25 | End: 2024-06-05 | Stop reason: SDUPTHER

## 2024-04-01 ENCOUNTER — OFFICE VISIT (OUTPATIENT)
Dept: PSYCHIATRY | Facility: CLINIC | Age: 64
End: 2024-04-01
Payer: OTHER GOVERNMENT

## 2024-04-01 ENCOUNTER — OFFICE VISIT (OUTPATIENT)
Dept: PULMONOLOGY | Facility: CLINIC | Age: 64
End: 2024-04-01
Payer: OTHER GOVERNMENT

## 2024-04-01 VITALS
HEART RATE: 76 BPM | WEIGHT: 159.38 LBS | BODY MASS INDEX: 25.73 KG/M2 | DIASTOLIC BLOOD PRESSURE: 68 MMHG | SYSTOLIC BLOOD PRESSURE: 140 MMHG | OXYGEN SATURATION: 94 %

## 2024-04-01 DIAGNOSIS — J44.89 ASTHMA WITH COPD: Primary | ICD-10-CM

## 2024-04-01 DIAGNOSIS — G47.33 OSA (OBSTRUCTIVE SLEEP APNEA): ICD-10-CM

## 2024-04-01 DIAGNOSIS — F33.1 MODERATE EPISODE OF RECURRENT MAJOR DEPRESSIVE DISORDER: Primary | ICD-10-CM

## 2024-04-01 DIAGNOSIS — Z63.8 FAMILY CONFLICT: ICD-10-CM

## 2024-04-01 PROCEDURE — 99999 PR PBB SHADOW E&M-EST. PATIENT-LVL II: CPT | Mod: PBBFAC,,, | Performed by: SOCIAL WORKER

## 2024-04-01 PROCEDURE — 99213 OFFICE O/P EST LOW 20 MIN: CPT | Mod: S$GLB,,, | Performed by: NURSE PRACTITIONER

## 2024-04-01 PROCEDURE — 99212 OFFICE O/P EST SF 10 MIN: CPT | Mod: PBBFAC,PN | Performed by: SOCIAL WORKER

## 2024-04-01 PROCEDURE — 90834 PSYTX W PT 45 MINUTES: CPT | Mod: ,,, | Performed by: SOCIAL WORKER

## 2024-04-01 SDOH — SOCIAL DETERMINANTS OF HEALTH (SDOH): OTHER SPECIFIED PROBLEMS RELATED TO PRIMARY SUPPORT GROUP: Z63.8

## 2024-04-01 NOTE — PROGRESS NOTES
SUBJECTIVE:    Patient ID: Luann Borjas is a 64 y.o. female.    Chief Complaint: Follow-up (6 month follow up Asthma with COPD, CARO)    Patient here today feeling well. She is using her Anoro daily.  She is sleeping on her CPAP and does feel benefit from it.  She has not had to use her rescue inhaler.  She will be going on a cruise in October.  The screening Ct of her chest was stable in October.      Past Medical History:   Diagnosis Date    Anxiety     Arthritis     Asthma     Cataract of left eye     Hyperlipidemia     Sleep apnea     cpap     Past Surgical History:   Procedure Laterality Date    BASAL CELL CARCINOMA EXCISION  09/2019    lip    CATARACT EXTRACTION W/  INTRAOCULAR LENS IMPLANT Right 11/13/2019    Procedure: EXTRACTION, CATARACT, WITH IOL INSERTION;  Surgeon: Carmelo Perez II, MD;  [facility]: [facility];  Service: Ophthalmology;  Laterality: Right;    ECTOPIC PREGNANCY SURGERY      EYE SURGERY Right     cataracts    HYSTERECTOMY       Family History   Problem Relation Age of Onset    COPD Mother     Liver disease Mother     Thyroid disease Mother     COPD Father     Diabetes Father     Hypertension Father     Pacemaker/defibrilator Father     COPD Sister     Hypertension Sister     Hypertension Brother         Social History:   Marital Status:   Occupation: teacher   Alcohol History:  reports current alcohol use of about 2.0 standard drinks of alcohol per week.  Tobacco History:  reports that she quit smoking about 3 years ago. Her smoking use included cigarettes. She started smoking about 35 years ago. She has a 16.0 pack-year smoking history. She has been exposed to tobacco smoke. She has never used smokeless tobacco.  Drug History:  reports no history of drug use.    Review of patient's allergies indicates:   Allergen Reactions    Erythromycin Anaphylaxis    Buspirone     Ciprofloxacin     Levaquin [levofloxacin]     Zithromax z-masoud [azithromycin]        Current Outpatient Medications    Medication Sig Dispense Refill    albuterol (VENTOLIN HFA) 90 mcg/actuation inhaler Inhale 2 puffs into the lungs every 6 (six) hours as needed for Wheezing. Rescue 18 g 6    ALPRAZolam (XANAX) 0.5 MG tablet Take 1 tablet (0.5 mg total) by mouth daily as needed for Anxiety. 30 tablet 0    amlodipine-benazepril 5-10 mg (LOTREL) 5-10 mg per capsule Take 1 capsule by mouth once daily. 30 capsule 1    atorvastatin (LIPITOR) 10 MG tablet Take 1 tablet (10 mg total) by mouth once daily. 90 tablet 1    calcium carbonate (OS-MELIA) 500 mg calcium (1,250 mg) chewable tablet Take 1 tablet by mouth once daily.      cholecalciferol, vitamin D3, (VITAMIN D3) 50 mcg (2,000 unit) Tab Take 1 tablet (2,000 Units total) by mouth once daily. 30 tablet 0    cholecalciferol, vitamin D3, 1,250 mcg (50,000 unit) Tab Take 1,250 mcg by mouth every 7 days. 12 tablet 0    umeclidinium-vilanteroL (ANORO ELLIPTA) 62.5-25 mcg/actuation DsDv Inhale 1 puff into the lungs once daily. Controller 3 each 5    vilazodone (VIIBRYD) 40 mg Tab tablet Take 1 tablet (40 mg total) by mouth once daily. 90 tablet 0     No current facility-administered medications for this visit.         Last PFT: 04/2022  Normal previous  PFT showed moderate obstruction that reversed with bronchodilator    Last CT:10/2023  IMPRESSION:  1. Stable 6 mm left lower lobe noncalcified pulmonary nodule, with no suspicious pulmonary nodules or masses.  2. Aortic and coronary arterial calcifications.  3. Additional observations as described.      Review of Systems  General: Feeling well  Eyes: Vision is good.  ENT:  allergies  Heart:: No chest pain or palpitations.  Lungs: no dyspnea, no cough   GI: No Nausea, vomiting, constipation, diarrhea, or reflux.  : Nocturia once sometimes  Musculoskeletal: No joint pain or myalgias.  Skin: No lesions or rashes.  Neuro: No headaches or neuropathy.  Lymph: No edema or adenopathy.  Psych: anxiety and depression  Endo: gaining  weight    OBJECTIVE:      BP (!) 140/68 (BP Location: Right arm, Patient Position: Sitting, BP Method: Medium (Manual))   Pulse 76   Wt 72.3 kg (159 lb 6.4 oz)   SpO2 (!) 94%   BMI 25.73 kg/m²     Physical Exam  GENERAL: Midaged patient in no distress.  HEENT: Pupils equal and reactive. Extraocular movements intact. Nose intact.      Pharynx moist.  NECK: Supple.   HEART: Regular rate and rhythm. No murmur or gallop auscultated.  LUNGS: Clear to auscultation and percussion. Lung excursion symmetrical. No change in fremitus. No adventitial noises.  ABDOMEN: Bowel sounds present. Non-tender, no masses palpated.  EXTREMITIES: Normal muscle tone and joint movement, no cyanosis or clubbing.   LYMPHATICS: No adenopathy palpated, no edema.  SKIN: Dry, intact, no lesions.   NEURO: Cranial nerves II-XII intact. Motor strength 5/5 bilaterally, upper and lower extremities.  PSYCH: Appropriate affect.    Assessment:       1. Asthma with COPD    2. CARO (obstructive sleep apnea)               Plan:       Asthma with COPD    CARO (obstructive sleep apnea)                 Follow up in about 6 months (around 10/1/2024).      Continue Anoro  Keep sleeping on your CPAP   Screening CT in October

## 2024-04-02 NOTE — PROGRESS NOTES
Individual Psychotherapy (PhD/LCSW)    4/1/2024    Site:  [facility]         Therapeutic Intervention: Met with patient.  Outpatient - Insight oriented psychotherapy 45 min - CPT code 62532, Outpatient - Behavior modifying psychotherapy 45 min - CPT code 76971, and Outpatient - Supportive psychotherapy 45 min - CPT Code 18465    Chief complaint/reason for encounter: depression, anxiety, and family stress             Interval history and content of current session: Ct was referred to tx by Apryl REHMAN to address depression, anxiety, and marital/family issues. Ct arrived to session and was fully engaged. 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 Ct wanted a lot of reassurance that she was doing the right things in her life regarding work, her , and her son. SW encouraged ct to do what she wanted to do in her life and focus on who and what she can control- essential herself and her actions. Ct to continue in 1:1 sessions.       Treatment plan:  Target symptoms: depression, anxiety , family stress  Why chosen therapy is appropriate versus another modality: relevant to diagnosis, patient responds to this modality, evidence based practice  Outcome monitoring methods: self-report  Therapeutic intervention type: insight oriented psychotherapy, behavior modifying psychotherapy, supportive psychotherapy    Risk parameters:  Patient reports no suicidal ideation  Patient reports no homicidal ideation  Patient reports no self-injurious behavior  Patient reports no violent behavior    CSSRS was completed:     Mental Status Exam:  General Appearance:  unremarkable, age appropriate   Speech: normal tone, normal rate, normal pitch, normal volume      Level of Cooperation: cooperative      Thought Processes: normal and logical   Mood: steady      Thought Content: normal, no suicidality, no homicidality,  delusions, or paranoia   Affect: congruent and appropriate   Orientation: Oriented x3   Memory: recent >  intact   Attention Span & Concentration: intact   Fund of General Knowledge: intact and appropriate to age and level of education   Abstract Reasoning: interpretation of similarities was abstract   Judgment & Insight: fair, intact     Language intact       Verbal deficits: None    Patient's response to intervention:  The patient's response to intervention is accepting.    Progress toward goals and other mental status changes:  The patient's progress toward goals is  stable .    Diagnosis:     ICD-10-CM ICD-9-CM   1. Moderate episode of recurrent major depressive disorder  F33.1 296.32   2. Family conflict  Z63.8 V61.9       Plan:  individual psychotherapy and ct to follow up with Apryl SHERIE for psych med mgt  Pt to go to ED or call 911 if symptoms worsen or if she has thoughts of harming self and/or others. Pt verbalized understanding.    Return to clinic: as scheduled    Length of Service (minutes): 45      A portion of this note was created using [employer] voice recognition software that occasionally misinterprets phrases or words.    Each patient to whom he or she provides medical services by telemedicine is: (1) informed of the relationship between the physician and patient and the respective role of any other health care provider with respect to management of the patient; and (2) notified that he or she may decline to receive medical services by telemedicine and may withdraw from such care at any time.

## 2024-04-04 ENCOUNTER — PATIENT MESSAGE (OUTPATIENT)
Dept: PSYCHIATRY | Facility: CLINIC | Age: 64
End: 2024-04-04
Payer: OTHER GOVERNMENT

## 2024-04-04 DIAGNOSIS — F41.1 GAD (GENERALIZED ANXIETY DISORDER): ICD-10-CM

## 2024-04-04 RX ORDER — ALPRAZOLAM 0.5 MG/1
0.5 TABLET ORAL DAILY PRN
Qty: 30 TABLET | Refills: 0 | Status: SHIPPED | OUTPATIENT
Start: 2024-04-04 | End: 2024-05-06 | Stop reason: SDUPTHER

## 2024-05-06 DIAGNOSIS — F41.1 GAD (GENERALIZED ANXIETY DISORDER): ICD-10-CM

## 2024-05-07 RX ORDER — ALPRAZOLAM 0.5 MG/1
0.5 TABLET ORAL DAILY PRN
Qty: 30 TABLET | Refills: 0 | Status: SHIPPED | OUTPATIENT
Start: 2024-05-07 | End: 2024-06-05 | Stop reason: SDUPTHER

## 2024-05-08 ENCOUNTER — TELEPHONE (OUTPATIENT)
Dept: PSYCHIATRY | Facility: CLINIC | Age: 64
End: 2024-05-08
Payer: OTHER GOVERNMENT

## 2024-05-15 ENCOUNTER — PATIENT MESSAGE (OUTPATIENT)
Dept: PSYCHIATRY | Facility: CLINIC | Age: 64
End: 2024-05-15
Payer: OTHER GOVERNMENT

## 2024-05-15 NOTE — TELEPHONE ENCOUNTER
Called patient and left message to call office so can get missed visit rescheduled. Also sent my chart message of the same .

## 2024-05-27 ENCOUNTER — OFFICE VISIT (OUTPATIENT)
Dept: PSYCHIATRY | Facility: CLINIC | Age: 64
End: 2024-05-27
Payer: OTHER GOVERNMENT

## 2024-05-27 DIAGNOSIS — F41.1 GAD (GENERALIZED ANXIETY DISORDER): ICD-10-CM

## 2024-05-27 DIAGNOSIS — Z63.8 FAMILY CONFLICT: ICD-10-CM

## 2024-05-27 DIAGNOSIS — F33.1 MODERATE EPISODE OF RECURRENT MAJOR DEPRESSIVE DISORDER: Primary | ICD-10-CM

## 2024-05-27 PROCEDURE — 90834 PSYTX W PT 45 MINUTES: CPT | Mod: ,,, | Performed by: SOCIAL WORKER

## 2024-05-27 SDOH — SOCIAL DETERMINANTS OF HEALTH (SDOH): OTHER SPECIFIED PROBLEMS RELATED TO PRIMARY SUPPORT GROUP: Z63.8

## 2024-06-03 DIAGNOSIS — E78.00 PURE HYPERCHOLESTEROLEMIA: ICD-10-CM

## 2024-06-03 RX ORDER — ATORVASTATIN CALCIUM 10 MG/1
10 TABLET, FILM COATED ORAL DAILY
Qty: 90 TABLET | Refills: 1 | Status: SHIPPED | OUTPATIENT
Start: 2024-06-03

## 2024-06-03 NOTE — PROGRESS NOTES
Individual Psychotherapy (PhD/LCSW)    5/27/2024    Site:  [facility]         Therapeutic Intervention: Met with patient.  Outpatient - Insight oriented psychotherapy 45 min - CPT code 67676, Outpatient - Behavior modifying psychotherapy 45 min - CPT code 76860, and Outpatient - Supportive psychotherapy 45 min - CPT Code 00199    Chief complaint/reason for encounter: depression, anxiety, and family stress             Interval history and content of current session:  Ct was referred to tx by Apryl REHMAN to address depression, anxiety, and marital/family issues. Ct arrived to session and was fully engaged. Ct shared that things have been okay. 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 Her relationship with her son is slowing improving. She continues to work on her daniel and spirituality by remaining engaged in her Voodoo. She is making progress but feels she is still a work in progress. She shared about her brother dying and her estranged relationship with her niece and nephew. She reported that she's been offering her support to her sister in law. Ct is making progress. Ct to continue in 1:1 sessions as needed.       Treatment plan:  Target symptoms: depression, anxiety , family stress  Why chosen therapy is appropriate versus another modality: relevant to diagnosis, patient responds to this modality, evidence based practice  Outcome monitoring methods: self-report  Therapeutic intervention type: insight oriented psychotherapy, behavior modifying psychotherapy, supportive psychotherapy    Risk parameters:  Patient reports no suicidal ideation  Patient reports no homicidal ideation  Patient reports no self-injurious behavior  Patient reports no violent behavior    CSSRS was completed:     Mental Status Exam:  General Appearance:  unremarkable, age appropriate   Speech: normal tone, normal rate, normal pitch, normal volume      Level of Cooperation: cooperative      Thought Processes: normal and logical    Mood: steady      Thought Content: normal, no suicidality, no homicidality, delusions, or paranoia   Affect: congruent and appropriate   Orientation: Oriented x3   Memory: recent >  intact   Attention Span & Concentration: intact   Fund of General Knowledge: intact and appropriate to age and level of education   Abstract Reasoning: interpretation of similarities was abstract   Judgment & Insight: fair, intact     Language intact       Verbal deficits: None    Patient's response to intervention:  The patient's response to intervention is accepting.    Progress toward goals and other mental status changes:  The patient's progress toward goals is  Improved .    Diagnosis:     ICD-10-CM ICD-9-CM   1. Moderate episode of recurrent major depressive disorder  F33.1 296.32   2. VANDANA (generalized anxiety disorder)  F41.1 300.02   3. Family conflict  Z63.8 V61.9       Plan:  individual psychotherapy and ct to follow up with Apryl REHMAN for psych med mgt  Pt to go to ED or call 911 if symptoms worsen or if she has thoughts of harming self and/or others. Pt verbalized understanding.    Return to clinic: as scheduled    Length of Service (minutes): 45      A portion of this note was created using [employer] voice recognition software that occasionally misinterprets phrases or words.    Each patient to whom he or she provides medical services by telemedicine is: (1) informed of the relationship between the physician and patient and the respective role of any other health care provider with respect to management of the patient; and (2) notified that he or she may decline to receive medical services by telemedicine and may withdraw from such care at any time.

## 2024-06-05 ENCOUNTER — OFFICE VISIT (OUTPATIENT)
Dept: PSYCHIATRY | Facility: CLINIC | Age: 64
End: 2024-06-05
Payer: OTHER GOVERNMENT

## 2024-06-05 VITALS
WEIGHT: 161.06 LBS | HEART RATE: 68 BPM | HEIGHT: 66 IN | DIASTOLIC BLOOD PRESSURE: 75 MMHG | BODY MASS INDEX: 25.89 KG/M2 | SYSTOLIC BLOOD PRESSURE: 153 MMHG

## 2024-06-05 DIAGNOSIS — F41.1 GAD (GENERALIZED ANXIETY DISORDER): ICD-10-CM

## 2024-06-05 DIAGNOSIS — F33.1 MODERATE EPISODE OF RECURRENT MAJOR DEPRESSIVE DISORDER: Primary | ICD-10-CM

## 2024-06-05 DIAGNOSIS — F41.0 PANIC ATTACKS: ICD-10-CM

## 2024-06-05 PROCEDURE — G2211 COMPLEX E/M VISIT ADD ON: HCPCS | Mod: S$PBB,,, | Performed by: PHYSICIAN ASSISTANT

## 2024-06-05 PROCEDURE — 99999 PR PBB SHADOW E&M-EST. PATIENT-LVL III: CPT | Mod: PBBFAC,,, | Performed by: PHYSICIAN ASSISTANT

## 2024-06-05 PROCEDURE — 99213 OFFICE O/P EST LOW 20 MIN: CPT | Mod: PBBFAC,PN | Performed by: PHYSICIAN ASSISTANT

## 2024-06-05 PROCEDURE — 99214 OFFICE O/P EST MOD 30 MIN: CPT | Mod: S$PBB,,, | Performed by: PHYSICIAN ASSISTANT

## 2024-06-05 RX ORDER — ALPRAZOLAM 0.5 MG/1
0.5 TABLET ORAL DAILY PRN
Qty: 30 TABLET | Refills: 0 | Status: SHIPPED | OUTPATIENT
Start: 2024-06-05 | End: 2024-07-05

## 2024-06-05 RX ORDER — VILAZODONE HYDROCHLORIDE 40 MG/1
40 TABLET ORAL DAILY
Qty: 90 TABLET | Refills: 0 | Status: SHIPPED | OUTPATIENT
Start: 2024-06-05
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
## 2024-06-17 DIAGNOSIS — I10 ESSENTIAL HYPERTENSION: ICD-10-CM

## 2024-06-17 RX ORDER — AMLODIPINE AND BENAZEPRIL HYDROCHLORIDE 5; 10 MG/1; MG/1
1 CAPSULE ORAL DAILY
Qty: 30 CAPSULE | Refills: 1 | Status: SHIPPED | OUTPATIENT
Start: 2024-06-17

## 2024-06-20 ENCOUNTER — OFFICE VISIT (OUTPATIENT)
Dept: PSYCHIATRY | Facility: CLINIC | Age: 64
End: 2024-06-20
Payer: OTHER GOVERNMENT

## 2024-06-20 DIAGNOSIS — F33.1 MODERATE EPISODE OF RECURRENT MAJOR DEPRESSIVE DISORDER: ICD-10-CM

## 2024-06-20 DIAGNOSIS — F41.1 GAD (GENERALIZED ANXIETY DISORDER): Primary | ICD-10-CM

## 2024-06-20 DIAGNOSIS — Z63.8 FAMILY CONFLICT: ICD-10-CM

## 2024-06-20 PROCEDURE — 90834 PSYTX W PT 45 MINUTES: CPT | Mod: ,,, | Performed by: SOCIAL WORKER

## 2024-06-20 PROCEDURE — 99999 PR PBB SHADOW E&M-EST. PATIENT-LVL II: CPT | Mod: PBBFAC,,, | Performed by: SOCIAL WORKER

## 2024-06-20 PROCEDURE — 99212 OFFICE O/P EST SF 10 MIN: CPT | Mod: PBBFAC,PN | Performed by: SOCIAL WORKER

## 2024-06-20 SDOH — SOCIAL DETERMINANTS OF HEALTH (SDOH): OTHER SPECIFIED PROBLEMS RELATED TO PRIMARY SUPPORT GROUP: Z63.8

## 2024-06-24 NOTE — PROGRESS NOTES
Individual Psychotherapy (PhD/LCSW)    6/20/2024    Site:  [facility]         Therapeutic Intervention: Met with patient.  Outpatient - Insight oriented psychotherapy 45 min - CPT code 06574, Outpatient - Behavior modifying psychotherapy 45 min - CPT code 55623, and Outpatient - Supportive psychotherapy 45 min - CPT Code 62254    Chief complaint/reason for encounter: depression, anxiety, and family stress             Interval history and content of current session: Ct was referred to tx by Apryl REHMAN to address depression, anxiety, and marital/family issues. Ct arrived to session and was fully engaged. Ct shared that her mood has been down. She reported that she is still more or less grieving her brother. She reported that she went to [place] recently to help her late sister's  clean up his house and to help care for him after a heart surgery. Ct shared that the visit was emotional, it made her think of her brother and sister's deaths. Ct is the last of her siblings who is alive. She reported it brought up feelings of her son whom she has reached out to and he has not responded. Ct shared fears of being alone. SW and ct processed the importance of ct setting boundaries with her son and sticking to them, managing her expectations of that relationship. Ct was reluctant at first and more open to feedback towards the end of the session. Ct to continue in 1:1 sessions.       Treatment plan:  Target symptoms: depression, anxiety , family stress  Why chosen therapy is appropriate versus another modality: relevant to diagnosis, patient responds to this modality, evidence based practice  Outcome monitoring methods: self-report  Therapeutic intervention type: insight oriented psychotherapy, behavior modifying psychotherapy, supportive psychotherapy    Risk parameters:  Patient reports no suicidal ideation  Patient reports no homicidal ideation  Patient reports no self-injurious behavior  Patient reports no violent  behavior    CSSRS was completed:     Mental Status Exam:  General Appearance:  unremarkable, age appropriate   Speech: normal tone, normal rate, normal pitch, normal volume      Level of Cooperation: cooperative      Thought Processes: normal and logical   Mood: sad      Thought Content: normal, no suicidality, no homicidality, delusions, or paranoia   Affect: congruent and appropriate, sad   Orientation: Oriented x3   Memory: recent >  intact   Attention Span & Concentration: intact   Fund of General Knowledge: intact and appropriate to age and level of education   Abstract Reasoning: interpretation of similarities was abstract   Judgment & Insight: fair, intact     Language intact       Verbal deficits: None    Patient's response to intervention:  The patient's response to intervention is accepting, reluctant.    Progress toward goals and other mental status changes:  The patient's progress toward goals is fair .    Diagnosis:     ICD-10-CM ICD-9-CM   1. VANDANA (generalized anxiety disorder)  F41.1 300.02   2. Moderate episode of recurrent major depressive disorder  F33.1 296.32   3. Family conflict  Z63.8 V61.9       Plan:  individual psychotherapy and ct to follow up with Apryl REHMAN for psych med mgt  Pt to go to ED or call 911 if symptoms worsen or if she has thoughts of harming self and/or others. Pt verbalized understanding.    Return to clinic: as scheduled    Length of Service (minutes): 45      A portion of this note was created using [employer] voice recognition software that occasionally misinterprets phrases or words.    Each patient to whom he or she provides medical services by telemedicine is: (1) informed of the relationship between the physician and patient and the respective role of any other health care provider with respect to management of the patient; and (2) notified that he or she may decline to receive medical services by telemedicine and may withdraw from such care at any time.

## 2024-07-06 ENCOUNTER — PATIENT MESSAGE (OUTPATIENT)
Dept: PSYCHIATRY | Facility: CLINIC | Age: 64
End: 2024-07-06
Payer: OTHER GOVERNMENT

## 2024-07-06 DIAGNOSIS — F41.1 GAD (GENERALIZED ANXIETY DISORDER): ICD-10-CM

## 2024-07-08 ENCOUNTER — TELEPHONE (OUTPATIENT)
Dept: FAMILY MEDICINE | Facility: CLINIC | Age: 64
End: 2024-07-08
Payer: OTHER GOVERNMENT

## 2024-07-08 DIAGNOSIS — I10 ESSENTIAL HYPERTENSION: ICD-10-CM

## 2024-07-08 DIAGNOSIS — Z79.899 ENCOUNTER FOR LONG-TERM (CURRENT) USE OF OTHER MEDICATIONS: Primary | ICD-10-CM

## 2024-07-08 DIAGNOSIS — E78.00 PURE HYPERCHOLESTEROLEMIA: ICD-10-CM

## 2024-07-08 DIAGNOSIS — E55.9 VITAMIN D DEFICIENCY: ICD-10-CM

## 2024-07-08 RX ORDER — ALPRAZOLAM 0.5 MG/1
0.5 TABLET ORAL DAILY PRN
Qty: 30 TABLET | Refills: 0 | Status: SHIPPED | OUTPATIENT
Start: 2024-07-08 | End: 2024-08-07

## 2024-07-12 ENCOUNTER — LAB VISIT (OUTPATIENT)
Dept: LAB | Facility: HOSPITAL | Age: 64
End: 2024-07-12
Attending: NURSE PRACTITIONER
Payer: OTHER GOVERNMENT

## 2024-07-12 DIAGNOSIS — I10 ESSENTIAL HYPERTENSION: ICD-10-CM

## 2024-07-12 DIAGNOSIS — E55.9 VITAMIN D DEFICIENCY: ICD-10-CM

## 2024-07-12 DIAGNOSIS — E78.00 PURE HYPERCHOLESTEROLEMIA: ICD-10-CM

## 2024-07-12 DIAGNOSIS — Z79.899 ENCOUNTER FOR LONG-TERM (CURRENT) USE OF OTHER MEDICATIONS: ICD-10-CM

## 2024-07-12 LAB
25(OH)D3+25(OH)D2 SERPL-MCNC: 65 NG/ML (ref 30–96)
ALBUMIN SERPL BCP-MCNC: 4.7 G/DL (ref 3.5–5.2)
ALP SERPL-CCNC: 70 U/L (ref 55–135)
ALT SERPL W/O P-5'-P-CCNC: 22 U/L (ref 10–44)
ANION GAP SERPL CALC-SCNC: 7 MMOL/L (ref 8–16)
AST SERPL-CCNC: 19 U/L (ref 10–40)
BILIRUB SERPL-MCNC: 0.8 MG/DL (ref 0.1–1)
BUN SERPL-MCNC: 20 MG/DL (ref 8–23)
CALCIUM SERPL-MCNC: 9.4 MG/DL (ref 8.7–10.5)
CHLORIDE SERPL-SCNC: 105 MMOL/L (ref 95–110)
CHOLEST SERPL-MCNC: 177 MG/DL (ref 120–199)
CHOLEST/HDLC SERPL: 2.2 {RATIO} (ref 2–5)
CO2 SERPL-SCNC: 30 MMOL/L (ref 23–29)
CREAT SERPL-MCNC: 1 MG/DL (ref 0.5–1.4)
EST. GFR  (NO RACE VARIABLE): >60 ML/MIN/1.73 M^2
GLUCOSE SERPL-MCNC: 94 MG/DL (ref 70–110)
HDLC SERPL-MCNC: 80 MG/DL (ref 40–75)
HDLC SERPL: 45.2 % (ref 20–50)
LDLC SERPL CALC-MCNC: 83.6 MG/DL (ref 63–159)
NONHDLC SERPL-MCNC: 97 MG/DL
POTASSIUM SERPL-SCNC: 4.5 MMOL/L (ref 3.5–5.1)
PROT SERPL-MCNC: 7.1 G/DL (ref 6–8.4)
SODIUM SERPL-SCNC: 142 MMOL/L (ref 136–145)
TRIGL SERPL-MCNC: 67 MG/DL (ref 30–150)

## 2024-07-12 PROCEDURE — 80061 LIPID PANEL: CPT | Performed by: NURSE PRACTITIONER

## 2024-07-12 PROCEDURE — 82306 VITAMIN D 25 HYDROXY: CPT | Performed by: NURSE PRACTITIONER

## 2024-07-12 PROCEDURE — 36415 COLL VENOUS BLD VENIPUNCTURE: CPT | Performed by: NURSE PRACTITIONER

## 2024-07-12 PROCEDURE — 80053 COMPREHEN METABOLIC PANEL: CPT | Performed by: NURSE PRACTITIONER

## 2024-07-15 ENCOUNTER — OFFICE VISIT (OUTPATIENT)
Dept: PSYCHIATRY | Facility: CLINIC | Age: 64
End: 2024-07-15
Payer: OTHER GOVERNMENT

## 2024-07-15 DIAGNOSIS — Z63.8 FAMILY CONFLICT: ICD-10-CM

## 2024-07-15 DIAGNOSIS — F41.1 GAD (GENERALIZED ANXIETY DISORDER): Primary | ICD-10-CM

## 2024-07-15 DIAGNOSIS — F33.1 MODERATE EPISODE OF RECURRENT MAJOR DEPRESSIVE DISORDER: ICD-10-CM

## 2024-07-15 PROCEDURE — 90837 PSYTX W PT 60 MINUTES: CPT | Mod: ,,, | Performed by: SOCIAL WORKER

## 2024-07-15 PROCEDURE — 99999 PR PBB SHADOW E&M-EST. PATIENT-LVL II: CPT | Mod: PBBFAC,,, | Performed by: SOCIAL WORKER

## 2024-07-15 PROCEDURE — 99212 OFFICE O/P EST SF 10 MIN: CPT | Mod: PBBFAC,PN | Performed by: SOCIAL WORKER

## 2024-07-15 SDOH — SOCIAL DETERMINANTS OF HEALTH (SDOH): OTHER SPECIFIED PROBLEMS RELATED TO PRIMARY SUPPORT GROUP: Z63.8

## 2024-07-15 NOTE — PROGRESS NOTES
SUBJECTIVE:      Patient ID: Luann Borjas is a 64 y.o. female.    Chief Complaint: Hypertension    Mrs Borjas is following up on htn, vit D def and hyperlipidemia. She is following with Dr Ravi for sleep apnea and copd. Following with Dr Tobin for cardiology. Following with Apryl Adams for psyc. She will be going on a cruise in a few months and asking for something to take as needed for sea sickness    Hypertension  This is a chronic problem. The problem is unchanged. The problem is controlled. Pertinent negatives include no chest pain, headaches, neck pain, palpitations, peripheral edema or shortness of breath. Risk factors for coronary artery disease include smoking/tobacco exposure. Past treatments include calcium channel blockers and ACE inhibitors. The current treatment provides significant improvement.   Hyperlipidemia  This is a chronic problem. The problem is controlled. Recent lipid tests were reviewed and are normal. Pertinent negatives include no chest pain or shortness of breath. Current antihyperlipidemic treatment includes statins. The current treatment provides significant improvement of lipids.   Diarrhea   This is a new problem. The current episode started more than 1 month ago. The problem occurs 2 to 4 times per day. The problem has been unchanged. The stool consistency is described as Watery. The patient states that diarrhea does not awaken her from sleep. Pertinent negatives include no abdominal pain, arthralgias, chills, headaches or vomiting. The symptoms are aggravated by stress. She has tried nothing for the symptoms. The treatment provided no relief.       Past Surgical History:   Procedure Laterality Date    BASAL CELL CARCINOMA EXCISION  09/2019    lip    CATARACT EXTRACTION W/  INTRAOCULAR LENS IMPLANT Right 11/13/2019    Procedure: EXTRACTION, CATARACT, WITH IOL INSERTION;  Surgeon: Carmelo Perez II, MD;  Location: [facility] OR;  Service: Ophthalmology;  Laterality: Right;     ECTOPIC PREGNANCY SURGERY      EYE SURGERY Right     cataracts    HYSTERECTOMY       Family History   Problem Relation Name Age of Onset    COPD Mother      Liver disease Mother      Thyroid disease Mother      COPD Father      Diabetes Father      Hypertension Father      Pacemaker/defibrilator Father      COPD Sister      Hypertension Sister      Hypertension Brother        Social History     Socioeconomic History    Marital status:    Tobacco Use    Smoking status: Former     Current packs/day: 0.00     Average packs/day: 0.5 packs/day for 32.0 years (16.0 ttl pk-yrs)     Types: Cigarettes     Start date: 11/5/1988     Quit date: 10/25/2020     Years since quitting: 3.7     Passive exposure: Past    Smokeless tobacco: Never   Substance and Sexual Activity    Alcohol use: Yes     Alcohol/week: 2.0 standard drinks of alcohol     Types: 2 Glasses of wine per week    Drug use: No    Sexual activity: Not Currently     Social Determinants of Health     Financial Resource Strain: Low Risk  (1/16/2024)    Overall Financial Resource Strain (CARDIA)     Difficulty of Paying Living Expenses: Not hard at all   Food Insecurity: No Food Insecurity (1/16/2024)    Hunger Vital Sign     Worried About Running Out of Food in the Last Year: Never true     Ran Out of Food in the Last Year: Never true   Transportation Needs: No Transportation Needs (1/16/2024)    PRAPARE - Transportation     Lack of Transportation (Medical): No     Lack of Transportation (Non-Medical): No   Physical Activity: Insufficiently Active (1/16/2024)    Exercise Vital Sign     Days of Exercise per Week: 1 day     Minutes of Exercise per Session: 20 min   Stress: Stress Concern Present (1/16/2024)    [employer] - Occupational Stress Questionnaire     Feeling of Stress : To some extent   Housing Stability: Low Risk  (1/16/2024)    Housing Stability Vital Sign     Unable to Pay for Housing in the Last Year: No     Number of  Places Lived in the Last Year: 1     Unstable Housing in the Last Year: No     Current Outpatient Medications   Medication Sig Dispense Refill    ALPRAZolam (XANAX) 0.5 MG tablet Take 1 tablet (0.5 mg total) by mouth daily as needed for Anxiety. 30 tablet 0    calcium carbonate (OS-MELIA) 500 mg calcium (1,250 mg) chewable tablet Take 1 tablet by mouth once daily.      cholecalciferol, vitamin D3, 1,250 mcg (50,000 unit) Tab Take 1,250 mcg by mouth every 7 days. 12 tablet 0    umeclidinium-vilanteroL (ANORO ELLIPTA) 62.5-25 mcg/actuation DsDv Inhale 1 puff into the lungs once daily. Controller 3 each 5    vilazodone (VIIBRYD) 40 mg Tab tablet Take 1 tablet (40 mg total) by mouth once daily. 90 tablet 0    albuterol (PROAIR HFA) 90 mcg/actuation inhaler Inhale 2 puffs into the lungs every 6 (six) hours as needed for Wheezing. Rescue 18 g 2    amlodipine-benazepril 5-10 mg (LOTREL) 5-10 mg per capsule Take 1 capsule by mouth once daily. 90 capsule 1    atorvastatin (LIPITOR) 10 MG tablet Take 1 tablet (10 mg total) by mouth once daily. 90 tablet 1    Bifidobacterium infantis (ALIGN) 4 mg Cap Take 1 capsule (4 mg total) by mouth Daily. 30 capsule 0    meclizine (ANTIVERT) 12.5 mg tablet Take 1 tablet (12.5 mg total) by mouth 3 (three) times daily as needed. 30 tablet 0     No current facility-administered medications for this visit.     Review of patient's allergies indicates:   Allergen Reactions    Erythromycin Anaphylaxis    Buspirone     Ciprofloxacin     Levaquin [levofloxacin]     Zithromax z-masoud [azithromycin]       Past Medical History:   Diagnosis Date    Anxiety     Arthritis     Asthma     Cataract of left eye     Hyperlipidemia     Sleep apnea     cpap     Past Surgical History:   Procedure Laterality Date    BASAL CELL CARCINOMA EXCISION  09/2019    lip    CATARACT EXTRACTION W/  INTRAOCULAR LENS IMPLANT Right 11/13/2019    Procedure: EXTRACTION, CATARACT, WITH IOL INSERTION;  Surgeon: Carmelo Perez II,  "MD;  Location: [facility] OR;  Service: Ophthalmology;  Laterality: Right;    ECTOPIC PREGNANCY SURGERY      EYE SURGERY Right     cataracts    HYSTERECTOMY         Review of Systems   Constitutional:  Negative for activity change, appetite change, chills, diaphoresis and unexpected weight change.   HENT:  Negative for ear discharge, hearing loss, rhinorrhea, trouble swallowing and voice change.    Eyes:  Negative for photophobia, pain, discharge and visual disturbance.   Respiratory:  Negative for chest tightness, shortness of breath, wheezing and stridor.    Cardiovascular:  Negative for chest pain and palpitations.   Gastrointestinal:  Positive for diarrhea. Negative for abdominal pain, blood in stool, constipation and vomiting.   Endocrine: Negative for cold intolerance, heat intolerance, polydipsia and polyuria.   Genitourinary:  Negative for difficulty urinating, dysuria, flank pain, hematuria and menstrual problem.   Musculoskeletal:  Negative for arthralgias, joint swelling, neck pain and neck stiffness.   Skin:  Negative for pallor.   Neurological:  Negative for dizziness, speech difficulty, weakness and headaches.   Hematological:  Does not bruise/bleed easily.   Psychiatric/Behavioral:  Negative for confusion, dysphoric mood, self-injury, sleep disturbance and suicidal ideas. The patient is not nervous/anxious.       OBJECTIVE:      Vitals:    07/16/24 0823 07/16/24 0847   BP: (!) (P) 150/70 130/74   Pulse: 78    SpO2: 97%    Weight: 73 kg (161 lb)    Height: 5' 6" (1.676 m)      Physical Exam  Vitals and nursing note reviewed.   Constitutional:       General: She is not in acute distress.     Appearance: She is well-developed.   HENT:      Head: Normocephalic and atraumatic.      Right Ear: Tympanic membrane normal.      Left Ear: Tympanic membrane normal.      Nose: Nose normal.      Mouth/Throat:      Pharynx: Uvula midline.   Eyes:      General: Lids are normal.      Conjunctiva/sclera: Conjunctivae normal. "      Pupils: Pupils are equal, round, and reactive to light.      Right eye: Pupil is round and reactive.      Left eye: Pupil is round and reactive.   Neck:      Thyroid: No thyromegaly.      Vascular: No JVD.      Trachea: Trachea normal.   Cardiovascular:      Rate and Rhythm: Normal rate and regular rhythm.      Pulses: Normal pulses.      Heart sounds: Normal heart sounds.   Pulmonary:      Effort: Pulmonary effort is normal. No tachypnea or respiratory distress.      Breath sounds: Normal breath sounds.   Abdominal:      General: Bowel sounds are normal.      Palpations: Abdomen is soft.      Tenderness: There is no abdominal tenderness.   Musculoskeletal:         General: Normal range of motion.      Cervical back: Normal range of motion and neck supple.   Lymphadenopathy:      Cervical: No cervical adenopathy.   Skin:     General: Skin is warm and dry.      Findings: No rash.   Neurological:      Mental Status: She is alert and oriented to person, place, and time.   Psychiatric:         Mood and Affect: Mood normal.         Speech: Speech normal.         Behavior: Behavior normal. Behavior is cooperative.         Thought Content: Thought content normal.          Lab Visit on 07/12/2024   Component Date Value Ref Range Status    Sodium 07/12/2024 142  136 - 145 mmol/L Final    Potassium 07/12/2024 4.5  3.5 - 5.1 mmol/L Final    Chloride 07/12/2024 105  95 - 110 mmol/L Final    CO2 07/12/2024 30 (H)  23 - 29 mmol/L Final    Glucose 07/12/2024 94  70 - 110 mg/dL Final    BUN 07/12/2024 20  8 - 23 mg/dL Final    Creatinine 07/12/2024 1.0  0.5 - 1.4 mg/dL Final    Calcium 07/12/2024 9.4  8.7 - 10.5 mg/dL Final    Total Protein 07/12/2024 7.1  6.0 - 8.4 g/dL Final    Albumin 07/12/2024 4.7  3.5 - 5.2 g/dL Final    Total Bilirubin 07/12/2024 0.8  0.1 - 1.0 mg/dL Final    Comment: For infants and newborns, interpretation of results should be based  on gestational age, weight and in agreement with  clinical  observations.    Premature Infant recommended reference ranges:  Up to 24 hours.............<8.0 mg/dL  Up to 48 hours............<12.0 mg/dL  3-5 days..................<15.0 mg/dL  6-29 days.................<15.0 mg/dL      Alkaline Phosphatase 07/12/2024 70  55 - 135 U/L Final    AST 07/12/2024 19  10 - 40 U/L Final    ALT 07/12/2024 22  10 - 44 U/L Final    eGFR 07/12/2024 >60.0  >60 mL/min/1.73 m^2 Final    Anion Gap 07/12/2024 7 (L)  8 - 16 mmol/L Final    Cholesterol 07/12/2024 177  120 - 199 mg/dL Final    Comment: The National Cholesterol Education Program (NCEP) has set the  following guidelines (reference ranges) for Cholesterol:  Optimal.....................<200 mg/dL  Borderline High.............200-239 mg/dL  High........................> or = 240 mg/dL      Triglycerides 07/12/2024 67  30 - 150 mg/dL Final    Comment: The National Cholesterol Education Program (NCEP) has set the  following guidelines (reference values) for triglycerides:  Normal......................<150 mg/dL  Borderline High.............150-199 mg/dL  High........................200-499 mg/dL      HDL 07/12/2024 80 (H)  40 - 75 mg/dL Final    Comment: The National Cholesterol Education Program (NCEP) has set the  following guidelines (reference values) for HDL Cholesterol:  Low...............<40 mg/dL  Optimal...........>60 mg/dL      LDL Cholesterol 07/12/2024 83.6  63.0 - 159.0 mg/dL Final    Comment: The National Cholesterol Education Program (NCEP) has set the  following guidelines (reference values) for LDL Cholesterol:  Optimal.......................<130 mg/dL  Borderline High...............130-159 mg/dL  High..........................160-189 mg/dL  Very High.....................>190 mg/dL      HDL/Cholesterol Ratio 07/12/2024 45.2  20.0 - 50.0 % Final    Total Cholesterol/HDL Ratio 07/12/2024 2.2  2.0 - 5.0 Final    Non-HDL Cholesterol 07/12/2024 97  mg/dL Final    Comment: Risk category and Non-HDL cholesterol  goals:  Coronary heart disease (CHD)or equivalent (10-year risk of CHD >20%):  Non-HDL cholesterol goal     <130 mg/dL  Two or more CHD risk factors and 10-year risk of CHD <= 20%:  Non-HDL cholesterol goal     <160 mg/dL  0 to 1 CHD risk factor:  Non-HDL cholesterol goal     <190 mg/dL      Vit D, 25-Hydroxy 07/12/2024 65  30 - 96 ng/mL Final    Comment: Vitamin D deficiency.........<10 ng/mL                              Vitamin D insufficiency......10-29 ng/mL       Vitamin D sufficiency........> or equal to 30 ng/mL  Vitamin D toxicity............>100 ng/mL     [  Last visit note, most recent available labs, and health maintenance reviewed    Assessment:       1. Pure hypercholesterolemia    2. Vitamin D deficiency    3. Essential hypertension    4. Chronic obstructive pulmonary disease, unspecified COPD type    5. Diarrhea, unspecified type    6. History of motion sickness        Plan:       Pure hypercholesterolemia  -     Discontinue: atorvastatin (LIPITOR) 10 MG tablet; Take 1 tablet (10 mg total) by mouth once daily.  Dispense: 90 tablet; Refill: 1  -     atorvastatin (LIPITOR) 10 MG tablet; Take 1 tablet (10 mg total) by mouth once daily.  Dispense: 90 tablet; Refill: 1    Vitamin D deficiency  Cont otc supplement    Essential hypertension  -     Discontinue: amlodipine-benazepril 5-10 mg (LOTREL) 5-10 mg per capsule; Take 1 capsule by mouth once daily.  Dispense: 90 capsule; Refill: 1  -     amlodipine-benazepril 5-10 mg (LOTREL) 5-10 mg per capsule; Take 1 capsule by mouth once daily.  Dispense: 90 capsule; Refill: 1    Chronic obstructive pulmonary disease, unspecified COPD type  -     albuterol (PROAIR HFA) 90 mcg/actuation inhaler; Inhale 2 puffs into the lungs every 6 (six) hours as needed for Wheezing. Rescue  Dispense: 18 g; Refill: 2  Cont f/u with pulmonology    Diarrhea, unspecified type  -     Stool culture; Future; Expected date: 07/16/2024  -     Stool Exam-Ova,Cysts,Parasites; Future;  Expected date: 07/16/2024  -     WBC, Stool; Future; Expected date: 07/16/2024  -     Clostridium difficile EIA; Future; Expected date: 07/16/2024  -     Bifidobacterium infantis (ALIGN) 4 mg Cap; Take 1 capsule (4 mg total) by mouth Daily.  Dispense: 30 capsule; Refill: 0  Discussed elimination diet starting with dairy. Will get stool cultures. She recently went on a mission to [facility] but says she had the diarrhea prior to going     History of motion sickness  -     meclizine (ANTIVERT) 12.5 mg tablet; Take 1 tablet (12.5 mg total) by mouth 3 (three) times daily as needed.  Dispense: 30 tablet; Refill: 0        Follow up in about 6 months (around 1/16/2025) for htn.      7/16/2024 YULIANA Farooq, FNP

## 2024-07-16 ENCOUNTER — OFFICE VISIT (OUTPATIENT)
Dept: FAMILY MEDICINE | Facility: CLINIC | Age: 64
End: 2024-07-16
Payer: OTHER GOVERNMENT

## 2024-07-16 VITALS
OXYGEN SATURATION: 97 % | BODY MASS INDEX: 25.88 KG/M2 | HEIGHT: 66 IN | SYSTOLIC BLOOD PRESSURE: 130 MMHG | DIASTOLIC BLOOD PRESSURE: 74 MMHG | WEIGHT: 161 LBS | HEART RATE: 78 BPM

## 2024-07-16 DIAGNOSIS — R19.7 DIARRHEA, UNSPECIFIED TYPE: ICD-10-CM

## 2024-07-16 DIAGNOSIS — E55.9 VITAMIN D DEFICIENCY: ICD-10-CM

## 2024-07-16 DIAGNOSIS — J44.9 CHRONIC OBSTRUCTIVE PULMONARY DISEASE, UNSPECIFIED COPD TYPE: ICD-10-CM

## 2024-07-16 DIAGNOSIS — E78.00 PURE HYPERCHOLESTEROLEMIA: Primary | ICD-10-CM

## 2024-07-16 DIAGNOSIS — Z87.898 HISTORY OF MOTION SICKNESS: ICD-10-CM

## 2024-07-16 DIAGNOSIS — I10 ESSENTIAL HYPERTENSION: ICD-10-CM

## 2024-07-16 PROCEDURE — 99214 OFFICE O/P EST MOD 30 MIN: CPT | Mod: S$GLB,,, | Performed by: NURSE PRACTITIONER

## 2024-07-16 RX ORDER — AMLODIPINE AND BENAZEPRIL HYDROCHLORIDE 5; 10 MG/1; MG/1
1 CAPSULE ORAL DAILY
Qty: 90 CAPSULE | Refills: 1 | Status: SHIPPED | OUTPATIENT
Start: 2024-07-16 | End: 2024-07-16 | Stop reason: SDUPTHER

## 2024-07-16 RX ORDER — ATORVASTATIN CALCIUM 10 MG/1
10 TABLET, FILM COATED ORAL DAILY
Qty: 90 TABLET | Refills: 1 | Status: SHIPPED | OUTPATIENT
Start: 2024-07-16 | End: 2024-07-16 | Stop reason: SDUPTHER

## 2024-07-16 RX ORDER — ALUMINUM ZIRCONIUM OCTACHLOROHYDREX GLY 16 G/100G
4 GEL TOPICAL DAILY
Qty: 30 CAPSULE | Refills: 0 | Status: SHIPPED | OUTPATIENT
Start: 2024-07-16

## 2024-07-16 RX ORDER — ATORVASTATIN CALCIUM 10 MG/1
10 TABLET, FILM COATED ORAL DAILY
Qty: 90 TABLET | Refills: 1 | Status: SHIPPED | OUTPATIENT
Start: 2024-07-16

## 2024-07-16 RX ORDER — AMLODIPINE AND BENAZEPRIL HYDROCHLORIDE 5; 10 MG/1; MG/1
1 CAPSULE ORAL DAILY
Qty: 90 CAPSULE | Refills: 1 | Status: SHIPPED | OUTPATIENT
Start: 2024-07-16

## 2024-07-16 RX ORDER — MECLIZINE HCL 12.5 MG 12.5 MG/1
12.5 TABLET ORAL 3 TIMES DAILY PRN
Qty: 30 TABLET | Refills: 0 | Status: SHIPPED | OUTPATIENT
Start: 2024-07-16

## 2024-07-16 RX ORDER — ALBUTEROL SULFATE 90 UG/1
2 AEROSOL, METERED RESPIRATORY (INHALATION) EVERY 6 HOURS PRN
Qty: 18 G | Refills: 2 | Status: SHIPPED | OUTPATIENT
Start: 2024-07-16

## 2024-07-18 ENCOUNTER — LAB VISIT (OUTPATIENT)
Dept: LAB | Facility: HOSPITAL | Age: 64
End: 2024-07-18
Attending: NURSE PRACTITIONER
Payer: OTHER GOVERNMENT

## 2024-07-18 DIAGNOSIS — R19.7 DIARRHEA, UNSPECIFIED TYPE: ICD-10-CM

## 2024-07-18 LAB
C DIFF GDH STL QL: NEGATIVE
C DIFF TOX A+B STL QL IA: NEGATIVE
WBC #/AREA STL HPF: NORMAL /[HPF]

## 2024-07-18 PROCEDURE — 87209 SMEAR COMPLEX STAIN: CPT | Performed by: NURSE PRACTITIONER

## 2024-07-18 PROCEDURE — 87449 NOS EACH ORGANISM AG IA: CPT | Performed by: NURSE PRACTITIONER

## 2024-07-18 PROCEDURE — 89055 LEUKOCYTE ASSESSMENT FECAL: CPT | Performed by: NURSE PRACTITIONER

## 2024-07-18 PROCEDURE — 87449 NOS EACH ORGANISM AG IA: CPT | Mod: 91 | Performed by: NURSE PRACTITIONER

## 2024-07-18 PROCEDURE — 87046 STOOL CULTR AEROBIC BACT EA: CPT | Mod: 59 | Performed by: NURSE PRACTITIONER

## 2024-07-18 PROCEDURE — 87324 CLOSTRIDIUM AG IA: CPT | Performed by: NURSE PRACTITIONER

## 2024-07-18 PROCEDURE — 87045 FECES CULTURE AEROBIC BACT: CPT | Performed by: NURSE PRACTITIONER

## 2024-07-19 ENCOUNTER — TELEPHONE (OUTPATIENT)
Dept: FAMILY MEDICINE | Facility: CLINIC | Age: 64
End: 2024-07-19
Payer: OTHER GOVERNMENT

## 2024-07-19 NOTE — TELEPHONE ENCOUNTER
----- Message from Allen Callahan NP sent at 7/19/2024  6:59 AM CDT -----  Stool studies neg so far

## 2024-07-19 NOTE — PROGRESS NOTES
"Individual Psychotherapy (PhD/LCSW)    7/15/2024    Site:  [facility]         Therapeutic Intervention: Met with patient.  Outpatient - Insight oriented psychotherapy 60 min - CPT code 78313, Outpatient - Behavior modifying psychotherapy 60 min - CPT code 26254, and Outpatient - Supportive psychotherapy 60 min - CPT Code 89107    Chief complaint/reason for encounter: depression and anxiety             Interval history and content of current session: Ct was referred to tx by Aprly REHMAN to address depression, anxiety, and marital/family issues. Ct arrived to session and was fully engaged. Ct shared that her mood has been down. She reported that she is still is not "happy". SW and ct processed the importance of ct focusing on her attitudes and her behaviors vs her thoughts and feelings because her attitude and behaviors are in her control. SW and ct processed her relationship with her  the importance of ct continuing to have reasonable expectations of him and to maintain firm boundaries with him. She continues to report that her daniel and Bahai are still very important to her and that she continues to be involved with her Mu-ism. She reported that she continues to do bible studies. Ct and SW discussed the importance of ct giving herself joce and trusting the process. Ct to continue in 1:1 sessions.       Treatment plan:  Target symptoms: depression, anxiety   Why chosen therapy is appropriate versus another modality: relevant to diagnosis, patient responds to this modality, evidence based practice  Outcome monitoring methods: self-report  Therapeutic intervention type: insight oriented psychotherapy, behavior modifying psychotherapy, supportive psychotherapy    Risk parameters:  Patient reports no suicidal ideation  Patient reports no homicidal ideation  Patient reports no self-injurious behavior  Patient reports no violent behavior    CSSRS was completed:     Mental Status Exam:  General Appearance:  " unremarkable, age appropriate   Speech: normal tone, normal rate, normal pitch, normal volume      Level of Cooperation: cooperative      Thought Processes: normal and logical   Mood: sad      Thought Content: normal, no suicidality, no homicidality, delusions, or paranoia   Affect: congruent and appropriate   Orientation: Oriented x3   Memory: recent >  intact   Attention Span & Concentration: intact   Fund of General Knowledge: intact and appropriate to age and level of education   Abstract Reasoning: interpretation of similarities was abstract   Judgment & Insight: fair, intact     Language intact       Verbal deficits: None    Patient's response to intervention:  The patient's response to intervention is accepting.    Progress toward goals and other mental status changes:  The patient's progress toward goals is fair .    Diagnosis:     ICD-10-CM ICD-9-CM   1. VANDANA (generalized anxiety disorder)  F41.1 300.02   2. Moderate episode of recurrent major depressive disorder  F33.1 296.32   3. Family conflict  Z63.8 V61.9       Plan:  individual psychotherapy and ct to follow up with Apryl REHMAN for psych med mgt  Pt to go to ED or call 911 if symptoms worsen or if she has thoughts of harming self and/or others. Pt verbalized understanding.    Return to clinic: as scheduled    Length of Service (minutes): 60      A portion of this note was created using [employer] voice recognition software that occasionally misinterprets phrases or words.    Each patient to whom he or she provides medical services by telemedicine is: (1) informed of the relationship between the physician and patient and the respective role of any other health care provider with respect to management of the patient; and (2) notified that he or she may decline to receive medical services by telemedicine and may withdraw from such care at any time.

## 2024-07-20 LAB — BACTERIA STL CULT: NORMAL

## 2024-07-22 ENCOUNTER — PATIENT MESSAGE (OUTPATIENT)
Dept: FAMILY MEDICINE | Facility: CLINIC | Age: 64
End: 2024-07-22
Payer: OTHER GOVERNMENT

## 2024-07-22 ENCOUNTER — TELEPHONE (OUTPATIENT)
Dept: FAMILY MEDICINE | Facility: CLINIC | Age: 64
End: 2024-07-22
Payer: OTHER GOVERNMENT

## 2024-07-26 LAB
O+P SPEC MICRO: NORMAL
O+P STL CONC: NORMAL

## 2024-08-06 ENCOUNTER — OFFICE VISIT (OUTPATIENT)
Dept: PSYCHIATRY | Facility: CLINIC | Age: 64
End: 2024-08-06
Payer: OTHER GOVERNMENT

## 2024-08-06 DIAGNOSIS — F41.1 GAD (GENERALIZED ANXIETY DISORDER): ICD-10-CM

## 2024-08-06 DIAGNOSIS — Z63.8 FAMILY CONFLICT: ICD-10-CM

## 2024-08-06 DIAGNOSIS — F33.1 MODERATE EPISODE OF RECURRENT MAJOR DEPRESSIVE DISORDER: Primary | ICD-10-CM

## 2024-08-06 PROCEDURE — 99212 OFFICE O/P EST SF 10 MIN: CPT | Mod: PBBFAC,PN | Performed by: SOCIAL WORKER

## 2024-08-06 PROCEDURE — 99999 PR PBB SHADOW E&M-EST. PATIENT-LVL II: CPT | Mod: PBBFAC,,, | Performed by: SOCIAL WORKER

## 2024-08-06 PROCEDURE — 90834 PSYTX W PT 45 MINUTES: CPT | Mod: ,,, | Performed by: SOCIAL WORKER

## 2024-08-06 SDOH — SOCIAL DETERMINANTS OF HEALTH (SDOH): OTHER SPECIFIED PROBLEMS RELATED TO PRIMARY SUPPORT GROUP: Z63.8

## 2024-08-08 DIAGNOSIS — F41.1 GAD (GENERALIZED ANXIETY DISORDER): ICD-10-CM

## 2024-08-08 RX ORDER — ALPRAZOLAM 0.5 MG/1
0.5 TABLET ORAL DAILY PRN
Qty: 30 TABLET | Refills: 0 | Status: SHIPPED | OUTPATIENT
Start: 2024-08-08 | End: 2024-09-07

## 2024-08-31 DIAGNOSIS — F33.1 MODERATE EPISODE OF RECURRENT MAJOR DEPRESSIVE DISORDER: ICD-10-CM

## 2024-09-03 RX ORDER — VILAZODONE HYDROCHLORIDE 40 MG/1
40 TABLET ORAL
Qty: 90 TABLET | Refills: 0 | Status: SHIPPED | OUTPATIENT
Start: 2024-09-03

## 2024-09-05 ENCOUNTER — OFFICE VISIT (OUTPATIENT)
Dept: PSYCHIATRY | Facility: CLINIC | Age: 64
End: 2024-09-05
Payer: OTHER GOVERNMENT

## 2024-09-05 VITALS
HEIGHT: 66 IN | DIASTOLIC BLOOD PRESSURE: 74 MMHG | BODY MASS INDEX: 25.4 KG/M2 | HEART RATE: 71 BPM | WEIGHT: 158.06 LBS | SYSTOLIC BLOOD PRESSURE: 158 MMHG

## 2024-09-05 DIAGNOSIS — F41.1 GAD (GENERALIZED ANXIETY DISORDER): ICD-10-CM

## 2024-09-05 DIAGNOSIS — Z63.8 FAMILY CONFLICT: ICD-10-CM

## 2024-09-05 DIAGNOSIS — F41.0 PANIC ATTACKS: ICD-10-CM

## 2024-09-05 DIAGNOSIS — F33.1 MODERATE EPISODE OF RECURRENT MAJOR DEPRESSIVE DISORDER: Primary | ICD-10-CM

## 2024-09-05 PROCEDURE — 99214 OFFICE O/P EST MOD 30 MIN: CPT | Mod: S$PBB,,, | Performed by: PHYSICIAN ASSISTANT

## 2024-09-05 PROCEDURE — 99213 OFFICE O/P EST LOW 20 MIN: CPT | Mod: PBBFAC,PN | Performed by: PHYSICIAN ASSISTANT

## 2024-09-05 PROCEDURE — 99999 PR PBB SHADOW E&M-EST. PATIENT-LVL II: CPT | Mod: PBBFAC,,, | Performed by: SOCIAL WORKER

## 2024-09-05 PROCEDURE — G2211 COMPLEX E/M VISIT ADD ON: HCPCS | Mod: S$PBB,,, | Performed by: PHYSICIAN ASSISTANT

## 2024-09-05 PROCEDURE — 99999 PR PBB SHADOW E&M-EST. PATIENT-LVL III: CPT | Mod: PBBFAC,,, | Performed by: PHYSICIAN ASSISTANT

## 2024-09-05 PROCEDURE — 99212 OFFICE O/P EST SF 10 MIN: CPT | Mod: PBBFAC,27,PN | Performed by: SOCIAL WORKER

## 2024-09-05 PROCEDURE — 90837 PSYTX W PT 60 MINUTES: CPT | Mod: ,,, | Performed by: SOCIAL WORKER

## 2024-09-05 RX ORDER — ALPRAZOLAM 0.5 MG/1
0.5 TABLET ORAL DAILY PRN
Qty: 30 TABLET | Refills: 0 | Status: SHIPPED | OUTPATIENT
Start: 2024-09-05 | End: 2024-10-05

## 2024-09-05 SDOH — SOCIAL DETERMINANTS OF HEALTH (SDOH): OTHER SPECIFIED PROBLEMS RELATED TO PRIMARY SUPPORT GROUP: Z63.8
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
## 2024-09-06 NOTE — PROGRESS NOTES
Individual Psychotherapy (PhD/LCSW)    9/5/2024    Site:  [facility]         Therapeutic Intervention: Met with patient.  Outpatient - Insight oriented psychotherapy 60 min - CPT code 03649, Outpatient - Behavior modifying psychotherapy 60 min - CPT code 55820, and Outpatient - Supportive psychotherapy 60 min - CPT Code 17502    Chief complaint/reason for encounter: depression, anxiety, and family stress             Interval history and content of current session: Ct was referred to tx by Apryl REHMAN to address depression, anxiety, and marital/family issues. Ct arrived to session and was fully engaged. Ct shared that things have been okay. Ct shared about her marriage and how she and her  continue to have difficulties communicating. Ct shared that her  is often critical and isolative and it negative impacts her life. Ct continues keep herself active and would like to do more with her  but they don't always have the same interest. Ct shared about her  judging her when she invites friends over or helps her friends. SW and ct processed the importance of ct setting boundaries and ct advocating for herself by being more direct and assertive. Ct to continue in 1:1 sessions.       Treatment plan:  Target symptoms: depression, anxiety , family stress, marital stress  Why chosen therapy is appropriate versus another modality: relevant to diagnosis, patient responds to this modality, evidence based practice  Outcome monitoring methods: self-report  Therapeutic intervention type: insight oriented psychotherapy, behavior modifying psychotherapy, supportive psychotherapy    Risk parameters:  Patient reports no suicidal ideation  Patient reports no homicidal ideation  Patient reports no self-injurious behavior  Patient reports no violent behavior    CSSRS was completed:     Mental Status Exam:  General Appearance:  unremarkable, age appropriate   Speech: normal tone, normal rate, normal pitch, normal volume       Level of Cooperation: cooperative      Thought Processes: normal and logical   Mood: steady, tearful      Thought Content: normal, no suicidality, no homicidality, delusions, or paranoia   Affect: congruent and appropriate   Orientation: Oriented x3   Memory: recent >  intact   Attention Span & Concentration: intact   Fund of General Knowledge: intact and appropriate to age and level of education   Abstract Reasoning: interpretation of similarities was abstract   Judgment & Insight: fair, intact     Language intact       Verbal deficits: None    Patient's response to intervention:  The patient's response to intervention is accepting.    Progress toward goals and other mental status changes:  The patient's progress toward goals is  improved .    Diagnosis:     ICD-10-CM ICD-9-CM   1. Moderate episode of recurrent major depressive disorder  F33.1 296.32   2. VANDANA (generalized anxiety disorder)  F41.1 300.02   3. Family conflict  Z63.8 V61.9       Plan:  individual psychotherapy and ct to follow up with Apryl REHMAN for psych med mgt  Pt to go to ED or call 911 if symptoms worsen or if she has thoughts of harming self and/or others. Pt verbalized understanding.    Return to clinic: as scheduled    Length of Service (minutes): 60      A portion of this note was created using [employer] voice recognition software that occasionally misinterprets phrases or words.    Each patient to whom he or she provides medical services by telemedicine is: (1) informed of the relationship between the physician and patient and the respective role of any other health care provider with respect to management of the patient; and (2) notified that he or she may decline to receive medical services by telemedicine and may withdraw from such care at any time.

## 2024-09-18 ENCOUNTER — OFFICE VISIT (OUTPATIENT)
Dept: PULMONOLOGY | Facility: CLINIC | Age: 64
End: 2024-09-18
Payer: OTHER GOVERNMENT

## 2024-09-18 VITALS
TEMPERATURE: 98 F | BODY MASS INDEX: 25.33 KG/M2 | HEART RATE: 92 BPM | WEIGHT: 161.38 LBS | HEIGHT: 67 IN | DIASTOLIC BLOOD PRESSURE: 68 MMHG | OXYGEN SATURATION: 93 % | SYSTOLIC BLOOD PRESSURE: 118 MMHG

## 2024-09-18 DIAGNOSIS — Z87.891 PERSONAL HISTORY OF SMOKING: ICD-10-CM

## 2024-09-18 DIAGNOSIS — J44.9 CHRONIC OBSTRUCTIVE PULMONARY DISEASE, UNSPECIFIED COPD TYPE: ICD-10-CM

## 2024-09-18 DIAGNOSIS — G47.33 OSA (OBSTRUCTIVE SLEEP APNEA): ICD-10-CM

## 2024-09-18 DIAGNOSIS — J44.89 ASTHMA WITH COPD: Primary | ICD-10-CM

## 2024-09-18 PROCEDURE — 99999 PR PBB SHADOW E&M-EST. PATIENT-LVL IV: CPT | Mod: PBBFAC,,, | Performed by: NURSE PRACTITIONER

## 2024-09-18 PROCEDURE — 99213 OFFICE O/P EST LOW 20 MIN: CPT | Mod: S$PBB,,, | Performed by: NURSE PRACTITIONER

## 2024-09-18 PROCEDURE — 99214 OFFICE O/P EST MOD 30 MIN: CPT | Mod: PBBFAC,PN | Performed by: NURSE PRACTITIONER

## 2024-09-18 RX ORDER — PREDNISONE 10 MG/1
TABLET ORAL
Qty: 20 TABLET | Refills: 0 | Status: SHIPPED | OUTPATIENT
Start: 2024-09-18

## 2024-09-18 RX ORDER — BENZONATATE 200 MG/1
200 CAPSULE ORAL 3 TIMES DAILY PRN
Qty: 90 CAPSULE | Refills: 6 | Status: SHIPPED | OUTPATIENT
Start: 2024-09-18 | End: 2024-09-28

## 2024-09-18 RX ORDER — UMECLIDINIUM BROMIDE AND VILANTEROL TRIFENATATE 62.5; 25 UG/1; UG/1
1 POWDER RESPIRATORY (INHALATION) DAILY
Qty: 3 EACH | Refills: 5 | Status: SHIPPED | OUTPATIENT
Start: 2024-09-18

## 2024-09-18 NOTE — PROGRESS NOTES
SUBJECTIVE:    Patient ID: Luann Borjas is a 64 y.o. female.    Chief Complaint: Follow-up (6 month follow up CARO/Patient has cough, sore throat, no fever)    Patient here today feeling well. She is using her Anoro daily.  She is sleeping on her CPAP and does feel benefit from it.  Her compliance download shows she is 77% compliant, sleeps an average of 8 hours and 36 minutes with an AHI of 4.3. She had a sore throat several days ago. She is going on a cruise and to [employer] at end of the month.   Past Medical History:   Diagnosis Date    Anxiety     Arthritis     Asthma     Cataract of left eye     Hyperlipidemia     Sleep apnea     cpap     Past Surgical History:   Procedure Laterality Date    BASAL CELL CARCINOMA EXCISION  09/2019    lip    CATARACT EXTRACTION W/  INTRAOCULAR LENS IMPLANT Right 11/13/2019    Procedure: EXTRACTION, CATARACT, WITH IOL INSERTION;  Surgeon: Carmelo Perez II, MD;  Location: [facility] OR;  Service: Ophthalmology;  Laterality: Right;    ECTOPIC PREGNANCY SURGERY      EYE SURGERY Right     cataracts    HYSTERECTOMY       Family History   Problem Relation Name Age of Onset    COPD Mother      Liver disease Mother      Thyroid disease Mother      COPD Father      Diabetes Father      Hypertension Father      Pacemaker/defibrilator Father      COPD Sister      Hypertension Sister      Hypertension Brother          Social History:   Marital Status:   Occupation: teacher   Alcohol History:  reports current alcohol use of about 2.0 standard drinks of alcohol per week.  Tobacco History:  reports that she quit smoking about 3 years ago. Her smoking use included cigarettes. She started smoking about 35 years ago. She has a 16 pack-year smoking history. She has been exposed to tobacco smoke. She has never used smokeless tobacco.  Drug History:  reports no history of drug use.    Review of patient's allergies indicates:   Allergen Reactions    Erythromycin Anaphylaxis    Buspirone      Ciprofloxacin     Levaquin [levofloxacin]     Zithromax z-masoud [azithromycin]        Current Outpatient Medications   Medication Sig Dispense Refill    albuterol (PROAIR HFA) 90 mcg/actuation inhaler Inhale 2 puffs into the lungs every 6 (six) hours as needed for Wheezing. Rescue 18 g 2    ALPRAZolam (XANAX) 0.5 MG tablet Take 1 tablet (0.5 mg total) by mouth daily as needed for Anxiety. 30 tablet 0    amlodipine-benazepril 5-10 mg (LOTREL) 5-10 mg per capsule Take 1 capsule by mouth once daily. 90 capsule 1    atorvastatin (LIPITOR) 10 MG tablet Take 1 tablet (10 mg total) by mouth once daily. 90 tablet 1    Bifidobacterium infantis (ALIGN) 4 mg Cap Take 1 capsule (4 mg total) by mouth Daily. 30 capsule 0    calcium carbonate (OS-MELIA) 500 mg calcium (1,250 mg) chewable tablet Take 1 tablet by mouth once daily.      cholecalciferol, vitamin D3, 1,250 mcg (50,000 unit) Tab Take 1,250 mcg by mouth every 7 days. 12 tablet 0    vilazodone (VIIBRYD) 40 mg Tab tablet TAKE 1 TABLET(40 MG) BY MOUTH DAILY 90 tablet 0    benzonatate (TESSALON) 200 MG capsule Take 1 capsule (200 mg total) by mouth 3 (three) times daily as needed for Cough. 90 capsule 6    meclizine (ANTIVERT) 12.5 mg tablet Take 1 tablet (12.5 mg total) by mouth 3 (three) times daily as needed. (Patient not taking: Reported on 9/18/2024) 30 tablet 0    predniSONE (DELTASONE) 10 MG tablet Take 4 tabs x 2 days, then take 3 tabs x 2 days, then take 2 tabs x 2 days, then take 1 tab x 2 days. 20 tablet 0    umeclidinium-vilanteroL (ANORO ELLIPTA) 62.5-25 mcg/actuation DsDv Inhale 1 puff into the lungs once daily. Controller 3 each 5     No current facility-administered medications for this visit.         Last PFT: 04/2022  Normal previous  PFT showed moderate obstruction that reversed with bronchodilator    Last CT:10/2023  IMPRESSION:  1. Stable 6 mm left lower lobe noncalcified pulmonary nodule, with no suspicious pulmonary nodules or masses.  2. Aortic and  "coronary arterial calcifications.  3. Additional observations as described.      Review of Systems  General: Feeling well  Eyes: Vision is good.  ENT:  sore throat few days ago.   Heart:: No chest pain or palpitations.  Lungs: no dyspnea, no cough   GI: No Nausea, vomiting, constipation, diarrhea, or reflux.  : Nocturia once sometimes  Musculoskeletal: No joint pain or myalgias.  Skin: No lesions or rashes.  Neuro: No headaches or neuropathy.  Lymph: No edema or adenopathy.  Psych: anxiety and depression  Endo: weight stable.     OBJECTIVE:      /68 (BP Location: Right arm, Patient Position: Sitting, BP Method: Medium (Manual))   Pulse 92   Temp 98.2 °F (36.8 °C)   Ht 5' 6.5" (1.689 m)   Wt 73.2 kg (161 lb 6.4 oz)   SpO2 (!) 93%   BMI 25.66 kg/m²     Physical Exam  GENERAL: Midaged patient in no distress.  HEENT: Pupils equal and reactive. Extraocular movements intact. Nose intact.      Pharynx moist.  NECK: Supple.   HEART: Regular rate and rhythm. No murmur or gallop auscultated.  LUNGS: Clear to auscultation and percussion. Lung excursion symmetrical. No change in fremitus. No adventitial noises.  ABDOMEN: Bowel sounds present. Non-tender, no masses palpated.  EXTREMITIES: Normal muscle tone and joint movement, no cyanosis or clubbing.   LYMPHATICS: No adenopathy palpated, no edema.  SKIN: Dry, intact, no lesions.   NEURO: Cranial nerves II-XII intact. Motor strength 5/5 bilaterally, upper and lower extremities.  PSYCH: Appropriate affect.    Assessment:       1. Asthma with COPD    2. CARO (obstructive sleep apnea)    3. Chronic obstructive pulmonary disease, unspecified COPD type    4. Personal history of smoking                 Plan:       Asthma with COPD    CARO (obstructive sleep apnea)    Chronic obstructive pulmonary disease, unspecified COPD type  -     umeclidinium-vilanteroL (ANORO ELLIPTA) 62.5-25 mcg/actuation DsDv; Inhale 1 puff into the lungs once daily. Controller  Dispense: 3 each; " Refill: 5    Personal history of smoking  -     CT Chest Lung Screening Low Dose; Future; Expected date: 09/18/2024    Other orders  -     predniSONE (DELTASONE) 10 MG tablet; Take 4 tabs x 2 days, then take 3 tabs x 2 days, then take 2 tabs x 2 days, then take 1 tab x 2 days.  Dispense: 20 tablet; Refill: 0  -     benzonatate (TESSALON) 200 MG capsule; Take 1 capsule (200 mg total) by mouth 3 (three) times daily as needed for Cough.  Dispense: 90 capsule; Refill: 6                   Follow up in about 6 months (around 3/18/2025).      Continue Anoro  Keep sleeping on your CPAP   Screening CT in October  PFT    Prednisone and Tessalon sent to take with you on trip to have just in case  Bring your CPAP, Anoro and rescue inhaler with you on trip

## 2024-09-20 ENCOUNTER — PATIENT MESSAGE (OUTPATIENT)
Dept: FAMILY MEDICINE | Facility: CLINIC | Age: 64
End: 2024-09-20
Payer: OTHER GOVERNMENT

## 2024-09-20 DIAGNOSIS — R09.02 HYPOXEMIA: Primary | ICD-10-CM

## 2024-09-20 DIAGNOSIS — J44.89 ASTHMA-COPD OVERLAP SYNDROME: ICD-10-CM

## 2024-10-10 ENCOUNTER — PATIENT MESSAGE (OUTPATIENT)
Dept: FAMILY MEDICINE | Facility: CLINIC | Age: 64
End: 2024-10-10
Payer: OTHER GOVERNMENT

## 2024-10-10 DIAGNOSIS — M25.532 LEFT WRIST PAIN: Primary | ICD-10-CM

## 2024-10-20 DIAGNOSIS — F41.1 GAD (GENERALIZED ANXIETY DISORDER): ICD-10-CM

## 2024-10-21 ENCOUNTER — OFFICE VISIT (OUTPATIENT)
Dept: ORTHOPEDICS | Facility: CLINIC | Age: 64
End: 2024-10-21
Payer: OTHER GOVERNMENT

## 2024-10-21 ENCOUNTER — HOSPITAL ENCOUNTER (OUTPATIENT)
Dept: RADIOLOGY | Facility: HOSPITAL | Age: 64
Discharge: HOME | End: 2024-10-21
Attending: ORTHOPAEDIC SURGERY
Payer: OTHER GOVERNMENT

## 2024-10-21 VITALS — BODY MASS INDEX: 25.33 KG/M2 | WEIGHT: 161.38 LBS | HEIGHT: 67 IN

## 2024-10-21 DIAGNOSIS — M25.532 LEFT WRIST PAIN: ICD-10-CM

## 2024-10-21 DIAGNOSIS — M25.531 RIGHT WRIST PAIN: ICD-10-CM

## 2024-10-21 DIAGNOSIS — S52.531A CLOSED COLLES' FRACTURE OF RIGHT RADIUS, INITIAL ENCOUNTER: ICD-10-CM

## 2024-10-21 DIAGNOSIS — M25.531 RIGHT WRIST PAIN: Primary | ICD-10-CM

## 2024-10-21 PROCEDURE — 73110 X-RAY EXAM OF WRIST: CPT | Mod: 26,RT,, | Performed by: RADIOLOGY

## 2024-10-21 PROCEDURE — 99999 PR PBB SHADOW E&M-EST. PATIENT-LVL III: CPT | Mod: PBBFAC,,, | Performed by: ORTHOPAEDIC SURGERY

## 2024-10-21 PROCEDURE — 99999PBSHW PR PBB SHADOW TECHNICAL ONLY FILED TO HB: Mod: PBBFAC,,,

## 2024-10-21 PROCEDURE — 99213 OFFICE O/P EST LOW 20 MIN: CPT | Mod: PBBFAC,25,PO | Performed by: ORTHOPAEDIC SURGERY

## 2024-10-21 PROCEDURE — 29075 APPL CST ELBW FNGR SHORT ARM: CPT | Mod: PBBFAC,PO,RT | Performed by: ORTHOPAEDIC SURGERY

## 2024-10-21 PROCEDURE — 99204 OFFICE O/P NEW MOD 45 MIN: CPT | Mod: 25,S$PBB,, | Performed by: ORTHOPAEDIC SURGERY

## 2024-10-21 PROCEDURE — 29075 APPL CST ELBW FNGR SHORT ARM: CPT | Mod: S$PBB,RT,, | Performed by: ORTHOPAEDIC SURGERY

## 2024-10-21 PROCEDURE — 73110 X-RAY EXAM OF WRIST: CPT | Mod: TC,PO,RT

## 2024-10-21 RX ORDER — ALPRAZOLAM 0.5 MG/1
0.5 TABLET ORAL DAILY PRN
Qty: 30 TABLET | Refills: 0 | Status: SHIPPED | OUTPATIENT
Start: 2024-10-21 | End: 2024-11-20

## 2024-10-21 RX ORDER — IBUPROFEN 600 MG/1
600 TABLET ORAL EVERY 8 HOURS PRN
Qty: 12 TABLET | Refills: 0 | Status: SHIPPED | OUTPATIENT
Start: 2024-10-21
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
## 2024-10-28 ENCOUNTER — OFFICE VISIT (OUTPATIENT)
Dept: ORTHOPEDICS | Facility: CLINIC | Age: 64
End: 2024-10-28
Payer: OTHER GOVERNMENT

## 2024-10-28 ENCOUNTER — HOSPITAL ENCOUNTER (OUTPATIENT)
Dept: RADIOLOGY | Facility: HOSPITAL | Age: 64
Discharge: HOME | End: 2024-10-28
Attending: ORTHOPAEDIC SURGERY
Payer: OTHER GOVERNMENT

## 2024-10-28 DIAGNOSIS — M25.531 RIGHT WRIST PAIN: ICD-10-CM

## 2024-10-28 DIAGNOSIS — S52.531A CLOSED COLLES' FRACTURE OF RIGHT RADIUS, INITIAL ENCOUNTER: ICD-10-CM

## 2024-10-28 DIAGNOSIS — M25.531 RIGHT WRIST PAIN: Primary | ICD-10-CM

## 2024-10-28 PROCEDURE — 73110 X-RAY EXAM OF WRIST: CPT | Mod: TC,PO,RT

## 2024-10-28 PROCEDURE — 99213 OFFICE O/P EST LOW 20 MIN: CPT | Mod: S$PBB,,, | Performed by: ORTHOPAEDIC SURGERY

## 2024-10-28 PROCEDURE — 99999 PR PBB SHADOW E&M-EST. PATIENT-LVL III: CPT | Mod: PBBFAC,,, | Performed by: ORTHOPAEDIC SURGERY

## 2024-10-28 PROCEDURE — 73110 X-RAY EXAM OF WRIST: CPT | Mod: 26,RT,, | Performed by: RADIOLOGY

## 2024-10-28 PROCEDURE — 99213 OFFICE O/P EST LOW 20 MIN: CPT | Mod: PBBFAC,25,PO | Performed by: ORTHOPAEDIC SURGERY

## 2024-10-31 DIAGNOSIS — M25.531 RIGHT WRIST PAIN: Primary | ICD-10-CM

## 2024-11-04 ENCOUNTER — HOSPITAL ENCOUNTER (OUTPATIENT)
Dept: RADIOLOGY | Facility: HOSPITAL | Age: 64
Discharge: HOME | End: 2024-11-04
Attending: ORTHOPAEDIC SURGERY
Payer: OTHER GOVERNMENT

## 2024-11-04 ENCOUNTER — OFFICE VISIT (OUTPATIENT)
Dept: ORTHOPEDICS | Facility: CLINIC | Age: 64
End: 2024-11-04
Payer: OTHER GOVERNMENT

## 2024-11-04 VITALS — HEIGHT: 67 IN | BODY MASS INDEX: 25.33 KG/M2 | WEIGHT: 161.38 LBS

## 2024-11-04 DIAGNOSIS — M25.532 LEFT WRIST PAIN: ICD-10-CM

## 2024-11-04 DIAGNOSIS — S52.531A CLOSED COLLES' FRACTURE OF RIGHT RADIUS, INITIAL ENCOUNTER: Primary | ICD-10-CM

## 2024-11-04 DIAGNOSIS — M25.531 RIGHT WRIST PAIN: ICD-10-CM

## 2024-11-04 PROCEDURE — 99999PBSHW PR PBB SHADOW TECHNICAL ONLY FILED TO HB: Mod: PBBFAC,,,

## 2024-11-04 PROCEDURE — 99213 OFFICE O/P EST LOW 20 MIN: CPT | Mod: PBBFAC,25,PO | Performed by: ORTHOPAEDIC SURGERY

## 2024-11-04 PROCEDURE — 29075 APPL CST ELBW FNGR SHORT ARM: CPT | Mod: S$PBB,RT,, | Performed by: ORTHOPAEDIC SURGERY

## 2024-11-04 PROCEDURE — 73110 X-RAY EXAM OF WRIST: CPT | Mod: 26,RT,, | Performed by: RADIOLOGY

## 2024-11-04 PROCEDURE — 29075 APPL CST ELBW FNGR SHORT ARM: CPT | Mod: PBBFAC,PO | Performed by: ORTHOPAEDIC SURGERY

## 2024-11-04 PROCEDURE — 99999 PR PBB SHADOW E&M-EST. PATIENT-LVL III: CPT | Mod: PBBFAC,,, | Performed by: ORTHOPAEDIC SURGERY

## 2024-11-04 PROCEDURE — 73110 X-RAY EXAM OF WRIST: CPT | Mod: TC,PO,RT

## 2024-11-04 PROCEDURE — 73110 X-RAY EXAM OF WRIST: CPT | Mod: 26,LT,, | Performed by: RADIOLOGY

## 2024-11-04 PROCEDURE — 73110 X-RAY EXAM OF WRIST: CPT | Mod: TC,PO,LT

## 2024-11-04 PROCEDURE — 99214 OFFICE O/P EST MOD 30 MIN: CPT | Mod: 25,S$PBB,, | Performed by: ORTHOPAEDIC SURGERY

## 2024-11-04 NOTE — ADDENDUM NOTE
Addended by: BRIJESH GALLO on: 11/4/2024 09:04 AM     Modules accepted: Orders, Level of Service

## 2024-11-04 NOTE — PROGRESS NOTES
Ms Borjas returns to clinic today.  Has a history of a right distal radius fracture.  He was nearly 4 weeks post injury.  She has been in a cast.  He was also complaining of pain to the left wrist.  This started at the same injury.  She states that she did have an x-ray of the wrist at the time of injury which did not show a fracture but she was continuing have soreness and pain to the left wrist    Physical exam:  Examination of the right wrist reveals that there is only minimal edema.  There is still some ecchymosis remaining but it was improving.  She still has soreness over the distal radius to palpation.  She does have intact sensation in the median radial ulnar distribution and capillary refill is less than 2 seconds     Examination of the left wrist and hand reveals that there are no major skin changes.  There was no significant edema.  Palpation does produce tenderness over the distal ulna as well as over the lunate and lunar triquetral ligaments.  She does not have significant radial sided tenderness.  She was neurovascularly intact grossly.    Radiology: X-rays of the right wrist were taken in clinic today.  He was noted have a fracture of the distal radius.  This has less than 2 mm of articular step-off.  She has neutral alignment on the lateral.  He was lost 1-2 mm of height total.      X-rays of the left wrist were taken in clinic today there is noted to be healed fracture of the distal radius with plate and screw fixation in place.  There was also ulnar styloid tip fracture which is chronic.  It was questionable lucency across the waist of the scaphoid which could be new versus old injury.    Assessment: Right distal radius fracture     Plan:     1. Will replace a short-arm fiberglass cast on the right and I will place her into a Velcro growth thumb spica splint on the left    2.  She can continue to work on range of motion of the fingers but will continue to be limited weight-bearing to the right arm  and I will also keep her limited weight-bearing on the left until I can review the results of the imaging with radiology right    3.  She will follow up in 2 weeks with repeat x-rays of the right wrist at which point we will consider going to a Velcro brace and beginning range of motion

## 2024-11-05 ENCOUNTER — HOSPITAL ENCOUNTER (OUTPATIENT)
Dept: RADIOLOGY | Facility: HOSPITAL | Age: 64
Discharge: HOME | End: 2024-11-05
Attending: NURSE PRACTITIONER
Payer: OTHER GOVERNMENT

## 2024-11-05 ENCOUNTER — PATIENT MESSAGE (OUTPATIENT)
Dept: PULMONOLOGY | Facility: CLINIC | Age: 64
End: 2024-11-05

## 2024-11-05 ENCOUNTER — TELEPHONE (OUTPATIENT)
Dept: PULMONOLOGY | Facility: CLINIC | Age: 64
End: 2024-11-05

## 2024-11-05 ENCOUNTER — HOSPITAL ENCOUNTER (OUTPATIENT)
Dept: PULMONOLOGY | Facility: HOSPITAL | Age: 64
Discharge: HOME | End: 2024-11-05
Attending: NURSE PRACTITIONER
Payer: OTHER GOVERNMENT

## 2024-11-05 DIAGNOSIS — Z87.891 PERSONAL HISTORY OF SMOKING: ICD-10-CM

## 2024-11-05 DIAGNOSIS — J44.89 ASTHMA WITH COPD: ICD-10-CM

## 2024-11-05 LAB
DLCO SINGLE BREATH LLN: 17.81
DLCO SINGLE BREATH PRE REF: 76.6 %
DLCO SINGLE BREATH REF: 23.55
DLCOC SBVA LLN: 3.09
DLCOC SBVA REF: 4.46
DLCOC SINGLE BREATH LLN: 17.81
DLCOC SINGLE BREATH REF: 23.55
DLCOVA LLN: 3.09
DLCOVA PRE REF: 73.2 %
DLCOVA PRE: 3.27 ML/(MIN*MMHG*L) (ref 3.09–5.84)
DLCOVA REF: 4.46
ERVN2 LLN: -16449.22
ERVN2 PRE REF: 57.3 %
ERVN2 PRE: 0.44 L (ref -16449.22–16450.78)
ERVN2 REF: 0.78
FEF 25 75 CHG: -9 %
FEF 25 75 LLN: 1.48
FEF 25 75 POST REF: 58.4 %
FEF 25 75 PRE REF: 64.2 %
FEF 25 75 REF: 2.87
FET100 CHG: 12 %
FEV1 CHG: -3.7 %
FEV1 FVC CHG: -1.2 %
FEV1 FVC LLN: 66
FEV1 FVC POST REF: 93 %
FEV1 FVC PRE REF: 94.2 %
FEV1 FVC REF: 79
FEV1 LLN: 1.91
FEV1 POST REF: 91.5 %
FEV1 PRE REF: 95 %
FEV1 REF: 2.56
FRCN2 LLN: 2
FRCN2 PRE REF: 113.5 %
FRCN2 REF: 2.82
FVC CHG: -2.5 %
FVC LLN: 2.45
FVC POST REF: 97.6 %
FVC PRE REF: 100.1 %
FVC REF: 3.27
IVC PRE: 3.01 L (ref 2.45–4.14)
IVC SINGLE BREATH LLN: 2.45
IVC SINGLE BREATH PRE REF: 91.9 %
IVC SINGLE BREATH REF: 3.27
PEF CHG: 25.6 %
PEF LLN: 4.61
PEF POST REF: 99.1 %
PEF PRE REF: 78.9 %
PEF REF: 6.43
POST FEF 25 75: 1.68 L/S (ref 1.48–4.27)
POST FET 100: 8.25 SEC
POST FEV1 FVC: 73.15 % (ref 66.25–89.26)
POST FEV1: 2.34 L (ref 1.91–3.18)
POST FVC: 3.19 L (ref 2.45–4.14)
POST PEF: 6.38 L/S (ref 4.61–8.25)
PRE DLCO: 18.03 ML/(MIN*MMHG) (ref 17.81–29.28)
PRE FEF 25 75: 1.84 L/S (ref 1.48–4.27)
PRE FET 100: 7.36 SEC
PRE FEV1 FVC: 74.07 % (ref 66.25–89.26)
PRE FEV1: 2.43 L (ref 1.91–3.18)
PRE FRC N2: 3.2 L (ref 2–3.64)
PRE FVC: 3.28 L (ref 2.45–4.14)
PRE PEF: 5.08 L/S (ref 4.61–8.25)
RVN2 LLN: 1.47
RVN2 PRE REF: 134.9 %
RVN2 PRE: 2.75 L (ref 1.47–2.62)
RVN2 REF: 2.04
RVN2TLCN2 LLN: 30.79
RVN2TLCN2 PRE REF: 113.1 %
RVN2TLCN2 PRE: 45.68 % (ref 30.79–49.97)
RVN2TLCN2 REF: 40.38
TLCN2 LLN: 4.29
TLCN2 PRE REF: 114.3 %
TLCN2 PRE: 6.03 L (ref 4.29–6.26)
TLCN2 REF: 5.27
VA PRE: 5.52 L (ref 5.12–5.12)
VA SINGLE BREATH LLN: 5.12
VA SINGLE BREATH PRE REF: 107.7 %
VA SINGLE BREATH REF: 5.12
VCMAXN2 LLN: 2.45
VCMAXN2 PRE REF: 100.1 %
VCMAXN2 PRE: 3.28 L (ref 2.45–4.14)
VCMAXN2 REF: 3.27

## 2024-11-05 PROCEDURE — 94729 DIFFUSING CAPACITY: CPT | Mod: 26,,, | Performed by: INTERNAL MEDICINE

## 2024-11-05 PROCEDURE — 94727 GAS DIL/WSHOT DETER LNG VOL: CPT | Mod: 26,,, | Performed by: INTERNAL MEDICINE

## 2024-11-05 PROCEDURE — 94727 GAS DIL/WSHOT DETER LNG VOL: CPT

## 2024-11-05 PROCEDURE — 71271 CT THORAX LUNG CANCER SCR C-: CPT | Mod: TC,PO

## 2024-11-05 PROCEDURE — 94060 EVALUATION OF WHEEZING: CPT

## 2024-11-05 PROCEDURE — 94729 DIFFUSING CAPACITY: CPT

## 2024-11-05 PROCEDURE — 71271 CT THORAX LUNG CANCER SCR C-: CPT | Mod: 26,,, | Performed by: RADIOLOGY

## 2024-11-05 PROCEDURE — 94060 EVALUATION OF WHEEZING: CPT | Mod: 26,,, | Performed by: INTERNAL MEDICINE

## 2024-11-05 NOTE — TELEPHONE ENCOUNTER
Ct chest  Impression:     No acute cardiac or pulmonary process, and no new concerning pulmonary nodule/mass identified.     LUNG-RADS CATEGORY 2: BENIGN APPEARANCE OR BEHAVIOR     RECOMMENDATION: Continue annual screening with LDCT in 12 months.

## 2024-11-05 NOTE — TELEPHONE ENCOUNTER
PFT shows no obstruction, air trapping, and mild diffusion defect.  PFT in 2020 showed moderate obstruction

## 2024-11-14 DIAGNOSIS — S52.531A CLOSED COLLES' FRACTURE OF RIGHT RADIUS, INITIAL ENCOUNTER: Primary | ICD-10-CM

## 2024-11-18 ENCOUNTER — HOSPITAL ENCOUNTER (OUTPATIENT)
Dept: RADIOLOGY | Facility: HOSPITAL | Age: 64
Discharge: HOME | End: 2024-11-18
Attending: ORTHOPAEDIC SURGERY
Payer: OTHER GOVERNMENT

## 2024-11-18 ENCOUNTER — OFFICE VISIT (OUTPATIENT)
Dept: ORTHOPEDICS | Facility: CLINIC | Age: 64
End: 2024-11-18
Payer: OTHER GOVERNMENT

## 2024-11-18 DIAGNOSIS — S52.531A CLOSED COLLES' FRACTURE OF RIGHT RADIUS, INITIAL ENCOUNTER: Primary | ICD-10-CM

## 2024-11-18 DIAGNOSIS — S52.531A CLOSED COLLES' FRACTURE OF RIGHT RADIUS, INITIAL ENCOUNTER: ICD-10-CM

## 2024-11-18 PROCEDURE — 73110 X-RAY EXAM OF WRIST: CPT | Mod: 26,RT,, | Performed by: RADIOLOGY

## 2024-11-18 PROCEDURE — 99999 PR PBB SHADOW E&M-EST. PATIENT-LVL III: CPT | Mod: PBBFAC,,, | Performed by: ORTHOPAEDIC SURGERY

## 2024-11-18 PROCEDURE — 99213 OFFICE O/P EST LOW 20 MIN: CPT | Mod: S$PBB,,, | Performed by: ORTHOPAEDIC SURGERY

## 2024-11-18 PROCEDURE — 73110 X-RAY EXAM OF WRIST: CPT | Mod: TC,PO,RT

## 2024-11-18 PROCEDURE — 99213 OFFICE O/P EST LOW 20 MIN: CPT | Mod: PBBFAC,25,PO | Performed by: ORTHOPAEDIC SURGERY

## 2024-11-18 NOTE — PROGRESS NOTES
Ms Borjas returns to clinic today.  Has a history of a right distal radius fracture.  She has been in a fiberglass cast.  She was overall doing well.  He was having some anxiety.      Physical exam: Examination of the right wrist reveals that there is no significant deformity.  There is only mild edema.  Palpation only produces mild tenderness.  She does have sensation intact.  Capillary refill is less than 2 seconds     Radiology: X-rays of the right wrist were taken in clinic today.  She was noted have a fracture of the distal radius.  There was 2 mm of articular step-off.  Height is relatively well-maintained as tilt is very well-maintained     Assessment: Right distal radius fracture     Plan:    1. Will transition to a Velcro brace but she must wear this full-time     2.  She will continue limited weight-bearing to the right arm and hand     3. She can get rid of the Velcro brace for her left wrist     4. She will follow up with me in 2-3 weeks for repeat evaluation with an x-ray of the right wrist at which point we will begin weaning out of the Velcro brace

## 2024-11-20 DIAGNOSIS — F41.1 GAD (GENERALIZED ANXIETY DISORDER): ICD-10-CM

## 2024-11-20 RX ORDER — ALPRAZOLAM 0.5 MG/1
0.5 TABLET ORAL DAILY PRN
Qty: 30 TABLET | Refills: 0 | Status: SHIPPED | OUTPATIENT
Start: 2024-11-20 | End: 2024-12-20

## 2024-12-03 DIAGNOSIS — E78.00 PURE HYPERCHOLESTEROLEMIA: ICD-10-CM

## 2024-12-03 RX ORDER — ATORVASTATIN CALCIUM 10 MG/1
10 TABLET, FILM COATED ORAL DAILY
Qty: 30 TABLET | Refills: 1 | Status: SHIPPED | OUTPATIENT
Start: 2024-12-03

## 2024-12-05 ENCOUNTER — OFFICE VISIT (OUTPATIENT)
Dept: PSYCHIATRY | Facility: CLINIC | Age: 64
End: 2024-12-05
Payer: OTHER GOVERNMENT

## 2024-12-05 VITALS
DIASTOLIC BLOOD PRESSURE: 74 MMHG | WEIGHT: 167.88 LBS | SYSTOLIC BLOOD PRESSURE: 146 MMHG | HEART RATE: 81 BPM | BODY MASS INDEX: 26.69 KG/M2

## 2024-12-05 DIAGNOSIS — F41.1 GAD (GENERALIZED ANXIETY DISORDER): ICD-10-CM

## 2024-12-05 DIAGNOSIS — F33.41 RECURRENT MAJOR DEPRESSIVE DISORDER, IN PARTIAL REMISSION: Primary | ICD-10-CM

## 2024-12-05 DIAGNOSIS — S52.531A CLOSED COLLES' FRACTURE OF RIGHT RADIUS, INITIAL ENCOUNTER: Primary | ICD-10-CM

## 2024-12-05 DIAGNOSIS — F41.0 PANIC ATTACKS: ICD-10-CM

## 2024-12-05 PROCEDURE — 99214 OFFICE O/P EST MOD 30 MIN: CPT | Mod: S$PBB,,, | Performed by: PHYSICIAN ASSISTANT

## 2024-12-05 PROCEDURE — 99213 OFFICE O/P EST LOW 20 MIN: CPT | Mod: PBBFAC,PN | Performed by: PHYSICIAN ASSISTANT

## 2024-12-05 PROCEDURE — 99999 PR PBB SHADOW E&M-EST. PATIENT-LVL III: CPT | Mod: PBBFAC,,, | Performed by: PHYSICIAN ASSISTANT

## 2024-12-05 PROCEDURE — G2211 COMPLEX E/M VISIT ADD ON: HCPCS | Mod: S$PBB,,, | Performed by: PHYSICIAN ASSISTANT

## 2024-12-05 RX ORDER — VILAZODONE HYDROCHLORIDE 40 MG/1
40 TABLET ORAL DAILY
Qty: 90 TABLET | Refills: 1 | Status: SHIPPED | OUTPATIENT
Start: 2024-12-05
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
## 2024-12-09 ENCOUNTER — HOSPITAL ENCOUNTER (OUTPATIENT)
Dept: RADIOLOGY | Facility: HOSPITAL | Age: 64
Discharge: HOME | End: 2024-12-09
Attending: ORTHOPAEDIC SURGERY
Payer: OTHER GOVERNMENT

## 2024-12-09 ENCOUNTER — OFFICE VISIT (OUTPATIENT)
Dept: ORTHOPEDICS | Facility: CLINIC | Age: 64
End: 2024-12-09
Payer: OTHER GOVERNMENT

## 2024-12-09 DIAGNOSIS — S52.531A CLOSED COLLES' FRACTURE OF RIGHT RADIUS, INITIAL ENCOUNTER: ICD-10-CM

## 2024-12-09 DIAGNOSIS — S52.531A CLOSED COLLES' FRACTURE OF RIGHT RADIUS, INITIAL ENCOUNTER: Primary | ICD-10-CM

## 2024-12-09 PROCEDURE — 99213 OFFICE O/P EST LOW 20 MIN: CPT | Mod: S$PBB,,, | Performed by: ORTHOPAEDIC SURGERY

## 2024-12-09 PROCEDURE — 73110 X-RAY EXAM OF WRIST: CPT | Mod: TC,PO,RT

## 2024-12-09 PROCEDURE — 99999 PR PBB SHADOW E&M-EST. PATIENT-LVL III: CPT | Mod: PBBFAC,,, | Performed by: ORTHOPAEDIC SURGERY

## 2024-12-09 PROCEDURE — 99213 OFFICE O/P EST LOW 20 MIN: CPT | Mod: PBBFAC,25,PO | Performed by: ORTHOPAEDIC SURGERY

## 2024-12-09 PROCEDURE — 73110 X-RAY EXAM OF WRIST: CPT | Mod: 26,RT,, | Performed by: RADIOLOGY

## 2024-12-09 NOTE — PROGRESS NOTES
Ms Borjas returns to clinic today.  Has a history of right distal radius fracture.  She has been in a Velcro brace.  She still has stiffness of the wrist.  She is still having mild pain.  There are no other complaints.      Physical exam: Examination of the right wrist reveals that there is only minimal edema.  There was no significant deformity.  Palpation produces mild tenderness.  Wrist range of motion is extension of 20° and flexion of 30°.  She is able make a full composite fist and extend the fingers.      Radiology: X-rays of the right wrist were taken in clinic today.  He is noted have a fracture of the distal radius.  He has 1-2 mm intra-articular step-off which is similar to previous x-ray.  There was no significant shortening.  She has neutral dorsal angulation.      Assessment:  Right distal radius fracture     Plan:     1. She will begin to wean the Velcro brace     2.  Will set her up with occupational therapy to begin range of motion and eventually gentle strengthening     3.  She will follow up in 3 weeks with an x-ray of the right wrist

## 2024-12-10 ENCOUNTER — CLINICAL SUPPORT (OUTPATIENT)
Dept: REHABILITATION | Facility: HOSPITAL | Age: 64
End: 2024-12-10
Attending: ORTHOPAEDIC SURGERY
Payer: OTHER GOVERNMENT

## 2024-12-10 DIAGNOSIS — M25.531 RIGHT WRIST PAIN: Primary | ICD-10-CM

## 2024-12-10 DIAGNOSIS — S52.531A CLOSED COLLES' FRACTURE OF RIGHT RADIUS, INITIAL ENCOUNTER: ICD-10-CM

## 2024-12-10 PROCEDURE — 97530 THERAPEUTIC ACTIVITIES: CPT | Mod: PN

## 2024-12-10 PROCEDURE — 97165 OT EVAL LOW COMPLEX 30 MIN: CPT | Mod: PN

## 2024-12-10 NOTE — PLAN OF CARE
Patient evaluated and plan of care established.  Please sign and return if in agreement.    Thank you,  PETAR Christensen           Occupational Therapy [facility]  Initial Evaluation     Date: 12/10/2024  Name: Luann Borjas  Clinic Number: [id number]    Therapy Diagnosis: S52.531A (ICD-10-CM) - Closed Colles' fracture of right radius  Encounter Diagnosis   Name Primary?    Right wrist pain Yes     Physician: Tony Grande MD    Physician Orders: S52.531A (ICD-10-CM) - Closed Colles' fracture of right radius; evaluate and treat; Please begin therapy to the right wrist and hand. Include edema control and range of motion and eventually gentle strengthening   Surgical Procedure and Date: Not Applicable   Dominant Side: Right  Date of Onset: 10/09/2024  post injury: 8 weeks, 6 days  History / Mechanism of Injury: Patient had a fall while on a cruise.  Patient was seen by the medical staff on board and was told she had a fracture.  Patient followed up with orthopedics on 10/21/2024 and was placed in a well molded short arm cast.  Patient  followed up again on 11/18/2024 and cast was removed and patient placed in removable Velcro brace.  Patient returned for another follow up on 12/09/2024 and was told to begin to wean from Velcro splint and was referred to therapy for evaluation and treatment. History of (Left) wrist fracture  Previous Therapy:  outpatient after (Left) wrist fracture    Environmental Concerns/Fall Risk: FALL RISK!!!  Language: None  Cultural/Spiritual: None  Abuse/Neglect/Nutritional: None  Imaging / Tests: See Epic    Past Medical History/Physical Systems Review:    has a past medical history of Anxiety, Arthritis, Asthma, Cataract of left eye, Hyperlipidemia, and Sleep apnea.     has a past surgical history that includes Excision basal cell carcinoma (09/2019); Eye surgery (Right); Cataract extraction w/  intraocular lens implant (Right, 11/13/2019); Hysterectomy; and Ectopic pregnancy  "surgery.    has a current medication list which includes the following prescription(s): albuterol, alprazolam, amlodipine-benazepril 5-10 mg, atorvastatin, align, calcium carbonate, cholecalciferol (vitamin d3), ibuprofen, meclizine, prednisone, anoro ellipta, and vilazodone.    Review of patient's allergies indicates:   Allergen Reactions    Erythromycin Anaphylaxis    Buspirone     Ciprofloxacin     Levaquin [levofloxacin]     Zithromax z-masoud [azithromycin]         Insurance Authorization Period Expiration: 2024-2025  Plan of Care Certification Period: 12/10/2024-03/10/2025  Date of Return to MD: 2024    Occupation:  retired   Working presently: retired  Workers Compensation:  no      Visit # / Visits authorized:   Time In: 8:12  Time Out: 8:50  Total Billable Time:  10 minutes        Precautions:  Patient. Reports, "Skin Cancer, no pacemaker, no allergies to latex or adhesives."     Subjective     Patient Reports, "I had a fall while on a cruise 10/09/2024.  I was seen by the medical staff on board and was told me I had a fracture and put a cast on.  When I went into port in [facility] and got a new cast because of the swelling.   I went to orthopedics on 10/21/2024 and was placed in a well molded short arm cast. I had two cast with Dr. Grande.  Then I went back again on 2024 and the cast was removed and placed in removable Velcro brace.  I went back for a follow up on 2024 and was told to begin to wean from Velcro splint and was referred to therapy for evaluation and treatment. I am having difficulty brushing teeth, using restroom, getting up from the floor, Fine Motor Coordination, writing, lifting a pot, opening containers, turning knobs, using utensils to cut food, buttoning, zipping, braiding my hair.  I get cramping in the thumb area while using my hand."     Pain   At rest: 2/10  With work/ Activity: 5/10  Sleepin/10  Location of Pain: (Right) wrist "     Patient's Goals for Therapy: increase function    Objective     Sensation: grossly intact  Scar / Wound: Not Applicable   Edema:   centimeters (Right) / (Left)  Metacarpals:  20.1 / 19.8  Proximal Wrist Crease:  18.4 / 17.0                       Active Range of Motion: hand   Patient able to actively make loose composite fist and oppose (Bilateral) thumbs to palm.                                    Active Range of Motion: wrist                         (Right)   //  (Left)  Dorsal Flexion /Volar Flexion: 25 / 30   //  55/65  Radial Deviation /Ulnar Deviation: 5 / 12  //  15/25  Supination / Pronation: 50 / 55  //  80/80    Passive Range of Motion: wrist        (submaximally, within pain tolerance)                  (Right)    Dorsal Flexion /Volar Flexion: 35 / 35  Radial Deviation /Ulnar Deviation: 8 / 15  Supination / Pronation:  55 / 60    Comments: previous (Left) wrist fracture    Manual Muscle Test:  Wrist     (submaximally, within pain tolerance)        (Right)   Dorsal Flexion:  To be assessed   Volar Flexion:  To be assessed   Radial Deviation:  To be assessed   Ulnar Deviation: To be assessed          Strength: (BRITTNI Dynamometer in pounds),Position II  (submaximally, within pain tolerance)   (Right): To be assessed   (Left):  To be assessed     Pinch Strength: (Pinch Gauge in pounds)  (submaximally, within pain tolerance)              (Right) /  (Left)  Lateral Pinch:  To be assessed   3 Point Pinch:  To be assessed   2 Point Pinch:  To be assessed       FOTO SCORE: 49%      Treatment Included:   Occupational Therapy  evaluation and instruction in written Home Exercise Program including Tendon glides for intrinsic +/-, flat fist, and composite fist x 10 repetitions x 4 x daily; 0# Dorsal Flexion, Volar Flexion, Radial Deviation, Ulnar Deviation x 20 repetitions x 4 x daily; 0# Supination / Pronation x 3 second holds x 20 repetitions x 4 x daily.    Patient. Educated on modalities for home pain  management, activity modifications and precautions, Distal to proximal edema massage for edema management.   Patient. Demo understanding of above.     Patient/Family Education: role of Occupational Therapy, goals for Occupational Therapy, scheduling/cancellations - patient verbalized understanding. Discussed insurance limitations with patient.        Assessment:     Problem List :       Decreased Range of motion,  Decreased functional abilities,  Increased pain,   Edema      During the evaluation, the patient required Minimal modification or assistance.  The following co-morbid conditions/personal factors may affect the plan of care: previous history of (Left) wrist fracture.        Luann Borjas is a 64 y.o. female referred to outpatient occupational therapy and presents with a medical diagnosis of S52.531A (ICD-10-CM) - Closed Colles' fracture of right radius, resulting in limited range of motion, strength, functional abilities, increased pain and inflammation and demonstrates limitations as described in the chart above. Following medical record review it is determined that patient will benefit from occupational therapy services in order to maximize pain free and/or functional use of right wrist. The following goals were discussed with the patient and patient is in agreement with them as to be addressed in the treatment plan. The patient's rehab potential is Good.     Anticipated Barriers to Therapy: None     The following goals were discussed with the patient and patient is in agreement with them as to be addressed in the treatment plan.     Goals:   Goals:  1)   Patient to be Independent with Home exercise program and modalities for pain management by 1 week.   2)   Patient will report   3/10 pain on average with activity to assist with exercises by 6-8 weeks.  3)   Patient will increase range of Motion by 3-5 degrees to increase functional use for activities of daily living by 6-8 weeks.  4)    Patient will  decrease edema by 0.1-0.3 millimeters  to increase joint mobility /flexibility by 6-8 weeks.          Plan:   Patient to be treated by Occupational Therapy    2-3    times per week for     90 days   during the certification period from   12/10/2024  to 03/10/2025     to achieve the established goals.  Treatment to include :  paraffin, fluidotherapy, manual therapy/joint mobilizations, kinesiotaping, dry needling performed by certified physical therapist,   modalities for pain management, Ultrasound 1.0 /3.0mhz, therapeutic exercises/activities,        strengthening,    as well as any other treatments deemed necessary based on the patient's needs or progress.     Will reassess as needed and adjust treatment plan accordingly.              I certify the need for these services furnished under this plan of treatment and while under my care    ____________________________________                         __________________  Physician/Referring Practitioner                                               Date of Signature    Ayana Alvarado OT

## 2024-12-10 NOTE — PROGRESS NOTES
Patient evaluated and plan of care established.  Please sign and return if in agreement.    Thank you,  PETAR Christensen           Occupational Therapy [facility]  Initial Evaluation     Date: 12/10/2024  Name: Luann Borjas  Clinic Number: [id number]    Therapy Diagnosis: S52.531A (ICD-10-CM) - Closed Colles' fracture of right radius  Encounter Diagnosis   Name Primary?    Right wrist pain Yes     Physician: Tony Grande MD    Physician Orders: S52.531A (ICD-10-CM) - Closed Colles' fracture of right radius; evaluate and treat; Please begin therapy to the right wrist and hand. Include edema control and range of motion and eventually gentle strengthening   Surgical Procedure and Date: Not Applicable   Dominant Side: Right  Date of Onset: 10/09/2024  post injury: 8 weeks, 6 days  History / Mechanism of Injury: Patient had a fall while on a cruise.  Patient was seen by the medical staff on board and was told she had a fracture.  Patient followed up with orthopedics on 10/21/2024 and was placed in a well molded short arm cast.  Patient  followed up again on 11/18/2024 and cast was removed and patient placed in removable Velcro brace.  Patient returned for another follow up on 12/09/2024 and was told to begin to wean from Velcro splint and was referred to therapy for evaluation and treatment. History of (Left) wrist fracture  Previous Therapy:  outpatient after (Left) wrist fracture    Environmental Concerns/Fall Risk: FALL RISK!!!  Language: None  Cultural/Spiritual: None  Abuse/Neglect/Nutritional: None  Imaging / Tests: See Epic    Past Medical History/Physical Systems Review:    has a past medical history of Anxiety, Arthritis, Asthma, Cataract of left eye, Hyperlipidemia, and Sleep apnea.     has a past surgical history that includes Excision basal cell carcinoma (09/2019); Eye surgery (Right); Cataract extraction w/  intraocular lens implant (Right, 11/13/2019); Hysterectomy; and Ectopic pregnancy  "surgery.    has a current medication list which includes the following prescription(s): albuterol, alprazolam, amlodipine-benazepril 5-10 mg, atorvastatin, align, calcium carbonate, cholecalciferol (vitamin d3), ibuprofen, meclizine, prednisone, anoro ellipta, and vilazodone.    Review of patient's allergies indicates:   Allergen Reactions    Erythromycin Anaphylaxis    Buspirone     Ciprofloxacin     Levaquin [levofloxacin]     Zithromax z-masoud [azithromycin]         Insurance Authorization Period Expiration: 2024-2025  Plan of Care Certification Period: 12/10/2024-03/10/2025  Date of Return to MD: 2024    Occupation:  retired   Working presently: retired  Workers Compensation:  no      Visit # / Visits authorized:   Time In: 8:12  Time Out: 8:50  Total Billable Time:  10 minutes        Precautions:  Patient. Reports, "Skin Cancer, no pacemaker, no allergies to latex or adhesives."     Subjective     Patient Reports, "I had a fall while on a cruise 10/09/2024.  I was seen by the medical staff on board and was told me I had a fracture and put a cast on.  When I went into port in [facility] and got a new cast because of the swelling.   I went to orthopedics on 10/21/2024 and was placed in a well molded short arm cast. I had two cast with Dr. Grande.  Then I went back again on 2024 and the cast was removed and placed in removable Velcro brace.  I went back for a follow up on 2024 and was told to begin to wean from Velcro splint and was referred to therapy for evaluation and treatment. I am having difficulty brushing teeth, using restroom, getting up from the floor, Fine Motor Coordination, writing, lifting a pot, opening containers, turning knobs, using utensils to cut food, buttoning, zipping, braiding my hair.  I get cramping in the thumb area while using my hand."     Pain   At rest: 2/10  With work/ Activity: 5/10  Sleepin/10  Location of Pain: (Right) wrist "     Patient's Goals for Therapy: increase function    Objective     Sensation: grossly intact  Scar / Wound: Not Applicable   Edema:   centimeters (Right) / (Left)  Metacarpals:  20.1 / 19.8  Proximal Wrist Crease:  18.4 / 17.0                       Active Range of Motion: hand   Patient able to actively make loose composite fist and oppose (Bilateral) thumbs to palm.                                    Active Range of Motion: wrist                         (Right)   //  (Left)  Dorsal Flexion /Volar Flexion: 25 / 30   //  55/65  Radial Deviation /Ulnar Deviation: 5 / 12  //  15/25  Supination / Pronation: 50 / 55  //  80/80    Passive Range of Motion: wrist        (submaximally, within pain tolerance)                  (Right)    Dorsal Flexion /Volar Flexion: 35 / 35  Radial Deviation /Ulnar Deviation: 8 / 15  Supination / Pronation:  55 / 60    Comments: previous (Left) wrist fracture    Manual Muscle Test:  Wrist     (submaximally, within pain tolerance)        (Right)   Dorsal Flexion:  To be assessed   Volar Flexion:  To be assessed   Radial Deviation:  To be assessed   Ulnar Deviation: To be assessed          Strength: (BRITTNI Dynamometer in pounds),Position II  (submaximally, within pain tolerance)   (Right): To be assessed   (Left):  To be assessed     Pinch Strength: (Pinch Gauge in pounds)  (submaximally, within pain tolerance)              (Right) /  (Left)  Lateral Pinch:  To be assessed   3 Point Pinch:  To be assessed   2 Point Pinch:  To be assessed       FOTO SCORE: 49%      Treatment Included:   Occupational Therapy  evaluation and instruction in written Home Exercise Program including Tendon glides for intrinsic +/-, flat fist, and composite fist x 10 repetitions x 4 x daily; 0# Dorsal Flexion, Volar Flexion, Radial Deviation, Ulnar Deviation x 20 repetitions x 4 x daily; 0# Supination / Pronation x 3 second holds x 20 repetitions x 4 x daily.    Patient. Educated on modalities for home pain  management, activity modifications and precautions, Distal to proximal edema massage for edema management.   Patient. Demo understanding of above.     Patient/Family Education: role of Occupational Therapy, goals for Occupational Therapy, scheduling/cancellations - patient verbalized understanding. Discussed insurance limitations with patient.        Assessment:     Problem List :       Decreased Range of motion,  Decreased functional abilities,  Increased pain,   Edema      During the evaluation, the patient required Minimal modification or assistance.  The following co-morbid conditions/personal factors may affect the plan of care: previous history of (Left) wrist fracture.        Luann Borjas is a 64 y.o. female referred to outpatient occupational therapy and presents with a medical diagnosis of S52.531A (ICD-10-CM) - Closed Colles' fracture of right radius, resulting in limited range of motion, strength, functional abilities, increased pain and inflammation and demonstrates limitations as described in the chart above. Following medical record review it is determined that patient will benefit from occupational therapy services in order to maximize pain free and/or functional use of right wrist. The following goals were discussed with the patient and patient is in agreement with them as to be addressed in the treatment plan. The patient's rehab potential is Good.     Anticipated Barriers to Therapy: None     The following goals were discussed with the patient and patient is in agreement with them as to be addressed in the treatment plan.     Goals:   Goals:  1)   Patient to be Independent with Home exercise program and modalities for pain management by 1 week.   2)   Patient will report   3/10 pain on average with activity to assist with exercises by 6-8 weeks.  3)   Patient will increase range of Motion by 3-5 degrees to increase functional use for activities of daily living by 6-8 weeks.  4)    Patient will  decrease edema by 0.1-0.3 millimeters  to increase joint mobility /flexibility by 6-8 weeks.          Plan:   Patient to be treated by Occupational Therapy    2-3    times per week for     90 days   during the certification period from   12/10/2024  to 03/10/2025     to achieve the established goals.  Treatment to include :  paraffin, fluidotherapy, manual therapy/joint mobilizations, kinesiotaping, dry needling performed by certified physical therapist,   modalities for pain management, Ultrasound 1.0 /3.0mhz, therapeutic exercises/activities,        strengthening,    as well as any other treatments deemed necessary based on the patient's needs or progress.     Will reassess as needed and adjust treatment plan accordingly.              I certify the need for these services furnished under this plan of treatment and while under my care    ____________________________________                         __________________  Physician/Referring Practitioner                                               Date of Signature    Ayana Alvarado OT

## 2024-12-16 NOTE — PROGRESS NOTES
SUBJECTIVE:    Patient ID: Luann Borjas is a 62 y.o. female.    Chief Complaint: Follow-up    The patient returns with no new complaints. She is using Anoro for her COPD.  She is not having any breathing issues, has not had to use her rescue inhaler.  Recent PFT was normal, previous showed moderate obstruction. She has not slept on her CPAP like she had previously due to going on vacation and not taking machine with her. Her compliance report shows that she is 43% compliant with an average use of 2 hours and 42 minutes with an AHI of 12.5.  Past Medical History:   Diagnosis Date    Anxiety     Arthritis     Cataract of left eye     Hyperlipidemia     Sleep apnea     cpap     Past Surgical History:   Procedure Laterality Date    BASAL CELL CARCINOMA EXCISION  09/2019    lip    CATARACT EXTRACTION W/  INTRAOCULAR LENS IMPLANT Right 11/13/2019    Procedure: EXTRACTION, CATARACT, WITH IOL INSERTION;  Surgeon: Carmelo Perez II, MD;  Location: [facility] OR;  Service: Ophthalmology;  Laterality: Right;    ECTOPIC PREGNANCY SURGERY      EYE SURGERY Right     cataracts    HYSTERECTOMY       Family History   Problem Relation Age of Onset    COPD Mother     Liver disease Mother     Thyroid disease Mother     COPD Father     Diabetes Father     Hypertension Father     Pacemaker/defibrilator Father     COPD Sister     Hypertension Sister     Hypertension Brother         Social History:   Marital Status:   Occupation: teacher   Alcohol History:  reports current alcohol use of about 2.0 standard drinks per week.  Tobacco History:  reports that she quit smoking about 23 months ago. Her smoking use included cigarettes. She started smoking about 33 years ago. She smoked an average of .5 packs per day. She has never used smokeless tobacco.  Drug History:  reports no history of drug use.    Review of patient's allergies indicates:   Allergen Reactions    Erythromycin Anaphylaxis    Buspirone     Ciprofloxacin     Levaquin  "[levofloxacin]     Zithromax z-masoud [azithromycin]        Current Outpatient Medications   Medication Sig Dispense Refill    albuterol (VENTOLIN HFA) 90 mcg/actuation inhaler Inhale 2 puffs into the lungs every 6 (six) hours as needed for Wheezing. Rescue 18 g 6    amlodipine-benazepril 5-10 mg (LOTREL) 5-10 mg per capsule Take 1 capsule by mouth once daily. 90 capsule 1    atorvastatin (LIPITOR) 10 MG tablet TAKE 1 TABLET(10 MG) BY MOUTH EVERY DAY 90 tablet 1    calcium carbonate (OS-MELIA) 500 mg calcium (1,250 mg) chewable tablet Take 1 tablet by mouth once daily.      cholecalciferol, vitamin D3, (VITAMIN D3) 50 mcg (2,000 unit) Tab Take 1 tablet (2,000 Units total) by mouth once daily. 30 tablet 0    umeclidinium-vilanteroL (ANORO ELLIPTA) 62.5-25 mcg/actuation DsDv Inhale 1 puff into the lungs once daily. Controller 3 each 5    vilazodone (VIIBRYD) 20 mg Tab Take 1 tablet (20 mg total) by mouth once daily. 90 tablet 0    umeclidinium-vilanteroL (ANORO ELLIPTA) 62.5-25 mcg/actuation DsDv Inhale 1 puff into the lungs once daily. Controller 3 each 5     No current facility-administered medications for this visit.         Last PFT: 04/2022  Normal previous  PFT showed moderate obstruction that reversed with bronchodilator    Last CT:10/27/2021 5 mm nodule seen on previous CT earlier not seen on this one, but too much motion    Review of Systems  General: Feeling well  Eyes: Vision is good.  ENT:  allergies  Heart:: No chest pain or palpitations.  Lungs: no dyspnea, no cough   GI: No Nausea, vomiting, constipation, diarrhea, or reflux.  : Nocturia once sometimes  Musculoskeletal: No joint pain or myalgias.  Skin: No lesions or rashes.  Neuro: No headaches or neuropathy.  Lymph: No edema or adenopathy.  Psych: anxiety and depression  Endo: gaining weight    OBJECTIVE:      BP (!) 140/64 (BP Location: Left arm, Patient Position: Sitting, BP Method: Medium (Manual))   Pulse 78   Temp 98.3 °F (36.8 °C)   Ht 5' 6" " (1.676 m)   Wt 68.9 kg (152 lb)   SpO2 96%   BMI 24.53 kg/m²     Physical Exam  GENERAL: Midaged patient in no distress.  HEENT: Pupils equal and reactive. Extraocular movements intact. Nose intact.      Pharynx moist.  NECK: Supple.   HEART: Regular rate and rhythm. No murmur or gallop auscultated.  LUNGS: Clear to auscultation and percussion. Lung excursion symmetrical. No change in fremitus. No adventitial noises.  ABDOMEN: Bowel sounds present. Non-tender, no masses palpated.  EXTREMITIES: Normal muscle tone and joint movement, no cyanosis or clubbing.   LYMPHATICS: No adenopathy palpated, no edema.  SKIN: Dry, intact, no lesions.   NEURO: Cranial nerves II-XII intact. Motor strength 5/5 bilaterally, upper and lower extremities.  PSYCH: Appropriate affect.    Assessment:       1. CARO (obstructive sleep apnea)    2. Former smoker    3. Asthma, unspecified asthma severity, unspecified whether complicated, unspecified whether persistent    4. Pulmonary emphysema, unspecified emphysema type         Plan:       CARO (obstructive sleep apnea)    Former smoker  -     CT Chest Lung Screening Low Dose; Future    Asthma, unspecified asthma severity, unspecified whether complicated, unspecified whether persistent    Pulmonary emphysema, unspecified emphysema type    Other orders  -     umeclidinium-vilanteroL (ANORO ELLIPTA) 62.5-25 mcg/actuation DsDv; Inhale 1 puff into the lungs once daily. Controller  Dispense: 3 each; Refill: 5         Follow up in about 6 months (around 3/28/2023).      Continue Anoro  Keep Sleeping on CPAP nightly  Screening CT                    ashlie

## 2024-12-19 DIAGNOSIS — M25.531 RIGHT WRIST PAIN: Primary | ICD-10-CM

## 2024-12-19 NOTE — PROGRESS NOTES
[facility]  Occupational Therapy Treatment Note     Date: 2024  Name: Luann Borjas  Clinic Number: [id number]    Therapy Diagnosis: S52.531A (ICD-10-CM) - Closed Colles' fracture of right radius       Encounter Diagnosis   Name Primary?    Right wrist pain Yes      Physician: Tony Grande MD     Physician Orders: S52.531A (ICD-10-CM) - Closed Colles' fracture of right radius; evaluate and treat; Please begin therapy to the right wrist and hand. Include edema control and range of motion and eventually gentle strengthening   Surgical Procedure and Date: Not Applicable   Dominant Side: Right  Date of Onset: 10/09/2024  post injury: 8 weeks, 6 days    Time In 0700  Time Out 0800  Total Time 60 min    Subjective     Pt reports: she is doing better. Pain is less and mobility is improving. Looking forward to washing dishes as the warm water feels good.   She was  compliant with home exercise program given last session.   Response to previous treatment:Good   Functional change: brushing teeth is easier, washing under L arm, chopping for cooking     Pain:  At rest: 2/10  With work/ Activity: 5/10  Sleepin/10  At worst: 6/10  Location of Pain: (Right) wrist       Objective     Objective Measures updated at progress report unless specified.  Wrist E/F measured this date    Edema:   centimeters (Right) / (Left)  Metacarpals:  20.1 / 19.8  Proximal Wrist Crease:  18.4 / 17.0                                   Active Range of Motion: hand   Patient able to actively make loose composite fist and oppose (Bilateral) thumbs to palm.                                    Active Range of Motion: wrist                         (Right)   //              (Left)  Dorsal Flexion /Volar Flexion: 49 / 34  (+24/+4)  //  55/65  Radial Deviation /Ulnar Deviation: 5 / 12  //            15/25  Supination / Pronation: 50 / 55  //                            80/80     Passive Range of Motion: wrist         (submaximally, within pain tolerance)                  (Right)    Dorsal Flexion /Volar Flexion: 35 / 35  Radial Deviation /Ulnar Deviation: 8 / 15  Supination / Pronation:  55 / 60         Treatment     Luann received the treatments listed below:      Supervised Modalities after being cleared for contradictions:   Fluidotherapy x 15 min to R hand/arm  to decrease pain, desensitize, and increase tissue extensibility.       Manual Therapy Techniques were applied for 3 minutes, including:  -Retrograde massage to R digits/hand/wrist x 3 min to stimulate lymphatics to decrease pain,  edema and increase AROM and functional use.       Therapeutic Exercises to develop  for  ROM, endurance, and/or strength for 34 minutes including:  Tendon glides for intrinsic +/-, flat fist, and composite fist ;   0# Dorsal Flexion, Volar Flexion, Radial Deviation, Ulnar Deviation x 20 repetitions x 4 x daily; 0# Supination / Pronation (reviewed)   Wrist AROM with dowel over wedge 4 ways 2x10  FA rotation with dowel, elbow at side 2x10   Gentle PROM wrist and FA all planes  Gentle passive stretch isolated DIPJ and hook and fist     Therapeutic Activities to improve functional performance for 8  minutes, including:  Isospheres 3 min  In hand manipulation with medium pom poms 1 container      Patient Education and Home Exercises     Education provided:   continue same      Written Home Exercises Provided: Patient instructed to cont prior HEP.  Exercises were reviewed and Luann was able to demonstrate them prior to the end of the session.  Luann demonstrated good  understanding of the HEP provided. See EMR under Patient Instructions for exercises provided during therapy sessions.       Assessment     Pt would continue to benefit from skilled OT. Good early gains for wrist E/F AROM. Pt with good understanding of HEP.  Functional use improving per report.     Luann is progressing well towards her goals and there are no updates to goals at this  time.   - Progress towards goals: Good; STG 1 met     Pt prognosis is Good.    Pt will continue to benefit from skilled outpatient occupational therapy to address the deficits listed in the problem list on initial evaluation provide pt/family education and to maximize pt's level of independence in the home and community environment.     Pt's spiritual, cultural and educational needs considered and pt agreeable to plan of care and goals.    Anticipated barriers to occupational therapy: none     Goals:  1)   Patient to be Independent with Home exercise program and modalities for pain management by 1 week.  (Met 12/23/25)  2)   Patient will report   3/10 pain on average with activity to assist with exercises by 6-8 weeks.  3)   Patient will increase range of Motion by 3-5 degrees to increase functional use for activities of daily living by 6-8 weeks.  4)    Patient will decrease edema by 0.1-0.3 millimeters  to increase joint mobility /flexibility by 6-8 weeks.            Plan     Certification period through 3/10/2025  Continue Occupational Therapy 2-3 times per week x 12 weeks to decrease pain and edema, and increase A/PROM, strength, and functional use of R upper extremity.     Updates/Grading for next session: progress as tolerated.    PETAR Cabrera, CHT

## 2024-12-23 ENCOUNTER — CLINICAL SUPPORT (OUTPATIENT)
Dept: REHABILITATION | Facility: HOSPITAL | Age: 64
End: 2024-12-23
Payer: OTHER GOVERNMENT

## 2024-12-23 DIAGNOSIS — M25.531 RIGHT WRIST PAIN: Primary | ICD-10-CM

## 2024-12-23 DIAGNOSIS — F41.1 GAD (GENERALIZED ANXIETY DISORDER): ICD-10-CM

## 2024-12-23 PROCEDURE — 97110 THERAPEUTIC EXERCISES: CPT | Mod: PN

## 2024-12-23 PROCEDURE — 97022 WHIRLPOOL THERAPY: CPT | Mod: PN

## 2024-12-23 PROCEDURE — 97530 THERAPEUTIC ACTIVITIES: CPT | Mod: PN

## 2024-12-23 RX ORDER — ALPRAZOLAM 0.5 MG/1
0.5 TABLET ORAL DAILY PRN
Qty: 30 TABLET | Refills: 0 | Status: SHIPPED | OUTPATIENT
Start: 2024-12-23 | End: 2025-01-22

## 2024-12-30 ENCOUNTER — HOSPITAL ENCOUNTER (OUTPATIENT)
Dept: RADIOLOGY | Facility: HOSPITAL | Age: 64
Discharge: HOME | End: 2024-12-30
Attending: PHYSICIAN ASSISTANT
Payer: OTHER GOVERNMENT

## 2024-12-30 ENCOUNTER — OFFICE VISIT (OUTPATIENT)
Dept: ORTHOPEDICS | Facility: CLINIC | Age: 64
End: 2024-12-30
Payer: OTHER GOVERNMENT

## 2024-12-30 ENCOUNTER — CLINICAL SUPPORT (OUTPATIENT)
Dept: REHABILITATION | Facility: HOSPITAL | Age: 64
End: 2024-12-30
Payer: OTHER GOVERNMENT

## 2024-12-30 DIAGNOSIS — S52.531D CLOSED COLLES' FRACTURE OF RIGHT RADIUS WITH ROUTINE HEALING, SUBSEQUENT ENCOUNTER: Primary | ICD-10-CM

## 2024-12-30 DIAGNOSIS — M25.531 RIGHT WRIST PAIN: ICD-10-CM

## 2024-12-30 DIAGNOSIS — M25.531 RIGHT WRIST PAIN: Primary | ICD-10-CM

## 2024-12-30 PROCEDURE — 73110 X-RAY EXAM OF WRIST: CPT | Mod: TC,PO,RT

## 2024-12-30 PROCEDURE — 99213 OFFICE O/P EST LOW 20 MIN: CPT | Mod: PBBFAC,25,PO | Performed by: PHYSICIAN ASSISTANT

## 2024-12-30 PROCEDURE — 97022 WHIRLPOOL THERAPY: CPT | Mod: PN

## 2024-12-30 PROCEDURE — 97110 THERAPEUTIC EXERCISES: CPT | Mod: PN

## 2024-12-30 PROCEDURE — 97530 THERAPEUTIC ACTIVITIES: CPT | Mod: PN

## 2024-12-30 PROCEDURE — 73110 X-RAY EXAM OF WRIST: CPT | Mod: 26,RT,, | Performed by: RADIOLOGY

## 2024-12-30 PROCEDURE — 99213 OFFICE O/P EST LOW 20 MIN: CPT | Mod: S$PBB,,, | Performed by: PHYSICIAN ASSISTANT

## 2024-12-30 PROCEDURE — 99999 PR PBB SHADOW E&M-EST. PATIENT-LVL III: CPT | Mod: PBBFAC,,, | Performed by: PHYSICIAN ASSISTANT

## 2024-12-30 NOTE — PROGRESS NOTES
[facility]  Occupational Therapy Treatment Note     Date: 2024  Name: Luann Borjas  Clinic Number: [id number]    Therapy Diagnosis: S52.531A (ICD-10-CM) - Closed Colles' fracture of right radius       Encounter Diagnosis   Name Primary?    Right wrist pain Yes      Physician: Tony Grande MD     Physician Orders: S52.531A (ICD-10-CM) - Closed Colles' fracture of right radius; evaluate and treat; Please begin therapy to the right wrist and hand. Include edema control and range of motion and eventually gentle strengthening   Surgical Procedure and Date: Not Applicable   Dominant Side: Right  Date of Onset: 10/09/2024    post injury: 11 weeks on 24   Visit 3/pending    Time In 0710  Time Out 0815  Total Time 65 min    Subjective     Pt reports: sometimes her fingers get numb once per week. It can be any of the fingers. Unable to pour coffee from the pot yet. Folding clothes is also difficult.   She was  compliant with home exercise program given last session.   Response to previous treatment:Good   Functional change:  chopping vegetables    Pain:  At rest: 2/10  With work/ Activity: 5/10  Sleepin/10  At worst: 6/10  Location of Pain: (Right) wrist       Objective     Objective Measures updated at progress report unless specified.  Wrist and FA AROM measured this date    Edema:   centimeters (Right) / (Left)  Metacarpals:  20.1 / 19.8  Proximal Wrist Crease:  18.4 / 17.0                      Active Range of Motion: wrist                         (Right)   //              (Left)  Dorsal Flexion /Volar Flexion: 50 / 35  (+1/+1)  //  55/65  Radial Deviation /Ulnar Deviation: 15 / 16(+10/+4) //            15/25  Supination / Pronation: 50 / 55 (+15/+20)   //                            80/80     Passive Range of Motion: wrist        (submaximally, within pain tolerance)                  (Right)    Dorsal Flexion /Volar Flexion: 35 / 35  Radial Deviation /Ulnar  Deviation: 8 / 15  Supination / Pronation:  55 / 60         Treatment     Luann received the treatments listed below:      Supervised Modalities after being cleared for contradictions:   Fluidotherapy x 20 min to R hand/arm  to decrease pain, desensitize, and increase tissue extensibility.       Manual Therapy Techniques were applied for 3 minutes, including:  -Retrograde massage to R digits/hand/wrist x 3 min to stimulate lymphatics to decrease pain,  edema and increase AROM and functional use.       Therapeutic Exercises to develop  for  ROM, endurance, and/or strength for 27 minutes including:  Tendon glides for intrinsic +/-, flat fist, and composite fist in fluido   Wrist AROM with 1#  over wedge 4 ways 2x10 ea   FA rotation with 1/2#, elbow at side 2x10   Gentle passive stretch wrist and FA all planes  Gentle passive stretch isolated DIPJ and hook on all fingers     Therapeutic Activities to improve functional performance for 15  minutes, including:  Isospheres 3 min  In hand manipulation with coins/washer container   Yellow sponge squeezes 3 min    Wheel for wrist E/F 3 min      Patient Education and Home Exercises     Education provided:   continue same      Written Home Exercises Provided: Patient instructed to cont prior HEP.  Exercises were reviewed and Luann was able to demonstrate them prior to the end of the session.  Luann demonstrated good  understanding of the HEP provided. See EMR under Patient Instructions for exercises provided during therapy sessions.       Assessment     Pt would continue to benefit from skilled OT.  AROM improved for wrist RD/UD and FA rotation. Progressed to light PREs. Mild tightness noted only with intrinsic stretching of fingers.  Luann is progressing well towards her goals and there are no updates to goals at this time.   - Progress towards goals: Good;     Pt prognosis is Good.    Pt will continue to benefit from skilled outpatient occupational therapy to address the  deficits listed in the problem list on initial evaluation provide pt/family education and to maximize pt's level of independence in the home and community environment.     Pt's spiritual, cultural and educational needs considered and pt agreeable to plan of care and goals.    Anticipated barriers to occupational therapy: none     Goals:  1)   Patient to be Independent with Home exercise program and modalities for pain management by 1 week.  (Met 12/23/25)  2)   Patient will report   3/10 pain on average with activity to assist with exercises by 6-8 weeks.  3)   Patient will increase range of Motion by 3-5 degrees to increase functional use for activities of daily living by 6-8 weeks.  4)    Patient will decrease edema by 0.1-0.3 millimeters  to increase joint mobility /flexibility by 6-8 weeks.            Plan     Certification period through 3/10/2025  Continue Occupational Therapy 2-3 times per week x 12 weeks to decrease pain and edema, and increase A/PROM, strength, and functional use of R upper extremity.     Updates/Grading for next session: progress as tolerated.    PETAR Cabrera, CHT

## 2024-12-30 NOTE — PROGRESS NOTES
2024    HPI:  Luann Borjas is a 64 y.o. female, who presents to clinic today for continued evaluation of her right distal radius fracture.  She is about 12 weeks status post injury.  States overall she is doing well.  States he has performed occupational therapy and worn the splint as instructed.  Denies acute injuries since last visit.  Denies any other complaints at this time.    PMHX:  Past Medical History:   Diagnosis Date    Anxiety     Arthritis     Asthma     Cataract of left eye     Hyperlipidemia     Sleep apnea     cpap       PSHX:  Past Surgical History:   Procedure Laterality Date    BASAL CELL CARCINOMA EXCISION  2019    lip    CATARACT EXTRACTION W/  INTRAOCULAR LENS IMPLANT Right 2019    Procedure: EXTRACTION, CATARACT, WITH IOL INSERTION;  Surgeon: Carmelo Perez II, MD;  Location: [facility];  Service: Ophthalmology;  Laterality: Right;    ECTOPIC PREGNANCY SURGERY      EYE SURGERY Right     cataracts    HYSTERECTOMY         FMHX:  Family History   Problem Relation Name Age of Onset    COPD Mother      Liver disease Mother      Thyroid disease Mother      COPD Father      Diabetes Father      Hypertension Father      Pacemaker/defibrilator Father      COPD Sister      Hypertension Sister      Hypertension Brother         SOCHX:  Social History     Tobacco Use    Smoking status: Former     Current packs/day: 0.00     Average packs/day: 0.5 packs/day for 32.0 years (16.0 ttl pk-yrs)     Types: Cigarettes     Start date: 1988     Quit date: 10/25/2020     Years since quittin.1     Passive exposure: Past    Smokeless tobacco: Never   Substance Use Topics    Alcohol use: Yes     Alcohol/week: 2.0 standard drinks of alcohol     Types: 2 Glasses of wine per week       ALLERGIES:  Erythromycin, Buspirone, Ciprofloxacin, Levaquin [levofloxacin], and Zithromax z-masoud [azithromycin]    CURRENT MEDICATIONS:  Current Outpatient Medications on File Prior to Visit   Medication Sig Dispense  Refill    albuterol (PROAIR HFA) 90 mcg/actuation inhaler Inhale 2 puffs into the lungs every 6 (six) hours as needed for Wheezing. Rescue 18 g 2    ALPRAZolam (XANAX) 0.5 MG tablet Take 1 tablet (0.5 mg total) by mouth daily as needed for Anxiety. 30 tablet 0    amlodipine-benazepril 5-10 mg (LOTREL) 5-10 mg per capsule Take 1 capsule by mouth once daily. 90 capsule 1    atorvastatin (LIPITOR) 10 MG tablet Take 1 tablet (10 mg total) by mouth once daily. 30 tablet 1    Bifidobacterium infantis (ALIGN) 4 mg Cap Take 1 capsule (4 mg total) by mouth Daily. 30 capsule 0    calcium carbonate (OS-MELIA) 500 mg calcium (1,250 mg) chewable tablet Take 1 tablet by mouth once daily.      cholecalciferol, vitamin D3, 1,250 mcg (50,000 unit) Tab Take 1,250 mcg by mouth every 7 days. 12 tablet 0    umeclidinium-vilanteroL (ANORO ELLIPTA) 62.5-25 mcg/actuation DsDv Inhale 1 puff into the lungs once daily. Controller 3 each 5    vilazodone (VIIBRYD) 40 mg Tab tablet Take 1 tablet (40 mg total) by mouth once daily. 90 tablet 1    [DISCONTINUED] ibuprofen (ADVIL,MOTRIN) 600 MG tablet Take 1 tablet (600 mg total) by mouth every 8 (eight) hours as needed for Pain. 12 tablet 0    [DISCONTINUED] meclizine (ANTIVERT) 12.5 mg tablet Take 1 tablet (12.5 mg total) by mouth 3 (three) times daily as needed. 30 tablet 0    [DISCONTINUED] predniSONE (DELTASONE) 10 MG tablet Take 4 tabs x 2 days, then take 3 tabs x 2 days, then take 2 tabs x 2 days, then take 1 tab x 2 days. 20 tablet 0     No current facility-administered medications on file prior to visit.       REVIEW OF SYSTEMS:  Review of Systems Complete; Negative, unless noted above.    GENERAL PHYSICAL EXAM:   There were no vitals taken for this visit.   GEN: well developed, well nourished, no acute distress   PULM: No wheezing, no respiratory distress   CV: RRR    ORTHO EXAM:   Examination of the right wrist reveals no edema, erythema, ecchymosis, or skin breakdown.  Able make composite  fist and fully extend all fingers.  Mildly reduced range of motion regards to flexion/extension of the right wrist.  Normal sensation in the radial, ulnar, median nerve distributions.  Capillary refill is 2 seconds.    RADIOLOGY:   X-rays of the right wrist were taken today in clinic.  X-rays read by myself.  Imaging showed the presence of intra-articular distal radius fracture of the right wrist with routine healing.  No significant change in position or alignment compared to previous imaging.  No other significant bony abnormalities noted.    ASSESSMENT:   Mildly displaced distal radius fracture of the right wrist with routine healing    PLAN:  1. I discussed with Luann Borjas the best course of action this time is transition to wearing the removable Velcro short wrist splint for heavier activities only.  We did discuss she can begin aggressive range of motion and strengthening with occupational therapy.  She verbally agreed with the treatment plan.      2. I would like her follow up in clinic in 5 weeks for repeat evaluation.  She was instructed to contact clinic for any problems or concerns in the interim.

## 2025-01-03 NOTE — PROGRESS NOTES
"                                  Occupational Therapy Progress Note       Date: 1/7/2025  Name: Luann Borjas  Clinic Number: [id number]    Therapy Diagnosis: S52.531A (ICD-10-CM) - Closed Colles' fracture of right radius  Encounter Diagnosis   Name Primary?    Right wrist pain Yes     Physician: Tony Grande MD    Physician Orders: S52.531A (ICD-10-CM) - Closed Colles' fracture of right radius; evaluate and treat; Please begin therapy to the right wrist and hand. Include edema control and range of motion and eventually gentle strengthening   Medical Diagnosis: S52.531A (ICD-10-CM) - Closed Colles' fracture of right radius  Surgical Procedure and Date: Not Applicable     Insurance Authorization Period Expiration: 12/04/2024-04/03/2025  Plan of Care Certification Period: 01/07/2025-04/07/2025  Date of Return to MD: 01/28/2025    Evaluation FOTO: 12/10/2024 = 49%  Reassessment FOTO: 01/07/2025 = 45%    Visit # / Visits authorized:  4 / 16   Time In: 7:35  Time Out: 8:15   Total Billable Time:  40 minutes    Precautions:  Standard; Velcro short wrist splint for heavier activities only;  begin aggressive range of motion and strengthening       Post Injury:  10/09/2024      Subjective     Patient reports: "I am having a little more pain this morning because its cold.  I went back to see the doctor and he said we can push it in therapy."     Pain: 3/10   Location of pain: (Right) wrist      Objective    Patient seen by Occupational Therapy this session. Tx consisted of:      Supervised modalities after being cleared for contradictions  x   10 minutes:     -Fluidotherapy to  (Right)   hand to increase blood flow, circulation, tissue elasticity, desensitization, sensory re-education and for pain management  x 10 minutes.        Therapeutic Exercises to improve functional performance while increasing strength, endurance, range of motion,  and flexibility  x    20 minutes:    -Manual Therapy techniques to (Right) wrist " "and digits  including gentle stretching, and Passive Range of Motion to increase joint mobility, range of motion  and for pain management  x  5 minutes     -Soft Tissue Mobilization to   (Right) hand into wrist and forearm from distal to proximal to decrease edema and increase range of motion and functional abilities  x  2  minutes    - Active Assistive Range of Motion for (Right) wrist in all planes  x 10 repetitions  - Tendon glides for intrinsic plus, minus, flat fist and composite fist x 5 repetitions -NP  - 1# Dorsiflexion/Volar flexion (Supination /Pronation ), Radial Deviation / Ulnar Deviation x 10 repetitions  - HAMMER stretch for Supination / Pronation x 10 repetitions   - Weighted ball on tabletop for Dorsiflexion /Volar Flexion  stretches x 10 repetitions  - "Prayer Stretch" for Dorsal Flexion x 10 seconds x 5 repetitions         Therapeutic Activities  to improve functional performance while increasing independence with ADLs & IADLs  x    10  minutes:    - [place] Slot with (10) LARGE washers and  (10)  pennies ( 5 in hand) for Fine Motor Coordination x 1 repetitions    - YELLOW sponge squeezes for composite  x 20 repetitions   - TAN Theraputty for rolling x 10 repetitions   - TAN Theraputty for 3 Point Pinch x 10 repetitions   - TAN Theraputty for composite  x 10 repetitions       -Updated Home Exercise Program: Patient provided with and educated on written Home Exercise Program with HAMMER for Supination / Pronation, "Prayer stretch" for Dorsal Flexion; TAN Theraputty rolling, 3 Point Pinch, and composite .  See EMR under "patient instructions."  Patient demo understanding of above.      -NP= Not Performed     Initiate in future therapy sessions:      - Wrist roller coaster for Active Range of Motion  of wrist in all planes x 5 repetitions  (NEXT SESSION)   - YELLOW (non weighted) ball [place] for Supination/Pronation stretch ( in lap) x 10 repetitions  ( NEXT SESSION)   - Wrist wheel for " "Supination / Pronation stretch x 10 repetitions (NEXT SESSION)   - Wrist wheel for Radial Deviation / Ulnar Deviation stretches x 10 repetitions (NEXT SESSION)     - Colored bead UNlacing with thumb to base of 5th digit x 10 beads  - Colored bead UNlacing with 2nd-5th digit tips to palm x 20 beads  - Mini colored pegboard for Fine Motor Coordination and in-hand manipulation  (5 in-hand) x 20 pegs       -  ###Progressive Hand Gripper (black spring) for composite  x 20 repetitions   - ### Theraputty for rolling, pulling, PVC for "cookie cutting" and medicine top x 10 Repetitions   - ### digiflex for isolated x 10 repetitions;  ### digiflex for composite digital flexion x 20 repetitions    - Wooden pegboard with ### clothespins with 3PT pinch x 2 repetitions   - YELLOW digiweb for composite digital Extension and  intrinsics x 20 repetitions     -  #### dowel for Supination /Pronation  x 20 repetitions   - Low load prolonged stretches for Dorsiflexion /Volar Flexion with ### leg weight  (Table edge) x 30 seconds each x 2 repetitions      - ### Flexbar for Supination /Pronation  and Dorsiflexion /Volar Flexion x 20 repetitions    - ### Wrist exerstick in standing for Dorsiflexion / Volar Flexion  x 3 repetitions      Patient reassessed as follows:    Edema:   centimeters (Right) / (Left)  Metacarpals:  19.9 (-0.3)  / 19.8  Proximal Wrist Crease:  17.1 (-1.3)  / 17.0        Active Range of Motion: hand   Patient able to actively make loose composite fist and oppose (Bilateral) thumbs to palm.                                    Active Range of Motion: wrist                         (Right)   //  (Left)  Dorsal Flexion /Volar Flexion: 40 (+15)  / 50 (+20)    //  55/65  Radial Deviation /Ulnar Deviation: 12 (+7)  / 18 (+3)   //  15/25  Supination / Pronation: 55 (+5)  / 60 (+5)   //  80/80    Passive Range of Motion: wrist        (submaximally, within pain tolerance)                  (Right)    Dorsal Flexion /Volar " "Flexion: 45 (+10)  / 55 (+20)   Radial Deviation /Ulnar Deviation: 15 (+7) / 25 (+10)   Supination / Pronation:  60 (+5)  / 75 (+15)     Comments: previous (Left) wrist fracture    Manual Muscle Test:  Wrist     (submaximally, within pain tolerance)        (Right)   Dorsal Flexion:   3/5  Volar Flexion:  3/5  Radial Deviation:  3/5  Ulnar Deviation: 3+/5         Strength: (BRITTNI Dynamometer in pounds),Position II  (submaximally, within pain tolerance)   (Right): 25  (Left):  50    Pinch Strength: (Pinch Gauge in pounds)  (submaximally, within pain tolerance)              (Right) /  (Left)  Lateral Pinch:  14 / 20  3 Point Pinch:   10 /15  2 Point Pinch:   8 / 14      Assessment     Patient will continue to benefit from skilled Occupational Therapy intervention to increase functional abilities, range of motion, and strength and pain control.  Patient. demonstrated proper understanding of each exercise.  Patient continues to require verbal and tactile cues for throughout therapy session to maintain position and prevent compensation.   Patient continues to be limited in functional and leisurely pursuits. Pain limits patient's participation in activities of daily living.  Patient is not able to carryout necessary vocational tasks.   Patient's spiritual, cultural and educational needs considered and patient agreeable to plan of care and goals.  Patient is making good progress towards established goals.  Patient tolerated exercises / activities within pain tolerance.   Reviewed Home Exercise Program with patient., see EPIC under "patient instructions" for provided exercises, activity modifications and limitations, modalities for home pain management.  Patient. demo understanding of above.    Patient. tolerated Fluidotherapy with no irritation.     Patient tolerated addition of Hammer for Supination / Pronation stretch and "Prayer stretch" for Dorsal Flexion with tolerable stretching pain reported. Patient tolerated " addition of Theraputty for rolling, composite , and 3 Point Pinch with no reported pain but demo fatigue.      Patient demo decreased edema in (Right) metacarpals, Proximal Wrist Crease; increased Passive range of motion and Active range of motion for (Right) wrist Dorsal Flexion, Volar Flexion, Radial Deviation, Ulnar Deviation, Supination, and Pronation. Patient reported decreased pain on average with activity.      New/Revised Goals: Continue Plan Of Care   Goals:  1)   Patient to be Independent with Home exercise program and modalities for pain management by 1 week. (MET)   2)   Patient will report   1/10 pain on average with activity to assist with exercises by 6-8 weeks.  ( Revised 01/07/2025)   3)   Patient will increase range of Motion by 3-5 degrees to increase functional use for activities of daily living by 6-8 weeks.  ( In Progress)   4)    Patient will decrease edema by 0.1-0.3 millimeters  to increase joint mobility /flexibility by 6-8 weeks.  (MET)   5)   Patient will increase manual muscle testing by a grade to assist with lifting items by 6-8 weeks. ( Added 01/07/2025)   6)   Patient will increase  strength 3-5 pounds to open containers by 6-8 weeks.  ( Added 01/07/2025)   7)   Patient will increase pinch by 1-3 pounds for buttoning by 6-8 weeks.  ( Added 01/07/2025)       Plan      Continue 2x week during the 90 day certification period    01/07/2025   to 04/07/2025   with established Plan Of Care in pursuit of Occupational Therapy goals.      Ayana Alvarado, OT

## 2025-01-03 NOTE — PROGRESS NOTES
"                                 Occupational Therapy Daily Treatment Note         Date: 1/9/2025  Name: Luann Borjas  Clinic Number: [id number]     Therapy Diagnosis: S52.531A (ICD-10-CM) - Closed Colles' fracture of right radius       Encounter Diagnosis   Name Primary?    Right wrist pain Yes      Physician: Tony Grande MD     Physician Orders: S52.531A (ICD-10-CM) - Closed Colles' fracture of right radius; evaluate and treat; Please begin therapy to the right wrist and hand. Include edema control and range of motion and eventually gentle strengthening   Medical Diagnosis: S52.531A (ICD-10-CM) - Closed Colles' fracture of right radius  Surgical Procedure and Date: Not Applicable      Insurance Authorization Period Expiration: 12/04/2024-04/03/2025  Plan of Care Certification Period: 01/07/2025-04/07/2025  Date of Return to MD: 01/28/2025     Evaluation FOTO: 12/10/2024 = 49%  Reassessment FOTO: 01/07/2025 = 45%     Visit # / Visits authorized:  6 / 16   Time In: 7:30   Time Out: 8:15    Total Billable Time:  40 minutes     Precautions:  Standard; Velcro short wrist splint for heavier activities only;  begin aggressive range of motion and strengthening         Post Injury:  10/09/2024        Subjective      Patient reports: "I think I fell asleep on my hand with my wrist bent last night.  I woke up this morning and my wrist was sore.  I think the weather is also making my hand sore.  I have been working my hand at home."       Pain: 3/10   Location of pain: (Right) wrist       Objective    Patient seen by Occupational Therapy this session. Tx consisted of:        Supervised modalities after being cleared for contradictions  x   10 minutes:      -Fluidotherapy to  (Right)   hand to increase blood flow, circulation, tissue elasticity, desensitization, sensory re-education and for pain management  x 10 minutes.           Therapeutic Exercises to improve functional performance while increasing strength, " "endurance, range of motion,  and flexibility  x    18 minutes:     -Manual Therapy techniques to (Right) wrist and digits  including gentle stretching, and Passive Range of Motion to increase joint mobility, range of motion  and for pain management  x  5 minutes      -Soft Tissue Mobilization to   (Right) hand into wrist and forearm from distal to proximal to decrease edema and increase range of motion and functional abilities  x  1  minute     - Low load prolonged stretches for Dorsiflexion /Volar Flexion with  1.5# leg weight  (Table edge) x 30 seconds each x 2 repetitions    - Active Assistive Range of Motion for (Right) wrist in all planes  x 10 repetitions  - Tendon glides for intrinsic plus, minus, flat fist and composite fist x 5 repetitions -NP  - 1# Dorsiflexion/Volar flexion (Supination /Pronation ), Radial Deviation / Ulnar Deviation x 10 repetitions  - HAMMER stretch for Supination / Pronation x 10 repetitions   - Weighted ball on tabletop for Dorsiflexion /Volar Flexion  stretches x 10 repetitions  - "Prayer Stretch" for Dorsal Flexion x 10 seconds x 5 repetitions -NP  - Wrist roller coaster for Active Range of Motion  of wrist in all planes x 5 repetitions    - YELLOW (non weighted) ball [place] for Supination/Pronation stretch ( in lap) x 10 repetitions             Therapeutic Activities  to improve functional performance while increasing independence with ADLs & IADLs  x    12  minutes:        - Mini colored pegboard for Fine Motor Coordination and in-hand manipulation  (5 in-hand) x 20 pegs   - RED sponge squeezes for composite  x 20 repetitions   - TAN Theraputty for rolling x 10 repetitions  -NP  - TAN Theraputty for 3 Point Pinch x 10 repetitions  -NP  - TAN Theraputty for composite  x 10 repetitions -NP  - Wrist wheel for Supination / Pronation stretch x 10 repetitions   - Wrist wheel for Radial Deviation / Ulnar Deviation stretches x 10 repetitions   - Wooden pegboard with RED clothespins " "with 3PT pinch x 2 repetitions  - RED Theraputty with PVC for "cookie cutting" and medicine top x 10 repetitions            -NP= Not Performed      Initiate in future therapy sessions:         - Colored bead UNlacing with thumb to base of 5th digit x 10 beads  - Colored bead UNlacing with 2nd-5th digit tips to palm x 20 beads                   -  ###Progressive Hand Gripper (black spring) for composite  x 20 repetitions              - ### Theraputty for rolling, (Bilateral) pulling  x 10 Repetitions   - ### digiflex for isolated x 10 repetitions;  ### digiflex for composite digital flexion x 20 repetitions               - YELLOW digiweb for composite digital Extension and  intrinsics x 10 repetitions                -  #### dowel for Supination /Pronation  x 10 repetitions                  - ### Flexbar for Supination /Pronation  and Dorsiflexion /Volar Flexion x 10 repetitions               - ### Wrist exerstick in standing for Dorsiflexion / Volar Flexion  x 3 repetitions                          Assessment      Patient will continue to benefit from skilled Occupational Therapy intervention to increase functional abilities, range of motion, and strength and pain control.  Patient. demonstrated proper understanding of each exercise.  Patient continues to require verbal and tactile cues for throughout therapy session to maintain position and prevent compensation.   Patient continues to be limited in functional and leisurely pursuits. Pain limits patient's participation in activities of daily living.  Patient is not able to carryout necessary vocational tasks.   Patient's spiritual, cultural and educational needs considered and patient agreeable to plan of care and goals.  Patient is making good progress towards established goals.  Patient tolerated exercises / activities within pain tolerance.   Reviewed Home Exercise Program with patient., see EPIC under "patient instructions" for provided exercises, activity " "modifications and limitations, modalities for home pain management.  Patient. demo understanding of above.     Patient. tolerated Fluidotherapy with no irritation.      Patient tolerated increase in resistance during sponge squeezes for composite  with no reported pain. Patient tolerated addition of  Low load prolonged stretches for Dorsiflexion /Volar Flexion with tolerable stretching pain reported.       Patient tolerated addition of Theraputty with PVC for "cookie cutting" and medicine topwith no reported pain.         Active Range of Motion: wrist                         (Right)   //  (Left)  (Pre session)   Dorsal Flexion /Volar Flexion: 40   / 45     //  55/65  Radial Deviation /Ulnar Deviation: 12   / 15   //  15/25  Supination / Pronation: 60   / 60    //  80/80          New/Revised Goals: Continue Plan Of Care   Goals:  1)   Patient to be Independent with Home exercise program and modalities for pain management by 1 week. (MET)   2)   Patient will report   1/10 pain on average with activity to assist with exercises by 6-8 weeks.  ( Revised 01/07/2025)   3)   Patient will increase range of Motion by 3-5 degrees to increase functional use for activities of daily living by 6-8 weeks.  ( In Progress)   4)    Patient will decrease edema by 0.1-0.3 millimeters  to increase joint mobility /flexibility by 6-8 weeks.  (MET)   5)   Patient will increase manual muscle testing by a grade to assist with lifting items by 6-8 weeks. ( Added 01/07/2025)   6)   Patient will increase  strength 3-5 pounds to open containers by 6-8 weeks.  ( Added 01/07/2025)   7)   Patient will increase pinch by 1-3 pounds for buttoning by 6-8 weeks.  ( Added 01/07/2025)         Plan       Continue 2x week during the 90 day certification period    01/07/2025   to 04/07/2025   with established Plan Of Care in pursuit of Occupational Therapy goals.        PETAR Christensen    "

## 2025-01-04 DIAGNOSIS — E78.00 PURE HYPERCHOLESTEROLEMIA: ICD-10-CM

## 2025-01-06 DIAGNOSIS — E78.00 PURE HYPERCHOLESTEROLEMIA: ICD-10-CM

## 2025-01-06 DIAGNOSIS — I10 ESSENTIAL HYPERTENSION: ICD-10-CM

## 2025-01-06 DIAGNOSIS — Z79.899 ENCOUNTER FOR LONG-TERM (CURRENT) USE OF MEDICATIONS: Primary | ICD-10-CM

## 2025-01-06 RX ORDER — ATORVASTATIN CALCIUM 10 MG/1
10 TABLET, FILM COATED ORAL
Qty: 90 TABLET | OUTPATIENT
Start: 2025-01-06

## 2025-01-06 RX ORDER — ATORVASTATIN CALCIUM 10 MG/1
10 TABLET, FILM COATED ORAL DAILY
Qty: 30 TABLET | Refills: 0 | Status: SHIPPED | OUTPATIENT
Start: 2025-01-06

## 2025-01-07 ENCOUNTER — CLINICAL SUPPORT (OUTPATIENT)
Dept: REHABILITATION | Facility: HOSPITAL | Age: 65
End: 2025-01-07
Payer: OTHER GOVERNMENT

## 2025-01-07 DIAGNOSIS — M25.531 RIGHT WRIST PAIN: Primary | ICD-10-CM

## 2025-01-07 PROCEDURE — 97022 WHIRLPOOL THERAPY: CPT | Mod: PN

## 2025-01-07 PROCEDURE — 97530 THERAPEUTIC ACTIVITIES: CPT | Mod: PN

## 2025-01-07 PROCEDURE — 97110 THERAPEUTIC EXERCISES: CPT | Mod: PN

## 2025-01-07 NOTE — PROGRESS NOTES
"                                  Occupational Therapy Daily Treatment Note       Date: 1/9/2025  Name: Luann Borjas  Clinic Number: [id number]    Therapy Diagnosis: S52.531A (ICD-10-CM) - Closed Colles' fracture of right radius  Encounter Diagnosis   Name Primary?    Right wrist pain Yes     Physician: Tony Grande MD    Physician Orders: S52.531A (ICD-10-CM) - Closed Colles' fracture of right radius; evaluate and treat; Please begin therapy to the right wrist and hand. Include edema control and range of motion and eventually gentle strengthening   Medical Diagnosis: S52.531A (ICD-10-CM) - Closed Colles' fracture of right radius  Surgical Procedure and Date: Not Applicable     Insurance Authorization Period Expiration: 12/04/2024-04/03/2025  Plan of Care Certification Period: 01/07/2025-04/07/2025  Date of Return to MD: 01/28/2025    Evaluation FOTO: 12/10/2024 = 49%  Reassessment FOTO: 01/07/2025 = 45%    Visit # / Visits authorized:  5 / 16   Time In: 7:45  Time Out: 8:30   Total Billable Time:  40 minutes    Precautions:  Standard; Velcro short wrist splint for heavier activities only;  begin aggressive range of motion and strengthening       Post Injury:  10/09/2024      Subjective     Patient reports: "I have been going through papers and doing a lot of shredding.  I did not get a chance to try the new exercises.nnI am able to brush my teeth better but not perfect.  I am having trouble chopping and getting out of tub."      Pain: 3/10   Location of pain: (Right) wrist      Objective    Patient seen by Occupational Therapy this session. Tx consisted of:      Supervised modalities after being cleared for contradictions  x   10 minutes:     -Fluidotherapy to  (Right)   hand to increase blood flow, circulation, tissue elasticity, desensitization, sensory re-education and for pain management  x 10 minutes.        Therapeutic Exercises to improve functional performance while increasing strength, endurance, " "range of motion,  and flexibility  x    20 minutes:    -Manual Therapy techniques to (Right) wrist and digits  including gentle stretching, and Passive Range of Motion to increase joint mobility, range of motion  and for pain management  x  5 minutes     -Soft Tissue Mobilization to   (Right) hand into wrist and forearm from distal to proximal to decrease edema and increase range of motion and functional abilities  x  2  minutes    - Active Assistive Range of Motion for (Right) wrist in all planes  x 10 repetitions  - Tendon glides for intrinsic plus, minus, flat fist and composite fist x 5 repetitions -NP  - 1# Dorsiflexion/Volar flexion (Supination /Pronation ), Radial Deviation / Ulnar Deviation x 10 repetitions  - HAMMER stretch for Supination / Pronation x 10 repetitions   - Weighted ball on tabletop for Dorsiflexion /Volar Flexion  stretches x 10 repetitions  - "Prayer Stretch" for Dorsal Flexion x 10 seconds x 5 repetitions   - Wrist roller coaster for Active Range of Motion  of wrist in all planes x 5 repetitions    - YELLOW (non weighted) ball [place] for Supination/Pronation stretch ( in lap) x 10 repetitions          Therapeutic Activities  to improve functional performance while increasing independence with ADLs & IADLs  x    10  minutes:      - Mini colored pegboard for Fine Motor Coordination and in-hand manipulation  (5 in-hand) x 20 pegs   - YELLOW sponge squeezes for composite  x 20 repetitions   - TAN Theraputty for rolling x 10 repetitions  -NP  - TAN Theraputty for 3 Point Pinch x 10 repetitions  -NP  - TAN Theraputty for composite  x 10 repetitions -NP  - Wrist wheel for Supination / Pronation stretch x 10 repetitions   - Wrist wheel for Radial Deviation / Ulnar Deviation stretches x 10 repetitions   - Wooden pegboard with RED clothespins with 3PT pinch x 2 repetitions        -NP= Not Performed     Initiate in future therapy sessions:       - Colored bead UNlacing with thumb to base of 5th " "digit x 10 beads  - Colored bead UNlacing with 2nd-5th digit tips to palm x 20 beads       -  ###Progressive Hand Gripper (black spring) for composite  x 20 repetitions   - ### Theraputty for rolling, pulling, PVC for "cookie cutting" and medicine top x 10 Repetitions   - ### digiflex for isolated x 10 repetitions;  ### digiflex for composite digital flexion x 20 repetitions    - YELLOW digiweb for composite digital Extension and  intrinsics x 20 repetitions     -  #### dowel for Supination /Pronation  x 20 repetitions   - Low load prolonged stretches for Dorsiflexion /Volar Flexion with ### leg weight  (Table edge) x 30 seconds each x 2 repetitions      - ### Flexbar for Supination /Pronation  and Dorsiflexion /Volar Flexion x 20 repetitions    - ### Wrist exerstick in standing for Dorsiflexion / Volar Flexion  x 3 repetitions            Assessment     Patient will continue to benefit from skilled Occupational Therapy intervention to increase functional abilities, range of motion, and strength and pain control.  Patient. demonstrated proper understanding of each exercise.  Patient continues to require verbal and tactile cues for throughout therapy session to maintain position and prevent compensation.   Patient continues to be limited in functional and leisurely pursuits. Pain limits patient's participation in activities of daily living.  Patient is not able to carryout necessary vocational tasks.   Patient's spiritual, cultural and educational needs considered and patient agreeable to plan of care and goals.  Patient is making good progress towards established goals.  Patient tolerated exercises / activities within pain tolerance.   Reviewed Home Exercise Program with patient., see EPIC under "patient instructions" for provided exercises, activity modifications and limitations, modalities for home pain management.  Patient. demo understanding of above.    Patient. tolerated Fluidotherapy with no irritation. "     Patient tolerated addition of  [place] on nonweighted ball for Supination / Pronation , Wrist roller coaster,  wrist wheel for Radial Deviation / Ulnar Deviation, and  Supination /Pronation  with tolerable stretching pain reported.     Patient tolerated addition of mini colored pegboard for Fine Motor Coordination and in-hand manipulation  with no reported pain but demo limited coordination while dropping pegs several times throughout activity.  Patient tolerated addition of wooden pegboard with clothespins with 3PT pinch with no reported pain.       Active Range of Motion: wrist                         (Right)   //  (Left)  (Pre session)   Dorsal Flexion /Volar Flexion: 40 (+0)  / 45 (-5)    //  55/65  Radial Deviation /Ulnar Deviation: 12 (+0)  / 15 (-3)   //  15/25  Supination / Pronation: 60 (+5)  / 60 (+0)   //  80/80    Patient demo increased Active range of motion for (Right) wrist Supination, decreased Volar Flexion, Ulnar Deviation and remained the same for Dorsal Flexion, Radial Deviation, and Pronation.        New/Revised Goals: Continue Plan Of Care   Goals:  1)   Patient to be Independent with Home exercise program and modalities for pain management by 1 week. (MET)   2)   Patient will report   1/10 pain on average with activity to assist with exercises by 6-8 weeks.  ( Revised 01/07/2025)   3)   Patient will increase range of Motion by 3-5 degrees to increase functional use for activities of daily living by 6-8 weeks.  ( In Progress)   4)    Patient will decrease edema by 0.1-0.3 millimeters  to increase joint mobility /flexibility by 6-8 weeks.  (MET)   5)   Patient will increase manual muscle testing by a grade to assist with lifting items by 6-8 weeks. ( Added 01/07/2025)   6)   Patient will increase  strength 3-5 pounds to open containers by 6-8 weeks.  ( Added 01/07/2025)   7)   Patient will increase pinch by 1-3 pounds for buttoning by 6-8 weeks.  ( Added 01/07/2025)       Plan      Continue  2x week during the 90 day certification period    01/07/2025   to 04/07/2025   with established Plan Of Care in pursuit of Occupational Therapy goals.      Ayana Alvarado, OT

## 2025-01-09 ENCOUNTER — CLINICAL SUPPORT (OUTPATIENT)
Dept: REHABILITATION | Facility: HOSPITAL | Age: 65
End: 2025-01-09
Payer: OTHER GOVERNMENT

## 2025-01-09 DIAGNOSIS — M25.531 RIGHT WRIST PAIN: Primary | ICD-10-CM

## 2025-01-09 PROCEDURE — 97530 THERAPEUTIC ACTIVITIES: CPT | Mod: PN

## 2025-01-09 PROCEDURE — 97110 THERAPEUTIC EXERCISES: CPT | Mod: PN

## 2025-01-09 PROCEDURE — 97022 WHIRLPOOL THERAPY: CPT | Mod: PN

## 2025-01-13 ENCOUNTER — CLINICAL SUPPORT (OUTPATIENT)
Dept: REHABILITATION | Facility: HOSPITAL | Age: 65
End: 2025-01-13
Payer: OTHER GOVERNMENT

## 2025-01-13 DIAGNOSIS — M25.531 RIGHT WRIST PAIN: Primary | ICD-10-CM

## 2025-01-13 PROCEDURE — 97022 WHIRLPOOL THERAPY: CPT | Mod: PN

## 2025-01-13 PROCEDURE — 97110 THERAPEUTIC EXERCISES: CPT | Mod: PN

## 2025-01-13 PROCEDURE — 97530 THERAPEUTIC ACTIVITIES: CPT | Mod: PN

## 2025-01-13 NOTE — PROGRESS NOTES
"                                 Occupational Therapy Daily Treatment Note         Date: 1/9/2025  Name: Luann Borjas  Clinic Number: [id number]     Therapy Diagnosis: S52.531A (ICD-10-CM) - Closed Colles' fracture of right radius       Encounter Diagnosis   Name Primary?    Right wrist pain Yes      Physician: Tony Grande MD     Physician Orders: S52.531A (ICD-10-CM) - Closed Colles' fracture of right radius; evaluate and treat; Please begin therapy to the right wrist and hand. Include edema control and range of motion and eventually gentle strengthening   Medical Diagnosis: S52.531A (ICD-10-CM) - Closed Colles' fracture of right radius  Surgical Procedure and Date: Not Applicable      Insurance Authorization Period Expiration: 12/04/2024-04/03/2025  Plan of Care Certification Period: 01/07/2025-04/07/2025  Date of Return to MD: 01/28/2025     Evaluation FOTO: 12/10/2024 = 49%  Reassessment FOTO: 01/07/2025 = 45%     Visit # / Visits authorized:  7 / 16   Time In: 7:30   Time Out: 8:15    Total Billable Time:  40 minutes     Precautions:  Standard; Velcro short wrist splint for heavier activities only;  begin aggressive range of motion and strengthening         Post Injury:  10/09/2024        Subjective      Patient reports: "I was putting together dotation boxes and lifting them with both hands.  I was really sore after and my wrist was swollen.  I iced my hand/ wrist after.  Today I am only about 1/10 pain, and yesterday about 7/10 pain after lifting boxes."      Pain: 1-7/10   Location of pain: (Right) wrist       Objective    Patient seen by Occupational Therapy this session. Tx consisted of:        Supervised modalities after being cleared for contradictions  x   10 minutes:      -Fluidotherapy to  (Right)   hand to increase blood flow, circulation, tissue elasticity, desensitization, sensory re-education and for pain management  x 10 minutes.           Therapeutic Exercises to improve functional " "performance while increasing strength, endurance, range of motion,  and flexibility  x    18 minutes:     -Manual Therapy techniques to (Right) wrist and digits  including gentle stretching, and Passive Range of Motion to increase joint mobility, range of motion  and for pain management  x  5 minutes      -Soft Tissue Mobilization to   (Right) hand into wrist and forearm from distal to proximal to decrease edema and increase range of motion and functional abilities  x  1  minute     - Low load prolonged stretches for Dorsiflexion /Volar Flexion with  1.5# leg weight  (Table edge) x 30 seconds each x 2 repetitions    - Active Assistive Range of Motion for (Right) wrist in all planes  x 10 repetitions  - Tendon glides for intrinsic plus, minus, flat fist and composite fist x 5 repetitions -NP  - 1# Dorsiflexion/Volar flexion (Supination /Pronation ), Radial Deviation / Ulnar Deviation x 10 repetitions  - HAMMER stretch for Supination / Pronation x 10 repetitions   - Weighted ball on tabletop for Dorsiflexion /Volar Flexion  stretches x 10 repetitions  - "Prayer Stretch" for Dorsal Flexion x 10 seconds x 5 repetitions -NP  - Wrist roller coaster for Active Range of Motion  of wrist in all planes x 5 repetitions    - YELLOW (non weighted) ball [place] for Supination/Pronation stretch ( in lap) x 10 repetitions  -NP           Therapeutic Activities  to improve functional performance while increasing independence with ADLs & IADLs  x    12  minutes:        - Mini colored pegboard for Fine Motor Coordination and in-hand manipulation  (5 in-hand) x 20 pegs   - RED sponge squeezes for composite  x 20 repetitions   - TAN Theraputty for rolling x 10 repetitions  -NP  - TAN Theraputty for 3 Point Pinch x 10 repetitions  -NP  - TAN Theraputty for composite  x 10 repetitions -NP  - Wrist wheel for Supination / Pronation stretch x 10 repetitions   - Wrist wheel for Radial Deviation / Ulnar Deviation stretches x 10 repetitions " "  - Wooden pegboard with RED clothespins with 3PT pinch x 2 repetitions  - RED Theraputty with PVC for "cookie cutting" and medicine top x 10 repetitions     - 15# Progressive Hand Gripper (black spring) for composite  x 20 repetitions        -NP= Not Performed      Initiate in future therapy sessions:         - Colored bead UNlacing with thumb to base of 5th digit x 10 beads  - Colored bead UNlacing with 2nd-5th digit tips to palm x 20 beads                 - ### Theraputty for rolling, (Bilateral) pulling  x 10 Repetitions   - ### digiflex for isolated x 10 repetitions;  ### digiflex for composite digital flexion x 20 repetitions               - YELLOW digiweb for composite digital Extension and  intrinsics x 10 repetitions                -  #### dowel for Supination /Pronation  x 10 repetitions                  - ### Flexbar for Supination /Pronation  and Dorsiflexion /Volar Flexion x 10 repetitions               - ### Wrist exerstick in standing for Dorsiflexion / Volar Flexion  x 3 repetitions                          Assessment      Patient will continue to benefit from skilled Occupational Therapy intervention to increase functional abilities, range of motion, and strength and pain control.  Patient. demonstrated proper understanding of each exercise.  Patient continues to require verbal and tactile cues for throughout therapy session to maintain position and prevent compensation.   Patient continues to be limited in functional and leisurely pursuits. Pain limits patient's participation in activities of daily living.  Patient is not able to carryout necessary vocational tasks.   Patient's spiritual, cultural and educational needs considered and patient agreeable to plan of care and goals.  Patient is making good progress towards established goals.  Patient tolerated exercises / activities within pain tolerance.   Reviewed Home Exercise Program with patient., see EPIC under "patient instructions" for " provided exercises, activity modifications and limitations, modalities for home pain management.  Patient. demo understanding of above.     Patient. tolerated Fluidotherapy with no irritation.        Patient tolerated addition of Progressive Hand Gripper for composite   with no reported pain.         Active Range of Motion: wrist                         (Right)   //  (Left)  (Pre session)   Dorsal Flexion /Volar Flexion: 44 (+4)   / 47 (+2)      //  55/65  Radial Deviation /Ulnar Deviation: 14 (+2)    / 16 (+1)    //  15/25  Supination / Pronation: 64  (+4)    / 61 (+1)     //  80/80     Patient demo  increased Active range of motion for (Right) wrist Dorsal Flexion, Volar Flexion, Radial Deviation, Ulnar Deviation, Supination, and Pronation.      New/Revised Goals: Continue Plan Of Care   Goals:  1)   Patient to be Independent with Home exercise program and modalities for pain management by 1 week. (MET)   2)   Patient will report   1/10 pain on average with activity to assist with exercises by 6-8 weeks.  ( Revised 01/07/2025)   3)   Patient will increase range of Motion by 3-5 degrees to increase functional use for activities of daily living by 6-8 weeks.  ( In Progress)   4)    Patient will decrease edema by 0.1-0.3 millimeters  to increase joint mobility /flexibility by 6-8 weeks.  (MET)   5)   Patient will increase manual muscle testing by a grade to assist with lifting items by 6-8 weeks. ( Added 01/07/2025)   6)   Patient will increase  strength 3-5 pounds to open containers by 6-8 weeks.  ( Added 01/07/2025)   7)   Patient will increase pinch by 1-3 pounds for buttoning by 6-8 weeks.  ( Added 01/07/2025)         Plan       Continue 2x week during the 90 day certification period    01/07/2025   to 04/07/2025   with established Plan Of Care in pursuit of Occupational Therapy goals.        PETAR Christensen

## 2025-01-13 NOTE — PROGRESS NOTES
SUBJECTIVE:      Patient ID: Luann Borjas is a 64 y.o. female.    Chief Complaint: Annual Exam and Foot Pain     She is following with Dr Ravi for sleep apnea and copd. Following with Dr Tobin for cardiology. Following with Apryl Adams for psyc. Following with Dr Weinberg for recent wrist fracture. Has a painful bump on her left foot for several months that she would like to have looked at.     Hypertension  This is a chronic problem. The problem is unchanged. The problem is controlled. Pertinent negatives include no chest pain, headaches, palpitations, peripheral edema or shortness of breath. Risk factors for coronary artery disease include smoking/tobacco exposure. Past treatments include calcium channel blockers and ACE inhibitors. The current treatment provides significant improvement.   Hyperlipidemia  This is a chronic problem. The problem is controlled. Recent lipid tests were reviewed and are normal. Pertinent negatives include no chest pain or shortness of breath. Current antihyperlipidemic treatment includes statins. The current treatment provides significant improvement of lipids.                  Past Surgical History:   Procedure Laterality Date    BASAL CELL CARCINOMA EXCISION  09/2019    lip    CATARACT EXTRACTION W/  INTRAOCULAR LENS IMPLANT Right 11/13/2019    Procedure: EXTRACTION, CATARACT, WITH IOL INSERTION;  Surgeon: Carmelo Perez II, MD;  Location: [facility] OR;  Service: Ophthalmology;  Laterality: Right;    ECTOPIC PREGNANCY SURGERY      EYE SURGERY Right     cataracts    HYSTERECTOMY       Family History   Problem Relation Name Age of Onset    COPD Mother      Liver disease Mother      Thyroid disease Mother      COPD Father      Diabetes Father      Hypertension Father      Pacemaker/defibrilator Father      COPD Sister      Hypertension Sister      Hypertension Brother        Social History     Socioeconomic History    Marital status:    Tobacco Use    Smoking status:  Former     Current packs/day: 0.00     Average packs/day: 0.5 packs/day for 32.0 years (16.0 ttl pk-yrs)     Types: Cigarettes     Start date: 1988     Quit date: 10/25/2020     Years since quittin.2     Passive exposure: Past    Smokeless tobacco: Never   Substance and Sexual Activity    Alcohol use: Yes     Alcohol/week: 2.0 standard drinks of alcohol     Types: 2 Glasses of wine per week    Drug use: No    Sexual activity: Not Currently     Social Drivers of Health     Financial Resource Strain: Low Risk  (2024)    Overall Financial Resource Strain (CARDIA)     Difficulty of Paying Living Expenses: Not hard at all   Food Insecurity: No Food Insecurity (2024)    Hunger Vital Sign     Worried About Running Out of Food in the Last Year: Never true     Ran Out of Food in the Last Year: Never true   Transportation Needs: No Transportation Needs (2024)    PRAPARE - Transportation     Lack of Transportation (Medical): No     Lack of Transportation (Non-Medical): No   Physical Activity: Insufficiently Active (2024)    Exercise Vital Sign     Days of Exercise per Week: 1 day     Minutes of Exercise per Session: 20 min   Stress: Stress Concern Present (2024)    [employer] [place] [employer] - Occupational Stress Questionnaire     Feeling of Stress : To some extent   Housing Stability: Low Risk  (2024)    Housing Stability Vital Sign     Unable to Pay for Housing in the Last Year: No     Number of Places Lived in the Last Year: 1     Unstable Housing in the Last Year: No     Current Outpatient Medications   Medication Sig Dispense Refill    albuterol (PROAIR HFA) 90 mcg/actuation inhaler Inhale 2 puffs into the lungs every 6 (six) hours as needed for Wheezing. Rescue 18 g 2    ALPRAZolam (XANAX) 0.5 MG tablet Take 1 tablet (0.5 mg total) by mouth daily as needed for Anxiety. 30 tablet 0    Bifidobacterium infantis (ALIGN) 4 mg Cap Take 1 capsule (4 mg total) by mouth Daily.  30 capsule 0    calcium carbonate (OS-MELIA) 500 mg calcium (1,250 mg) chewable tablet Take 1 tablet by mouth once daily.      cholecalciferol, vitamin D3, 1,250 mcg (50,000 unit) Tab Take 1,250 mcg by mouth every 7 days. 12 tablet 0    umeclidinium-vilanteroL (ANORO ELLIPTA) 62.5-25 mcg/actuation DsDv Inhale 1 puff into the lungs once daily. Controller 3 each 5    vilazodone (VIIBRYD) 40 mg Tab tablet Take 1 tablet (40 mg total) by mouth once daily. 90 tablet 1    amlodipine-benazepril 5-10 mg (LOTREL) 5-10 mg per capsule Take 1 capsule by mouth once daily. 90 capsule 1    atorvastatin (LIPITOR) 10 MG tablet Take 1 tablet (10 mg total) by mouth once daily. 90 tablet 1     No current facility-administered medications for this visit.     Review of patient's allergies indicates:   Allergen Reactions    Erythromycin Anaphylaxis    Buspirone     Ciprofloxacin     Levaquin [levofloxacin]     Zithromax z-masoud [azithromycin]       Past Medical History:   Diagnosis Date    Anxiety     Arthritis     Asthma     Cataract of left eye     Hyperlipidemia     Sleep apnea     cpap     Past Surgical History:   Procedure Laterality Date    BASAL CELL CARCINOMA EXCISION  09/2019    lip    CATARACT EXTRACTION W/  INTRAOCULAR LENS IMPLANT Right 11/13/2019    Procedure: EXTRACTION, CATARACT, WITH IOL INSERTION;  Surgeon: Carmelo Perez II, MD;  Location: [facility];  Service: Ophthalmology;  Laterality: Right;    ECTOPIC PREGNANCY SURGERY      EYE SURGERY Right     cataracts    HYSTERECTOMY         Review of Systems   Constitutional:  Negative for appetite change, chills, diaphoresis, fatigue, fever and unexpected weight change.   HENT:  Negative for ear discharge, hearing loss, rhinorrhea, trouble swallowing and voice change.    Eyes:  Negative for photophobia and pain.   Respiratory:  Negative for chest tightness, shortness of breath, wheezing and stridor.    Cardiovascular:  Negative for chest pain and palpitations.   Gastrointestinal:   "Negative for abdominal pain, blood in stool and vomiting.   Endocrine: Negative for cold intolerance and heat intolerance.   Genitourinary:  Negative for difficulty urinating and flank pain.   Musculoskeletal:  Positive for arthralgias. Negative for joint swelling and neck stiffness.   Skin:  Negative for pallor.   Neurological:  Negative for dizziness, speech difficulty, weakness, light-headedness and headaches.   Hematological:  Does not bruise/bleed easily.   Psychiatric/Behavioral:  Negative for confusion, dysphoric mood, self-injury, sleep disturbance and suicidal ideas. The patient is not nervous/anxious.       OBJECTIVE:      Vitals:    01/14/25 0820   BP: 124/62   Pulse: 71   SpO2: 97%   Weight: 74.8 kg (165 lb)   Height: 5' 6.5" (1.689 m)     Physical Exam  Vitals and nursing note reviewed.   Constitutional:       General: She is not in acute distress.     Appearance: She is well-developed.   HENT:      Head: Normocephalic and atraumatic.      Right Ear: Tympanic membrane normal.      Left Ear: Tympanic membrane normal.      Nose: Nose normal.      Mouth/Throat:      Pharynx: Uvula midline.   Eyes:      General: Lids are normal.      Conjunctiva/sclera: Conjunctivae normal.      Pupils: Pupils are equal, round, and reactive to light.      Right eye: Pupil is round and reactive.      Left eye: Pupil is round and reactive.   Neck:      Thyroid: No thyromegaly.      Vascular: No JVD.      Trachea: Trachea normal.   Cardiovascular:      Rate and Rhythm: Regular rhythm.      Pulses: Normal pulses.      Heart sounds: Normal heart sounds.   Pulmonary:      Effort: Pulmonary effort is normal. No tachypnea or respiratory distress.      Breath sounds: Normal breath sounds. No wheezing, rhonchi or rales.   Abdominal:      General: Bowel sounds are normal.      Palpations: Abdomen is soft.      Tenderness: There is no abdominal tenderness.   Musculoskeletal:         General: Normal range of motion.      Cervical back: " Normal range of motion and neck supple.      Right lower leg: No edema.      Left lower leg: No edema.        Feet:    Lymphadenopathy:      Cervical: No cervical adenopathy.   Skin:     General: Skin is warm and dry.      Findings: No rash.   Neurological:      Mental Status: She is alert and oriented to person, place, and time.   Psychiatric:         Mood and Affect: Mood normal.         Speech: Speech normal.         Behavior: Behavior normal. Behavior is cooperative.         Thought Content: Thought content normal.         Judgment: Judgment normal.       Refill on 01/06/2025   Component Date Value Ref Range Status    Specific Gravity, UA 01/13/2025 1.017  1.005 - 1.030 Final    pH, UA 01/13/2025 7.0  5.0 - 7.5 Final    Color, UA 01/13/2025 Yellow  Yellow Final    Clarity, UA 01/13/2025 Clear  Clear Final    Leukocytes, UA 01/13/2025 Negative  Negative Final    Protein, UA 01/13/2025 Negative  Negative/Trace Final    Glucose, UA 01/13/2025 Negative  Negative Final    Ketones, UA 01/13/2025 Negative  Negative Final    Occult Blood UA 01/13/2025 Negative  Negative Final    Bilirubin, UA 01/13/2025 Negative  Negative Final    Urobilinogen, UA 01/13/2025 0.2  0.2 - 1.0 mg/dL Final    Nitrite, UA 01/13/2025 Negative  Negative Final    Microscopic Examination 01/13/2025 Comment   Final    Microscopic follows if indicated.    Microscopic Examination 01/13/2025 See below:   Final    Microscopic was indicated and was performed.    Urinalysis Reflex 01/13/2025 Comment   Final    This specimen will not reflex to a Urine Culture.    TSH 01/13/2025 1.630  0.450 - 4.500 uIU/mL Final    Cholesterol 01/13/2025 204 (H)  100 - 199 mg/dL Final    Triglycerides 01/13/2025 76  0 - 149 mg/dL Final    HDL 01/13/2025 90  >39 mg/dL Final    VLDL Cholesterol Bravo 01/13/2025 14  5 - 40 mg/dL Final    LDL Calculated 01/13/2025 100 (H)  0 - 99 mg/dL Final    WBC 01/13/2025 6.2  3.4 - 10.8 x10E3/uL Final    RBC 01/13/2025 4.64  3.77 - 5.28  x10E6/uL Final    Hemoglobin 01/13/2025 14.5  11.1 - 15.9 g/dL Final    Hematocrit 01/13/2025 44.6  34.0 - 46.6 % Final    MCV 01/13/2025 96  79 - 97 fL Final    MCH 01/13/2025 31.3  26.6 - 33.0 pg Final    MCHC 01/13/2025 32.5  31.5 - 35.7 g/dL Final    RDW 01/13/2025 11.9  11.7 - 15.4 % Final    Platelets 01/13/2025 307  150 - 450 x10E3/uL Final    Neutrophils 01/13/2025 68  Not Estab. % Final    Lymphs 01/13/2025 19  Not Estab. % Final    Monocytes 01/13/2025 7  Not Estab. % Final    Eos 01/13/2025 4  Not Estab. % Final    Basos 01/13/2025 2  Not Estab. % Final    Neutrophils (Absolute) 01/13/2025 4.2  1.4 - 7.0 x10E3/uL Final    Lymphs (Absolute) 01/13/2025 1.2  0.7 - 3.1 x10E3/uL Final    Monocytes(Absolute) 01/13/2025 0.4  0.1 - 0.9 x10E3/uL Final    Eos (Absolute) 01/13/2025 0.3  0.0 - 0.4 x10E3/uL Final    Baso (Absolute) 01/13/2025 0.1  0.0 - 0.2 x10E3/uL Final    Immature Granulocytes 01/13/2025 0  Not Estab. % Final    Immature Grans (Abs) 01/13/2025 0.0  0.0 - 0.1 x10E3/uL Final    Glucose 01/13/2025 105 (H)  70 - 99 mg/dL Final    BUN 01/13/2025 15  8 - 27 mg/dL Final    Creatinine 01/13/2025 0.90  0.57 - 1.00 mg/dL Final    eGFR 01/13/2025 72  >59 mL/min/1.73 Final    BUN/Creatinine Ratio 01/13/2025 17  12 - 28 Final    Sodium 01/13/2025 WILL FOLLOW   Preliminary    Potassium 01/13/2025 WILL FOLLOW   Preliminary    Chloride 01/13/2025 WILL FOLLOW   Preliminary    CO2 01/13/2025 26  20 - 29 mmol/L Final    Calcium 01/13/2025 10.2  8.7 - 10.3 mg/dL Final    Protein, Total 01/13/2025 6.8  6.0 - 8.5 g/dL Final    Albumin 01/13/2025 4.8  3.9 - 4.9 g/dL Final    Globulin, Total 01/13/2025 2.0  1.5 - 4.5 g/dL Final    Total Bilirubin 01/13/2025 0.6  0.0 - 1.2 mg/dL Final    Alkaline Phosphatase 01/13/2025 74  44 - 121 IU/L Final    AST 01/13/2025 23  0 - 40 IU/L Final    ALT 01/13/2025 23  0 - 32 IU/L Final    WBC, UA 01/13/2025 None seen  0 - 5 /hpf Final    RBC, UA 01/13/2025 0-2  0 - 2 /hpf Final     Epithelial Cells (non renal) 01/13/2025 None seen  0 - 10 /hpf Final    Casts 01/13/2025 None seen  None seen /lpf Final    Bacteria, UA 01/13/2025 None seen  None seen/Few Final      Last visit note, most recent available labs, and health maintenance reviewed    Assessment:       1. Preventative health care    2. Pure hypercholesterolemia    3. Essential hypertension    4. Osteopenia, unspecified location    5. Post-menopausal    6. Screening mammogram for breast cancer    7. Left foot pain    8. Need for pneumococcal vaccination    9. Elevated glucose        Plan:       Preventative health care  Counseled on age and gender appropriate medical preventative services, including cancer screenings, immunizations, overall nutritional health, need for a consistent exercise regimen and an overall push towards maintaining a vigorous and active lifestyle.     Pure hypercholesterolemia  -     atorvastatin (LIPITOR) 10 MG tablet; Take 1 tablet (10 mg total) by mouth once daily.  Dispense: 90 tablet; Refill: 1    Essential hypertension  -     amlodipine-benazepril 5-10 mg (LOTREL) 5-10 mg per capsule; Take 1 capsule by mouth once daily.  Dispense: 90 capsule; Refill: 1    Osteopenia, unspecified location  -     DXA Bone Density Axial Skeleton 1 or more sites; Future; Expected date: 01/14/2025    Post-menopausal  -     DXA Bone Density Axial Skeleton 1 or more sites; Future; Expected date: 01/14/2025    Screening mammogram for breast cancer  -     Mammo Digital Screening Bilat w/ Abad; Future; Expected date: 01/14/2025    Left foot pain  -     Ambulatory referral/consult to Podiatry; Future; Expected date: 01/14/2025    Need for pneumococcal vaccination  -     pneumoc 20-giorgio conj-dip cr(PF) (PREVNAR-20 (PF)) injection Syrg 0.5 mL                    Follow up in about 6 months (around 7/14/2025) for htn.      1/14/2025 Allen Callahan, YULIANA, FNP

## 2025-01-14 ENCOUNTER — OFFICE VISIT (OUTPATIENT)
Dept: FAMILY MEDICINE | Facility: CLINIC | Age: 65
End: 2025-01-14
Payer: OTHER GOVERNMENT

## 2025-01-14 VITALS
OXYGEN SATURATION: 97 % | WEIGHT: 165 LBS | BODY MASS INDEX: 25.9 KG/M2 | HEART RATE: 71 BPM | SYSTOLIC BLOOD PRESSURE: 124 MMHG | DIASTOLIC BLOOD PRESSURE: 62 MMHG | HEIGHT: 67 IN

## 2025-01-14 DIAGNOSIS — M85.80 OSTEOPENIA, UNSPECIFIED LOCATION: ICD-10-CM

## 2025-01-14 DIAGNOSIS — Z12.31 SCREENING MAMMOGRAM FOR BREAST CANCER: ICD-10-CM

## 2025-01-14 DIAGNOSIS — M79.672 LEFT FOOT PAIN: ICD-10-CM

## 2025-01-14 DIAGNOSIS — Z23 NEED FOR PNEUMOCOCCAL VACCINATION: ICD-10-CM

## 2025-01-14 DIAGNOSIS — E78.00 PURE HYPERCHOLESTEROLEMIA: ICD-10-CM

## 2025-01-14 DIAGNOSIS — Z00.00 PREVENTATIVE HEALTH CARE: Primary | ICD-10-CM

## 2025-01-14 DIAGNOSIS — R73.09 ELEVATED GLUCOSE: ICD-10-CM

## 2025-01-14 DIAGNOSIS — I10 ESSENTIAL HYPERTENSION: ICD-10-CM

## 2025-01-14 DIAGNOSIS — Z78.0 POST-MENOPAUSAL: ICD-10-CM

## 2025-01-14 PROCEDURE — 99396 PREV VISIT EST AGE 40-64: CPT | Mod: S$GLB,,, | Performed by: NURSE PRACTITIONER

## 2025-01-14 PROCEDURE — 99213 OFFICE O/P EST LOW 20 MIN: CPT | Mod: 25,S$GLB,, | Performed by: NURSE PRACTITIONER

## 2025-01-14 PROCEDURE — G0009 ADMIN PNEUMOCOCCAL VACCINE: HCPCS | Mod: S$GLB,,, | Performed by: NURSE PRACTITIONER

## 2025-01-14 PROCEDURE — 90677 PCV20 VACCINE IM: CPT | Mod: S$GLB,,, | Performed by: NURSE PRACTITIONER

## 2025-01-14 RX ORDER — AMLODIPINE AND BENAZEPRIL HYDROCHLORIDE 5; 10 MG/1; MG/1
1 CAPSULE ORAL DAILY
Qty: 90 CAPSULE | Refills: 1 | Status: SHIPPED | OUTPATIENT
Start: 2025-01-14

## 2025-01-14 RX ORDER — ATORVASTATIN CALCIUM 10 MG/1
10 TABLET, FILM COATED ORAL DAILY
Qty: 90 TABLET | Refills: 1 | Status: SHIPPED | OUTPATIENT
Start: 2025-01-14

## 2025-01-15 ENCOUNTER — CLINICAL SUPPORT (OUTPATIENT)
Dept: REHABILITATION | Facility: HOSPITAL | Age: 65
End: 2025-01-15
Payer: OTHER GOVERNMENT

## 2025-01-15 DIAGNOSIS — M25.531 RIGHT WRIST PAIN: Primary | ICD-10-CM

## 2025-01-15 PROCEDURE — 97022 WHIRLPOOL THERAPY: CPT | Mod: PN

## 2025-01-15 PROCEDURE — 97530 THERAPEUTIC ACTIVITIES: CPT | Mod: PN

## 2025-01-15 PROCEDURE — 97110 THERAPEUTIC EXERCISES: CPT | Mod: PN

## 2025-01-16 ENCOUNTER — TELEPHONE (OUTPATIENT)
Dept: FAMILY MEDICINE | Facility: CLINIC | Age: 65
End: 2025-01-16
Payer: OTHER GOVERNMENT

## 2025-01-16 NOTE — TELEPHONE ENCOUNTER
Called [facility] to check on status of her sodium, potassium, and chloride that has not resulted yet. Said they will release results and also fax them. FYI to Allen. I will let you know when they are in.

## 2025-01-17 NOTE — TELEPHONE ENCOUNTER
[facility] check status [facility] C[facility]. Said that she is trying to reach out to the lab to see what is going on. Advised her I spoke to someone yesterday as well on this and it was supposed to be resolved. Gave her my direct line. Someone should be calling me.

## 2025-01-18 DIAGNOSIS — F41.1 GAD (GENERALIZED ANXIETY DISORDER): ICD-10-CM

## 2025-01-21 RX ORDER — ALPRAZOLAM 0.5 MG/1
0.5 TABLET ORAL DAILY PRN
Qty: 30 TABLET | Refills: 0 | Status: SHIPPED | OUTPATIENT
Start: 2025-01-21 | End: 2025-02-20

## 2025-01-23 NOTE — PROGRESS NOTES
"                                 Occupational Therapy Daily Treatment Note         Date: 1/9/2025  Name: Luann Borjas  Clinic Number: [id number]     Therapy Diagnosis: S52.531A (ICD-10-CM) - Closed Colles' fracture of right radius       Encounter Diagnosis   Name Primary?    Right wrist pain Yes      Physician: Tony Grande MD     Physician Orders: S52.531A (ICD-10-CM) - Closed Colles' fracture of right radius; evaluate and treat; Please begin therapy to the right wrist and hand. Include edema control and range of motion and eventually gentle strengthening   Medical Diagnosis: S52.531A (ICD-10-CM) - Closed Colles' fracture of right radius  Surgical Procedure and Date: Not Applicable      Insurance Authorization Period Expiration: 12/04/2024-04/03/2025  Plan of Care Certification Period: 01/07/2025-04/07/2025  Date of Return to MD: 01/28/2025     Evaluation FOTO: 12/10/2024 = 49%  Reassessment FOTO: 01/07/2025 = 45%     Visit # / Visits authorized:  8 / 16   Time In: 4:00  Time Out: 4:45    Total Billable Time:  40 minutes     Precautions:  Standard; Velcro short wrist splint for heavier activities only;  begin aggressive range of motion and strengthening         Post Injury:  10/09/2024        Subjective      Patient reports: "I am having aching pain in my forearm, wrist and thumb.  The cold weather last week had me hurting,  and still this week."      Pain: 4-5/10   Location of pain: (Right) wrist       Objective    Patient seen by Occupational Therapy this session. Tx consisted of:        Supervised modalities after being cleared for contradictions  x   10 minutes:      -Fluidotherapy to  (Right)   hand to increase blood flow, circulation, tissue elasticity, desensitization, sensory re-education and for pain management  x 10 minutes.           Therapeutic Exercises to improve functional performance while increasing strength, endurance, range of motion,  and flexibility  x    18 minutes:     -Manual Therapy " "techniques to (Right) wrist and digits  including gentle stretching, and Passive Range of Motion to increase joint mobility, range of motion  and for pain management  x  5 minutes      -Soft Tissue Mobilization to   (Right) hand into wrist and forearm from distal to proximal to decrease edema and increase range of motion and functional abilities  x  1  minute     - Low load prolonged stretches for 2.0# leg weight Dorsiflexion / 2.0# leg weight Volar Flexion with   (Table edge) x 30 seconds each x 2 repetitions    - Active Assistive Range of Motion for (Right) wrist in all planes  x 10 repetitions  - Tendon glides for intrinsic plus, minus, flat fist and composite fist x 5 repetitions -NP  - 1# Dorsiflexion/Volar flexion (Supination /Pronation ), Radial Deviation / Ulnar Deviation x 10 repetitions  - HAMMER stretch for Supination / Pronation x 10 repetitions   - Weighted ball on tabletop for Dorsiflexion /Volar Flexion  stretches x 10 repetitions  - "Prayer Stretch" for Dorsal Flexion x 10 seconds x 5 repetitions -NP  - Wrist roller coaster for Active Range of Motion  of wrist in all planes x 5 repetitions    - YELLOW (non weighted) ball [place] for Supination/Pronation stretch ( in lap) x 10 repetitions  -NP           Therapeutic Activities  to improve functional performance while increasing independence with ADLs & IADLs  x    12  minutes:        - Mini colored pegboard for Fine Motor Coordination and in-hand manipulation  (5 in-hand) x 20 pegs   - GREEN sponge squeezes for composite  x 20 repetitions   - TAN Theraputty for rolling x 10 repetitions  -NP  - TAN Theraputty for 3 Point Pinch x 10 repetitions  -NP  - TAN Theraputty for composite  x 10 repetitions -NP  - Wrist wheel for Supination / Pronation stretch x 10 repetitions   - Wrist wheel for Radial Deviation / Ulnar Deviation stretches x 10 repetitions   - Wooden pegboard with RED clothespins with 3PT pinch x 2 repetitions  - RED Theraputty with PVC for " ""cookie cutting" and medicine top x 10 repetitions     - 15# Progressive Hand Gripper (black spring) for composite  x 20 repetitions      -Updated Home Exercise Program: Patient provided with and educated on written Home Exercise Program with GREEN sponge for composite .  See EMR under "patient instructions."  Patient demo understanding of above.      -NP= Not Performed      Initiate in future therapy sessions:         - Colored bead UNlacing with thumb to base of 5th digit x 10 beads  - Colored bead UNlacing with 2nd-5th digit tips to palm x 20 beads                 - ### Theraputty for rolling, (Bilateral) pulling  x 10 Repetitions   - ### digiflex for isolated x 10 repetitions;  ### digiflex for composite digital flexion x 20 repetitions               - YELLOW digiweb for composite digital Extension and  intrinsics x 10 repetitions                -  #### dowel for Supination /Pronation  x 10 repetitions                  - ### Flexbar for Supination /Pronation  and Dorsiflexion /Volar Flexion x 10 repetitions               - ### Wrist exerstick in standing for Dorsiflexion / Volar Flexion  x 3 repetitions                          Assessment      Patient will continue to benefit from skilled Occupational Therapy intervention to increase functional abilities, range of motion, and strength and pain control.  Patient. demonstrated proper understanding of each exercise.  Patient continues to require verbal and tactile cues for throughout therapy session to maintain position and prevent compensation.   Patient continues to be limited in functional and leisurely pursuits. Pain limits patient's participation in activities of daily living.  Patient is not able to carryout necessary vocational tasks.   Patient's spiritual, cultural and educational needs considered and patient agreeable to plan of care and goals.  Patient is making good progress towards established goals.  Patient tolerated exercises / activities " "within pain tolerance.   Reviewed Home Exercise Program with patient., see EPIC under "patient instructions" for provided exercises, activity modifications and limitations, modalities for home pain management.  Patient. demo understanding of above.     Patient. tolerated Fluidotherapy with no irritation.        Patient tolerated increase in resistance during low load prolonged stretch for Dorsal Flexion  / Volar Flexion with tolerable stretching pain reported. Patient tolerated increase in resistance during sponge squeezes for composite  with no reported pain.         Active Range of Motion: wrist                         (Right)   //  (Left)  (Pre session)   Dorsal Flexion /Volar Flexion: 45 (+1)   / 35 (-12)      //  55/65  Radial Deviation /Ulnar Deviation: 15 (+1)    / 15 (-1)    //  15/25  Supination / Pronation: 60  (-4)    / 55 (-6)     //  80/80     Patient demo increased Active range of motion for (Right) wrist Dorsal Flexion,  Radial Deviation; and decreased  Supination, and Pronation, Ulnar Deviation and Volar Flexion.      New/Revised Goals: Continue Plan Of Care   Goals:  1)   Patient to be Independent with Home exercise program and modalities for pain management by 1 week. (MET)   2)   Patient will report   1/10 pain on average with activity to assist with exercises by 6-8 weeks.  ( Revised 01/07/2025)   3)   Patient will increase range of Motion by 3-5 degrees to increase functional use for activities of daily living by 6-8 weeks.  ( In Progress)   4)    Patient will decrease edema by 0.1-0.3 millimeters  to increase joint mobility /flexibility by 6-8 weeks.  (MET)   5)   Patient will increase manual muscle testing by a grade to assist with lifting items by 6-8 weeks. ( Added 01/07/2025)   6)   Patient will increase  strength 3-5 pounds to open containers by 6-8 weeks.  ( Added 01/07/2025)   7)   Patient will increase pinch by 1-3 pounds for buttoning by 6-8 weeks.  ( Added 01/07/2025)       "   Plan       Continue 2x week during the 90 day certification period    01/07/2025   to 04/07/2025   with established Plan Of Care in pursuit of Occupational Therapy goals.        PETAR Christensen

## 2025-01-28 ENCOUNTER — CLINICAL SUPPORT (OUTPATIENT)
Dept: REHABILITATION | Facility: HOSPITAL | Age: 65
End: 2025-01-28
Payer: OTHER GOVERNMENT

## 2025-01-28 DIAGNOSIS — M25.531 RIGHT WRIST PAIN: Primary | ICD-10-CM

## 2025-01-28 PROCEDURE — 97022 WHIRLPOOL THERAPY: CPT | Mod: PN

## 2025-01-28 PROCEDURE — 97530 THERAPEUTIC ACTIVITIES: CPT | Mod: PN

## 2025-01-28 PROCEDURE — 97110 THERAPEUTIC EXERCISES: CPT | Mod: PN

## 2025-01-28 NOTE — PROGRESS NOTES
"                                 Occupational Therapy Daily Treatment Note         Date: 1/9/2025  Name: Luann Borjas  Clinic Number: [id number]     Therapy Diagnosis: S52.531A (ICD-10-CM) - Closed Colles' fracture of right radius       Encounter Diagnosis   Name Primary?    Right wrist pain Yes      Physician: Tony Grande MD     Physician Orders: S52.531A (ICD-10-CM) - Closed Colles' fracture of right radius; evaluate and treat; Please begin therapy to the right wrist and hand. Include edema control and range of motion and eventually gentle strengthening   Medical Diagnosis: S52.531A (ICD-10-CM) - Closed Colles' fracture of right radius  Surgical Procedure and Date: Not Applicable      Insurance Authorization Period Expiration: 12/04/2024-04/03/2025  Plan of Care Certification Period: 01/07/2025-04/07/2025  Date of Return to MD: 01/28/2025     Evaluation FOTO: 12/10/2024 = 49%  Reassessment FOTO: 01/07/2025 = 45%     Visit # / Visits authorized:  9 / 16   Time In: 4:30  Time Out: 5:15     Total Billable Time:  40 minutes     Precautions:  Standard; Velcro short wrist splint for heavier activities only;  begin aggressive range of motion and strengthening         Post Injury:  10/09/2024        Subjective      Patient reports: "I was undressing and my wrist got caught on my clothes and it popped.  It felt a lot better after that.  I have difficulty with lifting heavy items and typing ( position of wrist)."      Pain: 2/10   Location of pain: (Right) wrist       Objective    Patient seen by Occupational Therapy this session. Tx consisted of:        Supervised modalities after being cleared for contradictions  x   10 minutes:      -Fluidotherapy to  (Right)   hand to increase blood flow, circulation, tissue elasticity, desensitization, sensory re-education and for pain management  x 10 minutes.           Therapeutic Exercises to improve functional performance while increasing strength, endurance, range of " "motion,  and flexibility  x    18 minutes:     -Manual Therapy techniques to (Right) wrist and digits  including gentle stretching, and Passive Range of Motion to increase joint mobility, range of motion  and for pain management  x  5 minutes      -Soft Tissue Mobilization to   (Right) hand into wrist and forearm from distal to proximal to decrease edema and increase range of motion and functional abilities  x  1  minute     - Low load prolonged stretches for 2.0# leg weight Dorsiflexion / 2.0# leg weight Volar Flexion with   (Table edge) x 30 seconds each x 2 repetitions    - Active Assistive Range of Motion for (Right) wrist in all planes  x 10 repetitions  - Tendon glides for intrinsic plus, minus, flat fist and composite fist x 5 repetitions -NP  - 1# Dorsiflexion/Volar flexion (Supination /Pronation ), Radial Deviation / Ulnar Deviation x 10 repetitions -NP  - 0.5# leg weight dowel for Supination / Pronation stretch x 10 repetitions   - Weighted ball on tabletop for Dorsiflexion /Volar Flexion  stretches x 10 repetitions  - "Prayer Stretch" for Dorsal Flexion x 10 seconds x 5 repetitions -NP  - YELLOW (non weighted) ball [place] for Supination/Pronation stretch ( in lap) x 10 repetitions  -NP  - Wrist roller coaster for Active Range of Motion  of wrist in all planes x 5 repetitions    - YELLOW Flexbar for Dorsiflexion /Volar Flexion x 10 repetitions        Therapeutic Activities  to improve functional performance while increasing independence with ADLs & IADLs  x    12  minutes:        - Mini colored pegboard for Fine Motor Coordination and in-hand manipulation  (5 in-hand) x 20 pegs   - Wrist wheel for Supination / Pronation stretch x 10 repetitions   - Wrist wheel for Radial Deviation / Ulnar Deviation stretches x 10 repetitions   - Wooden pegboard with RED clothespins with 3PT pinch x 2 repetitions  - RED Theraputty with PVC for "cookie cutting" and medicine top x 10 repetitions     - 15# Progressive Hand " "Gripper (black spring) for composite  x 20 repetitions         -NP= Not Performed      Initiate in future therapy sessions:          - ### Flexbar for Supination /Pronation x 10 repetitions  - Colored bead UNlacing with thumb to base of 5th digit x 10 beads  - Colored bead UNlacing with 2nd-5th digit tips to palm x 20 beads                 - ### Theraputty for rolling, (Bilateral) pulling  x 10 Repetitions   - ### digiflex for isolated x 10 repetitions;  ### digiflex for composite digital flexion x 20 repetitions               - YELLOW digiweb for composite digital Extension and  intrinsics x 10 repetitions                -  #### dowel for Supination /Pronation  x 10 repetitions                              - ### Wrist exerstick in standing for Dorsiflexion / Volar Flexion  x 3 repetitions                          Assessment      Patient will continue to benefit from skilled Occupational Therapy intervention to increase functional abilities, range of motion, and strength and pain control.  Patient. demonstrated proper understanding of each exercise.  Patient continues to require verbal and tactile cues for throughout therapy session to maintain position and prevent compensation.   Patient continues to be limited in functional and leisurely pursuits. Pain limits patient's participation in activities of daily living.  Patient is not able to carryout necessary vocational tasks.   Patient's spiritual, cultural and educational needs considered and patient agreeable to plan of care and goals.  Patient is making good progress towards established goals.  Patient tolerated exercises / activities within pain tolerance.   Reviewed Home Exercise Program with patient., see EPIC under "patient instructions" for provided exercises, activity modifications and limitations, modalities for home pain management.  Patient. demo understanding of above.     Patient. tolerated Fluidotherapy with no irritation.        Patient tolerated " increase in resistance during dowel for Supination / Pronation stretch with tolerable stretching pain reported. Patient tolerated addition of Flexbar for Dorsal Flexion / Volar Flexion with no reported pain.         Active Range of Motion: wrist                         (Right)   //  (Left)  (Pre session)   Dorsal Flexion /Volar Flexion: 46 (+1)   / 40 (+5)      //  55/65  Radial Deviation /Ulnar Deviation: 16 (+1)    / 17 (+2)    //  15/25  Supination / Pronation: 64  (+4)    / 55 (-6)     //  80/80     Patient demo increased Active range of motion for (Right) wrist  Dorsal Flexion, Radial Deviation, Supination, and Pronation, Ulnar Deviation and Volar Flexion.      New/Revised Goals: Continue Plan Of Care   Goals:  1)   Patient to be Independent with Home exercise program and modalities for pain management by 1 week. (MET)   2)   Patient will report   1/10 pain on average with activity to assist with exercises by 6-8 weeks.  ( Revised 01/07/2025)   3)   Patient will increase range of Motion by 3-5 degrees to increase functional use for activities of daily living by 6-8 weeks.  ( In Progress)   4)    Patient will decrease edema by 0.1-0.3 millimeters  to increase joint mobility /flexibility by 6-8 weeks.  (MET)   5)   Patient will increase manual muscle testing by a grade to assist with lifting items by 6-8 weeks. ( Added 01/07/2025)   6)   Patient will increase  strength 3-5 pounds to open containers by 6-8 weeks.  ( Added 01/07/2025)   7)   Patient will increase pinch by 1-3 pounds for buttoning by 6-8 weeks.  ( Added 01/07/2025)         Plan       Continue 2x week during the 90 day certification period    01/07/2025   to 04/07/2025   with established Plan Of Care in pursuit of Occupational Therapy goals.        PETAR Christensen

## 2025-01-30 ENCOUNTER — CLINICAL SUPPORT (OUTPATIENT)
Dept: REHABILITATION | Facility: HOSPITAL | Age: 65
End: 2025-01-30
Payer: OTHER GOVERNMENT

## 2025-01-30 DIAGNOSIS — M25.531 RIGHT WRIST PAIN: Primary | ICD-10-CM

## 2025-01-30 PROCEDURE — 97110 THERAPEUTIC EXERCISES: CPT | Mod: PN

## 2025-01-30 PROCEDURE — 97530 THERAPEUTIC ACTIVITIES: CPT | Mod: PN

## 2025-01-30 PROCEDURE — 97022 WHIRLPOOL THERAPY: CPT | Mod: PN

## 2025-01-30 NOTE — PROGRESS NOTES
"                                 Occupational Therapy Daily Treatment Note         Date: 1/9/2025  Name: Luann Borjas  Clinic Number: [id number]     Therapy Diagnosis: S52.531A (ICD-10-CM) - Closed Colles' fracture of right radius       Encounter Diagnosis   Name Primary?    Right wrist pain Yes      Physician: Tony Grande MD     Physician Orders: S52.531A (ICD-10-CM) - Closed Colles' fracture of right radius; evaluate and treat; Please begin therapy to the right wrist and hand. Include edema control and range of motion and eventually gentle strengthening   Medical Diagnosis: S52.531A (ICD-10-CM) - Closed Colles' fracture of right radius  Surgical Procedure and Date: Not Applicable      Insurance Authorization Period Expiration: 12/04/2024-04/03/2025  Plan of Care Certification Period: 01/07/2025-04/07/2025  Date of Return to MD: 01/28/2025     Evaluation FOTO: 12/10/2024 = 49%  Reassessment FOTO: 01/07/2025 = 45%     Visit # / Visits authorized:  10 / 16   Time In: 4:00  Time Out: 4:45     Total Billable Time:  40 minutes     Precautions:  Standard; Velcro short wrist splint for heavier activities only;  begin aggressive range of motion and strengthening         Post Injury:  10/09/2024        Subjective      Patient reports: "I have been working at the [employer] all day and my hand is sore.  I have trouble picking up small pills."      Pain: 2-3/10   Location of pain: (Right) wrist       Objective    Patient seen by Occupational Therapy this session. Tx consisted of:        Supervised modalities after being cleared for contradictions  x   10 minutes:      -Fluidotherapy to  (Right)   hand to increase blood flow, circulation, tissue elasticity, desensitization, sensory re-education and for pain management  x 10 minutes.           Therapeutic Exercises to improve functional performance while increasing strength, endurance, range of motion,  and flexibility  x    18 minutes:     -Manual Therapy techniques " "to (Right) wrist and digits  including gentle stretching, and Passive Range of Motion to increase joint mobility, range of motion  and for pain management  x  5 minutes      -Soft Tissue Mobilization to   (Right) hand into wrist and forearm from distal to proximal to decrease edema and increase range of motion and functional abilities  x  1  minute     - Low load prolonged stretches for 2.0# leg weight Dorsiflexion / 2.0# leg weight Volar Flexion with   (Table edge) x 30 seconds each x 2 repetitions    - Active Assistive Range of Motion for (Right) wrist in all planes  x 5 repetitions  - Tendon glides for intrinsic plus, minus, flat fist and composite fist x 5 repetitions -NP  - 1# Dorsiflexion/Volar flexion (Supination /Pronation ), Radial Deviation / Ulnar Deviation x 10 repetitions -NP  - 0.5# leg weight dowel for Supination / Pronation stretch x 10 repetitions   - Weighted ball on tabletop for Dorsiflexion /Volar Flexion  stretches x 10 repetitions  - "Prayer Stretch" for Dorsal Flexion x 10 seconds x 5 repetitions -NP  - YELLOW (non weighted) ball [place] for Supination/Pronation stretch ( in lap) x 10 repetitions  -NP  - Wrist roller coaster for Active Range of Motion  of wrist in all planes x 5 repetitions    - YELLOW Flexbar for Dorsiflexion /Volar Flexion x 10 repetitions  - YELLOW digiflex for isolated x 10 repetitions;  RED digiflex for composite digital flexion x 20 repetitions         Therapeutic Activities  to improve functional performance while increasing independence with ADLs & IADLs  x    12  minutes:        - Mini colored pegboard for Fine Motor Coordination and in-hand manipulation  (5 in-hand) x 20 pegs   - Wrist wheel for Supination / Pronation stretch x 10 repetitions   - Wrist wheel for Radial Deviation / Ulnar Deviation stretches x 10 repetitions   - Wooden pegboard with RED clothespins with 3PT pinch x 2 repetitions  - RED Theraputty with PVC for "cookie cutting" and medicine top x 10 " "repetitions     - 25# Progressive Hand Gripper (black spring) for composite  x 20 repetitions         -NP= Not Performed      Initiate in future therapy sessions:          - ### Flexbar for Supination /Pronation x 10 repetitions  - Colored bead UNlacing with thumb to base of 5th digit x 10 beads  - Colored bead UNlacing with 2nd-5th digit tips to palm x 20 beads                 - ### Theraputty for rolling, (Bilateral) pulling  x 10 Repetitions                 - YELLOW digiweb for composite digital Extension and  intrinsics x 10 repetitions                -  #### dowel for Supination /Pronation  x 10 repetitions                 - ### Wrist exerstick in standing for Dorsiflexion / Volar Flexion  x 3 repetitions                          Assessment      Patient will continue to benefit from skilled Occupational Therapy intervention to increase functional abilities, range of motion, and strength and pain control.  Patient. demonstrated proper understanding of each exercise.  Patient continues to require verbal and tactile cues for throughout therapy session to maintain position and prevent compensation.   Patient continues to be limited in functional and leisurely pursuits. Pain limits patient's participation in activities of daily living.  Patient is not able to carryout necessary vocational tasks.   Patient's spiritual, cultural and educational needs considered and patient agreeable to plan of care and goals.  Patient is making good progress towards established goals.  Patient tolerated exercises / activities within pain tolerance.   Reviewed Home Exercise Program with patient., see EPIC under "patient instructions" for provided exercises, activity modifications and limitations, modalities for home pain management.  Patient. demo understanding of above.     Patient. tolerated Fluidotherapy with no irritation.        Patient tolerated increase in resistance during Progressive Hand Gripper for composite  with no " reported pain. Patient tolerated addition of digiflex for isolated and composite digital flexion  with no reported pain.         Active Range of Motion: wrist                         (Right)   //  (Left)  (Pre session)   Dorsal Flexion /Volar Flexion: 47 (+1)   / 43 (+3)      //  55/65  Radial Deviation /Ulnar Deviation: 16     / 18 (+1)    //  15/25  Supination / Pronation: 65  (+1)    / 60 (+5)     //  80/80     Patient demo increased Active range of motion for (Right) wrist   Dorsal Flexion, Volar Flexion, Ulnar Deviation, Supination, and Pronation.      New/Revised Goals: Continue Plan Of Care   Goals:  1)   Patient to be Independent with Home exercise program and modalities for pain management by 1 week. (MET)   2)   Patient will report   1/10 pain on average with activity to assist with exercises by 6-8 weeks.  ( Revised 01/07/2025)   3)   Patient will increase range of Motion by 3-5 degrees to increase functional use for activities of daily living by 6-8 weeks.  ( In Progress)   4)    Patient will decrease edema by 0.1-0.3 millimeters  to increase joint mobility /flexibility by 6-8 weeks.  (MET)   5)   Patient will increase manual muscle testing by a grade to assist with lifting items by 6-8 weeks. ( Added 01/07/2025)   6)   Patient will increase  strength 3-5 pounds to open containers by 6-8 weeks.  ( Added 01/07/2025)   7)   Patient will increase pinch by 1-3 pounds for buttoning by 6-8 weeks.  ( Added 01/07/2025)         Plan       Continue 2x week during the 90 day certification period    01/07/2025   to 04/07/2025   with established Plan Of Care in pursuit of Occupational Therapy goals.        PETAR Christensen

## 2025-02-04 ENCOUNTER — CLINICAL SUPPORT (OUTPATIENT)
Dept: REHABILITATION | Facility: HOSPITAL | Age: 65
End: 2025-02-04
Payer: OTHER GOVERNMENT

## 2025-02-04 DIAGNOSIS — M25.531 RIGHT WRIST PAIN: Primary | ICD-10-CM

## 2025-02-04 PROCEDURE — 97530 THERAPEUTIC ACTIVITIES: CPT | Mod: PN

## 2025-02-04 PROCEDURE — 97022 WHIRLPOOL THERAPY: CPT | Mod: PN

## 2025-02-04 PROCEDURE — 97110 THERAPEUTIC EXERCISES: CPT | Mod: PN

## 2025-02-04 NOTE — PROGRESS NOTES
"                                 Occupational Therapy Daily Treatment Note         Date: 1/9/2025  Name: Luann Borjas  Clinic Number: [id number]     Therapy Diagnosis: S52.531A (ICD-10-CM) - Closed Colles' fracture of right radius       Encounter Diagnosis   Name Primary?    Right wrist pain Yes      Physician: Tony Grande MD     Physician Orders: S52.531A (ICD-10-CM) - Closed Colles' fracture of right radius; evaluate and treat; Please begin therapy to the right wrist and hand. Include edema control and range of motion and eventually gentle strengthening   Medical Diagnosis: S52.531A (ICD-10-CM) - Closed Colles' fracture of right radius  Surgical Procedure and Date: Not Applicable      Insurance Authorization Period Expiration: 12/04/2024-04/03/2025  Plan of Care Certification Period: 01/07/2025-04/07/2025  Date of Return to MD: needs to schedule     Evaluation FOTO: 12/10/2024 = 49%  Reassessment FOTO: 01/07/2025 = 45%     Visit # / Visits authorized:  11 / 16   Time In: 4:25  Time Out: 5:10      Total Billable Time:  40 minutes     Precautions:  Standard; Velcro short wrist splint for heavier activities only;  begin aggressive range of motion and strengthening         Post Injury:  10/09/2024        Subjective      Patient reports: "I am not in pain just soreness."      Pain: 0-1/10   Location of pain: (Right) wrist       Objective    Patient seen by Occupational Therapy this session. Tx consisted of:        Supervised modalities after being cleared for contradictions  x   10 minutes:      -Fluidotherapy to  (Right)   hand to increase blood flow, circulation, tissue elasticity, desensitization, sensory re-education and for pain management  x 10 minutes.           Therapeutic Exercises to improve functional performance while increasing strength, endurance, range of motion,  and flexibility  x    18 minutes:     -Manual Therapy techniques to (Right) wrist and digits  including gentle stretching, and Passive " "Range of Motion to increase joint mobility, range of motion  and for pain management  x  5 minutes      -Soft Tissue Mobilization to   (Right) hand into wrist and forearm from distal to proximal to decrease edema and increase range of motion and functional abilities  x  1  minute     - Low load prolonged stretches for 2.0# leg weight Dorsiflexion / 2.0# leg weight Volar Flexion with   (Table edge) x 30 seconds each x 2 repetitions    - Active Assistive Range of Motion for (Right) wrist in all planes  x 5 repetitions  - Tendon glides for intrinsic plus, minus, flat fist and composite fist x 5 repetitions -NP  - 1# Dorsiflexion/Volar flexion (Supination /Pronation ), Radial Deviation / Ulnar Deviation x 10 repetitions -NP  - 0.5# leg weight dowel for Supination / Pronation stretch x 10 repetitions   - Weighted ball on tabletop for Dorsiflexion /Volar Flexion  stretches x 10 repetitions  - "Prayer Stretch" for Dorsal Flexion x 10 seconds x 5 repetitions -NP  - YELLOW (non weighted) ball [place] for Supination/Pronation stretch ( in lap) x 10 repetitions  -NP  - Wrist roller coaster for Active Range of Motion  of wrist in all planes x 5 repetitions    - YELLOW Flexbar for Dorsiflexion /Volar Flexion; Supination /Pronation x 10 repetitions  - YELLOW digiflex for isolated x 10 repetitions;  RED digiflex for composite digital flexion x 20 repetitions         Therapeutic Activities  to improve functional performance while increasing independence with ADLs & IADLs  x    12  minutes:        - Mini colored pegboard for Fine Motor Coordination and in-hand manipulation  (5 in-hand) x 20 pegs   - Wrist wheel for Supination / Pronation stretch x 10 repetitions   - Wrist wheel for Radial Deviation / Ulnar Deviation stretches x 10 repetitions   - Wooden pegboard with RED clothespins with 3PT pinch x 2 repetitions  - RED Theraputty with PVC for "cookie cutting" and medicine top x 10 repetitions     - 25# Progressive Hand Gripper (black " "spring) for composite  x 20 repetitions         -NP= Not Performed      Initiate in future therapy sessions:      - Colored bead UNlacing with thumb to base of 5th digit x 10 beads  - Colored bead UNlacing with 2nd-5th digit tips to palm x 20 beads                 - ### Theraputty for rolling, (Bilateral) pulling  x 10 Repetitions                 - YELLOW digiweb for composite digital Extension and  intrinsics x 10 repetitions                -  #### dowel for Supination /Pronation  x 10 repetitions                 - ### Wrist exerstick in standing for Dorsiflexion / Volar Flexion  x 3 repetitions                          Assessment      Patient will continue to benefit from skilled Occupational Therapy intervention to increase functional abilities, range of motion, and strength and pain control.  Patient. demonstrated proper understanding of each exercise.  Patient continues to require verbal and tactile cues for throughout therapy session to maintain position and prevent compensation.   Patient continues to be limited in functional and leisurely pursuits. Pain limits patient's participation in activities of daily living.  Patient is not able to carryout necessary vocational tasks.   Patient's spiritual, cultural and educational needs considered and patient agreeable to plan of care and goals.  Patient is making good progress towards established goals.  Patient tolerated exercises / activities within pain tolerance.   Reviewed Home Exercise Program with patient., see EPIC under "patient instructions" for provided exercises, activity modifications and limitations, modalities for home pain management.  Patient. demo understanding of above.     Patient. tolerated Fluidotherapy with no irritation.        Patient tolerated addition of Flexbar for Supination / Pronation  with no reported pain.         Active Range of Motion: wrist                         (Right)   //  (Left)  (Pre session)    Dorsal Flexion /Volar " Flexion: 48 (+1)   / 44 (+1)      //  55/65  Radial Deviation /Ulnar Deviation: 16     / 20 (+2)    //  15/25  Supination / Pronation: 65  (+0)    / 64 (+4)     //  80/80     Patient demo increased Active range of motion for (Right) wrist  Dorsal Flexion, Volar Flexion, Ulnar Deviation, and Pronation remained the same for Supination.      New/Revised Goals: Continue Plan Of Care   Goals:  1)   Patient to be Independent with Home exercise program and modalities for pain management by 1 week. (MET)   2)   Patient will report   1/10 pain on average with activity to assist with exercises by 6-8 weeks.  ( Revised 01/07/2025)   3)   Patient will increase range of Motion by 3-5 degrees to increase functional use for activities of daily living by 6-8 weeks.  ( In Progress)   4)    Patient will decrease edema by 0.1-0.3 millimeters  to increase joint mobility /flexibility by 6-8 weeks.  (MET)   5)   Patient will increase manual muscle testing by a grade to assist with lifting items by 6-8 weeks. ( Added 01/07/2025)   6)   Patient will increase  strength 3-5 pounds to open containers by 6-8 weeks.  ( Added 01/07/2025)   7)   Patient will increase pinch by 1-3 pounds for buttoning by 6-8 weeks.  ( Added 01/07/2025)         Plan       Continue 2x week during the 90 day certification period    01/07/2025   to 04/07/2025   with established Plan Of Care in pursuit of Occupational Therapy goals.        PETAR Christensen

## 2025-02-06 ENCOUNTER — CLINICAL SUPPORT (OUTPATIENT)
Dept: REHABILITATION | Facility: HOSPITAL | Age: 65
End: 2025-02-06
Payer: OTHER GOVERNMENT

## 2025-02-06 DIAGNOSIS — M25.531 RIGHT WRIST PAIN: Primary | ICD-10-CM

## 2025-02-06 PROCEDURE — 97022 WHIRLPOOL THERAPY: CPT | Mod: PN

## 2025-02-06 PROCEDURE — 97110 THERAPEUTIC EXERCISES: CPT | Mod: PN

## 2025-02-06 PROCEDURE — 97530 THERAPEUTIC ACTIVITIES: CPT | Mod: PN

## 2025-02-06 NOTE — PROGRESS NOTES
"                                 Occupational Therapy Daily Treatment Note         Date: 1/9/2025  Name: Luann Borjas  Clinic Number: [id number]     Therapy Diagnosis: S52.531A (ICD-10-CM) - Closed Colles' fracture of right radius       Encounter Diagnosis   Name Primary?    Right wrist pain Yes      Physician: Tony Grande MD     Physician Orders: S52.531A (ICD-10-CM) - Closed Colles' fracture of right radius; evaluate and treat; Please begin therapy to the right wrist and hand. Include edema control and range of motion and eventually gentle strengthening   Medical Diagnosis: S52.531A (ICD-10-CM) - Closed Colles' fracture of right radius  Surgical Procedure and Date: Not Applicable      Insurance Authorization Period Expiration: 12/04/2024-04/03/2025  Plan of Care Certification Period: 01/07/2025-04/07/2025  Date of Return to MD: 02/18/2025     Evaluation FOTO: 12/10/2024 = 49%  Reassessment FOTO: 01/07/2025 = 45%     Visit # / Visits authorized:  12 / 32   Time In: 3:15  Time Out: 4:00       Total Billable Time:  40 minutes     Precautions:  Standard; Velcro short wrist splint for heavier activities only;  begin aggressive range of motion and strengthening         Post Injury:  10/09/2024        Subjective      Patient reports: "I have been using my hands a lot today.  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 Tx consisted of:        Supervised modalities after being cleared for contradictions  x   10 minutes:      -Fluidotherapy to  (Right)   hand to increase blood flow, circulation, tissue elasticity, desensitization, sensory re-education and for pain management  x 10 minutes.           Therapeutic Exercises to improve functional performance while increasing strength, endurance, range of motion,  and flexibility  x    18 minutes:     -Manual Therapy techniques to " "(Right) wrist and digits  including gentle stretching, and Passive Range of Motion to increase joint mobility, range of motion  and for pain management  x  5 minutes      -Soft Tissue Mobilization to   (Right) hand into wrist and forearm from distal to proximal to decrease edema and increase range of motion and functional abilities  x  1  minute     - Low load prolonged stretches for 2.0# leg weight Dorsiflexion / 2.0# leg weight Volar Flexion with   (Table edge) x 30 seconds each x 2 repetitions    - Active Assistive Range of Motion for (Right) wrist in all planes  x 5 repetitions  - Tendon glides for intrinsic plus, minus, flat fist and composite fist x 5 repetitions -NP  - 1# Dorsiflexion/Volar flexion (Supination /Pronation ), Radial Deviation / Ulnar Deviation x 10 repetitions -NP  - 0.5# leg weight dowel for Supination / Pronation stretch x 10 repetitions   - Weighted ball on tabletop for Dorsiflexion /Volar Flexion  stretches x 10 repetitions -NP  - "Prayer Stretch" for Dorsal Flexion x 10 seconds x 5 repetitions -NP  - YELLOW (non weighted) ball [place] for Supination/Pronation stretch ( in lap) x 10 repetitions  -NP  - Wrist roller coaster for Active Range of Motion  of wrist in all planes x 5 repetitions    - YELLOW Flexbar for Dorsiflexion /Volar Flexion; Supination /Pronation x 10 repetitions  - YELLOW digiflex for isolated x 10 repetitions;  RED digiflex for composite digital flexion x 20 repetitions   - YELLOW digiweb for intrinsics x 10 repetitions         Therapeutic Activities  to improve functional performance while increasing independence with ADLs & IADLs  x    12  minutes:        - Mini colored pegboard for Fine Motor Coordination and in-hand manipulation  (5 in-hand) x 20 pegs   - Wrist wheel for Supination / Pronation stretch x 10 repetitions (ADD TBAND NEXT)  - Wrist wheel for Radial Deviation / Ulnar Deviation stretches x 10 repetitions   - Wooden pegboard with RED clothespins with 3PT pinch x 2 " "repetitions  - RED Theraputty with PVC for "cookie cutting" and medicine top x 10 repetitions     - 25# Progressive Hand Gripper (black spring) for composite  x 20 repetitions         -NP= Not Performed      Initiate in future therapy sessions:      - Colored bead UNlacing with thumb to base of 5th digit x 10 beads  - Colored bead UNlacing with 2nd-5th digit tips to palm x 20 beads                 - ### Theraputty for rolling, (Bilateral) pulling  x 10 Repetitions                 - YELLOW digiweb for composite digital Extension x 10 repetitions                - ### Wrist exerstick in standing for Dorsiflexion / Volar Flexion  x 3 repetitions                          Assessment      Patient will continue to benefit from skilled Occupational Therapy intervention to increase functional abilities, range of motion, and strength and pain control.  Patient. demonstrated proper understanding of each exercise.  Patient continues to require verbal and tactile cues for throughout therapy session to maintain position and prevent compensation.   Patient continues to be limited in functional and leisurely pursuits. Pain limits patient's participation in activities of daily living.  Patient is not able to carryout necessary vocational tasks.   Patient's spiritual, cultural and educational needs considered and patient agreeable to plan of care and goals.  Patient is making good progress towards established goals.  Patient tolerated exercises / activities within pain tolerance.   Reviewed Home Exercise Program with patient., see EPIC under "patient instructions" for provided exercises, activity modifications and limitations, modalities for home pain management.  Patient. demo understanding of above.     Patient. tolerated Fluidotherapy with no irritation.        Patient tolerated addition of  digiweb for intrinsics with no reported pain.         Active Range of Motion: wrist                         (Right)   //  (Left)  (Pre " session)    Dorsal Flexion /Volar Flexion: 50 (+2)   / 45 (+1)      //  55/65  Radial Deviation /Ulnar Deviation: 16     / 21 (+1)    //  15/25  Supination / Pronation: 67  (+2)    / 66 (+2)     //  80/80     Patient demo increased Active range of motion for (Right) wrist  Dorsal Flexion, Volar Flexion, Ulnar Deviation, Pronation, and  Supination.      New/Revised Goals: Continue Plan Of Care   Goals:  1)   Patient to be Independent with Home exercise program and modalities for pain management by 1 week. (MET)   2)   Patient will report   1/10 pain on average with activity to assist with exercises by 6-8 weeks.  ( Revised 01/07/2025)   3)   Patient will increase range of Motion by 3-5 degrees to increase functional use for activities of daily living by 6-8 weeks.  ( In Progress)   4)    Patient will decrease edema by 0.1-0.3 millimeters  to increase joint mobility /flexibility by 6-8 weeks.  (MET)   5)   Patient will increase manual muscle testing by a grade to assist with lifting items by 6-8 weeks. ( Added 01/07/2025)   6)   Patient will increase  strength 3-5 pounds to open containers by 6-8 weeks.  ( Added 01/07/2025)   7)   Patient will increase pinch by 1-3 pounds for buttoning by 6-8 weeks.  ( Added 01/07/2025)         Plan       Continue 2x week during the 90 day certification period    01/07/2025   to 04/07/2025   with established Plan Of Care in pursuit of Occupational Therapy goals.        PETAR Christensen

## 2025-02-11 ENCOUNTER — PATIENT MESSAGE (OUTPATIENT)
Dept: FAMILY MEDICINE | Facility: CLINIC | Age: 65
End: 2025-02-11
Payer: OTHER GOVERNMENT

## 2025-02-11 ENCOUNTER — CLINICAL SUPPORT (OUTPATIENT)
Dept: REHABILITATION | Facility: HOSPITAL | Age: 65
End: 2025-02-11
Payer: OTHER GOVERNMENT

## 2025-02-11 ENCOUNTER — HOSPITAL ENCOUNTER (OUTPATIENT)
Dept: RADIOLOGY | Facility: HOSPITAL | Age: 65
Discharge: HOME | End: 2025-02-11
Attending: NURSE PRACTITIONER
Payer: OTHER GOVERNMENT

## 2025-02-11 VITALS — WEIGHT: 164.88 LBS | BODY MASS INDEX: 25.88 KG/M2 | HEIGHT: 67 IN

## 2025-02-11 DIAGNOSIS — M25.531 RIGHT WRIST PAIN: Primary | ICD-10-CM

## 2025-02-11 DIAGNOSIS — Z12.31 SCREENING MAMMOGRAM FOR BREAST CANCER: ICD-10-CM

## 2025-02-11 DIAGNOSIS — M85.80 OSTEOPENIA, UNSPECIFIED LOCATION: Primary | ICD-10-CM

## 2025-02-11 DIAGNOSIS — M85.80 OSTEOPENIA, UNSPECIFIED LOCATION: ICD-10-CM

## 2025-02-11 DIAGNOSIS — Z78.0 POST-MENOPAUSAL: ICD-10-CM

## 2025-02-11 PROCEDURE — 97110 THERAPEUTIC EXERCISES: CPT | Mod: PN

## 2025-02-11 PROCEDURE — 77080 DXA BONE DENSITY AXIAL: CPT | Mod: 26,,, | Performed by: RADIOLOGY

## 2025-02-11 PROCEDURE — 77063 BREAST TOMOSYNTHESIS BI: CPT | Mod: 26,,, | Performed by: RADIOLOGY

## 2025-02-11 PROCEDURE — 77067 SCR MAMMO BI INCL CAD: CPT | Mod: 26,,, | Performed by: RADIOLOGY

## 2025-02-11 PROCEDURE — 77080 DXA BONE DENSITY AXIAL: CPT | Mod: TC,PO

## 2025-02-11 PROCEDURE — 77067 SCR MAMMO BI INCL CAD: CPT | Mod: TC,PO

## 2025-02-11 PROCEDURE — 97022 WHIRLPOOL THERAPY: CPT | Mod: PN

## 2025-02-11 PROCEDURE — 97530 THERAPEUTIC ACTIVITIES: CPT | Mod: PN

## 2025-02-11 NOTE — PROGRESS NOTES
"                                 Occupational Therapy Progress Note         Date: 1/9/2025  Name: Luann Borjas  Clinic Number: [id number]     Therapy Diagnosis: S52.531A (ICD-10-CM) - Closed Colles' fracture of right radius       Encounter Diagnosis   Name Primary?    Right wrist pain Yes      Physician: Tony Grande MD     Physician Orders: S52.531A (ICD-10-CM) - Closed Colles' fracture of right radius; evaluate and treat; Please begin therapy to the right wrist and hand. Include edema control and range of motion and eventually gentle strengthening   Medical Diagnosis: S52.531A (ICD-10-CM) - Closed Colles' fracture of right radius  Surgical Procedure and Date: Not Applicable      Insurance Authorization Period Expiration: 12/04/2024-04/03/2025  Plan of Care Certification Period: 01/07/2025-04/07/2025  Date of Return to MD: 02/18/2025     Evaluation FOTO: 12/10/2024 = 49%  Reassessment FOTO: 01/07/2025 = 45%  Reassessment FOTO: 02/13/2025 = 49%     Visit # / Visits authorized:  13 / 32   Time In: 2:15  Time Out: 3:00       Total Billable Time:  40 minutes     Precautions:  Standard; Velcro short wrist splint for heavier activities only;  begin aggressive range of motion and strengthening         Post Injury:  10/09/2024        Subjective      Patient reports: "I jammed my middle finger and hand yesterday while I was sitting in a rolling chair.  My hand hit the table so my pain is a little higher today."     Pain: 2/10   Location of pain: (Right) wrist       Objective    Patient seen by Occupational Therapy this session. Tx consisted of:        Supervised modalities after being cleared for contradictions  x   10 minutes:      -Fluidotherapy to  (Right)   hand to increase blood flow, circulation, tissue elasticity, desensitization, sensory re-education and for pain management  x 10 minutes.           Therapeutic Exercises to improve functional performance while increasing strength, endurance, range of motion,  " "and flexibility  x    18 minutes:     -Manual Therapy techniques to (Right) wrist and digits  including gentle stretching, and Passive Range of Motion to increase joint mobility, range of motion  and for pain management  x  5 minutes      -Soft Tissue Mobilization to   (Right) hand into wrist and forearm from distal to proximal to decrease edema and increase range of motion and functional abilities  x  1  minute     - Low load prolonged stretches for 2.0# leg weight Dorsiflexion / 2.0# leg weight Volar Flexion with   (Table edge) x 30 seconds each x 2 repetitions    - Active Assistive Range of Motion for (Right) wrist in all planes  x 5 repetitions  - Tendon glides for intrinsic plus, minus, flat fist and composite fist x 5 repetitions -NP  - 1# Dorsiflexion/Volar flexion (Supination /Pronation ), Radial Deviation / Ulnar Deviation x 10 repetitions -NP  - 0.5# leg weight dowel for Supination / Pronation stretch x 10 repetitions   - Weighted ball on tabletop for Dorsiflexion /Volar Flexion  stretches x 10 repetitions -NP  - "Prayer Stretch" for Dorsal Flexion x 10 seconds x 5 repetitions -NP  - YELLOW (non weighted) ball [place] for Supination/Pronation stretch ( in lap) x 10 repetitions  -NP  - Wrist roller coaster for Active Range of Motion  of wrist in all planes x 5 repetitions    - YELLOW Flexbar for Dorsiflexion /Volar Flexion; Supination /Pronation x 10 repetitions  - YELLOW digiflex for isolated x 10 repetitions;  RED digiflex for composite digital flexion x 20 repetitions   - YELLOW digiweb for intrinsics x 10 repetitions         Therapeutic Activities  to improve functional performance while increasing independence with ADLs & IADLs  x    12  minutes:        - Mini colored pegboard for Fine Motor Coordination and in-hand manipulation  (5 in-hand) x 20 pegs   - Wrist wheel for Radial Deviation / Ulnar Deviation stretches x 10 repetitions   - Wooden pegboard with RED clothespins with 3PT pinch x 2 repetitions  - " "RED Theraputty with PVC for "cookie cutting" and medicine top x 10 repetitions     - 25# Progressive Hand Gripper (black spring) for composite  x 20 repetitions         -NP= Not Performed      Initiate in future therapy sessions:      - Wrist wheel for Supination / Pronation stretch x 10 repetitions (ADD TBAND NEXT)    - Colored bead UNlacing with thumb to base of 5th digit x 10 beads  - Colored bead UNlacing with 2nd-5th digit tips to palm x 20 beads                 - ### Theraputty for rolling, (Bilateral) pulling  x 10 Repetitions                 - YELLOW digiweb for composite digital Extension x 10 repetitions                - ### Wrist exerstick in standing for Dorsiflexion / Volar Flexion  x 3 repetitions                     Patient reassessed as follows:    Active Range of Motion: wrist                         (Right)   //  (Left)  Dorsal Flexion /Volar Flexion: 45 (+5)  / 53 (+3)    //  55/65  Radial Deviation /Ulnar Deviation: 15 (+3)  / 20 (+2)   //  15/25  Supination / Pronation: 60 (+5)  / 60 (+5)   //  80/80    Passive Range of Motion: wrist        (submaximally, within pain tolerance)                  (Right)    Dorsal Flexion /Volar Flexion: 50 (+5)  / 58 (+3)   Radial Deviation /Ulnar Deviation: 15  / 25   Supination / Pronation:  65 (+5)  / 75 (+0)     Comments: previous (Left) wrist fracture    Manual Muscle Test:  Wrist     (submaximally, within pain tolerance)        (Right)   Dorsal Flexion:   3+/5  (+1)   Volar Flexion:  3+/5  (+1)   Radial Deviation:  3+/5  (+1)   Ulnar Deviation: 3+/5  (+1)      Strength: (BRITTNI Dynamometer in pounds),Position II  (submaximally, within pain tolerance)   (Right): 30  (+5)   (Left):  50    Pinch Strength: (Pinch Gauge in pounds)  (submaximally, within pain tolerance)              (Right) /  (Left)  Lateral Pinch:  15 (+1) / 20  3 Point Pinch:   12 (+2)  /15  2 Point Pinch:   8  (+0) / 14     Assessment      Patient will continue to benefit from skilled " "Occupational Therapy intervention to increase functional abilities, range of motion, and strength and pain control.  Patient. demonstrated proper understanding of each exercise.  Patient continues to require verbal and tactile cues for throughout therapy session to maintain position and prevent compensation.   Patient continues to be limited in functional and leisurely pursuits. Pain limits patient's participation in activities of daily living.  Patient is not able to carryout necessary vocational tasks.   Patient's spiritual, cultural and educational needs considered and patient agreeable to plan of care and goals.  Patient is making good progress towards established goals.  Patient tolerated exercises / activities within pain tolerance.   Reviewed Home Exercise Program with patient., see EPIC under "patient instructions" for provided exercises, activity modifications and limitations, modalities for home pain management.  Patient. demo understanding of above.     Patient. tolerated Fluidotherapy with no irritation.              Patient demo increased Active range of motion for (Right) wrist  Dorsal Flexion, Volar Flexion, Ulnar Deviation, Pronation, and  Supination, increased Passive range of motion for (Right) wrist Dorsal Flexion, Volar Flexion, Supination,  and remained the same for Pronation; increased Manual Muscle Testing for (Right) wrist Dorsal Flexion, Volar Flexion, Radial Deviation, and Ulnar Deviation, increased , Lateral Pinch, 3 Point Pinch, and remained the same for 2 Point Pinch strength.          New/Revised Goals: Continue Plan Of Care   Goals:  1)   Patient to be Independent with Home exercise program and modalities for pain management by 1 week. (MET)   2)   Patient will report   1/10 pain on average with activity to assist with exercises by 6-8 weeks.  ( Revised 01/07/2025)   3)   Patient will increase range of Motion by 3-5 degrees to increase functional use for activities of daily living " by 6-8 weeks.  ( In Progress)   4)    Patient will decrease edema by 0.1-0.3 millimeters  to increase joint mobility /flexibility by 6-8 weeks.  (MET)   5)   Patient will increase manual muscle testing by a grade to assist with lifting items by 6-8 weeks. ( In Progress)   6)   Patient will increase  strength 3-5 pounds to open containers by 6-8 weeks.  (In Progress)   7)   Patient will increase pinch by 1-3 pounds for buttoning by 6-8 weeks.  (In Progress)         Plan       Continue 2x week during the 90 day certification period    01/07/2025   to 04/07/2025   with established Plan Of Care in pursuit of Occupational Therapy goals.        PETAR Christensen

## 2025-02-13 ENCOUNTER — CLINICAL SUPPORT (OUTPATIENT)
Dept: REHABILITATION | Facility: HOSPITAL | Age: 65
End: 2025-02-13
Payer: OTHER GOVERNMENT

## 2025-02-13 DIAGNOSIS — M25.531 RIGHT WRIST PAIN: Primary | ICD-10-CM

## 2025-02-13 PROCEDURE — 97530 THERAPEUTIC ACTIVITIES: CPT | Mod: PN

## 2025-02-13 PROCEDURE — 97022 WHIRLPOOL THERAPY: CPT | Mod: PN

## 2025-02-13 PROCEDURE — 97110 THERAPEUTIC EXERCISES: CPT | Mod: PN

## 2025-02-13 RX ORDER — ALENDRONATE SODIUM 70 MG/1
70 TABLET ORAL
Qty: 12 TABLET | Refills: 1 | Status: SHIPPED | OUTPATIENT
Start: 2025-02-13

## 2025-02-13 NOTE — PROGRESS NOTES
"                                 Occupational Therapy Daily Treatment Note         Date: 1/9/2025  Name: Luann Borjas  Clinic Number: [id number]     Therapy Diagnosis: S52.531A (ICD-10-CM) - Closed Colles' fracture of right radius       Encounter Diagnosis   Name Primary?    Right wrist pain Yes      Physician: Tony Grande MD     Physician Orders: S52.531A (ICD-10-CM) - Closed Colles' fracture of right radius; evaluate and treat; Please begin therapy to the right wrist and hand. Include edema control and range of motion and eventually gentle strengthening   Medical Diagnosis: S52.531A (ICD-10-CM) - Closed Colles' fracture of right radius  Surgical Procedure and Date: Not Applicable      Insurance Authorization Period Expiration: 12/04/2024-04/03/2025  Plan of Care Certification Period: 01/07/2025-04/07/2025  Date of Return to MD: 02/18/2025     Evaluation FOTO: 12/10/2024 = 49%  Reassessment FOTO: 01/07/2025 = 45%  Reassessment FOTO: 02/13/2025 = 49%     Visit # / Visits authorized:  14 / 32   Time In: 4:00  Time Out: 4:45        Total Billable Time:  40 minutes     Precautions:  Standard; Velcro short wrist splint for heavier activities only;  begin aggressive range of motion and strengthening         Post Injury:  10/09/2024        Subjective      Patient reports: "I went back to the doctor for a follow up and he said to continue therapy as long as we felt it was still useful. 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 Tx consisted of:        Supervised modalities after being cleared for contradictions  x   10 minutes:      -Fluidotherapy to  (Right)   hand to increase blood flow, circulation, tissue elasticity, desensitization, sensory re-education and for pain management  x 10 minutes.           Therapeutic Exercises to improve functional performance " "while increasing strength, endurance, range of motion,  and flexibility  x    18 minutes:     -Manual Therapy techniques to (Right) wrist and digits  including gentle stretching, and Passive Range of Motion to increase joint mobility, range of motion  and for pain management  x  5 minutes      -Soft Tissue Mobilization to   (Right) hand into wrist and forearm from distal to proximal to decrease edema and increase range of motion and functional abilities  x  1  minute     - Low load prolonged stretches for 3.0# leg weight Dorsiflexion / 3.0# leg weight Volar Flexion with   (Table edge) x 30 seconds each x 2 repetitions    - Active Assistive Range of Motion for (Right) wrist in all planes  x 5 repetitions  - Tendon glides for intrinsic plus, minus, flat fist and composite fist x 5 repetitions -NP  - 1# Dorsiflexion/Volar flexion (Supination /Pronation ), Radial Deviation / Ulnar Deviation x 10 repetitions -NP  - 0.5# leg weight dowel for Supination / Pronation stretch x 10 repetitions   - Weighted ball on tabletop for Dorsiflexion /Volar Flexion  stretches x 10 repetitions -NP  - "Prayer Stretch" for Dorsal Flexion x 10 seconds x 5 repetitions -NP  - YELLOW (non weighted) ball [place] for Supination/Pronation stretch ( in lap) x 10 repetitions  -NP  - Wrist roller coaster for Active Range of Motion  of wrist in all planes x 5 repetitions    - YELLOW Flexbar for Dorsiflexion /Volar Flexion; Supination /Pronation x 10 repetitions  - YELLOW digiflex for isolated x 10 repetitions;  RED digiflex for composite digital flexion x 20 repetitions   - YELLOW digiweb for intrinsics x 10 repetitions   - Wrist wheel with YELLOW Theraband for Supination / Pronation  x 10 repetitions         Therapeutic Activities  to improve functional performance while increasing independence with ADLs & IADLs  x    12  minutes:        - Mini colored pegboard for Fine Motor Coordination and in-hand manipulation  (5 in-hand) x 20 pegs   - Wrist wheel " "for Radial Deviation / Ulnar Deviation stretches x 10 repetitions   - Wooden pegboard with RED clothespins with 3PT pinch x 2 repetitions  - RED Theraputty with PVC for "cookie cutting" and medicine top x 10 repetitions     - 25# Progressive Hand Gripper (black spring) for composite  x 20 repetitions         -NP= Not Performed      Initiate in future therapy sessions:        - Colored bead UNlacing with thumb to base of 5th digit x 10 beads  - Colored bead UNlacing with 2nd-5th digit tips to palm x 20 beads                 - ### Theraputty for rolling, (Bilateral) pulling  x 10 Repetitions                 - YELLOW digiweb for composite digital Extension x 10 repetitions                - ### Wrist exerstick in standing for Dorsiflexion / Volar Flexion  x 3 repetitions                      Assessment      Patient will continue to benefit from skilled Occupational Therapy intervention to increase functional abilities, range of motion, and strength and pain control.  Patient. demonstrated proper understanding of each exercise.  Patient continues to require verbal and tactile cues for throughout therapy session to maintain position and prevent compensation.   Patient continues to be limited in functional and leisurely pursuits. Pain limits patient's participation in activities of daily living.  Patient is not able to carryout necessary vocational tasks.   Patient's spiritual, cultural and educational needs considered and patient agreeable to plan of care and goals.  Patient is making good progress towards established goals.  Patient tolerated exercises / activities within pain tolerance.   Reviewed Home Exercise Program with patient., see EPIC under "patient instructions" for provided exercises, activity modifications and limitations, modalities for home pain management.  Patient. demo understanding of above.     Patient. tolerated Fluidotherapy with no irritation.         Patient tolerated addition of   Wrist wheel " with YELLOW Theraband for Supination / Pronation with no reported pain but demo fatigue.  Patient tolerated increase in resistance during low load prolonged stretch for Dorsal Flexion / Volar Flexion with tolerable stretching pain reported.        New/Revised Goals: Continue Plan Of Care   Goals:  1)   Patient to be Independent with Home exercise program and modalities for pain management by 1 week. (MET)   2)   Patient will report   1/10 pain on average with activity to assist with exercises by 6-8 weeks.  ( Revised 01/07/2025)   3)   Patient will increase range of Motion by 3-5 degrees to increase functional use for activities of daily living by 6-8 weeks.  ( In Progress)   4)    Patient will decrease edema by 0.1-0.3 millimeters  to increase joint mobility /flexibility by 6-8 weeks.  (MET)   5)   Patient will increase manual muscle testing by a grade to assist with lifting items by 6-8 weeks. ( In Progress)   6)   Patient will increase  strength 3-5 pounds to open containers by 6-8 weeks.  (In Progress)   7)   Patient will increase pinch by 1-3 pounds for buttoning by 6-8 weeks.  (In Progress)         Plan       Continue 2x week during the 90 day certification period    01/07/2025   to 04/07/2025   with established Plan Of Care in pursuit of Occupational Therapy goals.        PETAR Christensen

## 2025-02-18 ENCOUNTER — OFFICE VISIT (OUTPATIENT)
Dept: ORTHOPEDICS | Facility: CLINIC | Age: 65
End: 2025-02-18
Payer: OTHER GOVERNMENT

## 2025-02-18 ENCOUNTER — OFFICE VISIT (OUTPATIENT)
Dept: PODIATRY | Facility: CLINIC | Age: 65
End: 2025-02-18
Payer: OTHER GOVERNMENT

## 2025-02-18 ENCOUNTER — CLINICAL SUPPORT (OUTPATIENT)
Dept: REHABILITATION | Facility: HOSPITAL | Age: 65
End: 2025-02-18
Payer: OTHER GOVERNMENT

## 2025-02-18 VITALS — BODY MASS INDEX: 26.37 KG/M2 | HEIGHT: 67 IN | WEIGHT: 168 LBS

## 2025-02-18 VITALS — BODY MASS INDEX: 26.71 KG/M2 | WEIGHT: 168 LBS

## 2025-02-18 DIAGNOSIS — M67.479 GANGLION CYST OF FOOT: Primary | ICD-10-CM

## 2025-02-18 DIAGNOSIS — M25.531 RIGHT WRIST PAIN: Primary | ICD-10-CM

## 2025-02-18 DIAGNOSIS — S52.531D CLOSED COLLES' FRACTURE OF RIGHT RADIUS WITH ROUTINE HEALING, SUBSEQUENT ENCOUNTER: Primary | ICD-10-CM

## 2025-02-18 DIAGNOSIS — M79.672 LEFT FOOT PAIN: ICD-10-CM

## 2025-02-18 PROCEDURE — 99213 OFFICE O/P EST LOW 20 MIN: CPT | Mod: PBBFAC,PN | Performed by: PODIATRIST

## 2025-02-18 PROCEDURE — 97022 WHIRLPOOL THERAPY: CPT | Mod: PN

## 2025-02-18 PROCEDURE — 97530 THERAPEUTIC ACTIVITIES: CPT | Mod: PN

## 2025-02-18 PROCEDURE — 99213 OFFICE O/P EST LOW 20 MIN: CPT | Mod: PBBFAC,27,PO | Performed by: PHYSICIAN ASSISTANT

## 2025-02-18 PROCEDURE — 97110 THERAPEUTIC EXERCISES: CPT | Mod: PN

## 2025-02-18 NOTE — PROGRESS NOTES
[address] [facility] [address]  [place] [address]  Phone: [phone]   [phone]    Patient's PCP:Allen Callahan, NP  Referring Provider: Allen Callahan    Subjective:      Chief Complaint:: Foot Pain (Possible cyst, LT )    Foot Pain  Pertinent negatives include no abdominal pain, arthralgias, chest pain, chills, coughing, fatigue, fever, headaches, joint swelling, myalgias, nausea, neck pain, numbness, rash or weakness.     Luann Borjas is a 64 y.o. female who presents today with a complaint of foot pain, possible cyst LT. The current episode started 4 months ago.  The symptoms include bruised pain. Probable cause of complaint unknown.  The symptoms are aggravated by touch. The problem has worsened. Treatment to date have included none which provided no relief.         Vitals:    02/18/25 0841   Weight: 76.2 kg (167 lb 15.9 oz)   PainSc: 0-No pain      Shoe Size: 8.5    Past Surgical History:   Procedure Laterality Date    BASAL CELL CARCINOMA EXCISION  09/2019    lip    CATARACT EXTRACTION W/  INTRAOCULAR LENS IMPLANT Right 11/13/2019    Procedure: EXTRACTION, CATARACT, WITH IOL INSERTION;  Surgeon: Carmelo Perez II, MD;  Location: [facility] OR;  Service: Ophthalmology;  Laterality: Right;    ECTOPIC PREGNANCY SURGERY      EYE SURGERY Right     cataracts    HYSTERECTOMY       Past Medical History:   Diagnosis Date    Anxiety     Arthritis     Asthma     Cataract of left eye     Hyperlipidemia     Sleep apnea     cpap     Family History   Problem Relation Name Age of Onset    COPD Mother      Liver disease Mother      Thyroid disease Mother      COPD Father      Diabetes Father      Hypertension Father      Pacemaker/defibrilator Father      COPD Sister      Hypertension Sister      Hypertension Brother          Social History:   Marital Status:   Alcohol History:  reports current alcohol use of about 2.0 standard drinks of alcohol per week.  Tobacco History:  reports that she quit smoking about 4  years ago. Her smoking use included cigarettes. She started smoking about 36 years ago. She has a 16 pack-year smoking history. She has been exposed to tobacco smoke. She has never used smokeless tobacco.  Drug History:  reports no history of drug use.    Review of patient's allergies indicates:   Allergen Reactions    Erythromycin Anaphylaxis    Buspirone     Ciprofloxacin     Levaquin [levofloxacin]     Zithromax z-masoud [azithromycin]        Current Medications[1]    Review of Systems   Constitutional:  Negative for chills, fatigue, fever and unexpected weight change.   HENT:  Negative for hearing loss and trouble swallowing.    Eyes:  Negative for photophobia and visual disturbance.   Respiratory:  Negative for cough, shortness of breath and wheezing.    Cardiovascular:  Negative for chest pain, palpitations and leg swelling.   Gastrointestinal:  Negative for abdominal pain and nausea.   Genitourinary:  Negative for dysuria and frequency.   Musculoskeletal:  Negative for arthralgias, back pain, gait problem, joint swelling, myalgias and neck pain.   Skin:  Negative for rash and wound.   Neurological:  Negative for tremors, seizures, weakness, numbness and headaches.   Hematological:  Does not bruise/bleed easily.   Psychiatric/Behavioral:  Negative for hallucinations.          Objective:        Physical Exam:   Foot Exam    General  General Appearance: appears stated age and healthy   Orientation: alert and oriented to person, place, and time   Affect: appropriate   Gait: unimpaired       Left Foot/Ankle      Inspection and Palpation  Ecchymosis: none  Tenderness: (Mild tenderness overlying cyst dorsal foot)  Swelling: none   Arch: normal  Hammertoes: absent  Claw toes: absent  Hallux valgus: no  Hallux limitus: no  Skin Exam: skin intact;   Neurovascular  Dorsalis pedis: 2+  Posterior tibial: 2+  Capillary refill: 2+  Varicose veins: not present  Saphenous nerve sensation: normal  Tibial nerve sensation:  normal  Superficial peroneal nerve sensation: normal  Deep peroneal nerve sensation: normal  Sural nerve sensation: normal    Muscle Strength  Ankle dorsiflexion: 5  Ankle plantar flexion: 5  Ankle inversion: 5  Ankle eversion: 5  Great toe extension: 5  Great toe flexion: 5    Range of Motion    Normal left ankle ROM    Tests  Anterior drawer: negative   Talar tilt: negative   PT Tinel's sign: negative  Paresthesia: negative      Physical Exam  Cardiovascular:      Pulses:           Dorsalis pedis pulses are 2+ on the left side.        Posterior tibial pulses are 2+ on the left side.   Musculoskeletal:      Left foot: No bunion.        Feet:                Left Ankle/Foot Exam     Range of Motion   The patient has normal left ankle ROM.       Muscle Strength   Left Lower Extremity   Ankle Dorsiflexion:  5   Plantar flexion:  5/5     Vascular Exam       Left Pulses  Dorsalis Pedis:      2+  Posterior Tibial:      2+           Imaging: none            Assessment:       1. Ganglion cyst of foot    2. Left foot pain      Plan:   Ganglion cyst of foot    Left foot pain      Follow up if symptoms worsen or fail to improve.    I discussed the options of treatment for the ganglion cyst including no treatment, aspiration under local anesthesia and injection of cortisone and excision of the mass.  I explained how a ganglion is either growing out of a joint or a tendon. I also explained the risk of recurrence.    Given minimal symptoms I recommend continue with conservative management and monitoring for now.  I discussed her that if the cyst becomes increasing the painful or increase his incised and I would recommend surgical excision.    Procedures          Counseling:     I provided patient education verbally regarding:   Patient diagnosis, treatment options, as well as alternatives, risks, and benefits.     This note was created using Dragon voice recognition software that occasionally misinterpreted phrases or words.                     [1]   Current Outpatient Medications   Medication Sig Dispense Refill    albuterol (PROAIR HFA) 90 mcg/actuation inhaler Inhale 2 puffs into the lungs every 6 (six) hours as needed for Wheezing. Rescue 18 g 2    alendronate (FOSAMAX) 70 MG tablet Take 1 tablet (70 mg total) by mouth every 7 days. 12 tablet 1    ALPRAZolam (XANAX) 0.5 MG tablet Take 1 tablet (0.5 mg total) by mouth daily as needed for Anxiety. 30 tablet 0    amlodipine-benazepril 5-10 mg (LOTREL) 5-10 mg per capsule Take 1 capsule by mouth once daily. 90 capsule 1    atorvastatin (LIPITOR) 10 MG tablet Take 1 tablet (10 mg total) by mouth once daily. 90 tablet 1    Bifidobacterium infantis (ALIGN) 4 mg Cap Take 1 capsule (4 mg total) by mouth Daily. 30 capsule 0    calcium carbonate (OS-MELIA) 500 mg calcium (1,250 mg) chewable tablet Take 1 tablet by mouth once daily.      cholecalciferol, vitamin D3, 1,250 mcg (50,000 unit) Tab Take 1,250 mcg by mouth every 7 days. 12 tablet 0    umeclidinium-vilanteroL (ANORO ELLIPTA) 62.5-25 mcg/actuation DsDv Inhale 1 puff into the lungs once daily. Controller 3 each 5    vilazodone (VIIBRYD) 40 mg Tab tablet Take 1 tablet (40 mg total) by mouth once daily. 90 tablet 1     No current facility-administered medications for this visit.

## 2025-02-18 NOTE — PROGRESS NOTES
2025    HPI:  Luann Borjas is a 64 y.o. female, who presents to clinic today for continued evaluation of her right distal radius fracture.  She is about 4.5 months status post injury.  States overall she is doing very well with occupational therapy.  States the strength and function of her right hand/wrist have significantly improved.  Denies acute injuries since last visit.  Denies any other complaints this time.    PMHX:  Past Medical History:   Diagnosis Date    Anxiety     Arthritis     Asthma     Cataract of left eye     Hyperlipidemia     Sleep apnea     cpap       PSHX:  Past Surgical History:   Procedure Laterality Date    BASAL CELL CARCINOMA EXCISION  2019    lip    CATARACT EXTRACTION W/  INTRAOCULAR LENS IMPLANT Right 2019    Procedure: EXTRACTION, CATARACT, WITH IOL INSERTION;  Surgeon: Carmelo Perez II, MD;  Location: [facility] OR;  Service: Ophthalmology;  Laterality: Right;    ECTOPIC PREGNANCY SURGERY      EYE SURGERY Right     cataracts    HYSTERECTOMY         FMHX:  Family History   Problem Relation Name Age of Onset    COPD Mother      Liver disease Mother      Thyroid disease Mother      COPD Father      Diabetes Father      Hypertension Father      Pacemaker/defibrilator Father      COPD Sister      Hypertension Sister      Hypertension Brother         SOCHX:  Social History     Tobacco Use    Smoking status: Former     Current packs/day: 0.00     Average packs/day: 0.5 packs/day for 32.0 years (16.0 ttl pk-yrs)     Types: Cigarettes     Start date: 1988     Quit date: 10/25/2020     Years since quittin.3     Passive exposure: Past    Smokeless tobacco: Never   Substance Use Topics    Alcohol use: Yes     Alcohol/week: 2.0 standard drinks of alcohol     Types: 2 Glasses of wine per week       ALLERGIES:  Erythromycin, Buspirone, Ciprofloxacin, Levaquin [levofloxacin], and Zithromax z-masoud [azithromycin]    CURRENT MEDICATIONS:  Medications Ordered Prior to  "Encounter[1]    REVIEW OF SYSTEMS:  Review of Systems Complete; Negative, unless noted above.    GENERAL PHYSICAL EXAM:   Ht 5' 6.5" (1.689 m)   Wt 76.2 kg (167 lb 15.9 oz)   BMI 26.71 kg/m²    GEN: well developed, well nourished, no acute distress   PULM: No wheezing, no respiratory distress   CV: RRR    ORTHO EXAM:   Examination of the right hand/wrist reveals no edema, erythema, ecchymosis or skin breakdown.  Able to flex extend the wrist appropriately with only mildly reduced range of motion.  Able make composite fist and fully extend fingers.  5/5 /intrinsic strength.  Normal sensation in the radial, ulnar, and median nerve distributions.  Capillary refill less than 2 seconds.    RADIOLOGY:   None.    ASSESSMENT:   Healed distal radius fracture of the right wrist    PLAN:  1. I discussed with Luann Borjas that he has progressed very well in the treatment course.  We discussed the best course of action this time is to return to normal activity as tolerated with no restrictions.  She verbally agreed with the treatment plan.      2. I would like her follow up in clinic on a p.r.n. basis.  She was instructed to contact the clinic for any problems or concerns.           [1]   Current Outpatient Medications on File Prior to Visit   Medication Sig Dispense Refill    albuterol (PROAIR HFA) 90 mcg/actuation inhaler Inhale 2 puffs into the lungs every 6 (six) hours as needed for Wheezing. Rescue 18 g 2    alendronate (FOSAMAX) 70 MG tablet Take 1 tablet (70 mg total) by mouth every 7 days. 12 tablet 1    ALPRAZolam (XANAX) 0.5 MG tablet Take 1 tablet (0.5 mg total) by mouth daily as needed for Anxiety. 30 tablet 0    amlodipine-benazepril 5-10 mg (LOTREL) 5-10 mg per capsule Take 1 capsule by mouth once daily. 90 capsule 1    atorvastatin (LIPITOR) 10 MG tablet Take 1 tablet (10 mg total) by mouth once daily. 90 tablet 1    Bifidobacterium infantis (ALIGN) 4 mg Cap Take 1 capsule (4 mg total) by mouth Daily. 30 " capsule 0    calcium carbonate (OS-MELIA) 500 mg calcium (1,250 mg) chewable tablet Take 1 tablet by mouth once daily.      cholecalciferol, vitamin D3, 1,250 mcg (50,000 unit) Tab Take 1,250 mcg by mouth every 7 days. 12 tablet 0    umeclidinium-vilanteroL (ANORO ELLIPTA) 62.5-25 mcg/actuation DsDv Inhale 1 puff into the lungs once daily. Controller 3 each 5    vilazodone (VIIBRYD) 40 mg Tab tablet Take 1 tablet (40 mg total) by mouth once daily. 90 tablet 1     No current facility-administered medications on file prior to visit.

## 2025-02-18 NOTE — PROGRESS NOTES
"                                 Occupational Therapy Daily Treatment Note         Date: 1/9/2025  Name: Luann Borjas  Clinic Number: [id number]     Therapy Diagnosis: S52.531A (ICD-10-CM) - Closed Colles' fracture of right radius       Encounter Diagnosis   Name Primary?    Right wrist pain Yes      Physician: Tony Grande MD     Physician Orders: S52.531A (ICD-10-CM) - Closed Colles' fracture of right radius; evaluate and treat; Please begin therapy to the right wrist and hand. Include edema control and range of motion and eventually gentle strengthening   Medical Diagnosis: S52.531A (ICD-10-CM) - Closed Colles' fracture of right radius  Surgical Procedure and Date: Not Applicable      Insurance Authorization Period Expiration: 12/04/2024-04/03/2025  Plan of Care Certification Period: 01/07/2025-04/07/2025  Date of Return to MD: as needed     Evaluation FOTO: 12/10/2024 = 49%  Reassessment FOTO: 01/07/2025 = 45%  Reassessment FOTO: 02/13/2025 = 49%     Visit # / Visits authorized:  15 / 32   Time In: 4:30  Time Out: 5:15        Total Billable Time:  40 minutes     Precautions:  Standard; Velcro short wrist splint for heavier activities only;  begin aggressive range of motion and strengthening         Post Injury:  10/09/2024        Subjective      Patient reports: "I am not in any pain today.  I was in a little pain after last therapy session."      Pain: 0/10   Location of pain: (Right) wrist       Objective    Patient seen by Occupational Therapy this session. Tx consisted of:        Supervised modalities after being cleared for contradictions  x   10 minutes:      -Fluidotherapy to  (Right)   hand to increase blood flow, circulation, tissue elasticity, desensitization, sensory re-education and for pain management  x 10 minutes.           Therapeutic Exercises to improve functional performance while increasing strength, endurance, range of motion,  and flexibility  x    18 minutes:     -Manual Therapy " "techniques to (Right) wrist and digits  including gentle stretching, and Passive Range of Motion to increase joint mobility, range of motion  and for pain management  x  5 minutes      -Soft Tissue Mobilization to   (Right) hand into wrist and forearm from distal to proximal to decrease edema and increase range of motion and functional abilities  x  1  minute     - Low load prolonged stretches for 3.0# leg weight Dorsiflexion / 3.0# leg weight Volar Flexion with   (Table edge) x 30 seconds each x 2 repetitions    - Active Assistive Range of Motion for (Right) wrist in all planes  x 5 repetitions  - Tendon glides for intrinsic plus, minus, flat fist and composite fist x 5 repetitions -NP  - 1# Dorsiflexion/Volar flexion (Supination /Pronation ), Radial Deviation / Ulnar Deviation x 10 repetitions -NP  - 0.5# leg weight dowel for Supination / Pronation stretch x 10 repetitions   - Weighted ball on tabletop for Dorsiflexion /Volar Flexion  stretches x 10 repetitions -NP  - "Prayer Stretch" for Dorsal Flexion x 10 seconds x 5 repetitions -NP  - YELLOW (non weighted) ball [place] for Supination/Pronation stretch ( in lap) x 10 repetitions  -NP  - Wrist roller coaster for Active Range of Motion  of wrist in all planes x 5 repetitions    - YELLOW Flexbar for Dorsiflexion /Volar Flexion; Supination /Pronation x 10 repetitions  - YELLOW digiflex for isolated x 10 repetitions;  RED digiflex for composite digital flexion x 20 repetitions   - YELLOW digiweb for intrinsics x 10 repetitions   - Wrist wheel with YELLOW Theraband for Supination / Pronation  x 10 repetitions         Therapeutic Activities  to improve functional performance while increasing independence with ADLs & IADLs  x    12  minutes:        - Mini colored pegboard for Fine Motor Coordination and in-hand manipulation  (5 in-hand) x 20 pegs   - Wrist wheel for Radial Deviation / Ulnar Deviation stretches x 10 repetitions   - Wooden pegboard with RED clothespins with " "3PT pinch x 2 repetitions  - GREEN Theraputty with PVC for "cookie cutting" and medicine top x 10 repetitions     - 25# Progressive Hand Gripper (black spring) for composite  x 20 repetitions         -NP= Not Performed      Initiate in future therapy sessions:        - Colored bead UNlacing with thumb to base of 5th digit x 10 beads  - Colored bead UNlacing with 2nd-5th digit tips to palm x 20 beads                 - ### Theraputty for rolling, (Bilateral) pulling  x 10 Repetitions                 - YELLOW digiweb for composite digital Extension x 10 repetitions                - ### Wrist exerstick in standing for Dorsiflexion / Volar Flexion  x 3 repetitions                      Assessment      Patient will continue to benefit from skilled Occupational Therapy intervention to increase functional abilities, range of motion, and strength and pain control.  Patient. demonstrated proper understanding of each exercise.  Patient continues to require verbal and tactile cues for throughout therapy session to maintain position and prevent compensation.   Patient continues to be limited in functional and leisurely pursuits. Pain limits patient's participation in activities of daily living.  Patient is not able to carryout necessary vocational tasks.   Patient's spiritual, cultural and educational needs considered and patient agreeable to plan of care and goals.  Patient is making good progress towards established goals.  Patient tolerated exercises / activities within pain tolerance.   Reviewed Home Exercise Program with patient., see EPIC under "patient instructions" for provided exercises, activity modifications and limitations, modalities for home pain management.  Patient. demo understanding of above.     Patient. tolerated Fluidotherapy with no irritation.         Patient tolerated increase in resistance during Theraputty with PVC for "cookie cutting" and medicine top with no reported pain.  Patient unable to " complete wrist wheel with Theraband for Supination 2* reported pain.        New/Revised Goals: Continue Plan Of Care   Goals:  1)   Patient to be Independent with Home exercise program and modalities for pain management by 1 week. (MET)   2)   Patient will report   1/10 pain on average with activity to assist with exercises by 6-8 weeks.  ( Revised 01/07/2025)   3)   Patient will increase range of Motion by 3-5 degrees to increase functional use for activities of daily living by 6-8 weeks.  ( In Progress)   4)    Patient will decrease edema by 0.1-0.3 millimeters  to increase joint mobility /flexibility by 6-8 weeks.  (MET)   5)   Patient will increase manual muscle testing by a grade to assist with lifting items by 6-8 weeks. ( In Progress)   6)   Patient will increase  strength 3-5 pounds to open containers by 6-8 weeks.  (In Progress)   7)   Patient will increase pinch by 1-3 pounds for buttoning by 6-8 weeks.  (In Progress)         Plan       Continue 2x week during the 90 day certification period    01/07/2025   to 04/07/2025   with established Plan Of Care in pursuit of Occupational Therapy goals.        PETAR Christensen

## 2025-02-18 NOTE — PATIENT INSTRUCTIONS
Ganglion Cyst  What Is a Ganglion Cyst?  A ganglion cyst is a sac filled with a jellylike fluid that originates from a tendon sheath or joint capsule. The word ganglion means knot and is used to describe the knot-like mass or lump that forms below the surface of the skin.    Ganglion cysts are among the most common benign soft- tissue masses. Although they most often occur on the wrist, they also frequently develop on the foot--usually on the top, but elsewhere as well. Ganglion cysts vary in size, may get smaller and larger and may even disappear completely, only to return later. Several foot ganglion cyst treatment options available.          Causes of Ganglion Cysts  Although the exact cause of ganglion cysts is unknown, they may arise from trauma--whether a single event or repetitive microtrauma.    Symptoms of Ganglion Cysts  A ganglion cyst is associated with one or more of the following symptoms:  A noticeable lump--often this is the only symptom experienced  Tingling or burning, if the cyst is touching a nerve  Dull pain or ache, which may indicate the cyst is pressing against a tendon or joint  Difficulty wearing shoes due to irritation between the lump and the shoe    Diagnosis of Ganglion Cysts  To diagnose a ganglion cyst, the foot and ankle surgeon will perform a thorough examination of the foot. The lump will be visually apparent, and, when pressed in a certain way, it should move freely underneath the skin. Sometimes the surgeon will shine a light through the cyst or remove a small amount of fluid from the cyst for evaluation. Your doctor may take an x-ray, and in some cases, additional imaging studies may be ordered.    Nonsurgical Treatment  There are various options for treating a ganglion cyst on the foot:  Monitoring but no treatment. If the cyst causes no pain and does not interfere with walking, the surgeon may decide it is best to carefully watch the cyst over a period of time.  Shoe  modifications. Wearing shoes that do not rub the cyst or cause irritation may be advised. In addition, placing a pad inside the shoe may help reduce pressure against the cyst.  Aspiration and injection. This technique involves draining the fluid and then injecting a steroid medication into the mass. More than one session may be needed. Although this approach is successful in some cases, in many others, the cyst returns.    When Is Surgery Needed?  When other treatment options fail or are not appropriate, the cyst may need to be surgically removed. While the recurrence rate associated with surgery is much lower than that experienced with aspiration and injection therapy, there are nevertheless cases in which the ganglion cyst returns.    Why choose a foot and ankle surgeon?  Foot and ankle surgeons are the leading experts in foot and ankle care today. As doctors of podiatric medicine - also known as podiatrists, DPMs or occasionally foot and ankle doctors - they are the board-certified surgical specialists of the podiatric profession. Foot and ankle surgeons have more education and training specific to the foot and ankle than any other healthcare provider.    Foot and ankle surgeons treat all conditions affecting the foot and ankle, from the simple to the complex, in patients of all ages including ganglion cysts. Their intensive education and training qualify foot and ankle surgeons to perform a wide range of surgeries, including any surgery that may be indicated for ganglion cysts.

## 2025-02-19 NOTE — PROGRESS NOTES
"                                 Occupational Therapy Daily Treatment Note         Date: 1/9/2025  Name: Luann Borjas  Clinic Number: [id number]     Therapy Diagnosis: S52.531A (ICD-10-CM) - Closed Colles' fracture of right radius       Encounter Diagnosis   Name Primary?    Right wrist pain Yes      Physician: oTny Grande MD     Physician Orders: S52.531A (ICD-10-CM) - Closed Colles' fracture of right radius; evaluate and treat; Please begin therapy to the right wrist and hand. Include edema control and range of motion and eventually gentle strengthening   Medical Diagnosis: S52.531A (ICD-10-CM) - Closed Colles' fracture of right radius  Surgical Procedure and Date: Not Applicable      Insurance Authorization Period Expiration: 12/04/2024-04/03/2025  Plan of Care Certification Period: 01/07/2025-04/07/2025  Date of Return to MD: as needed     Evaluation FOTO: 12/10/2024 = 49%  Reassessment FOTO: 01/07/2025 = 45%  Reassessment FOTO: 02/13/2025 = 49%    Visit # / Visits authorized:  18 / 32   Time In: 3:30  Time Out: 4:15           Total Billable Time:  40 minutes     Precautions:  Standard; Velcro short wrist splint for heavier activities only;  begin aggressive range of motion and strengthening         Post Injury:  10/09/2024        Subjective      Patient reports: "I think the weather has my hand hurting more today."      Pain: 3/10   Location of pain: (Right) wrist       Objective    Patient seen by Occupational Therapy this session. Tx consisted of:        Supervised modalities after being cleared for contradictions  x   10 minutes:      -Fluidotherapy to  (Right)   hand to increase blood flow, circulation, tissue elasticity, desensitization, sensory re-education and for pain management  x 10 minutes.           Therapeutic Exercises to improve functional performance while increasing strength, endurance, range of motion,  and flexibility  x    18 minutes:     -Manual Therapy techniques to (Right) wrist " "and digits  including gentle stretching, and Passive Range of Motion to increase joint mobility, range of motion  and for pain management  x  5 minutes      -Soft Tissue Mobilization to   (Right) hand into wrist and forearm from distal to proximal to decrease edema and increase range of motion and functional abilities  x  1  minute     - Low load prolonged stretches for 3.0# leg weight Dorsiflexion / 3.0# leg weight Volar Flexion with   (Table edge) x 30 seconds each x 2 repetitions     - Active Assistive Range of Motion for (Right) wrist in all planes  x 5 repetitions  - Tendon glides for intrinsic plus, minus, flat fist and composite fist x 5 repetitions -NP  - 1# Dorsiflexion/Volar flexion (Supination /Pronation ), Radial Deviation / Ulnar Deviation x 10 repetitions -NP  - 0.5# leg weight dowel for Supination / Pronation stretch x 10 repetitions   - Weighted ball on tabletop for Dorsiflexion /Volar Flexion  stretches x 10 repetitions -NP  - "Prayer Stretch" for Dorsal Flexion x 10 seconds x 5 repetitions -NP  - YELLOW (non weighted) ball [place] for Supination/Pronation stretch ( in lap) x 10 repetitions  -NP  - Wrist roller coaster for Active Range of Motion  of wrist in all planes x 5 repetitions    - RED Flexbar for Dorsiflexion /Volar Flexion; Supination /Pronation x 10 repetitions    - YELLOW digiflex for isolated x 10 repetitions;  RED digiflex for composite digital flexion x 20 repetitions   - Wrist wheel with YELLOW Theraband for Supination / Pronation  x 10 repetitions -NP (NEXT SESSION)      Ultrasound applied to   dorsal /ulnar aspect of (Right) thumb region  on   3.0 Mhz with 0.7 w/cm2 @ 50 % duty cycle to decrease pain and inflammation  and increase tissue elasticity x 8 minutes.        Therapeutic Activities  to improve functional performance while increasing independence with ADLs & IADLs  x    12  minutes:        - Mini colored pegboard for Fine Motor Coordination and in-hand manipulation  (5 in-hand) " "x 20 pegs   - Wrist wheel for Radial Deviation / Ulnar Deviation stretches x 10 repetitions   - Wooden pegboard with RED clothespins with 3PT pinch x 2 repetitions  - RED Theraputty with PVC for "cookie cutting" and medicine top x 10 repetitions               - 25# Progressive Hand Gripper (black spring) for composite  x 20 repetitions           -NP= Not Performed      Initiate in future therapy sessions:         - YELLOW digiweb for intrinsics x 20 repetitions   - YELLOW digiweb for composite digital Extension x 10 repetitions     - Colored bead UNlacing with thumb to base of 5th digit x 10 beads  - Colored bead UNlacing with 2nd-5th digit tips to palm x 20 beads                 - ### Theraputty for rolling, (Bilateral) pulling  x 10 Repetitions               - ### Wrist exerstick in standing for Dorsiflexion / Volar Flexion  x 3 repetitions                       Assessment      Patient will continue to benefit from skilled Occupational Therapy intervention to increase functional abilities, range of motion, and strength and pain control.  Patient. demonstrated proper understanding of each exercise.  Patient continues to require verbal and tactile cues for throughout therapy session to maintain position and prevent compensation.   Patient continues to be limited in functional and leisurely pursuits. Pain limits patient's participation in activities of daily living.  Patient is not able to carryout necessary vocational tasks.   Patient's spiritual, cultural and educational needs considered and patient agreeable to plan of care and goals.  Patient is making good progress towards established goals.  Patient tolerated exercises / activities within pain tolerance.   Reviewed Home Exercise Program with patient., see EPIC under "patient instructions" for provided exercises, activity modifications and limitations, modalities for home pain management.  Patient. demo understanding of above.     Patient. tolerated " "Fluidotherapy & Ultrasound with no irritation.         Decreased resistance during Theraputty with PVC for "cookie cutting" and medicine top 2* to increased pain upon arrival.       Active Range of Motion: wrist                         (Right)   //  (Left)  Dorsal Flexion /Volar Flexion: 40 (-7)  / 45 (-8)    //  55/65  Radial Deviation /Ulnar Deviation: 15   / 22 (+1)   //  15/25  Supination / Pronation: 68 (+3)  / 69 (+5)   //  80/80      Patient demo increased Active range of motion for (Right) wrist Ulnar Deviation, Supination, and Pronation and decreased for Dorsal Flexion, Volar Flexion.      New/Revised Goals: Continue Plan Of Care   Goals:  1)   Patient to be Independent with Home exercise program and modalities for pain management by 1 week. (MET)   2)   Patient will report   1/10 pain on average with activity to assist with exercises by 6-8 weeks.  ( Revised 01/07/2025)   3)   Patient will increase range of Motion by 3-5 degrees to increase functional use for activities of daily living by 6-8 weeks.  ( In Progress)   4)    Patient will decrease edema by 0.1-0.3 millimeters  to increase joint mobility /flexibility by 6-8 weeks.  (MET)   5)   Patient will increase manual muscle testing by a grade to assist with lifting items by 6-8 weeks. ( In Progress)   6)   Patient will increase  strength 3-5 pounds to open containers by 6-8 weeks.  (In Progress)   7)   Patient will increase pinch by 1-3 pounds for buttoning by 6-8 weeks.  (In Progress)         Plan       Continue 2x week during the 90 day certification period    01/07/2025   to 04/07/2025   with established Plan Of Care in pursuit of Occupational Therapy goals.        PETAR Christensen                            "

## 2025-02-20 ENCOUNTER — CLINICAL SUPPORT (OUTPATIENT)
Dept: REHABILITATION | Facility: HOSPITAL | Age: 65
End: 2025-02-20
Payer: OTHER GOVERNMENT

## 2025-02-20 DIAGNOSIS — M25.531 RIGHT WRIST PAIN: Primary | ICD-10-CM

## 2025-02-20 PROCEDURE — 97110 THERAPEUTIC EXERCISES: CPT | Mod: PN

## 2025-02-20 PROCEDURE — 97530 THERAPEUTIC ACTIVITIES: CPT | Mod: PN

## 2025-02-20 PROCEDURE — 97022 WHIRLPOOL THERAPY: CPT | Mod: PN

## 2025-02-20 NOTE — PROGRESS NOTES
"                                 Occupational Therapy Daily Treatment Note         Date: 1/9/2025  Name: Luann Borjas  Clinic Number: [id number]     Therapy Diagnosis: S52.531A (ICD-10-CM) - Closed Colles' fracture of right radius       Encounter Diagnosis   Name Primary?    Right wrist pain Yes      Physician: Tony Grande MD     Physician Orders: S52.531A (ICD-10-CM) - Closed Colles' fracture of right radius; evaluate and treat; Please begin therapy to the right wrist and hand. Include edema control and range of motion and eventually gentle strengthening   Medical Diagnosis: S52.531A (ICD-10-CM) - Closed Colles' fracture of right radius  Surgical Procedure and Date: Not Applicable      Insurance Authorization Period Expiration: 12/04/2024-04/03/2025  Plan of Care Certification Period: 01/07/2025-04/07/2025  Date of Return to MD: as needed     Evaluation FOTO: 12/10/2024 = 49%  Reassessment FOTO: 01/07/2025 = 45%  Reassessment FOTO: 02/13/2025 = 49%     Visit # / Visits authorized:  16 / 32   Time In: 4:40  Time Out: 5:25        Total Billable Time:  40 minutes     Precautions:  Standard; Velcro short wrist splint for heavier activities only;  begin aggressive range of motion and strengthening         Post Injury:  10/09/2024        Subjective      Patient reports: "I am feeling soreness in the back of my thumb region today. I don't know why."      Pain: 2/10   Location of pain: (Right) wrist       Objective    Patient seen by Occupational Therapy this session. Tx consisted of:        Supervised modalities after being cleared for contradictions  x   10 minutes:      -Fluidotherapy to  (Right)   hand to increase blood flow, circulation, tissue elasticity, desensitization, sensory re-education and for pain management  x 10 minutes.           Therapeutic Exercises to improve functional performance while increasing strength, endurance, range of motion,  and flexibility  x    18 minutes:     -Manual Therapy " "techniques to (Right) wrist and digits  including gentle stretching, and Passive Range of Motion to increase joint mobility, range of motion  and for pain management  x  5 minutes      -Soft Tissue Mobilization to   (Right) hand into wrist and forearm from distal to proximal to decrease edema and increase range of motion and functional abilities  x  1  minute     - Low load prolonged stretches for 3.0# leg weight Dorsiflexion / 3.0# leg weight Volar Flexion with   (Table edge) x 30 seconds each x 2 repetitions    - Active Assistive Range of Motion for (Right) wrist in all planes  x 5 repetitions  - Tendon glides for intrinsic plus, minus, flat fist and composite fist x 5 repetitions -NP  - 1# Dorsiflexion/Volar flexion (Supination /Pronation ), Radial Deviation / Ulnar Deviation x 10 repetitions -NP  - 0.5# leg weight dowel for Supination / Pronation stretch x 10 repetitions   - Weighted ball on tabletop for Dorsiflexion /Volar Flexion  stretches x 10 repetitions -NP  - "Prayer Stretch" for Dorsal Flexion x 10 seconds x 5 repetitions -NP  - YELLOW (non weighted) ball [place] for Supination/Pronation stretch ( in lap) x 10 repetitions  -NP  - Wrist roller coaster for Active Range of Motion  of wrist in all planes x 5 repetitions    - YELLOW Flexbar for Dorsiflexion /Volar Flexion; Supination /Pronation x 10 repetitions   (INCREASE NEXT SESSION)  - YELLOW digiflex for isolated x 10 repetitions;  RED digiflex for composite digital flexion x 20 repetitions   - YELLOW digiweb for intrinsics x 20 repetitions   - YELLOW digiweb for composite digital Extension x 10 repetitions   - Wrist wheel with YELLOW Theraband for Supination / Pronation  x 10 repetitions         Therapeutic Activities  to improve functional performance while increasing independence with ADLs & IADLs  x    12  minutes:        - Mini colored pegboard for Fine Motor Coordination and in-hand manipulation  (5 in-hand) x 20 pegs   - Wrist wheel for Radial " "Deviation / Ulnar Deviation stretches x 10 repetitions   - Wooden pegboard with RED clothespins with 3PT pinch x 2 repetitions  - GREEN Theraputty with PVC for "cookie cutting" and medicine top x 10 repetitions     - 25# Progressive Hand Gripper (black spring) for composite  x 20 repetitions         -NP= Not Performed      Initiate in future therapy sessions:        - Colored bead UNlacing with thumb to base of 5th digit x 10 beads  - Colored bead UNlacing with 2nd-5th digit tips to palm x 20 beads                 - ### Theraputty for rolling, (Bilateral) pulling  x 10 Repetitions                                 - ### Wrist exerstick in standing for Dorsiflexion / Volar Flexion  x 3 repetitions                      Assessment      Patient will continue to benefit from skilled Occupational Therapy intervention to increase functional abilities, range of motion, and strength and pain control.  Patient. demonstrated proper understanding of each exercise.  Patient continues to require verbal and tactile cues for throughout therapy session to maintain position and prevent compensation.   Patient continues to be limited in functional and leisurely pursuits. Pain limits patient's participation in activities of daily living.  Patient is not able to carryout necessary vocational tasks.   Patient's spiritual, cultural and educational needs considered and patient agreeable to plan of care and goals.  Patient is making good progress towards established goals.  Patient tolerated exercises / activities within pain tolerance.   Reviewed Home Exercise Program with patient., see EPIC under "patient instructions" for provided exercises, activity modifications and limitations, modalities for home pain management.  Patient. demo understanding of above.     Patient. tolerated Fluidotherapy with no irritation.         Patient tolerated addition of digiweb for composite digital Extension with no reported pain.  Patient tolerated " increase in repetitions  during digiweb for intrinsics with no reported pain.  Patient able to complete wrist wheel with Theraband for Supination with no reported pain.        New/Revised Goals: Continue Plan Of Care   Goals:  1)   Patient to be Independent with Home exercise program and modalities for pain management by 1 week. (MET)   2)   Patient will report   1/10 pain on average with activity to assist with exercises by 6-8 weeks.  ( Revised 01/07/2025)   3)   Patient will increase range of Motion by 3-5 degrees to increase functional use for activities of daily living by 6-8 weeks.  ( In Progress)   4)    Patient will decrease edema by 0.1-0.3 millimeters  to increase joint mobility /flexibility by 6-8 weeks.  (MET)   5)   Patient will increase manual muscle testing by a grade to assist with lifting items by 6-8 weeks. ( In Progress)   6)   Patient will increase  strength 3-5 pounds to open containers by 6-8 weeks.  (In Progress)   7)   Patient will increase pinch by 1-3 pounds for buttoning by 6-8 weeks.  (In Progress)         Plan       Continue 2x week during the 90 day certification period    01/07/2025   to 04/07/2025   with established Plan Of Care in pursuit of Occupational Therapy goals.        PETAR Christensen

## 2025-02-23 DIAGNOSIS — F41.1 GAD (GENERALIZED ANXIETY DISORDER): ICD-10-CM

## 2025-02-24 RX ORDER — ALPRAZOLAM 0.5 MG/1
0.5 TABLET ORAL DAILY PRN
Qty: 30 TABLET | Refills: 0 | Status: SHIPPED | OUTPATIENT
Start: 2025-02-24 | End: 2025-03-26

## 2025-02-25 ENCOUNTER — CLINICAL SUPPORT (OUTPATIENT)
Dept: REHABILITATION | Facility: HOSPITAL | Age: 65
End: 2025-02-25
Payer: OTHER GOVERNMENT

## 2025-02-25 DIAGNOSIS — M25.531 RIGHT WRIST PAIN: Primary | ICD-10-CM

## 2025-02-25 PROCEDURE — 97110 THERAPEUTIC EXERCISES: CPT | Mod: PN

## 2025-02-25 PROCEDURE — 97022 WHIRLPOOL THERAPY: CPT | Mod: PN

## 2025-02-25 PROCEDURE — 97530 THERAPEUTIC ACTIVITIES: CPT | Mod: PN

## 2025-02-25 NOTE — PROGRESS NOTES
"                                 Occupational Therapy Daily Treatment Note         Date: 1/9/2025  Name: Luann Borjas  Clinic Number: [id number]     Therapy Diagnosis: S52.531A (ICD-10-CM) - Closed Colles' fracture of right radius       Encounter Diagnosis   Name Primary?    Right wrist pain Yes      Physician: Tony Grande MD     Physician Orders: S52.531A (ICD-10-CM) - Closed Colles' fracture of right radius; evaluate and treat; Please begin therapy to the right wrist and hand. Include edema control and range of motion and eventually gentle strengthening   Medical Diagnosis: S52.531A (ICD-10-CM) - Closed Colles' fracture of right radius  Surgical Procedure and Date: Not Applicable      Insurance Authorization Period Expiration: 12/04/2024-04/03/2025  Plan of Care Certification Period: 01/07/2025-04/07/2025  Date of Return to MD: as needed     Evaluation FOTO: 12/10/2024 = 49%  Reassessment FOTO: 01/07/2025 = 45%  Reassessment FOTO: 02/13/2025 = 49%     Visit # / Visits authorized:  17 / 32   Time In: 7:30  Time Out: 8:20         Total Billable Time:  40 minutes     Precautions:  Standard; Velcro short wrist splint for heavier activities only;  begin aggressive range of motion and strengthening         Post Injury:  10/09/2024        Subjective      Patient reports: "I felt ok after last session.  I am still having pain around the back part of my thumb."      Pain: 2/10   Location of pain: (Right) wrist       Objective    Patient seen by Occupational Therapy this session. Tx consisted of:        Supervised modalities after being cleared for contradictions  x   10 minutes:      -Fluidotherapy to  (Right)   hand to increase blood flow, circulation, tissue elasticity, desensitization, sensory re-education and for pain management  x 10 minutes.           Therapeutic Exercises to improve functional performance while increasing strength, endurance, range of motion,  and flexibility  x    18 minutes:     -Manual " "Therapy techniques to (Right) wrist and digits  including gentle stretching, and Passive Range of Motion to increase joint mobility, range of motion  and for pain management  x  5 minutes      -Soft Tissue Mobilization to   (Right) hand into wrist and forearm from distal to proximal to decrease edema and increase range of motion and functional abilities  x  1  minute     - Low load prolonged stretches for 3.0# leg weight Dorsiflexion / 3.0# leg weight Volar Flexion with   (Table edge) x 30 seconds each x 2 repetitions -NP    - Active Assistive Range of Motion for (Right) wrist in all planes  x 5 repetitions  - Tendon glides for intrinsic plus, minus, flat fist and composite fist x 5 repetitions -NP  - 1# Dorsiflexion/Volar flexion (Supination /Pronation ), Radial Deviation / Ulnar Deviation x 10 repetitions -NP  - 0.5# leg weight dowel for Supination / Pronation stretch x 10 repetitions   - Weighted ball on tabletop for Dorsiflexion /Volar Flexion  stretches x 10 repetitions -NP  - "Prayer Stretch" for Dorsal Flexion x 10 seconds x 5 repetitions -NP  - YELLOW (non weighted) ball [place] for Supination/Pronation stretch ( in lap) x 10 repetitions  -NP  - Wrist roller coaster for Active Range of Motion  of wrist in all planes x 5 repetitions    - RED Flexbar for Dorsiflexion /Volar Flexion; Supination /Pronation x 10 repetitions    - YELLOW digiflex for isolated x 10 repetitions;  RED digiflex for composite digital flexion x 20 repetitions   - YELLOW digiweb for intrinsics x 20 repetitions   - YELLOW digiweb for composite digital Extension x 10 repetitions   - Wrist wheel with YELLOW Theraband for Supination / Pronation  x 10 repetitions      Ultrasound applied to   dorsal / radial aspect of (Right) thumb region  on   3.0 Mhz with 0.7 w/cm2 @ 50 % duty cycle to decrease pain and inflammation  and increase tissue elasticity x 8 minutes.     Therapeutic Activities  to improve functional performance while increasing " "independence with ADLs & IADLs  x    12  minutes:        - Mini colored pegboard for Fine Motor Coordination and in-hand manipulation  (5 in-hand) x 20 pegs   - Wrist wheel for Radial Deviation / Ulnar Deviation stretches x 10 repetitions   - Wooden pegboard with RED clothespins with 3PT pinch x 2 repetitions  - GREEN Theraputty with PVC for "cookie cutting" and medicine top x 10 repetitions     - 25# Progressive Hand Gripper (black spring) for composite  x 20 repetitions         -NP= Not Performed      Initiate in future therapy sessions:        - Colored bead UNlacing with thumb to base of 5th digit x 10 beads  - Colored bead UNlacing with 2nd-5th digit tips to palm x 20 beads                 - ### Theraputty for rolling, (Bilateral) pulling  x 10 Repetitions               - ### Wrist exerstick in standing for Dorsiflexion / Volar Flexion  x 3 repetitions                      Assessment      Patient will continue to benefit from skilled Occupational Therapy intervention to increase functional abilities, range of motion, and strength and pain control.  Patient. demonstrated proper understanding of each exercise.  Patient continues to require verbal and tactile cues for throughout therapy session to maintain position and prevent compensation.   Patient continues to be limited in functional and leisurely pursuits. Pain limits patient's participation in activities of daily living.  Patient is not able to carryout necessary vocational tasks.   Patient's spiritual, cultural and educational needs considered and patient agreeable to plan of care and goals.  Patient is making good progress towards established goals.  Patient tolerated exercises / activities within pain tolerance.   Reviewed Home Exercise Program with patient., see EPIC under "patient instructions" for provided exercises, activity modifications and limitations, modalities for home pain management.  Patient. demo understanding of above.     Patient. " tolerated Fluidotherapy & Ultrasound with no irritation.         Patient tolerated increase in resistance during  Flexbar for Dorsiflexion /Volar Flexion; Supination /Pronation with no reported pain.       Active Range of Motion: wrist                         (Right)   //  (Left)  Dorsal Flexion /Volar Flexion: 47 (+2)  / 53 (+0)    //  55/65  Radial Deviation /Ulnar Deviation: 15 (+3)  / 21 (+1)   //  15/25  Supination / Pronation: 65 (+5)  / 64 (+4)   //  80/80     Patient demo increased Active range of motion for (Right) wrist Dorsal Flexion, Ulnar Deviation, Supination, and Pronation and remained the same for Volar Flexion.     New/Revised Goals: Continue Plan Of Care   Goals:  1)   Patient to be Independent with Home exercise program and modalities for pain management by 1 week. (MET)   2)   Patient will report   1/10 pain on average with activity to assist with exercises by 6-8 weeks.  ( Revised 01/07/2025)   3)   Patient will increase range of Motion by 3-5 degrees to increase functional use for activities of daily living by 6-8 weeks.  ( In Progress)   4)    Patient will decrease edema by 0.1-0.3 millimeters  to increase joint mobility /flexibility by 6-8 weeks.  (MET)   5)   Patient will increase manual muscle testing by a grade to assist with lifting items by 6-8 weeks. ( In Progress)   6)   Patient will increase  strength 3-5 pounds to open containers by 6-8 weeks.  (In Progress)   7)   Patient will increase pinch by 1-3 pounds for buttoning by 6-8 weeks.  (In Progress)         Plan       Continue 2x week during the 90 day certification period    01/07/2025   to 04/07/2025   with established Plan Of Care in pursuit of Occupational Therapy goals.        PETAR Christensen

## 2025-02-26 ENCOUNTER — CLINICAL SUPPORT (OUTPATIENT)
Dept: REHABILITATION | Facility: HOSPITAL | Age: 65
End: 2025-02-26
Payer: OTHER GOVERNMENT

## 2025-02-26 DIAGNOSIS — M25.531 RIGHT WRIST PAIN: Primary | ICD-10-CM

## 2025-02-26 PROCEDURE — 97022 WHIRLPOOL THERAPY: CPT | Mod: PN

## 2025-02-26 PROCEDURE — 97530 THERAPEUTIC ACTIVITIES: CPT | Mod: PN

## 2025-02-26 PROCEDURE — 97110 THERAPEUTIC EXERCISES: CPT | Mod: PN

## 2025-03-05 ENCOUNTER — CLINICAL SUPPORT (OUTPATIENT)
Dept: REHABILITATION | Facility: HOSPITAL | Age: 65
End: 2025-03-05
Payer: OTHER GOVERNMENT

## 2025-03-05 ENCOUNTER — OFFICE VISIT (OUTPATIENT)
Dept: PSYCHIATRY | Facility: CLINIC | Age: 65
End: 2025-03-05
Payer: OTHER GOVERNMENT

## 2025-03-05 VITALS
HEIGHT: 67 IN | DIASTOLIC BLOOD PRESSURE: 79 MMHG | SYSTOLIC BLOOD PRESSURE: 159 MMHG | WEIGHT: 170.19 LBS | BODY MASS INDEX: 26.71 KG/M2 | HEART RATE: 68 BPM

## 2025-03-05 DIAGNOSIS — F41.1 GAD (GENERALIZED ANXIETY DISORDER): ICD-10-CM

## 2025-03-05 DIAGNOSIS — F33.41 RECURRENT MAJOR DEPRESSIVE DISORDER, IN PARTIAL REMISSION: Primary | ICD-10-CM

## 2025-03-05 DIAGNOSIS — M25.531 RIGHT WRIST PAIN: Primary | ICD-10-CM

## 2025-03-05 DIAGNOSIS — F41.0 PANIC ATTACKS: ICD-10-CM

## 2025-03-05 PROCEDURE — 97110 THERAPEUTIC EXERCISES: CPT | Mod: PN

## 2025-03-05 PROCEDURE — 99213 OFFICE O/P EST LOW 20 MIN: CPT | Mod: PBBFAC,PN | Performed by: PHYSICIAN ASSISTANT

## 2025-03-05 PROCEDURE — 97530 THERAPEUTIC ACTIVITIES: CPT | Mod: PN

## 2025-03-05 PROCEDURE — G2211 COMPLEX E/M VISIT ADD ON: HCPCS | Mod: S$PBB,,, | Performed by: PHYSICIAN ASSISTANT

## 2025-03-05 PROCEDURE — 99214 OFFICE O/P EST MOD 30 MIN: CPT | Mod: S$PBB,,, | Performed by: PHYSICIAN ASSISTANT

## 2025-03-05 PROCEDURE — 97022 WHIRLPOOL THERAPY: CPT | Mod: PN

## 2025-03-05 PROCEDURE — 99999 PR PBB SHADOW E&M-EST. PATIENT-LVL III: CPT | Mod: PBBFAC,,, | Performed by: PHYSICIAN ASSISTANT

## 2025-03-05 NOTE — PROGRESS NOTES
"                                 Occupational Therapy Daily Treatment Note         Date: 1/9/2025  Name: Luann Borjas  Clinic Number: [id number]     Therapy Diagnosis: S52.531A (ICD-10-CM) - Closed Colles' fracture of right radius       Encounter Diagnosis   Name Primary?    Right wrist pain Yes      Physician: Tony Grande MD     Physician Orders: S52.531A (ICD-10-CM) - Closed Colles' fracture of right radius; evaluate and treat; Please begin therapy to the right wrist and hand. Include edema control and range of motion and eventually gentle strengthening   Medical Diagnosis: S52.531A (ICD-10-CM) - Closed Colles' fracture of right radius  Surgical Procedure and Date: Not Applicable      Insurance Authorization Period Expiration: 12/04/2024-04/03/2025  Plan of Care Certification Period: 01/07/2025-04/07/2025  Date of Return to MD: as needed     Evaluation FOTO: 12/10/2024 = 49%  Reassessment FOTO: 01/07/2025 = 45%  Reassessment FOTO: 02/13/2025 = 49%    Visit # / Visits authorized:  19 / 32   Time In: 7:45  Time Out: 8:30            Total Billable Time:  40 minutes     Precautions:  Standard; Velcro short wrist splint for heavier activities only;  begin aggressive range of motion and strengthening         Post Injury:  10/09/2024        Subjective      Patient reports: "I am feeling about the same as yesterday.  I am having trouble moving my (Right) small and ring fingers."      Pain: 3-4/10   Location of pain: (Right) wrist       Objective    Patient seen by Occupational Therapy this session. Tx consisted of:        Supervised modalities after being cleared for contradictions  x   10 minutes:      -Fluidotherapy to  (Right)   hand to increase blood flow, circulation, tissue elasticity, desensitization, sensory re-education and for pain management  x 10 minutes.           Therapeutic Exercises to improve functional performance while increasing strength, endurance, range of motion,  and flexibility  x    18 " "minutes:     -Manual Therapy techniques to (Right) wrist and digits  including gentle stretching, and Passive Range of Motion to increase joint mobility, range of motion  and for pain management  x  5 minutes      -Soft Tissue Mobilization to   (Right) hand into wrist and forearm from distal to proximal to decrease edema and increase range of motion and functional abilities  x  1  minute     - Low load prolonged stretches for 3.0# leg weight Dorsiflexion / 3.0# leg weight Volar Flexion with   (Table edge) x 30 seconds each x 2 repetitions     - Active Assistive Range of Motion for (Right) wrist in all planes  x 5 repetitions  - Tendon glides for intrinsic plus, minus, flat fist and composite fist x 5 repetitions -NP  - 1# Dorsiflexion/Volar flexion (Supination /Pronation ), Radial Deviation / Ulnar Deviation x 10 repetitions -NP  - 0.5# leg weight dowel for Supination / Pronation stretch x 10 repetitions   - Weighted ball on tabletop for Dorsiflexion /Volar Flexion  stretches x 10 repetitions -NP  - "Prayer Stretch" for Dorsal Flexion x 10 seconds x 5 repetitions -NP  - YELLOW (non weighted) ball [place] for Supination/Pronation stretch ( in lap) x 10 repetitions  -NP  - Wrist roller coaster for Active Range of Motion  of wrist in all planes x 5 repetitions    - RED Flexbar for Dorsiflexion /Volar Flexion; Supination /Pronation x 10 repetitions    - YELLOW digiflex for isolated x 10 repetitions;  RED digiflex for composite digital flexion x 20 repetitions   - Wrist wheel with for Supination / Pronation  x 10 repetitions      Ultrasound applied to   dorsal /ulnar aspect of (Right) thumb region  on   3.0 Mhz with 0.7 w/cm2 @ 50 % duty cycle to decrease pain and inflammation  and increase tissue elasticity x 8 minutes.        Therapeutic Activities  to improve functional performance while increasing independence with ADLs & IADLs  x    12  minutes:        - Mini colored pegboard for Fine Motor Coordination and in-hand " "manipulation  (5 in-hand) x 20 pegs   - Wrist wheel for Radial Deviation / Ulnar Deviation stretches x 10 repetitions   - Wooden pegboard with RED clothespins with 3PT pinch x 2 repetitions  - RED Theraputty with PVC for "cookie cutting" and medicine top x 10 repetitions -NP              - 25# Progressive Hand Gripper (black spring) for composite  x 20 repetitions   - Cotton ball rolls in palm with 4th & 5th digits for composite flexion and flat fist x 5 repetitions            -NP= Not Performed      Initiate in future therapy sessions:         - YELLOW digiweb for intrinsics x 20 repetitions   - YELLOW digiweb for composite digital Extension x 10 repetitions     - Colored bead UNlacing with thumb to base of 5th digit x 10 beads  - Colored bead UNlacing with 2nd-5th digit tips to palm x 20 beads                 - ### Theraputty for rolling, (Bilateral) pulling  x 10 Repetitions               - ### Wrist exerstick in standing for Dorsiflexion / Volar Flexion  x 3 repetitions                       Assessment      Patient will continue to benefit from skilled Occupational Therapy intervention to increase functional abilities, range of motion, and strength and pain control.  Patient. demonstrated proper understanding of each exercise.  Patient continues to require verbal and tactile cues for throughout therapy session to maintain position and prevent compensation.   Patient continues to be limited in functional and leisurely pursuits. Pain limits patient's participation in activities of daily living.  Patient is not able to carryout necessary vocational tasks.   Patient's spiritual, cultural and educational needs considered and patient agreeable to plan of care and goals.  Patient is making good progress towards established goals.  Patient tolerated exercises / activities within pain tolerance.   Reviewed Home Exercise Program with patient., see EPIC under "patient instructions" for provided exercises, activity " modifications and limitations, modalities for home pain management.  Patient. demo understanding of above.     Patient. tolerated Fluidotherapy & Ultrasound with no irritation.         Wrist wheel completed without Theraband 2* to increased reported pain upon arrival. Patient tolerated addition of  Cotton ball rolls in palm with 4th & 5th digits for composite flexion and flat fist with tolerable stretching pain reported.       Active Range of Motion: wrist                         (Right)   //  (Left)  Dorsal Flexion /Volar Flexion: 45 (+5)  / 46 (+1)    //  55/65  Radial Deviation /Ulnar Deviation: 15   / 23 (+1)   //  15/25  Supination / Pronation: 68 (+0)  / 75 (+6)   //  80/80      Patient demo  increased Active range of motion for (Right) wrist Dorsal Flexion, Volar Flexion, Ulnar Deviation,  and Pronation and remained the same for Supination.       New/Revised Goals: Continue Plan Of Care   Goals:  1)   Patient to be Independent with Home exercise program and modalities for pain management by 1 week. (MET)   2)   Patient will report   1/10 pain on average with activity to assist with exercises by 6-8 weeks.  ( Revised 01/07/2025)   3)   Patient will increase range of Motion by 3-5 degrees to increase functional use for activities of daily living by 6-8 weeks.  ( In Progress)   4)    Patient will decrease edema by 0.1-0.3 millimeters  to increase joint mobility /flexibility by 6-8 weeks.  (MET)   5)   Patient will increase manual muscle testing by a grade to assist with lifting items by 6-8 weeks. ( In Progress)   6)   Patient will increase  strength 3-5 pounds to open containers by 6-8 weeks.  (In Progress)   7)   Patient will increase pinch by 1-3 pounds for buttoning by 6-8 weeks.  (In Progress)         Plan       Continue 2x week during the 90 day certification period    01/07/2025   to 04/07/2025   with established Plan Of Care in pursuit of Occupational Therapy goals.        PETAR Christensen

## 2025-03-05 NOTE — Clinical Note
Good morning Allen, Ms. Kirk's blood pressure was elevated at our last visit, 159/79. I encouraged her to follow up with you.   Thanks, Apryl
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
## 2025-03-06 ENCOUNTER — CLINICAL SUPPORT (OUTPATIENT)
Dept: REHABILITATION | Facility: HOSPITAL | Age: 65
End: 2025-03-06
Payer: OTHER GOVERNMENT

## 2025-03-06 DIAGNOSIS — M25.531 RIGHT WRIST PAIN: Primary | ICD-10-CM

## 2025-03-06 PROCEDURE — 97110 THERAPEUTIC EXERCISES: CPT | Mod: PN

## 2025-03-06 PROCEDURE — 97022 WHIRLPOOL THERAPY: CPT | Mod: PN

## 2025-03-06 PROCEDURE — 97530 THERAPEUTIC ACTIVITIES: CPT | Mod: PN

## 2025-03-06 NOTE — PROGRESS NOTES
"                                 Occupational Therapy Progress Note         Date: 1/9/2025  Name: Luann Borjas  Clinic Number: [id number]     Therapy Diagnosis: S52.531A (ICD-10-CM) - Closed Colles' fracture of right radius       Encounter Diagnosis   Name Primary?    Right wrist pain Yes      Physician: Tony Grande MD     Physician Orders: S52.531A (ICD-10-CM) - Closed Colles' fracture of right radius; evaluate and treat; Please begin therapy to the right wrist and hand. Include edema control and range of motion and eventually gentle strengthening   Medical Diagnosis: S52.531A (ICD-10-CM) - Closed Colles' fracture of right radius  Surgical Procedure and Date: Not Applicable      Insurance Authorization Period Expiration: 12/04/2024-04/03/2025  Plan of Care Certification Period: 03/11/2025-06/11/2025  Date of Return to MD: as needed     Evaluation FOTO: 12/10/2024 = 49%  Reassessment FOTO: 01/07/2025 = 45%  Reassessment FOTO: 02/13/2025 = 49%  Reassessment FOTO: 03/11/2025 = 41%    Visit # / Visits authorized:  20 / 32   Time In: 7:35  Time Out: 8:15             Total Billable Time:  40 minutes     Precautions:  Standard; Velcro short wrist splint for heavier activities only;  begin aggressive range of motion and strengthening         Post Injury:  10/09/2024        Subjective      Patient reports: "I am feeling ok despite the cold weather."      Pain: 1/10   Location of pain: (Right) wrist       Objective    Patient seen by Occupational Therapy this session. Tx consisted of:        Supervised modalities after being cleared for contradictions  x   10 minutes:      -Fluidotherapy to  (Right)   hand to increase blood flow, circulation, tissue elasticity, desensitization, sensory re-education and for pain management  x 10 minutes.           Therapeutic Exercises to improve functional performance while increasing strength, endurance, range of motion,  and flexibility  x    18 minutes:     -Manual Therapy " "techniques to (Right) wrist and digits  including gentle stretching, and Passive Range of Motion to increase joint mobility, range of motion  and for pain management  x  5 minutes      -Soft Tissue Mobilization to   (Right) hand into wrist and forearm from distal to proximal to decrease edema and increase range of motion and functional abilities  x  1  minute     - Low load prolonged stretches for 3.0# leg weight Dorsiflexion / 3.0# leg weight Volar Flexion with   (Table edge) x 30 seconds each x 2 repetitions     - Active Assistive Range of Motion for (Right) wrist in all planes  x 5 repetitions  - Tendon glides for intrinsic plus, minus, flat fist and composite fist x 5 repetitions -NP  - 1# Dorsiflexion/Volar flexion (Supination /Pronation ), Radial Deviation / Ulnar Deviation x 10 repetitions -NP  - 0.5# leg weight dowel for Supination / Pronation stretch x 10 repetitions   - Weighted ball on tabletop for Dorsiflexion /Volar Flexion  stretches x 10 repetitions -NP  - "Prayer Stretch" for Dorsal Flexion x 10 seconds x 5 repetitions -NP  - YELLOW (non weighted) ball [place] for Supination/Pronation stretch ( in lap) x 10 repetitions  -NP  - Wrist roller coaster for Active Range of Motion  of wrist in all planes x 5 repetitions    - RED Flexbar for Dorsiflexion /Volar Flexion; Supination /Pronation x 10 repetitions    - YELLOW digiflex for isolated x 10 repetitions;  RED digiflex for composite digital flexion x 20 repetitions   - Wrist wheel with for Supination / Pronation  x 10 repetitions            Therapeutic Activities  to improve functional performance while increasing independence with ADLs & IADLs  x    12  minutes:        - Mini colored pegboard for Fine Motor Coordination and in-hand manipulation  (5 in-hand) x 20 pegs   - Wrist wheel for Radial Deviation / Ulnar Deviation stretches x 10 repetitions   - Wooden pegboard with RED clothespins with 3PT pinch x 2 repetitions  - RED Theraputty with PVC for "cookie " "cutting" and medicine top x 10 repetitions -NP              - 25# Progressive Hand Gripper (black spring) for composite  x 20 repetitions   - Cotton ball rolls in palm with 4th & 5th digits for composite flexion and flat fist x 5 repetitions -NP           -NP= Not Performed      Initiate in future therapy sessions:         - YELLOW digiweb for intrinsics x 20 repetitions   - YELLOW digiweb for composite digital Extension x 10 repetitions     - Colored bead UNlacing with thumb to base of 5th digit x 10 beads  - Colored bead UNlacing with 2nd-5th digit tips to palm x 20 beads                 - ### Theraputty for rolling, (Bilateral) pulling  x 10 Repetitions               - ### Wrist exerstick in standing for Dorsiflexion / Volar Flexion  x 3 repetitions                      Patient reassessed as follows:    Active Range of Motion: wrist                         (Right)   //  (Left)  Dorsal Flexion /Volar Flexion: 48 (+3)  / 55 (+2)    //  55/65  Radial Deviation /Ulnar Deviation: 15   / 23 (+3)   //  15/25  Supination / Pronation: 65 (+5)  / 67 (+7)   //  80/80    Passive Range of Motion: wrist        (submaximally, within pain tolerance)                  (Right)    Dorsal Flexion /Volar Flexion: 52 (+2)  / 60 (+2)   Radial Deviation /Ulnar Deviation: 15  / 25   Supination / Pronation:  70 (+5)  / 77 (+2)     Comments: previous (Left) wrist fracture     Manual Muscle Test:  Wrist     (submaximally, within pain tolerance)        (Right)   Dorsal Flexion:   4/5  (+1)   Volar Flexion:  4/5  (+1)   Radial Deviation:  4/5  (+1)   Ulnar Deviation: 4/5  (+1)      Strength: (BRITTNI Dynamometer in pounds),Position II  (submaximally, within pain tolerance)   (Right): 35  (+5)   (Left):  50    Pinch Strength: (Pinch Gauge in pounds)  (submaximally, within pain tolerance)              (Right) /  (Left)  Lateral Pinch:  16 (+1) / 20  3 Point Pinch:   10 (-2)  /15  2 Point Pinch:   10  (+2) / 14     Assessment      Patient " "will continue to benefit from skilled Occupational Therapy intervention to increase functional abilities, range of motion, and strength and pain control.  Patient. demonstrated proper understanding of each exercise.  Patient continues to require verbal and tactile cues for throughout therapy session to maintain position and prevent compensation.   Patient continues to be limited in functional and leisurely pursuits. Pain limits patient's participation in activities of daily living.  Patient is not able to carryout necessary vocational tasks.   Patient's spiritual, cultural and educational needs considered and patient agreeable to plan of care and goals.  Patient is making good progress towards established goals.  Patient tolerated exercises / activities within pain tolerance.   Reviewed Home Exercise Program with patient., see EPIC under "patient instructions" for provided exercises, activity modifications and limitations, modalities for home pain management.  Patient. demo understanding of above.     Patient. tolerated Fluidotherapy  with no irritation.             Patient demo increased Active range of motion for (Right) wrist Dorsal Flexion, Volar Flexion, Ulnar Deviation, Supination, and Pronation; increased Passive range of motion for (Right) wrist Dorsal Flexion, Volar Flexion, Supination, and Pronation; increased Manual Muscle Testing for (Right) wrist Dorsal Flexion, Volar Flexion, Radial Deviation, and Ulnar Deviation; increased  and Lateral Pinch, and 2 Point Pinch strength and decreased 3 Point Pinch.     New/Revised Goals: Continue Plan Of Care   Goals:  1)   Patient to be Independent with Home exercise program and modalities for pain management by 1 week. (MET)   2)   Patient will report   0/10 pain on average with activity to assist with exercises by 6-8 weeks.  ( Revised 03/11/2025)   3)   Patient will increase range of Motion by 3-5 degrees to increase functional use for activities of daily " living by 6-8 weeks.  ( In Progress)   4)    Patient will decrease edema by 0.1-0.3 millimeters  to increase joint mobility /flexibility by 6-8 weeks.  (MET)   5)   Patient will increase manual muscle testing by a grade to assist with lifting items by 6-8 weeks. ( In Progress)   6)   Patient will increase  strength 3-5 pounds to open containers by 6-8 weeks.  (In Progress)   7)   Patient will increase pinch by 1-3 pounds for buttoning by 6-8 weeks.  (In Progress)         Plan       Continue 2x week during the 90 day certification period    03/11/2025   to 06/11/2025   with established Plan Of Care in pursuit of Occupational Therapy goals.        PETAR Christensen

## 2025-03-10 ENCOUNTER — CLINICAL SUPPORT (OUTPATIENT)
Dept: FAMILY MEDICINE | Facility: CLINIC | Age: 65
End: 2025-03-10
Payer: OTHER GOVERNMENT

## 2025-03-10 VITALS — DIASTOLIC BLOOD PRESSURE: 60 MMHG | SYSTOLIC BLOOD PRESSURE: 140 MMHG

## 2025-03-10 DIAGNOSIS — I10 ESSENTIAL HYPERTENSION: ICD-10-CM

## 2025-03-10 RX ORDER — AMLODIPINE AND BENAZEPRIL HYDROCHLORIDE 5; 20 MG/1; MG/1
1 CAPSULE ORAL DAILY
Qty: 30 CAPSULE | Refills: 0 | Status: SHIPPED | OUTPATIENT
Start: 2025-03-10

## 2025-03-10 NOTE — PROGRESS NOTES
Presents to clinic for BP check after another office reported 159/79, patient takes Lotrel 5/10mg daily  My readings were 140/60. Instructed pt to return to clinic in 2 weeks for another BP check. Spoke to artie and she will be changing her medication, new medication to [facility]

## 2025-03-11 ENCOUNTER — CLINICAL SUPPORT (OUTPATIENT)
Dept: REHABILITATION | Facility: HOSPITAL | Age: 65
End: 2025-03-11
Payer: OTHER GOVERNMENT

## 2025-03-11 DIAGNOSIS — M25.531 RIGHT WRIST PAIN: Primary | ICD-10-CM

## 2025-03-11 PROCEDURE — 97530 THERAPEUTIC ACTIVITIES: CPT | Mod: PN

## 2025-03-11 PROCEDURE — 97110 THERAPEUTIC EXERCISES: CPT | Mod: PN

## 2025-03-11 PROCEDURE — 97022 WHIRLPOOL THERAPY: CPT | Mod: PN

## 2025-03-11 NOTE — PROGRESS NOTES
"                                 Occupational Therapy Daily Treatment Note         Date: 1/9/2025  Name: Luann Borjas  Clinic Number: [id number]     Therapy Diagnosis: S52.531A (ICD-10-CM) - Closed Colles' fracture of right radius       Encounter Diagnosis   Name Primary?    Right wrist pain Yes      Physician: Tony Grande MD     Physician Orders: S52.531A (ICD-10-CM) - Closed Colles' fracture of right radius; evaluate and treat; Please begin therapy to the right wrist and hand. Include edema control and range of motion and eventually gentle strengthening   Medical Diagnosis: S52.531A (ICD-10-CM) - Closed Colles' fracture of right radius  Surgical Procedure and Date: Not Applicable      Insurance Authorization Period Expiration: 12/04/2024-04/03/2025  Plan of Care Certification Period: 03/11/2025-06/11/2025  Date of Return to MD: as needed     Evaluation FOTO: 12/10/2024 = 49%  Reassessment FOTO: 01/07/2025 = 45%  Reassessment FOTO: 02/13/2025 = 49%  Reassessment FOTO: 03/11/2025 = 41%    Visit # / Visits authorized:  21 / 32   Time In: 4:30  Time Out: 5:15              Total Billable Time:  40 minutes     Precautions:  Standard; Velcro short wrist splint for heavier activities only;  begin aggressive range of motion and strengthening         Post Injury:  10/09/2024        Subjective      Patient reports: "I am in no pain today."     Pain: 0/10   Location of pain: (Right) wrist       Objective    Patient seen by Occupational Therapy this session. Tx consisted of:        Supervised modalities after being cleared for contradictions  x   10 minutes:      -Fluidotherapy to  (Right)   hand to increase blood flow, circulation, tissue elasticity, desensitization, sensory re-education and for pain management  x 10 minutes.           Therapeutic Exercises to improve functional performance while increasing strength, endurance, range of motion,  and flexibility  x    18 minutes:     -Manual Therapy techniques to " "(Right) wrist and digits  including gentle stretching, and Passive Range of Motion to increase joint mobility, range of motion  and for pain management  x  5 minutes      -Soft Tissue Mobilization to   (Right) hand into wrist and forearm from distal to proximal to decrease edema and increase range of motion and functional abilities  x  1  minute     - Low load prolonged stretches for 3.0# leg weight Dorsiflexion / 3.0# leg weight Volar Flexion with   (Table edge) x 30 seconds each x 2 repetitions     - Active Assistive Range of Motion for (Right) wrist in all planes  x 5 repetitions  - Tendon glides for intrinsic plus, minus, flat fist and composite fist x 5 repetitions -NP  - 1# Dorsiflexion/Volar flexion (Supination /Pronation ), Radial Deviation / Ulnar Deviation x 10 repetitions -NP  - 0.5# leg weight dowel for Supination / Pronation stretch x 10 repetitions   - Weighted ball on tabletop for Dorsiflexion /Volar Flexion  stretches x 10 repetitions -NP  - "Prayer Stretch" for Dorsal Flexion x 10 seconds x 5 repetitions -NP  - YELLOW (non weighted) ball [place] for Supination/Pronation stretch ( in lap) x 10 repetitions  -NP  - Wrist roller coaster for Active Range of Motion  of wrist in all planes x 5 repetitions    - RED Flexbar for Dorsiflexion /Volar Flexion; Supination /Pronation x 10 repetitions    - YELLOW digiflex for isolated x 10 repetitions;  RED digiflex for composite digital flexion x 20 repetitions   - Wrist wheel with for Supination / Pronation  x 10 repetitions (ADD TBAND NEXT SESSION)            Therapeutic Activities  to improve functional performance while increasing independence with ADLs & IADLs  x    12  minutes:        - Mini colored pegboard for Fine Motor Coordination and in-hand manipulation  (5 in-hand) x 20 pegs -NP  - Wrist wheel for Radial Deviation / Ulnar Deviation stretches x 10 repetitions   - Wooden pegboard with RED clothespins with 3PT pinch x 2 repetitions  - RED Theraputty with " "PVC for "cookie cutting" and medicine top x 10 repetitions -NP              - 25# Progressive Hand Gripper (black spring) for composite  x 20 repetitions   - Cotton ball rolls in palm with 4th & 5th digits for composite flexion and flat fist x 5 repetitions -NP    - RED Theraputty for (Bilateral) pulling x 10 repetitions         -NP= Not Performed      Initiate in future therapy sessions:         - YELLOW digiweb for intrinsics x 20 repetitions   - YELLOW digiweb for composite digital Extension x 10 repetitions     - Colored bead UNlacing with thumb to base of 5th digit x 10 beads  - Colored bead UNlacing with 2nd-5th digit tips to palm x 20 beads                 - ### Theraputty for rolling,  x 10 Repetitions               - ### Wrist exerstick in standing for Dorsiflexion / Volar Flexion  x 3 repetitions                      Assessment      Patient will continue to benefit from skilled Occupational Therapy intervention to increase functional abilities, range of motion, and strength and pain control.  Patient. demonstrated proper understanding of each exercise.  Patient continues to require verbal and tactile cues for throughout therapy session to maintain position and prevent compensation.   Patient continues to be limited in functional and leisurely pursuits. Pain limits patient's participation in activities of daily living.  Patient is not able to carryout necessary vocational tasks.   Patient's spiritual, cultural and educational needs considered and patient agreeable to plan of care and goals.  Patient is making good progress towards established goals.  Patient tolerated exercises / activities within pain tolerance.   Reviewed Home Exercise Program with patient., see EPIC under "patient instructions" for provided exercises, activity modifications and limitations, modalities for home pain management.  Patient. demo understanding of above.     Patient. tolerated Fluidotherapy  with no irritation.         " Patient tolerated addition of Theraputty for (Bilateral) pulling with no reported pain.     Active Range of Motion: wrist        (Pre session)                  (Right)   //  (Left)  Dorsal Flexion /Volar Flexion: 49 (+1)  / 57 (+2)    //  55/65  Radial Deviation /Ulnar Deviation: 15   / 25 (+2)   //  15/25  Supination / Pronation: 68 (+3)  / 69 (+2)   //  80/80    Patient demo increased Active range of motion for (Right) wrist Dorsal Flexion, Volar Flexion, Ulnar Deviation, Supination, and Pronation.     New/Revised Goals: Continue Plan Of Care   Goals:  1)   Patient to be Independent with Home exercise program and modalities for pain management by 1 week. (MET)   2)   Patient will report   0/10 pain on average with activity to assist with exercises by 6-8 weeks.  ( Revised 03/11/2025)   3)   Patient will increase range of Motion by 3-5 degrees to increase functional use for activities of daily living by 6-8 weeks.  ( In Progress)   4)    Patient will decrease edema by 0.1-0.3 millimeters  to increase joint mobility /flexibility by 6-8 weeks.  (MET)   5)   Patient will increase manual muscle testing by a grade to assist with lifting items by 6-8 weeks. ( In Progress)   6)   Patient will increase  strength 3-5 pounds to open containers by 6-8 weeks.  (In Progress)   7)   Patient will increase pinch by 1-3 pounds for buttoning by 6-8 weeks.  (In Progress)         Plan       Continue 2x week during the 90 day certification period    03/11/2025   to 06/11/2025   with established Plan Of Care in pursuit of Occupational Therapy goals.        PETAR Christensen

## 2025-03-13 ENCOUNTER — CLINICAL SUPPORT (OUTPATIENT)
Dept: REHABILITATION | Facility: HOSPITAL | Age: 65
End: 2025-03-13
Payer: OTHER GOVERNMENT

## 2025-03-13 DIAGNOSIS — M25.531 RIGHT WRIST PAIN: Primary | ICD-10-CM

## 2025-03-13 PROCEDURE — 97022 WHIRLPOOL THERAPY: CPT | Mod: PN

## 2025-03-13 PROCEDURE — 97530 THERAPEUTIC ACTIVITIES: CPT | Mod: PN

## 2025-03-13 PROCEDURE — 97110 THERAPEUTIC EXERCISES: CPT | Mod: PN

## 2025-03-13 NOTE — PROGRESS NOTES
"                                 Occupational Therapy Daily Treatment Note         Date: 1/9/2025  Name: Luann Borjas  Clinic Number: [id number]     Therapy Diagnosis: S52.531A (ICD-10-CM) - Closed Colles' fracture of right radius       Encounter Diagnosis   Name Primary?    Right wrist pain Yes      Physician: Tony Grande MD     Physician Orders: S52.531A (ICD-10-CM) - Closed Colles' fracture of right radius; evaluate and treat; Please begin therapy to the right wrist and hand. Include edema control and range of motion and eventually gentle strengthening   Medical Diagnosis: S52.531A (ICD-10-CM) - Closed Colles' fracture of right radius  Surgical Procedure and Date: Not Applicable      Insurance Authorization Period Expiration: 12/04/2024-04/03/2025  Plan of Care Certification Period: 03/11/2025-06/11/2025  Date of Return to MD: as needed     Evaluation FOTO: 12/10/2024 = 49%  Reassessment FOTO: 01/07/2025 = 45%  Reassessment FOTO: 02/13/2025 = 49%  Reassessment FOTO: 03/11/2025 = 41%    Visit # / Visits authorized:  22 / 32   Time In: 8:15  Time Out: 8:55         Total Billable Time:  40 minutes     Precautions:  Standard; Velcro short wrist splint for heavier activities only;  begin aggressive range of motion and strengthening         Post Injury:  10/09/2024        Subjective      Patient reports: "I am having a little pain today on the small finger side of my wrist.  There is also some swelling."      Pain: 1/10   Location of pain: (Right) wrist       Objective    Patient seen by Occupational Therapy this session. Tx consisted of:        Supervised modalities after being cleared for contradictions  x   10 minutes:      -Fluidotherapy to  (Right)   hand to increase blood flow, circulation, tissue elasticity, desensitization, sensory re-education and for pain management  x 10 minutes.           Therapeutic Exercises to improve functional performance while increasing strength, endurance, range of motion,  " "and flexibility  x    18 minutes:     -Manual Therapy techniques to (Right) wrist and digits  including gentle stretching, and Passive Range of Motion to increase joint mobility, range of motion  and for pain management  x  5 minutes      -Soft Tissue Mobilization to   (Right) hand into wrist and forearm from distal to proximal to decrease edema and increase range of motion and functional abilities  x  1  minute     - Low load prolonged stretches for 3.0# leg weight Dorsiflexion / 3.0# leg weight Volar Flexion with   (Table edge) x 30 seconds each x 2 repetitions     - Active Assistive Range of Motion for (Right) wrist in all planes  x 5 repetitions  - Tendon glides for intrinsic plus, minus, flat fist and composite fist x 5 repetitions -NP  - 1# Dorsiflexion/Volar flexion (Supination /Pronation ), Radial Deviation / Ulnar Deviation x 10 repetitions -NP  - 0.5# leg weight dowel for Supination / Pronation stretch x 10 repetitions   - Weighted ball on tabletop for Dorsiflexion /Volar Flexion  stretches x 10 repetitions -NP  - "Prayer Stretch" for Dorsal Flexion x 10 seconds x 5 repetitions -NP  - YELLOW (non weighted) ball [place] for Supination/Pronation stretch ( in lap) x 10 repetitions  -NP  - Wrist roller coaster for Active Range of Motion  of wrist in all planes x 5 repetitions    - RED Flexbar for Dorsiflexion /Volar Flexion; Supination /Pronation x 10 repetitions    - YELLOW digiflex for isolated x 10 repetitions;  RED digiflex for composite digital flexion x 20 repetitions (INCREASE NEXT SESSION )  - Wrist wheel with YELLOW Theraband for Supination / Pronation  x 10 repetitions            Therapeutic Activities  to improve functional performance while increasing independence with ADLs & IADLs  x    12  minutes:        - Mini colored pegboard for Fine Motor Coordination and in-hand manipulation  (5 in-hand) x 20 pegs -NP  - Wrist wheel for Radial Deviation / Ulnar Deviation stretches x 10 repetitions   - Wooden " "pegboard with RED clothespins with 3PT pinch x 2 repetitions  - RED Theraputty with PVC for "cookie cutting" and medicine top x 10 repetitions -NP              - 25# Progressive Hand Gripper (black spring) for composite  x 20 repetitions   - Cotton ball rolls in palm with 4th & 5th digits for composite flexion and flat fist x 5 repetitions -NP    - RED Theraputty for (Bilateral) pulling x 10 repetitions      - Patient provided with and educated on compression sleeve for edema management.  Patient demo understanding of above.      -NP= Not Performed      Initiate in future therapy sessions:         - YELLOW digiweb for intrinsics x 20 repetitions   - YELLOW digiweb for composite digital Extension x 10 repetitions     - Colored bead UNlacing with thumb to base of 5th digit x 10 beads  - Colored bead UNlacing with 2nd-5th digit tips to palm x 20 beads                 - ### Theraputty for rolling,  x 10 Repetitions               - ### Wrist exerstick in standing for Dorsiflexion / Volar Flexion  x 3 repetitions                      Assessment      Patient will continue to benefit from skilled Occupational Therapy intervention to increase functional abilities, range of motion, and strength and pain control.  Patient. demonstrated proper understanding of each exercise.  Patient continues to require verbal and tactile cues for throughout therapy session to maintain position and prevent compensation.   Patient continues to be limited in functional and leisurely pursuits. Pain limits patient's participation in activities of daily living.  Patient is not able to carryout necessary vocational tasks.   Patient's spiritual, cultural and educational needs considered and patient agreeable to plan of care and goals.  Patient is making good progress towards established goals.  Patient tolerated exercises / activities within pain tolerance.   Reviewed Home Exercise Program with patient., see EPIC under "patient instructions" for " provided exercises, activity modifications and limitations, modalities for home pain management.  Patient. demo understanding of above.     Patient. tolerated Fluidotherapy  with no irritation.         Patient tolerated addition of Theraband with wrist wheel for Supination & Pronation with no reported pain.     Active Range of Motion: wrist        (Pre session)                  (Right)   //  (Left)  Dorsal Flexion /Volar Flexion: 52 (+3)  / 55 (-2)    //  55/65  Radial Deviation /Ulnar Deviation: 15   / 22 (-3)   //  15/25  Supination / Pronation: 70 (+2)  / 71 (+2)   //  80/80    Patient demo increased Active range of motion for (Right) wrist Dorsal Flexion,  Supination, and Pronation, decreased Volar Flexion and Ulnar Deviation.     New/Revised Goals: Continue Plan Of Care   Goals:  1)   Patient to be Independent with Home exercise program and modalities for pain management by 1 week. (MET)   2)   Patient will report   0/10 pain on average with activity to assist with exercises by 6-8 weeks.  ( Revised 03/11/2025)   3)   Patient will increase range of Motion by 3-5 degrees to increase functional use for activities of daily living by 6-8 weeks.  ( In Progress)   4)    Patient will decrease edema by 0.1-0.3 millimeters  to increase joint mobility /flexibility by 6-8 weeks.  (MET)   5)   Patient will increase manual muscle testing by a grade to assist with lifting items by 6-8 weeks. ( In Progress)   6)   Patient will increase  strength 3-5 pounds to open containers by 6-8 weeks.  (In Progress)   7)   Patient will increase pinch by 1-3 pounds for buttoning by 6-8 weeks.  (In Progress)         Plan       Continue 2x week during the 90 day certification period    03/11/2025   to 06/11/2025   with established Plan Of Care in pursuit of Occupational Therapy goals.        PETAR Christensen

## 2025-03-17 ENCOUNTER — CLINICAL SUPPORT (OUTPATIENT)
Dept: REHABILITATION | Facility: HOSPITAL | Age: 65
End: 2025-03-17
Payer: OTHER GOVERNMENT

## 2025-03-17 DIAGNOSIS — M25.531 RIGHT WRIST PAIN: Primary | ICD-10-CM

## 2025-03-17 PROCEDURE — 97110 THERAPEUTIC EXERCISES: CPT | Mod: PN

## 2025-03-17 PROCEDURE — 97022 WHIRLPOOL THERAPY: CPT | Mod: PN

## 2025-03-17 PROCEDURE — 97530 THERAPEUTIC ACTIVITIES: CPT | Mod: PN

## 2025-03-17 NOTE — PROGRESS NOTES
"                                 Occupational Therapy Daily Treatment Note         Date: 1/9/2025  Name: Luann Borjas  Clinic Number: [id number]     Therapy Diagnosis: S52.531A (ICD-10-CM) - Closed Colles' fracture of right radius       Encounter Diagnosis   Name Primary?    Right wrist pain Yes      Physician: Tony Grande MD     Physician Orders: S52.531A (ICD-10-CM) - Closed Colles' fracture of right radius; evaluate and treat; Please begin therapy to the right wrist and hand. Include edema control and range of motion and eventually gentle strengthening   Medical Diagnosis: S52.531A (ICD-10-CM) - Closed Colles' fracture of right radius  Surgical Procedure and Date: Not Applicable      Insurance Authorization Period Expiration: 12/04/2024-04/03/2025  Plan of Care Certification Period: 03/11/2025-06/11/2025  Date of Return to MD: as needed     Evaluation FOTO: 12/10/2024 = 49%  Reassessment FOTO: 01/07/2025 = 45%  Reassessment FOTO: 02/13/2025 = 49%  Reassessment FOTO: 03/11/2025 = 41%    Visit # / Visits authorized:  23 / 32   Time In: 7:30  Time Out: 8:10        Total Billable Time:  40 minutes     Precautions:  Standard; Velcro short wrist splint for heavier activities only;  begin aggressive range of motion and strengthening         Post Injury:  10/09/2024        Subjective      Patient reports: "I am feeling really stiff this morning in my wrist."      Pain: 1/10   Location of pain: (Right) wrist       Objective    Patient seen by Occupational Therapy this session. Tx consisted of:        Supervised modalities after being cleared for contradictions  x   10 minutes:      -Fluidotherapy to  (Right)   hand to increase blood flow, circulation, tissue elasticity, desensitization, sensory re-education and for pain management  x 10 minutes.           Therapeutic Exercises to improve functional performance while increasing strength, endurance, range of motion,  and flexibility  x    18 minutes:     -Manual " "Therapy techniques to (Right) wrist and digits  including gentle stretching, and Passive Range of Motion to increase joint mobility, range of motion  and for pain management  x  5 minutes      -Soft Tissue Mobilization to   (Right) hand into wrist and forearm from distal to proximal to decrease edema and increase range of motion and functional abilities  x  1  minute     - Low load prolonged stretches for 3.0# leg weight Dorsiflexion / 3.0# leg weight Volar Flexion with   (Table edge) x 30 seconds each x 2 repetitions     - Active Assistive Range of Motion for (Right) wrist in all planes  x 5 repetitions  - Tendon glides for intrinsic plus, minus, flat fist and composite fist x 5 repetitions -NP  - 1# Dorsiflexion/Volar flexion (Supination /Pronation ), Radial Deviation / Ulnar Deviation x 10 repetitions -NP  - 0.5# leg weight dowel for Supination / Pronation stretch x 10 repetitions   - Weighted ball on tabletop for Dorsiflexion /Volar Flexion  stretches x 10 repetitions -NP  - "Prayer Stretch" for Dorsal Flexion x 10 seconds x 5 repetitions -NP  - YELLOW (non weighted) ball [place] for Supination/Pronation stretch ( in lap) x 10 repetitions  -NP  - Wrist roller coaster for Active Range of Motion  of wrist in all planes x 5 repetitions    - RED Flexbar for Dorsiflexion /Volar Flexion; Supination /Pronation x 10 repetitions    - YELLOW digiflex for isolated x 10 repetitions;  GREEN digiflex for composite digital flexion x 20 repetitions   - Wrist wheel with YELLOW Theraband for Supination / Pronation  x 10 repetitions            Therapeutic Activities  to improve functional performance while increasing independence with ADLs & IADLs  x    12  minutes:        - Mini colored pegboard for Fine Motor Coordination and in-hand manipulation  (5 in-hand) x 20 pegs -NP  - Wrist wheel for Radial Deviation / Ulnar Deviation stretches x 10 repetitions   - Wooden pegboard with RED clothespins with 3PT pinch x 2 repetitions  - RED " "Theraputty with PVC for "cookie cutting" and medicine top x 10 repetitions -NP              - 25# Progressive Hand Gripper (black spring) for composite  x 20 repetitions   - Cotton ball rolls in palm with 4th & 5th digits for composite flexion and flat fist x 5 repetitions -NP    - RED Theraputty for (Bilateral) pulling x 10 repetitions         -NP= Not Performed      Initiate in future therapy sessions:         - YELLOW digiweb for intrinsics x 20 repetitions   - YELLOW digiweb for composite digital Extension x 10 repetitions     - Colored bead UNlacing with thumb to base of 5th digit x 10 beads  - Colored bead UNlacing with 2nd-5th digit tips to palm x 20 beads                 - ### Theraputty for rolling,  x 10 Repetitions               - ### Wrist exerstick in standing for Dorsiflexion / Volar Flexion  x 3 repetitions                      Assessment      Patient will continue to benefit from skilled Occupational Therapy intervention to increase functional abilities, range of motion, and strength and pain control.  Patient. demonstrated proper understanding of each exercise.  Patient continues to require verbal and tactile cues for throughout therapy session to maintain position and prevent compensation.   Patient continues to be limited in functional and leisurely pursuits. Pain limits patient's participation in activities of daily living.  Patient is not able to carryout necessary vocational tasks.   Patient's spiritual, cultural and educational needs considered and patient agreeable to plan of care and goals.  Patient is making good progress towards established goals.  Patient tolerated exercises / activities within pain tolerance.   Reviewed Home Exercise Program with patient., see EPIC under "patient instructions" for provided exercises, activity modifications and limitations, modalities for home pain management.  Patient. demo understanding of above.     Patient. tolerated Fluidotherapy  with no " irritation.         Patient tolerated increase in resistance during digiflex for composite digital Flexion with no reported pain.     Active Range of Motion: wrist        (Pre session)                    (Right)   //  (Left)  Dorsal Flexion /Volar Flexion: 40 (-12)  / 53 (-2)    //  55/65  Radial Deviation /Ulnar Deviation: 15   / 17 (-5)   //  15/25  Supination / Pronation: 71 (+1)  / 73 (+2)   //  80/80    Patient demo increased Active range of motion for (Right) wrist Supination, and Pronation,  decreased Dorsal Flexion, Volar Flexion and Ulnar Deviation.     New/Revised Goals: Continue Plan Of Care   Goals:  1)   Patient to be Independent with Home exercise program and modalities for pain management by 1 week. (MET)   2)   Patient will report   0/10 pain on average with activity to assist with exercises by 6-8 weeks.  ( Revised 03/11/2025)   3)   Patient will increase range of Motion by 3-5 degrees to increase functional use for activities of daily living by 6-8 weeks.  ( In Progress)   4)    Patient will decrease edema by 0.1-0.3 millimeters  to increase joint mobility /flexibility by 6-8 weeks.  (MET)   5)   Patient will increase manual muscle testing by a grade to assist with lifting items by 6-8 weeks. ( In Progress)   6)   Patient will increase  strength 3-5 pounds to open containers by 6-8 weeks.  (In Progress)   7)   Patient will increase pinch by 1-3 pounds for buttoning by 6-8 weeks.  (In Progress)         Plan       Continue 2x week during the 90 day certification period    03/11/2025   to 06/11/2025   with established Plan Of Care in pursuit of Occupational Therapy goals.        PETAR Christensen

## 2025-03-19 ENCOUNTER — CLINICAL SUPPORT (OUTPATIENT)
Dept: REHABILITATION | Facility: HOSPITAL | Age: 65
End: 2025-03-19
Payer: OTHER GOVERNMENT

## 2025-03-19 DIAGNOSIS — M25.531 RIGHT WRIST PAIN: Primary | ICD-10-CM

## 2025-03-19 PROCEDURE — 97022 WHIRLPOOL THERAPY: CPT | Mod: PN

## 2025-03-19 PROCEDURE — 97530 THERAPEUTIC ACTIVITIES: CPT | Mod: PN

## 2025-03-19 PROCEDURE — 97110 THERAPEUTIC EXERCISES: CPT | Mod: PN

## 2025-03-19 NOTE — PROGRESS NOTES
"                                 Occupational Therapy Daily Treatment Note         Date: 1/9/2025  Name: Luann Borjas  Clinic Number: [id number]     Therapy Diagnosis: S52.531A (ICD-10-CM) - Closed Colles' fracture of right radius       Encounter Diagnosis   Name Primary?    Right wrist pain Yes      Physician: Tony Grande MD     Physician Orders: S52.531A (ICD-10-CM) - Closed Colles' fracture of right radius; evaluate and treat; Please begin therapy to the right wrist and hand. Include edema control and range of motion and eventually gentle strengthening   Medical Diagnosis: S52.531A (ICD-10-CM) - Closed Colles' fracture of right radius  Surgical Procedure and Date: Not Applicable      Insurance Authorization Period Expiration: 12/04/2024-04/03/2025  Plan of Care Certification Period: 03/11/2025-06/11/2025  Date of Return to MD: as needed     Evaluation FOTO: 12/10/2024 = 49%  Reassessment FOTO: 01/07/2025 = 45%  Reassessment FOTO: 02/13/2025 = 49%  Reassessment FOTO: 03/11/2025 = 41%    Visit # / Visits authorized:  24 / 32   Time In: 8:15  Time Out: 9:00         Total Billable Time:  40 minutes     Precautions:  Standard; Velcro short wrist splint for heavier activities only;  begin aggressive range of motion and strengthening         Post Injury:  10/09/2024        Subjective      Patient reports: "I pulled weeds in the garden over  the weekend and I am a little sore."       Pain: 1-2/10   Location of pain: (Right) wrist       Objective    Patient seen by Occupational Therapy this session. Tx consisted of:        Supervised modalities after being cleared for contradictions  x   10 minutes:      -Fluidotherapy to  (Right)   hand to increase blood flow, circulation, tissue elasticity, desensitization, sensory re-education and for pain management  x 10 minutes.           Therapeutic Exercises to improve functional performance while increasing strength, endurance, range of motion,  and flexibility  x    18 " "minutes:     -Manual Therapy techniques to (Right) wrist and digits  including gentle stretching, and Passive Range of Motion to increase joint mobility, range of motion  and for pain management  x  5 minutes      -Soft Tissue Mobilization to   (Right) hand into wrist and forearm from distal to proximal to decrease edema and increase range of motion and functional abilities  x  1  minute     - Low load prolonged stretches for 3.0# leg weight Dorsiflexion / 3.0# leg weight Volar Flexion with   (Table edge) x 30 seconds each x 2 repetitions     - Active Assistive Range of Motion for (Right) wrist in all planes  x 5 repetitions  - Tendon glides for intrinsic plus, minus, flat fist and composite fist x 5 repetitions -NP  - 1# Dorsiflexion/Volar flexion (Supination /Pronation ), Radial Deviation / Ulnar Deviation x 10 repetitions -NP  - 0.5# leg weight dowel for Supination / Pronation stretch x 10 repetitions   - Weighted ball on tabletop for Dorsiflexion /Volar Flexion  stretches x 10 repetitions -NP  - "Prayer Stretch" for Dorsal Flexion x 10 seconds x 5 repetitions -NP  - YELLOW (non weighted) ball [place] for Supination/Pronation stretch ( in lap) x 10 repetitions  -NP  - Wrist roller coaster for Active Range of Motion  of wrist in all planes x 5 repetitions    - RED Flexbar for Dorsiflexion /Volar Flexion; Supination /Pronation x 10 repetitions    - YELLOW digiflex for isolated x 10 repetitions;  GREEN digiflex for composite digital flexion x 20 repetitions   - Wrist wheel with YELLOW Theraband for Supination / Pronation  x 10 repetitions            Therapeutic Activities  to improve functional performance while increasing independence with ADLs & IADLs  x    12  minutes:        - Mini colored pegboard for Fine Motor Coordination and in-hand manipulation  (5 in-hand) x 20 pegs -NP  - Wrist wheel for Radial Deviation / Ulnar Deviation stretches x 10 repetitions   - Wooden pegboard with RED clothespins with 3PT pinch x 2 " "repetitions  (NEXT SESSION)  - RED Theraputty with PVC for "cookie cutting" and medicine top x 10 repetitions -NP              - 25# Progressive Hand Gripper (black spring) for composite  x 20 repetitions   - Cotton ball rolls in palm with 4th & 5th digits for composite flexion and flat fist x 5 repetitions -NP    - RED Theraputty for (Bilateral) pulling x 10 repetitions    - Thermoplastic dowel presses with RED Theraputty x 10 repetitions         -NP= Not Performed      Initiate in future therapy sessions:         - YELLOW digiweb for intrinsics x 20 repetitions   - YELLOW digiweb for composite digital Extension x 10 repetitions     - Colored bead UNlacing with thumb to base of 5th digit x 10 beads  - Colored bead UNlacing with 2nd-5th digit tips to palm x 20 beads                 - ### Theraputty for rolling,  x 10 Repetitions               - ### Wrist exerstick in standing for Dorsiflexion / Volar Flexion  x 3 repetitions                      Assessment      Patient will continue to benefit from skilled Occupational Therapy intervention to increase functional abilities, range of motion, and strength and pain control.  Patient. demonstrated proper understanding of each exercise.  Patient continues to require verbal and tactile cues for throughout therapy session to maintain position and prevent compensation.   Patient continues to be limited in functional and leisurely pursuits. Pain limits patient's participation in activities of daily living.  Patient is not able to carryout necessary vocational tasks.   Patient's spiritual, cultural and educational needs considered and patient agreeable to plan of care and goals.  Patient is making good progress towards established goals.  Patient tolerated exercises / activities within pain tolerance.   Reviewed Home Exercise Program with patient., see EPIC under "patient instructions" for provided exercises, activity modifications and limitations, modalities for home pain " management.  Patient. demo understanding of above.     Patient. tolerated Fluidotherapy  with no irritation.         Patient tolerated increase in resistance during Thermoplastic dowel presses with Theraputty  with no reported pain.     Active Range of Motion: wrist        (Pre session)                    (Right)   //  (Left)  Dorsal Flexion /Volar Flexion: 50 (+10)  / 55 (+2)    //  55/65  Radial Deviation /Ulnar Deviation: 15   / 20 (+3)   //  15/25  Supination / Pronation: 70 (-1)  / 75 (+2)   //  80/80    Patient demo increased AROM for (Right) wrist Dorsal Flexion, Volar Flexion, Ulnar Deviation  and Pronation,  decreased AROM for Pronation.     New/Revised Goals: Continue Plan Of Care   Goals:  1)   Patient to be Independent with Home exercise program and modalities for pain management by 1 week. (MET)   2)   Patient will report   0/10 pain on average with activity to assist with exercises by 6-8 weeks.  ( Revised 03/11/2025)   3)   Patient will increase range of Motion by 3-5 degrees to increase functional use for activities of daily living by 6-8 weeks.  ( In Progress)   4)    Patient will decrease edema by 0.1-0.3 millimeters  to increase joint mobility /flexibility by 6-8 weeks.  (MET)   5)   Patient will increase manual muscle testing by a grade to assist with lifting items by 6-8 weeks. ( In Progress)   6)   Patient will increase  strength 3-5 pounds to open containers by 6-8 weeks.  (In Progress)   7)   Patient will increase pinch by 1-3 pounds for buttoning by 6-8 weeks.  (In Progress)         Plan       Continue 2x week during the 90 day certification period    03/11/2025   to 06/11/2025   with established Plan Of Care in pursuit of Occupational Therapy goals.        PETAR Christensen

## 2025-03-21 DIAGNOSIS — F41.1 GAD (GENERALIZED ANXIETY DISORDER): ICD-10-CM

## 2025-03-23 RX ORDER — ALPRAZOLAM 0.5 MG/1
0.5 TABLET ORAL DAILY PRN
Qty: 30 TABLET | Refills: 1 | Status: SHIPPED | OUTPATIENT
Start: 2025-03-23 | End: 2025-05-22

## 2025-03-24 ENCOUNTER — PATIENT MESSAGE (OUTPATIENT)
Dept: FAMILY MEDICINE | Facility: CLINIC | Age: 65
End: 2025-03-24
Payer: OTHER GOVERNMENT

## 2025-03-25 ENCOUNTER — OFFICE VISIT (OUTPATIENT)
Dept: PULMONOLOGY | Facility: CLINIC | Age: 65
End: 2025-03-25
Payer: OTHER GOVERNMENT

## 2025-03-25 ENCOUNTER — CLINICAL SUPPORT (OUTPATIENT)
Dept: REHABILITATION | Facility: HOSPITAL | Age: 65
End: 2025-03-25
Payer: OTHER GOVERNMENT

## 2025-03-25 VITALS
HEART RATE: 72 BPM | WEIGHT: 167.63 LBS | OXYGEN SATURATION: 96 % | SYSTOLIC BLOOD PRESSURE: 120 MMHG | BODY MASS INDEX: 26.65 KG/M2 | DIASTOLIC BLOOD PRESSURE: 72 MMHG

## 2025-03-25 DIAGNOSIS — J44.9 CHRONIC OBSTRUCTIVE PULMONARY DISEASE, UNSPECIFIED COPD TYPE: ICD-10-CM

## 2025-03-25 DIAGNOSIS — Z87.891 PERSONAL HISTORY OF SMOKING: ICD-10-CM

## 2025-03-25 DIAGNOSIS — M25.531 RIGHT WRIST PAIN: Primary | ICD-10-CM

## 2025-03-25 DIAGNOSIS — J43.2 CENTRILOBULAR EMPHYSEMA: ICD-10-CM

## 2025-03-25 DIAGNOSIS — J44.89 ASTHMA-COPD OVERLAP SYNDROME: ICD-10-CM

## 2025-03-25 DIAGNOSIS — G47.33 OSA (OBSTRUCTIVE SLEEP APNEA): Primary | ICD-10-CM

## 2025-03-25 PROCEDURE — 99213 OFFICE O/P EST LOW 20 MIN: CPT | Mod: S$PBB,,, | Performed by: NURSE PRACTITIONER

## 2025-03-25 PROCEDURE — 99999 PR PBB SHADOW E&M-EST. PATIENT-LVL III: CPT | Mod: PBBFAC,,, | Performed by: NURSE PRACTITIONER

## 2025-03-25 PROCEDURE — 97110 THERAPEUTIC EXERCISES: CPT | Mod: PN

## 2025-03-25 PROCEDURE — 97530 THERAPEUTIC ACTIVITIES: CPT | Mod: PN

## 2025-03-25 PROCEDURE — 99213 OFFICE O/P EST LOW 20 MIN: CPT | Mod: PBBFAC,PN | Performed by: NURSE PRACTITIONER

## 2025-03-25 PROCEDURE — 97022 WHIRLPOOL THERAPY: CPT | Mod: PN

## 2025-03-25 RX ORDER — UMECLIDINIUM BROMIDE AND VILANTEROL TRIFENATATE 62.5; 25 UG/1; UG/1
1 POWDER RESPIRATORY (INHALATION) DAILY
Qty: 3 EACH | Refills: 5 | Status: SHIPPED | OUTPATIENT
Start: 2025-03-25

## 2025-03-25 NOTE — PROGRESS NOTES
SUBJECTIVE:    Patient ID: Luann Borjas is a 65 y.o. female.    Chief Complaint: Follow-up, COPD, and Sleep Apnea    Patient here today feeling well. She is using her Anoro daily.  She is sleeping on her CPAP and does feel benefit from it.  Her compliance download shows she is 81.3% compliant, sleeps an average of 5 hours and 59 minutes with an AHI of 5.6 . PFT shows no obstruction, air trapping, and mild diffusion defect.  PFT in 2020 showed moderate obstruction.  Her Screening Ct was stable.  She had no breathing issues when she traveled to [facility].    Past Medical History:   Diagnosis Date    Anxiety     Arthritis     Asthma     Cataract of left eye     Hyperlipidemia     Sleep apnea     cpap     Past Surgical History:   Procedure Laterality Date    BASAL CELL CARCINOMA EXCISION  09/2019    lip    CATARACT EXTRACTION W/  INTRAOCULAR LENS IMPLANT Right 11/13/2019    Procedure: EXTRACTION, CATARACT, WITH IOL INSERTION;  Surgeon: Carmelo Perez II, MD;  Location: [facility] OR;  Service: Ophthalmology;  Laterality: Right;    ECTOPIC PREGNANCY SURGERY      EYE SURGERY Right     cataracts    HYSTERECTOMY       Family History   Problem Relation Name Age of Onset    COPD Mother      Liver disease Mother      Thyroid disease Mother      COPD Father      Diabetes Father      Hypertension Father      Pacemaker/defibrilator Father      COPD Sister      Hypertension Sister      Hypertension Brother          Social History:   Marital Status:   Occupation: teacher   Alcohol History:  reports current alcohol use of about 2.0 standard drinks of alcohol per week.  Tobacco History:  reports that she quit smoking about 4 years ago. Her smoking use included cigarettes. She started smoking about 36 years ago. She has a 16 pack-year smoking history. She has been exposed to tobacco smoke. She has never used smokeless tobacco.  Drug History:  reports no history of drug use.    Review of patient's allergies indicates:    Allergen Reactions    Erythromycin Anaphylaxis    Buspirone     Ciprofloxacin     Levaquin [levofloxacin]     Zithromax z-masoud [azithromycin]        Current Outpatient Medications   Medication Sig Dispense Refill    albuterol (PROAIR HFA) 90 mcg/actuation inhaler Inhale 2 puffs into the lungs every 6 (six) hours as needed for Wheezing. Rescue 18 g 2    alendronate (FOSAMAX) 70 MG tablet Take 1 tablet (70 mg total) by mouth every 7 days. 12 tablet 1    ALPRAZolam (XANAX) 0.5 MG tablet Take 1 tablet (0.5 mg total) by mouth daily as needed for Anxiety. 30 tablet 1    amlodipine-benazepril 5-20 mg (LOTREL) 5-20 mg per capsule Take 1 capsule by mouth once daily. 30 capsule 0    atorvastatin (LIPITOR) 10 MG tablet Take 1 tablet (10 mg total) by mouth once daily. 90 tablet 1    Bifidobacterium infantis (ALIGN) 4 mg Cap Take 1 capsule (4 mg total) by mouth Daily. 30 capsule 0    calcium carbonate (OS-MELIA) 500 mg calcium (1,250 mg) chewable tablet Take 1 tablet by mouth once daily.      cholecalciferol, vitamin D3, 1,250 mcg (50,000 unit) Tab Take 1,250 mcg by mouth every 7 days. 12 tablet 0    umeclidinium-vilanteroL (ANORO ELLIPTA) 62.5-25 mcg/actuation DsDv Inhale 1 puff into the lungs once daily. Controller 3 each 5    vilazodone (VIIBRYD) 40 mg Tab tablet Take 1 tablet (40 mg total) by mouth once daily. 90 tablet 1     No current facility-administered medications for this visit.         Last PFT:   PFT shows no obstruction, air trapping, and mild diffusion defect.  PFT in 2020 showed moderate obstruction     Last CT:11/2024.  Impression:     No acute cardiac or pulmonary process, and no new concerning pulmonary nodule/mass identified.     LUNG-RADS CATEGORY 2: BENIGN APPEARANCE OR BEHAVIOR     RECOMMENDATION: Continue annual screening with LDCT in 12 months.    Review of Systems  General: Feeling well  Eyes: Vision is good.  ENT:  sore throat few days ago.   Heart:: No chest pain or palpitations.  Lungs: no dyspnea, no  cough   GI: No Nausea, vomiting, constipation, diarrhea, or reflux.  : Nocturia once sometimes  Musculoskeletal: No joint pain or myalgias.  Skin: No lesions or rashes.  Neuro: No headaches or neuropathy.  Lymph: No edema or adenopathy.  Psych: anxiety and depression  Endo: weight stable.     OBJECTIVE:      /72 (BP Location: Left arm, Patient Position: Sitting)   Pulse 72   Wt 76 kg (167 lb 9.6 oz)   SpO2 96%   BMI 26.65 kg/m²     Physical Exam  GENERAL: Midaged patient in no distress.  HEENT: Pupils equal and reactive. Extraocular movements intact. Nose intact.      Pharynx moist.  NECK: Supple.   HEART: Regular rate and rhythm. No murmur or gallop auscultated.  LUNGS: Clear to auscultation and percussion. Lung excursion symmetrical. No change in fremitus. No adventitial noises.  ABDOMEN: Bowel sounds present. Non-tender, no masses palpated.  EXTREMITIES: Normal muscle tone and joint movement, no cyanosis or clubbing.   LYMPHATICS: No adenopathy palpated, no edema.  SKIN: Dry, intact, no lesions.   NEURO: Cranial nerves II-XII intact. Motor strength 5/5 bilaterally, upper and lower extremities.  PSYCH: Appropriate affect.    Assessment:       1. CARO (obstructive sleep apnea)    2. Asthma-COPD overlap syndrome    3. Personal history of smoking    4. Centrilobular emphysema                 Plan:       CARO (obstructive sleep apnea)    Asthma-COPD overlap syndrome  -     Ambulatory referral/consult to Pulmonology    Personal history of smoking    Centrilobular emphysema                   No follow-ups on file.      Continue Anoro  Keep sleeping on your CPAP   [facility]ill [facility] with [facility]

## 2025-03-25 NOTE — PROGRESS NOTES
"                                 Occupational Therapy Daily Treatment Note         Date: 1/9/2025  Name: Luann Borjas  Clinic Number: [id number]     Therapy Diagnosis: S52.531A (ICD-10-CM) - Closed Colles' fracture of right radius       Encounter Diagnosis   Name Primary?    Right wrist pain Yes      Physician: Tony Grande MD     Physician Orders: S52.531A (ICD-10-CM) - Closed Colles' fracture of right radius; evaluate and treat; Please begin therapy to the right wrist and hand. Include edema control and range of motion and eventually gentle strengthening   Medical Diagnosis: S52.531A (ICD-10-CM) - Closed Colles' fracture of right radius  Surgical Procedure and Date: Not Applicable      Insurance Authorization Period Expiration: 12/04/2024-04/03/2025  Plan of Care Certification Period: 03/11/2025-06/11/2025  Date of Return to MD: as needed     Evaluation FOTO: 12/10/2024 = 49%  Reassessment FOTO: 01/07/2025 = 45%  Reassessment FOTO: 02/13/2025 = 49%  Reassessment FOTO: 03/11/2025 = 41%    Visit # / Visits authorized:  25 / 32   Time In: 7:45  Time Out: 8:30          Total Billable Time:  40 minutes     Precautions:  Standard; Velcro short wrist splint for heavier activities only;  begin aggressive range of motion and strengthening         Post Injury:  10/09/2024        Subjective      Patient reports: "I am still having trouble opening containers at times, even with the  assist."      Pain: 1-2/10   Location of pain: (Right) wrist       Objective    Patient seen by Occupational Therapy this session. Tx consisted of:        Supervised modalities after being cleared for contradictions  x   10 minutes:      -Fluidotherapy to  (Right)   hand to increase blood flow, circulation, tissue elasticity, desensitization, sensory re-education and for pain management  x 10 minutes.           Therapeutic Exercises to improve functional performance while increasing strength, endurance, range of motion,  and " "flexibility  x    18 minutes:     -Manual Therapy techniques to (Right) wrist and digits  including gentle stretching, and Passive Range of Motion to increase joint mobility, range of motion  and for pain management  x  5 minutes      -Soft Tissue Mobilization to   (Right) hand into wrist and forearm from distal to proximal to decrease edema and increase range of motion and functional abilities  x  1  minute     - Low load prolonged stretches for 3.0# leg weight Dorsiflexion / 3.0# leg weight Volar Flexion with   (Table edge) x 30 seconds each x 2 repetitions     - Active Assistive Range of Motion for (Right) wrist in all planes  x 5 repetitions  - Tendon glides for intrinsic plus, minus, flat fist and composite fist x 5 repetitions -NP  - 1# Dorsiflexion/Volar flexion (Supination /Pronation ), Radial Deviation / Ulnar Deviation x 10 repetitions -NP  - 0.5# leg weight dowel for Supination / Pronation stretch x 10 repetitions   - Weighted ball on tabletop for Dorsiflexion /Volar Flexion  stretches x 10 repetitions -NP  - "Prayer Stretch" for Dorsal Flexion x 10 seconds x 5 repetitions -NP  - YELLOW (non weighted) ball [place] for Supination/Pronation stretch ( in lap) x 10 repetitions  -NP  - Wrist roller coaster for Active Range of Motion  of wrist in all planes x 5 repetitions    - RED Flexbar for Dorsiflexion /Volar Flexion; Supination /Pronation x 10 repetitions    - YELLOW digiflex for isolated x 10 repetitions;  GREEN digiflex for composite digital flexion x 20 repetitions   - Wrist wheel with RED Theraband for Supination / Pronation  x 10 repetitions    -1# Wrist exerstick in standing for Dorsiflexion / Volar Flexion  x 3 repetitions           Therapeutic Activities  to improve functional performance while increasing independence with ADLs & IADLs  x    12  minutes:        - Mini colored pegboard for Fine Motor Coordination and in-hand manipulation  (5 in-hand) x 20 pegs -NP  - Wrist wheel for Radial Deviation / " "Ulnar Deviation stretches x 10 repetitions   - Wooden pegboard with GREEN clothespins with 3PT pinch x 2 repetitions   - RED Theraputty with PVC for "cookie cutting" and medicine top x 10 repetitions -NP              - 25# Progressive Hand Gripper (black spring) for composite  x 20 repetitions   - Cotton ball rolls in palm with 4th & 5th digits for composite flexion and flat fist x 5 repetitions -NP    - RED Theraputty for (Bilateral) pulling x 10 repetitions    - Thermoplastic dowel presses with RED Theraputty x 10 repetitions         -NP= Not Performed      Initiate in future therapy sessions:         - YELLOW digiweb for intrinsics x 20 repetitions   - YELLOW digiweb for composite digital Extension x 10 repetitions     - Colored bead UNlacing with thumb to base of 5th digit x 10 beads  - Colored bead UNlacing with 2nd-5th digit tips to palm x 20 beads                 - ### Theraputty for rolling,  x 10 Repetitions                                    Assessment      Patient will continue to benefit from skilled Occupational Therapy intervention to increase functional abilities, range of motion, and strength and pain control.  Patient. demonstrated proper understanding of each exercise.  Patient continues to require verbal and tactile cues for throughout therapy session to maintain position and prevent compensation.   Patient continues to be limited in functional and leisurely pursuits. Pain limits patient's participation in activities of daily living.  Patient is not able to carryout necessary vocational tasks.   Patient's spiritual, cultural and educational needs considered and patient agreeable to plan of care and goals.  Patient is making good progress towards established goals.  Patient tolerated exercises / activities within pain tolerance.   Reviewed Home Exercise Program with patient., see EPIC under "patient instructions" for provided exercises, activity modifications and limitations, modalities for " home pain management.  Patient. demo understanding of above.     Patient. tolerated Fluidotherapy  with no irritation.         Patient tolerated increase in resistance during wrist wheel with Theraband for Supination / Pronation, and wooden pegboard with clothespins for 3 Point Pinch  with no reported pain.   Patient tolerated addition of wrist exerstick for Dorsal Flexion / Volar Flexion with no reported pain.     Active Range of Motion: wrist        (Pre session)                    (Right)   //  (Left)  Dorsal Flexion /Volar Flexion: 52 (+2)  / 60 (+5)    //  55/65  Radial Deviation /Ulnar Deviation: 15   / 22 (+2)   //  15/25  Supination / Pronation: 74 (+4)  / 78 (+3)   //  80/80    Patient demo increased AROM for (Right) wrist  Dorsal Flexion, Volar Flexion, Ulnar Deviation, Supination, and PRON.     New/Revised Goals: Continue Plan Of Care   Goals:  1)   Patient to be Independent with Home exercise program and modalities for pain management by 1 week. (MET)   2)   Patient will report   0/10 pain on average with activity to assist with exercises by 6-8 weeks.  ( Revised 03/11/2025)   3)   Patient will increase range of Motion by 3-5 degrees to increase functional use for activities of daily living by 6-8 weeks.  ( In Progress)   4)    Patient will decrease edema by 0.1-0.3 millimeters  to increase joint mobility /flexibility by 6-8 weeks.  (MET)   5)   Patient will increase manual muscle testing by a grade to assist with lifting items by 6-8 weeks. ( In Progress)   6)   Patient will increase  strength 3-5 pounds to open containers by 6-8 weeks.  (In Progress)   7)   Patient will increase pinch by 1-3 pounds for buttoning by 6-8 weeks.  (In Progress)         Plan       Continue 2x week during the 90 day certification period    03/11/2025   to 06/11/2025   with established Plan Of Care in pursuit of Occupational Therapy goals.        PETAR Christensen

## 2025-03-27 ENCOUNTER — CLINICAL SUPPORT (OUTPATIENT)
Dept: REHABILITATION | Facility: HOSPITAL | Age: 65
End: 2025-03-27
Payer: OTHER GOVERNMENT

## 2025-03-27 DIAGNOSIS — M25.531 RIGHT WRIST PAIN: Primary | ICD-10-CM

## 2025-03-27 PROCEDURE — 97110 THERAPEUTIC EXERCISES: CPT | Mod: PN

## 2025-03-27 PROCEDURE — 97022 WHIRLPOOL THERAPY: CPT | Mod: PN

## 2025-03-27 PROCEDURE — 97530 THERAPEUTIC ACTIVITIES: CPT | Mod: PN

## 2025-03-27 NOTE — PROGRESS NOTES
"                                   Occupational Therapy Discharge Summary         Date: 1/9/2025  Name: Luann Borjas  Clinic Number: [id number]     Therapy Diagnosis: S52.531A (ICD-10-CM) - Closed Colles' fracture of right radius       Encounter Diagnosis   Name Primary?    Right wrist pain Yes      Physician: Tony Grande MD     Physician Orders: S52.531A (ICD-10-CM) - Closed Colles' fracture of right radius; evaluate and treat; Please begin therapy to the right wrist and hand. Include edema control and range of motion and eventually gentle strengthening   Medical Diagnosis: S52.531A (ICD-10-CM) - Closed Colles' fracture of right radius  Surgical Procedure and Date: Not Applicable      Insurance Authorization Period Expiration: 12/04/2024-04/03/2025  Plan of Care Certification Period: 03/11/2025-06/11/2025  Date of Return to MD: as needed     Evaluation FOTO: 12/10/2024 = 49%  Reassessment FOTO: 01/07/2025 = 45%  Reassessment FOTO: 02/13/2025 = 49%  Reassessment FOTO: 03/11/2025 = 41%  Discharge FOTO: 03/31/2025 = 35%    Visit # / Visits authorized:  26 / 32   Time In: 7:35  Time Out: 8:15        Total Billable Time:  40 minutes     Precautions:  Standard; Velcro short wrist splint for heavier activities only;  begin aggressive range of motion and strengthening         Post Injury:  10/09/2024        Subjective      Patient reports: "I have been practicing pushing up with my hand.  I think I can continue my exercises at home."      Pain: 0-1/10   Location of pain: (Right) wrist       Objective    Patient seen by Occupational Therapy this session. Tx consisted of:        Supervised modalities after being cleared for contradictions  x   10 minutes:      -Fluidotherapy to  (Right)   hand to increase blood flow, circulation, tissue elasticity, desensitization, sensory re-education and for pain management  x 10 minutes.           Therapeutic Exercises to improve functional performance while increasing strength, " endurance, range of motion,  and flexibility  x    18 minutes:     -Manual Therapy techniques to (Right) wrist and digits  including gentle stretching, and Passive Range of Motion to increase joint mobility, range of motion  and for pain management  x  5 minutes      -Soft Tissue Mobilization to   (Right) hand into wrist and forearm from distal to proximal to decrease edema and increase range of motion and functional abilities  x  1  minute     - Low load prolonged stretches for 3.0# leg weight Dorsiflexion / 3.0# leg weight Volar Flexion with   (Table edge) x 30 seconds each x 2 repetitions     - Active Assistive Range of Motion for (Right) wrist in all planes  x 5 repetitions  - 0.5# leg weight dowel for Supination / Pronation stretch x 10 repetitions   - Wrist roller coaster for Active Range of Motion  of wrist in all planes x 5 repetitions    - RED Flexbar for Dorsiflexion /Volar Flexion; Supination /Pronation x 10 repetitions    - YELLOW digiflex for isolated x 10 repetitions;  GREEN digiflex for composite digital flexion x 20 repetitions   - Wrist wheel with RED Theraband for Supination / Pronation  x 10 repetitions    -1# Wrist exerstick in standing for Dorsiflexion / Volar Flexion  x 3 repetitions           Therapeutic Activities  to improve functional performance while increasing independence with ADLs & IADLs  x    12  minutes:        - Wrist wheel for Radial Deviation / Ulnar Deviation stretches x 10 repetitions   - Wooden pegboard with GREEN clothespins with 3PT pinch x 2 repetitions               - 25# Progressive Hand Gripper (black spring) for composite  x 20 repetitions    - RED Theraputty for (Bilateral) pulling x 10 repetitions    - Thermoplastic dowel presses with RED Theraputty x 10 repetitions                                   Patient reassessed as follows:    Active Range of Motion: wrist                         (Right)   //  (Left)  Dorsal Flexion /Volar Flexion: 60 (+12)  / 58(+3)    //   "55/65  Radial Deviation /Ulnar Deviation: 15   / 25 (+2)   //  15/25  Supination / Pronation: 70 (+5)  / 80 (+13)   //  80/80    Passive Range of Motion: wrist        (submaximally, within pain tolerance)                  (Right)    Dorsal Flexion /Volar Flexion: WFL / 65 (+2)   Radial Deviation /Ulnar Deviation: WFL  / WFL  Supination / Pronation:  75 (+5)  / WFL     Comments: previous (Left) wrist fracture         Manual Muscle Test:  Wrist     (submaximally, within pain tolerance)        (Right)   Dorsal Flexion:   5/5  (+1)   Volar Flexion:  5/5  (+1)   Radial Deviation:  5/5  (+1)   Ulnar Deviation: 5/5  (+1)      Strength: (BRITTNI Dynamometer in pounds),Position II  (submaximally, within pain tolerance)   (Right): 40  (+5)   (Left):  50    Pinch Strength: (Pinch Gauge in pounds)  (submaximally, within pain tolerance)              (Right) /  (Left)  Lateral Pinch:  16 (+1) / 20  3 Point Pinch:   13 (+3)  /15  2 Point Pinch:   12  (+2) / 14          Assessment    Patient to be discharged home 2* to reaching max benefit from skilled outpatient Occupational Therapy at this time.  Patient to continue Home Exercise Program to maintain range of motion, strength, functional abilities, and pain management.  Patient to follow up with doctor as needed. Patient's spiritual, cultural and educational needs considered and patient agreeable to plan of care and goals.Patient tolerated exercises / activities within pain tolerance.   Reviewed Home Exercise Program with patient., see EPIC under "patient instructions" for provided exercises, activity modifications and limitations, modalities for home pain management.  Patient. demo understanding of above.     Patient. tolerated Fluidotherapy  with no irritation.       Patient demo increased Active range of motion for (Right) wrist Dorsal Flexion, Volar Flexion, Ulnar Deviation, Supination, and Pronation; increased Passive range of motion for Volar Flexion, and Supination; " increased Manual Muscle Testing for (Right) wrist Dorsal Flexion, Volar Flexion, Radial Deviation, and Ulnar Deviation; increased , Lateral Pinch, 3 Point Pinch, and 2 Point Pinch strength.   Patient met 7 / 7 established goals.     Goals:  1)   Patient to be Independent with Home exercise program and modalities for pain management by 1 week. (MET)   2)   Patient will report   0/10 pain on average with activity to assist with exercises by 6-8 weeks.  ( MET)   3)   Patient will increase range of Motion by 3-5 degrees to increase functional use for activities of daily living by 6-8 weeks.  ( MET)   4)    Patient will decrease edema by 0.1-0.3 millimeters  to increase joint mobility /flexibility by 6-8 weeks.  (MET)   5)   Patient will increase manual muscle testing by a grade to assist with lifting items by 6-8 weeks. ( MET)   6)   Patient will increase  strength 3-5 pounds to open containers by 6-8 weeks.  (MET)   7)   Patient will increase pinch by 1-3 pounds for buttoning by 6-8 weeks.  (MET)         Plan       Patient to be discharged home 2* to reaching max benefit from skilled outpatient Occupational Therapy at this time.  Patient to follow up with doctor as needed.             PETAR Christensen

## 2025-03-28 ENCOUNTER — PATIENT MESSAGE (OUTPATIENT)
Dept: FAMILY MEDICINE | Facility: CLINIC | Age: 65
End: 2025-03-28
Payer: OTHER GOVERNMENT

## 2025-03-31 ENCOUNTER — CLINICAL SUPPORT (OUTPATIENT)
Dept: REHABILITATION | Facility: HOSPITAL | Age: 65
End: 2025-03-31
Payer: OTHER GOVERNMENT

## 2025-03-31 DIAGNOSIS — M25.531 RIGHT WRIST PAIN: Primary | ICD-10-CM

## 2025-03-31 PROCEDURE — 97022 WHIRLPOOL THERAPY: CPT | Mod: PN

## 2025-03-31 PROCEDURE — 97110 THERAPEUTIC EXERCISES: CPT | Mod: PN

## 2025-03-31 PROCEDURE — 97530 THERAPEUTIC ACTIVITIES: CPT | Mod: PN

## 2025-04-07 NOTE — PROGRESS NOTES
SUBJECTIVE:      Patient ID: Luann Borjas is a 65 y.o. female.    Chief Complaint: Hypertension    HPI  History of Present Illness    CHIEF COMPLAINT:  Luann presents today for follow up on htn, osteopenia  and review labs    HYPERTENSION:  She reports tolerating an increase in Lotrel from 5/10 to 5/20 well with no side effects.    LABS:  Blood sugar and cholesterol levels were normal. Kidney and liver function tests were within normal limits.     WEIGHT MANAGEMENT:  She reports weight gain from 160s to current weight of 171 lbs, attributed to recent company visits and frequent dining out, particularly [place]un cuisine.    MUSCULOSKELETAL:  She has a history of broken wrists and current back pain with exercise, describing difficulty getting supine daily. She was prescribed weekly osteoporosis medication but is not taking it due to concerns about side effects. She is attempting to increase calcium intake through diet.       Past Surgical History:   Procedure Laterality Date    BASAL CELL CARCINOMA EXCISION  09/2019    lip    CATARACT EXTRACTION W/  INTRAOCULAR LENS IMPLANT Right 11/13/2019    Procedure: EXTRACTION, CATARACT, WITH IOL INSERTION;  Surgeon: Carmelo Perez II, MD;  Location: [facility];  Service: Ophthalmology;  Laterality: Right;    ECTOPIC PREGNANCY SURGERY      EYE SURGERY Right     cataracts    HYSTERECTOMY       Family History   Problem Relation Name Age of Onset    COPD Mother      Liver disease Mother      Thyroid disease Mother      COPD Father      Diabetes Father      Hypertension Father      Pacemaker/defibrilator Father      COPD Sister      Hypertension Sister      Hypertension Brother        Social History     Socioeconomic History    Marital status:    Tobacco Use    Smoking status: Former     Current packs/day: 0.00     Average packs/day: 0.5 packs/day for 32.0 years (16.0 ttl pk-yrs)     Types: Cigarettes     Start date: 11/5/1988     Quit date: 10/25/2020     Years since  quittin.4     Passive exposure: Past    Smokeless tobacco: Never   Substance and Sexual Activity    Alcohol use: Yes     Alcohol/week: 2.0 standard drinks of alcohol     Types: 2 Glasses of wine per week    Drug use: No    Sexual activity: Not Currently     Social Drivers of Health     Financial Resource Strain: Low Risk  (2025)    Overall Financial Resource Strain (CARDIA)     Difficulty of Paying Living Expenses: Not hard at all   Food Insecurity: No Food Insecurity (2025)    Hunger Vital Sign     Worried About Running Out of Food in the Last Year: Never true     Ran Out of Food in the Last Year: Never true   Transportation Needs: No Transportation Needs (2025)    PRAPARE - Transportation     Lack of Transportation (Medical): No     Lack of Transportation (Non-Medical): No   Physical Activity: Insufficiently Active (2025)    Exercise Vital Sign     Days of Exercise per Week: 2 days     Minutes of Exercise per Session: 10 min   Stress: Stress Concern Present (2025)    Citizen of the [place] [employer] - Occupational Stress Questionnaire     Feeling of Stress : Rather much   Housing Stability: Low Risk  (2025)    Housing Stability Vital Sign     Unable to Pay for Housing in the Last Year: No     Homeless in the Last Year: No     Current Medications[1]  Review of patient's allergies indicates:   Allergen Reactions    Erythromycin Anaphylaxis    Buspirone     Ciprofloxacin     Levaquin [levofloxacin]     Zithromax z-masoud [azithromycin]       Past Medical History:   Diagnosis Date    Anxiety     Arthritis     Asthma     Cataract of left eye     Hyperlipidemia     Sleep apnea     cpap     Past Surgical History:   Procedure Laterality Date    BASAL CELL CARCINOMA EXCISION  2019    lip    CATARACT EXTRACTION W/  INTRAOCULAR LENS IMPLANT Right 2019    Procedure: EXTRACTION, CATARACT, WITH IOL INSERTION;  Surgeon: Carmelo Perez II, MD;  Location: [facility] OR;  Service:  "Ophthalmology;  Laterality: Right;    ECTOPIC PREGNANCY SURGERY      EYE SURGERY Right     cataracts    HYSTERECTOMY         Review of Systems   Constitutional:  Negative for appetite change, chills, diaphoresis and unexpected weight change.   HENT:  Negative for ear discharge, hearing loss, trouble swallowing and voice change.    Eyes:  Negative for photophobia and pain.   Respiratory:  Negative for chest tightness and stridor.    Cardiovascular:  Negative for chest pain.   Gastrointestinal:  Negative for blood in stool and vomiting.   Endocrine: Negative for cold intolerance and heat intolerance.   Genitourinary:  Negative for difficulty urinating and flank pain.   Musculoskeletal:  Negative for joint swelling and neck stiffness.   Skin:  Negative for pallor.   Neurological:  Negative for speech difficulty.   Hematological:  Does not bruise/bleed easily.   Psychiatric/Behavioral:  Negative for confusion.       OBJECTIVE:      Vitals:    04/08/25 0859   BP: 134/64   Pulse: 66   SpO2: 97%   Weight: 77.6 kg (171 lb)   Height: 5' 6.5" (1.689 m)     Physical Exam  Vitals and nursing note reviewed.   Constitutional:       General: She is not in acute distress.     Appearance: She is well-developed.   HENT:      Head: Normocephalic and atraumatic.      Right Ear: Tympanic membrane normal.      Left Ear: Tympanic membrane normal.      Nose: Nose normal.      Mouth/Throat:      Pharynx: Uvula midline.   Eyes:      General: Lids are normal.      Conjunctiva/sclera: Conjunctivae normal.      Pupils: Pupils are equal, round, and reactive to light.      Right eye: Pupil is round and reactive.      Left eye: Pupil is round and reactive.   Neck:      Thyroid: No thyromegaly.      Vascular: No JVD.      Trachea: Trachea normal.   Cardiovascular:      Rate and Rhythm: Normal rate and regular rhythm.      Pulses: Normal pulses.      Heart sounds: Normal heart sounds.   Pulmonary:      Effort: Pulmonary effort is normal. No " tachypnea or respiratory distress.      Breath sounds: Normal breath sounds.   Abdominal:      General: Bowel sounds are normal.      Palpations: Abdomen is soft.      Tenderness: There is no abdominal tenderness.   Musculoskeletal:         General: Normal range of motion.      Cervical back: Normal range of motion and neck supple.   Lymphadenopathy:      Cervical: No cervical adenopathy.   Skin:     General: Skin is warm and dry.      Findings: No rash.   Neurological:      Mental Status: She is alert and oriented to person, place, and time.   Psychiatric:         Mood and Affect: Mood normal.         Speech: Speech normal.         Behavior: Behavior normal. Behavior is cooperative.         Thought Content: Thought content normal.       No visits with results within 1 Month(s) from this visit.   Latest known visit with results is:   Office Visit on 01/14/2025   Component Date Value Ref Range Status    Glucose 04/07/2025 94  70 - 99 mg/dL Final    BUN 04/07/2025 15  8 - 27 mg/dL Final    Creatinine 04/07/2025 0.89  0.57 - 1.00 mg/dL Final    eGFR 04/07/2025 72  >59 mL/min/1.73 Final    BUN/Creatinine Ratio 04/07/2025 17  12 - 28 Final    Sodium 04/07/2025 142  134 - 144 mmol/L Final    Potassium 04/07/2025 4.5  3.5 - 5.2 mmol/L Final    Chloride 04/07/2025 107 (H)  96 - 106 mmol/L Final    CO2 04/07/2025 23  20 - 29 mmol/L Final    Calcium 04/07/2025 9.2  8.7 - 10.3 mg/dL Final    Protein, Total 04/07/2025 6.5  6.0 - 8.5 g/dL Final    Albumin 04/07/2025 4.4  3.9 - 4.9 g/dL Final    Globulin, Total 04/07/2025 2.1  1.5 - 4.5 g/dL Final    Total Bilirubin 04/07/2025 0.4  0.0 - 1.2 mg/dL Final    Alkaline Phosphatase 04/07/2025 87  44 - 121 IU/L Final    AST 04/07/2025 21  0 - 40 IU/L Final    ALT 04/07/2025 19  0 - 32 IU/L Final    Cholesterol 04/07/2025 166  100 - 199 mg/dL Final    Triglycerides 04/07/2025 109  0 - 149 mg/dL Final    HDL 04/07/2025 71  >39 mg/dL Final    VLDL Cholesterol Bravo 04/07/2025 19  5 - 40  mg/dL Final    LDL Calculated 04/07/2025 76  0 - 99 mg/dL Final    Hemoglobin A1c 04/07/2025 5.3  4.8 - 5.6 % Final    Comment:          Prediabetes: 5.7 - 6.4           Diabetes: >6.4           Glycemic control for adults with diabetes: <7.0          EXAMINATION:  DXA BONE DENSITY AXIAL SKELETON 1 OR MORE SITES     CLINICAL HISTORY:  Other specified disorders of bone density and structure, unspecified site     COMPARISON:  12/20/2022     LUMBAR SPINE:     Bone mineral density in the lumbar spine from L1 through L4 is 1.063 g/cm2.     The T-score is 0.1 (standard deviations of Young Adult mean).     The Z-score is 1.8 (standard deviations of Age Matched mean).     The WHO classification is normal.  There has been a 5.4% increase since the prior study.     LEFT HIP:     Bone mineral density in the left hip is 0.717 g/cm2.     The T-score is -1.2 (standard deviations of Young Adult mean).     The Z-score is 0.3 (standard deviations of Age Matched mean).     The WHO classification is osteopenia.  There has been a 6.5% increase since the prior study     FRAX score     Ten year fracture risk     Major osteoporotic fracture 16%     Hip fracture 1.7%.     Impression:     Osteopenia of the left hip and normal bone mineral density of the lumbar spine with increase in bone mineral density since the prior study        Electronically signed by:Kaitlynn Katz  Date:                                            02/11/2025    Assessment:       1. Essential hypertension    2. Osteopenia, unspecified location    3. Pure hypercholesterolemia    4. Vitamin D deficiency        Plan:       Essential hypertension  -     amlodipine-benazepril 5-20 mg (LOTREL) 5-20 mg per capsule; Take 1 capsule by mouth once daily.  Dispense: 90 capsule; Refill: 1    Osteopenia, unspecified location  -     alendronate (FOSAMAX) 70 MG tablet; Take 1 tablet (70 mg total) by mouth every 7 days.    Pure hypercholesterolemia  -     atorvastatin (LIPITOR) 10 MG  tablet; Take 1 tablet (10 mg total) by mouth once daily.  Dispense: 90 tablet; Refill: 1    Vitamin D deficiency      Assessment & Plan    HYPERTENSION:  - Assessed blood pressure control after recent medication increase.  - Measured blood pressure, which was found to be 116/70, indicating good control.  - Evaluated patient's tolerance to the higher dose, noting no adverse effects.  -- Continued current treatment plan due to well-controlled blood pressure.  - Sent medication refills to [facility] for blood pressure management.    OSTEOPENIA AND BONE HEALTH:  - Evaluated bone health status, considering history of wrist fracture and previous DEXA scan showing osteopenia.  - Reviewed bone density scan results, which showed osteopenia in the hip and normal density in the lumbar spine.  - Calculated the 10-year risk of major fracture at 16%.  - Assessed that medication is recommended due to the combination of osteopenia and previous fracture.  - Discussed osteoporosis risk and treatment options, weighing benefits of medication against concerns about side effects.  --started Fosamax (alendronate) weekly for osteoporosis prevention.  - Recommend calcium and vitamin D supplementation, along with weight-bearing exercises for bone health.  - Planned to repeat bone density scan in 2 years to monitor progress.    HYPERLIPIDEMIA:  - Reviewed recent lab results, noting overall good values for cholesterol.  - Continued atorvastatin prescription for cholesterol management.  - Refilled atorvastatin prescription.      FOLLOW-UP:  - Follow up in September/October.  - Contact the office if needed before the next appointment.         Follow up in about 6 months (around 10/8/2025) for htn .  This note was generated with the assistance of ambient listening technology. Verbal consent was obtained by the patient and accompanying visitor(s) for the recording of patient appointment to facilitate this note. I attest to having reviewed and  edited the generated note for accuracy, though some syntax or spelling errors may persist. Please contact the author of this note for any clarification.        4/8/2025 YULIANA Farooq, LAI             [1]   Current Outpatient Medications   Medication Sig Dispense Refill    albuterol (PROAIR HFA) 90 mcg/actuation inhaler Inhale 2 puffs into the lungs every 6 (six) hours as needed for Wheezing. Rescue 18 g 2    ALPRAZolam (XANAX) 0.5 MG tablet Take 1 tablet (0.5 mg total) by mouth daily as needed for Anxiety. 30 tablet 1    Bifidobacterium infantis (ALIGN) 4 mg Cap Take 1 capsule (4 mg total) by mouth Daily. 30 capsule 0    calcium carbonate (OS-MELIA) 500 mg calcium (1,250 mg) chewable tablet Take 1 tablet by mouth once daily.      cholecalciferol, vitamin D3, 1,250 mcg (50,000 unit) Tab Take 1,250 mcg by mouth every 7 days. 12 tablet 0    umeclidinium-vilanteroL (ANORO ELLIPTA) 62.5-25 mcg/actuation DsDv Inhale 1 puff into the lungs once daily. Controller 3 each 5    vilazodone (VIIBRYD) 40 mg Tab tablet Take 1 tablet (40 mg total) by mouth once daily. 90 tablet 1    alendronate (FOSAMAX) 70 MG tablet Take 1 tablet (70 mg total) by mouth every 7 days.      amlodipine-benazepril 5-20 mg (LOTREL) 5-20 mg per capsule Take 1 capsule by mouth once daily. 90 capsule 1    atorvastatin (LIPITOR) 10 MG tablet Take 1 tablet (10 mg total) by mouth once daily. 90 tablet 1     No current facility-administered medications for this visit.

## 2025-04-08 ENCOUNTER — OFFICE VISIT (OUTPATIENT)
Dept: FAMILY MEDICINE | Facility: CLINIC | Age: 65
End: 2025-04-08
Payer: OTHER GOVERNMENT

## 2025-04-08 VITALS
BODY MASS INDEX: 26.84 KG/M2 | HEART RATE: 66 BPM | DIASTOLIC BLOOD PRESSURE: 70 MMHG | SYSTOLIC BLOOD PRESSURE: 116 MMHG | OXYGEN SATURATION: 97 % | HEIGHT: 67 IN | WEIGHT: 171 LBS

## 2025-04-08 DIAGNOSIS — E55.9 VITAMIN D DEFICIENCY: ICD-10-CM

## 2025-04-08 DIAGNOSIS — E78.00 PURE HYPERCHOLESTEROLEMIA: ICD-10-CM

## 2025-04-08 DIAGNOSIS — M85.80 OSTEOPENIA, UNSPECIFIED LOCATION: ICD-10-CM

## 2025-04-08 DIAGNOSIS — I10 ESSENTIAL HYPERTENSION: Primary | ICD-10-CM

## 2025-04-08 PROCEDURE — 99214 OFFICE O/P EST MOD 30 MIN: CPT | Mod: S$GLB,,, | Performed by: NURSE PRACTITIONER

## 2025-04-08 RX ORDER — ATORVASTATIN CALCIUM 10 MG/1
10 TABLET, FILM COATED ORAL DAILY
Qty: 90 TABLET | Refills: 1 | Status: SHIPPED | OUTPATIENT
Start: 2025-04-08

## 2025-04-08 RX ORDER — AMLODIPINE AND BENAZEPRIL HYDROCHLORIDE 5; 20 MG/1; MG/1
1 CAPSULE ORAL DAILY
Qty: 90 CAPSULE | Refills: 1 | Status: SHIPPED | OUTPATIENT
Start: 2025-04-08

## 2025-04-08 RX ORDER — ALENDRONATE SODIUM 70 MG/1
70 TABLET ORAL
Start: 2025-04-08

## 2025-04-18 DIAGNOSIS — F41.1 GAD (GENERALIZED ANXIETY DISORDER): ICD-10-CM

## 2025-04-18 RX ORDER — ALPRAZOLAM 0.5 MG/1
0.5 TABLET ORAL DAILY PRN
Qty: 30 TABLET | Refills: 1 | Status: CANCELLED | OUTPATIENT
Start: 2025-04-18 | End: 2025-06-17

## 2025-05-22 DIAGNOSIS — F41.1 GAD (GENERALIZED ANXIETY DISORDER): ICD-10-CM

## 2025-05-23 RX ORDER — ALPRAZOLAM 0.5 MG/1
0.5 TABLET ORAL DAILY PRN
Qty: 30 TABLET | Refills: 1 | Status: SHIPPED | OUTPATIENT
Start: 2025-05-23 | End: 2025-07-22

## 2025-06-11 ENCOUNTER — OFFICE VISIT (OUTPATIENT)
Dept: PSYCHIATRY | Facility: CLINIC | Age: 65
End: 2025-06-11
Payer: OTHER GOVERNMENT

## 2025-06-11 VITALS
HEIGHT: 67 IN | DIASTOLIC BLOOD PRESSURE: 69 MMHG | SYSTOLIC BLOOD PRESSURE: 139 MMHG | HEART RATE: 79 BPM | WEIGHT: 169.63 LBS | BODY MASS INDEX: 26.62 KG/M2

## 2025-06-11 DIAGNOSIS — F41.1 GAD (GENERALIZED ANXIETY DISORDER): ICD-10-CM

## 2025-06-11 DIAGNOSIS — F33.41 RECURRENT MAJOR DEPRESSIVE DISORDER, IN PARTIAL REMISSION: Primary | ICD-10-CM

## 2025-06-11 DIAGNOSIS — F41.0 PANIC ATTACKS: ICD-10-CM

## 2025-06-11 PROCEDURE — G2211 COMPLEX E/M VISIT ADD ON: HCPCS | Mod: ,,, | Performed by: PHYSICIAN ASSISTANT

## 2025-06-11 PROCEDURE — 99999 PR PBB SHADOW E&M-EST. PATIENT-LVL III: CPT | Mod: PBBFAC,,, | Performed by: PHYSICIAN ASSISTANT

## 2025-06-11 PROCEDURE — 99213 OFFICE O/P EST LOW 20 MIN: CPT | Mod: PBBFAC,PN | Performed by: PHYSICIAN ASSISTANT

## 2025-06-11 PROCEDURE — 99214 OFFICE O/P EST MOD 30 MIN: CPT | Mod: S$PBB,,, | Performed by: PHYSICIAN ASSISTANT

## 2025-06-11 RX ORDER — VILAZODONE HYDROCHLORIDE 40 MG/1
40 TABLET ORAL DAILY
Qty: 90 TABLET | Refills: 1 | Status: SHIPPED | OUTPATIENT
Start: 2025-06-11

## 2025-07-23 DIAGNOSIS — F41.1 GAD (GENERALIZED ANXIETY DISORDER): ICD-10-CM

## 2025-07-23 RX ORDER — ALPRAZOLAM 0.5 MG/1
0.5 TABLET ORAL DAILY PRN
Qty: 30 TABLET | Refills: 1 | Status: SHIPPED | OUTPATIENT
Start: 2025-07-23 | End: 2025-09-21

## (undated) DEVICE — KNIFE SLIT PHACO 2.4MM

## (undated) DEVICE — NDL HYPODERMIC BLUNT 18G 1.5IN

## (undated) DEVICE — GLOVE SURG ULTRA TOUCH 7.5

## (undated) DEVICE — DRAPE OPHTHALMIC 48X62 FEN

## (undated) DEVICE — CARTRIDGE LENS D

## (undated) DEVICE — SLEEVE ULTRA INFUSION

## (undated) DEVICE — CYSTOTOME IRRIG 24G 13MM

## (undated) DEVICE — SYS LABEL CORRECT MED

## (undated) DEVICE — PACK CUSTOM EYE KIT

## (undated) DEVICE — TIP KELMAN 30 DEGREE

## (undated) DEVICE — SHIELD EYE PLASTIC 3100G

## (undated) DEVICE — TIP I/A CURVED SINGLE USE

## (undated) DEVICE — DRESSING TRANS 2X2 TEGADERM

## (undated) DEVICE — SOL BETADINE 5%